# Patient Record
Sex: MALE | Race: WHITE | NOT HISPANIC OR LATINO | URBAN - METROPOLITAN AREA
[De-identification: names, ages, dates, MRNs, and addresses within clinical notes are randomized per-mention and may not be internally consistent; named-entity substitution may affect disease eponyms.]

---

## 2017-03-17 ENCOUNTER — INPATIENT (INPATIENT)
Facility: HOSPITAL | Age: 82
LOS: 4 days | Discharge: HOME | End: 2017-03-22
Attending: INTERNAL MEDICINE | Admitting: INTERNAL MEDICINE

## 2017-03-24 ENCOUNTER — APPOINTMENT (OUTPATIENT)
Dept: CARDIOLOGY | Facility: CLINIC | Age: 82
End: 2017-03-24

## 2017-03-24 VITALS
OXYGEN SATURATION: 97 % | HEART RATE: 87 BPM | BODY MASS INDEX: 28.44 KG/M2 | HEIGHT: 72 IN | WEIGHT: 210 LBS | DIASTOLIC BLOOD PRESSURE: 73 MMHG | SYSTOLIC BLOOD PRESSURE: 130 MMHG

## 2017-03-24 DIAGNOSIS — Z86.79 PERSONAL HISTORY OF OTHER DISEASES OF THE CIRCULATORY SYSTEM: ICD-10-CM

## 2017-03-24 PROBLEM — Z00.00 ENCOUNTER FOR PREVENTIVE HEALTH EXAMINATION: Status: ACTIVE | Noted: 2017-03-24

## 2017-03-24 RX ORDER — CLOPIDOGREL 75 MG/1
75 TABLET, FILM COATED ORAL
Refills: 0 | Status: DISCONTINUED | COMMUNITY
End: 2017-03-24

## 2017-04-03 ENCOUNTER — APPOINTMENT (OUTPATIENT)
Dept: CARDIOLOGY | Facility: CLINIC | Age: 82
End: 2017-04-03

## 2017-04-03 VITALS — SYSTOLIC BLOOD PRESSURE: 140 MMHG | DIASTOLIC BLOOD PRESSURE: 80 MMHG | HEART RATE: 62 BPM

## 2017-04-03 DIAGNOSIS — F15.90 OTHER STIMULANT USE, UNSPECIFIED, UNCOMPLICATED: ICD-10-CM

## 2017-04-03 DIAGNOSIS — Z87.891 PERSONAL HISTORY OF NICOTINE DEPENDENCE: ICD-10-CM

## 2017-04-03 DIAGNOSIS — Z78.9 OTHER SPECIFIED HEALTH STATUS: ICD-10-CM

## 2017-04-13 PROBLEM — Z86.79 HISTORY OF HYPERTENSION: Status: RESOLVED | Noted: 2017-04-13 | Resolved: 2017-04-13

## 2017-04-14 ENCOUNTER — RX RENEWAL (OUTPATIENT)
Age: 82
End: 2017-04-14

## 2017-06-27 DIAGNOSIS — H53.2 DIPLOPIA: ICD-10-CM

## 2017-06-27 DIAGNOSIS — I95.1 ORTHOSTATIC HYPOTENSION: ICD-10-CM

## 2017-06-27 DIAGNOSIS — E03.9 HYPOTHYROIDISM, UNSPECIFIED: ICD-10-CM

## 2017-06-27 DIAGNOSIS — R42 DIZZINESS AND GIDDINESS: ICD-10-CM

## 2017-06-27 DIAGNOSIS — I25.10 ATHEROSCLEROTIC HEART DISEASE OF NATIVE CORONARY ARTERY WITHOUT ANGINA PECTORIS: ICD-10-CM

## 2017-06-27 DIAGNOSIS — Z95.2 PRESENCE OF PROSTHETIC HEART VALVE: ICD-10-CM

## 2017-06-27 DIAGNOSIS — N40.0 BENIGN PROSTATIC HYPERPLASIA WITHOUT LOWER URINARY TRACT SYMPTOMS: ICD-10-CM

## 2017-06-27 DIAGNOSIS — I10 ESSENTIAL (PRIMARY) HYPERTENSION: ICD-10-CM

## 2017-06-27 DIAGNOSIS — I25.2 OLD MYOCARDIAL INFARCTION: ICD-10-CM

## 2017-06-27 DIAGNOSIS — E78.5 HYPERLIPIDEMIA, UNSPECIFIED: ICD-10-CM

## 2017-07-10 ENCOUNTER — APPOINTMENT (OUTPATIENT)
Dept: CARDIOLOGY | Facility: CLINIC | Age: 82
End: 2017-07-10

## 2017-07-10 VITALS
WEIGHT: 210 LBS | OXYGEN SATURATION: 97 % | BODY MASS INDEX: 28.44 KG/M2 | DIASTOLIC BLOOD PRESSURE: 98 MMHG | HEART RATE: 71 BPM | HEIGHT: 72 IN | SYSTOLIC BLOOD PRESSURE: 190 MMHG

## 2017-07-10 VITALS — DIASTOLIC BLOOD PRESSURE: 85 MMHG | SYSTOLIC BLOOD PRESSURE: 145 MMHG

## 2018-01-08 ENCOUNTER — APPOINTMENT (OUTPATIENT)
Dept: CARDIOLOGY | Facility: CLINIC | Age: 83
End: 2018-01-08

## 2018-02-05 ENCOUNTER — APPOINTMENT (OUTPATIENT)
Dept: CARDIOLOGY | Facility: CLINIC | Age: 83
End: 2018-02-05

## 2018-02-05 VITALS
SYSTOLIC BLOOD PRESSURE: 187 MMHG | DIASTOLIC BLOOD PRESSURE: 97 MMHG | HEART RATE: 69 BPM | HEIGHT: 72 IN | BODY MASS INDEX: 28.31 KG/M2 | OXYGEN SATURATION: 98 % | WEIGHT: 209 LBS

## 2018-02-05 DIAGNOSIS — E03.9 HYPOTHYROIDISM, UNSPECIFIED: ICD-10-CM

## 2018-02-19 ENCOUNTER — RX RENEWAL (OUTPATIENT)
Age: 83
End: 2018-02-19

## 2018-06-03 ENCOUNTER — INPATIENT (INPATIENT)
Facility: HOSPITAL | Age: 83
LOS: 2 days | Discharge: HOME | End: 2018-06-06
Attending: INTERNAL MEDICINE | Admitting: INTERNAL MEDICINE

## 2018-06-03 VITALS
HEART RATE: 94 BPM | DIASTOLIC BLOOD PRESSURE: 78 MMHG | OXYGEN SATURATION: 96 % | RESPIRATION RATE: 20 BRPM | TEMPERATURE: 101 F | SYSTOLIC BLOOD PRESSURE: 161 MMHG

## 2018-06-03 DIAGNOSIS — Z95.2 PRESENCE OF PROSTHETIC HEART VALVE: Chronic | ICD-10-CM

## 2018-06-03 DIAGNOSIS — Z98.890 OTHER SPECIFIED POSTPROCEDURAL STATES: Chronic | ICD-10-CM

## 2018-06-03 LAB
ALBUMIN SERPL ELPH-MCNC: 4 G/DL — SIGNIFICANT CHANGE UP (ref 3.5–5.2)
ALP SERPL-CCNC: 61 U/L — SIGNIFICANT CHANGE UP (ref 30–115)
ALT FLD-CCNC: 12 U/L — SIGNIFICANT CHANGE UP (ref 0–41)
ANION GAP SERPL CALC-SCNC: 16 MMOL/L — HIGH (ref 7–14)
APPEARANCE UR: (no result)
AST SERPL-CCNC: 13 U/L — SIGNIFICANT CHANGE UP (ref 0–41)
BACTERIA # UR AUTO: (no result) /HPF
BASE EXCESS BLDV CALC-SCNC: 2.8 MMOL/L — HIGH (ref -2–2)
BILIRUB SERPL-MCNC: 0.8 MG/DL — SIGNIFICANT CHANGE UP (ref 0.2–1.2)
BILIRUB UR-MCNC: NEGATIVE — SIGNIFICANT CHANGE UP
BUN SERPL-MCNC: 21 MG/DL — HIGH (ref 10–20)
CA-I SERPL-SCNC: 1.17 MMOL/L — SIGNIFICANT CHANGE UP (ref 1.12–1.3)
CALCIUM SERPL-MCNC: 8.8 MG/DL — SIGNIFICANT CHANGE UP (ref 8.5–10.1)
CHLORIDE SERPL-SCNC: 97 MMOL/L — LOW (ref 98–110)
CO2 SERPL-SCNC: 24 MMOL/L — SIGNIFICANT CHANGE UP (ref 17–32)
COLOR SPEC: SIGNIFICANT CHANGE UP
CREAT SERPL-MCNC: 1.5 MG/DL — SIGNIFICANT CHANGE UP (ref 0.7–1.5)
DIFF PNL FLD: (no result)
GAS PNL BLDV: 137 MMOL/L — SIGNIFICANT CHANGE UP (ref 136–145)
GAS PNL BLDV: SIGNIFICANT CHANGE UP
GLUCOSE SERPL-MCNC: 116 MG/DL — HIGH (ref 70–99)
GLUCOSE UR QL: NEGATIVE MG/DL — SIGNIFICANT CHANGE UP
HCO3 BLDV-SCNC: 28 MMOL/L — SIGNIFICANT CHANGE UP (ref 22–29)
HCT VFR BLD CALC: 40.1 % — LOW (ref 42–52)
HCT VFR BLDA CALC: 41.9 % — SIGNIFICANT CHANGE UP (ref 34–44)
HGB BLD CALC-MCNC: 13.7 G/DL — LOW (ref 14–18)
HGB BLD-MCNC: 13.4 G/DL — LOW (ref 14–18)
KETONES UR-MCNC: NEGATIVE — SIGNIFICANT CHANGE UP
LACTATE BLDV-MCNC: 1.5 MMOL/L — SIGNIFICANT CHANGE UP (ref 0.5–1.6)
LEUKOCYTE ESTERASE UR-ACNC: (no result)
MAGNESIUM SERPL-MCNC: 1.6 MG/DL — LOW (ref 1.8–2.4)
MCHC RBC-ENTMCNC: 32 PG — HIGH (ref 27–31)
MCHC RBC-ENTMCNC: 33.4 G/DL — SIGNIFICANT CHANGE UP (ref 32–37)
MCV RBC AUTO: 95.7 FL — HIGH (ref 80–94)
NITRITE UR-MCNC: POSITIVE
NRBC # BLD: 0 /100 WBCS — SIGNIFICANT CHANGE UP (ref 0–0)
PCO2 BLDV: 44 MMHG — SIGNIFICANT CHANGE UP (ref 41–51)
PH BLDV: 7.41 — SIGNIFICANT CHANGE UP (ref 7.26–7.43)
PH UR: 6 — SIGNIFICANT CHANGE UP (ref 5–8)
PLATELET # BLD AUTO: 169 K/UL — SIGNIFICANT CHANGE UP (ref 130–400)
PO2 BLDV: 21 MMHG — SIGNIFICANT CHANGE UP (ref 20–40)
POTASSIUM BLDV-SCNC: 4 MMOL/L — SIGNIFICANT CHANGE UP (ref 3.3–5.6)
POTASSIUM SERPL-MCNC: 4.1 MMOL/L — SIGNIFICANT CHANGE UP (ref 3.5–5)
POTASSIUM SERPL-SCNC: 4.1 MMOL/L — SIGNIFICANT CHANGE UP (ref 3.5–5)
PROT SERPL-MCNC: 6.5 G/DL — SIGNIFICANT CHANGE UP (ref 6–8)
PROT UR-MCNC: 100 MG/DL
RBC # BLD: 4.19 M/UL — LOW (ref 4.7–6.1)
RBC # FLD: 12.9 % — SIGNIFICANT CHANGE UP (ref 11.5–14.5)
RBC CASTS # UR COMP ASSIST: (no result) /HPF
SAO2 % BLDV: 35 % — SIGNIFICANT CHANGE UP
SODIUM SERPL-SCNC: 137 MMOL/L — SIGNIFICANT CHANGE UP (ref 135–146)
SP GR SPEC: 1.02 — SIGNIFICANT CHANGE UP (ref 1.01–1.03)
UROBILINOGEN FLD QL: 1 MG/DL (ref 0.2–0.2)
WBC # BLD: 21.54 K/UL — HIGH (ref 4.8–10.8)
WBC # FLD AUTO: 21.54 K/UL — HIGH (ref 4.8–10.8)
WBC UR QL: >50 /HPF

## 2018-06-03 RX ORDER — ACETAMINOPHEN 500 MG
650 TABLET ORAL ONCE
Qty: 0 | Refills: 0 | Status: COMPLETED | OUTPATIENT
Start: 2018-06-03 | End: 2018-06-03

## 2018-06-03 RX ORDER — PANTOPRAZOLE SODIUM 20 MG/1
40 TABLET, DELAYED RELEASE ORAL
Qty: 0 | Refills: 0 | Status: DISCONTINUED | OUTPATIENT
Start: 2018-06-03 | End: 2018-06-06

## 2018-06-03 RX ORDER — LISINOPRIL 2.5 MG/1
20 TABLET ORAL DAILY
Qty: 0 | Refills: 0 | Status: DISCONTINUED | OUTPATIENT
Start: 2018-06-03 | End: 2018-06-06

## 2018-06-03 RX ORDER — SODIUM CHLORIDE 9 MG/ML
3000 INJECTION INTRAMUSCULAR; INTRAVENOUS; SUBCUTANEOUS ONCE
Qty: 0 | Refills: 0 | Status: COMPLETED | OUTPATIENT
Start: 2018-06-03 | End: 2018-06-03

## 2018-06-03 RX ORDER — CEFTRIAXONE 500 MG/1
1 INJECTION, POWDER, FOR SOLUTION INTRAMUSCULAR; INTRAVENOUS EVERY 24 HOURS
Qty: 0 | Refills: 0 | Status: DISCONTINUED | OUTPATIENT
Start: 2018-06-04 | End: 2018-06-06

## 2018-06-03 RX ORDER — ATORVASTATIN CALCIUM 80 MG/1
80 TABLET, FILM COATED ORAL AT BEDTIME
Qty: 0 | Refills: 0 | Status: DISCONTINUED | OUTPATIENT
Start: 2018-06-03 | End: 2018-06-06

## 2018-06-03 RX ORDER — ASPIRIN/CALCIUM CARB/MAGNESIUM 324 MG
81 TABLET ORAL DAILY
Qty: 0 | Refills: 0 | Status: DISCONTINUED | OUTPATIENT
Start: 2018-06-03 | End: 2018-06-06

## 2018-06-03 RX ORDER — APIXABAN 2.5 MG/1
5 TABLET, FILM COATED ORAL EVERY 12 HOURS
Qty: 0 | Refills: 0 | Status: DISCONTINUED | OUTPATIENT
Start: 2018-06-03 | End: 2018-06-06

## 2018-06-03 RX ORDER — LEVOTHYROXINE SODIUM 125 MCG
75 TABLET ORAL
Qty: 0 | Refills: 0 | Status: DISCONTINUED | OUTPATIENT
Start: 2018-06-03 | End: 2018-06-06

## 2018-06-03 RX ORDER — MAGNESIUM SULFATE 500 MG/ML
2 VIAL (ML) INJECTION ONCE
Qty: 0 | Refills: 0 | Status: COMPLETED | OUTPATIENT
Start: 2018-06-03 | End: 2018-06-03

## 2018-06-03 RX ORDER — FOLIC ACID 0.8 MG
1 TABLET ORAL ONCE
Qty: 0 | Refills: 0 | Status: COMPLETED | OUTPATIENT
Start: 2018-06-03 | End: 2018-06-03

## 2018-06-03 RX ORDER — TAMSULOSIN HYDROCHLORIDE 0.4 MG/1
0.4 CAPSULE ORAL AT BEDTIME
Qty: 0 | Refills: 0 | Status: DISCONTINUED | OUTPATIENT
Start: 2018-06-03 | End: 2018-06-06

## 2018-06-03 RX ORDER — CEFTRIAXONE 500 MG/1
1 INJECTION, POWDER, FOR SOLUTION INTRAMUSCULAR; INTRAVENOUS ONCE
Qty: 0 | Refills: 0 | Status: COMPLETED | OUTPATIENT
Start: 2018-06-03 | End: 2018-06-03

## 2018-06-03 RX ADMIN — Medication 650 MILLIGRAM(S): at 21:22

## 2018-06-03 RX ADMIN — CEFTRIAXONE 100 GRAM(S): 500 INJECTION, POWDER, FOR SOLUTION INTRAMUSCULAR; INTRAVENOUS at 21:22

## 2018-06-03 RX ADMIN — Medication 50 GRAM(S): at 22:38

## 2018-06-03 RX ADMIN — SODIUM CHLORIDE 2000 MILLILITER(S): 9 INJECTION INTRAMUSCULAR; INTRAVENOUS; SUBCUTANEOUS at 21:22

## 2018-06-03 NOTE — ED PROVIDER NOTE - CARE PLAN
Principal Discharge DX:	Acute cystitis without hematuria  Secondary Diagnosis:	Fever  Secondary Diagnosis:	Leukocytosis Principal Discharge DX:	Acute cystitis without hematuria  Assessment and plan of treatment:	86m w UTI symptoms and fever concerning for systemic infection. nontoxic appearing, n/v itnact. --CBC, CMP, lactate, blood/urine cx, CXR, UA. --IV fluids, antipyretics, abx, admit  Secondary Diagnosis:	Fever  Secondary Diagnosis:	Leukocytosis

## 2018-06-03 NOTE — H&P ADULT - ASSESSMENT
# Fever w/leukocytosis and urinary symptoms - complicated UTI vs Pyelonephritis  - F/u Urine & Blood cx  - IV Abx    # AF     # CAD c/p MI    # Hypothyroidism 86/ hx of HTN, HLD, CAD s/p MI (2010), AF (on eliquis), Porcine AV (2013), loop recorder (2017 - for eval of TIA - not removed), hypothyrodism, presents with fever and difficulty urinating & dysuria x1 day. At home had temperature 101.8F.    # Fever w/leukocytosis and urinary symptoms - complicated UTI vs Pyelonephritis  - No hypotnesion, recieved 3L IVF in Ed  - Family requested no ricardo catheter (daughter is an RN), will monitor urine outpt - if <0.5cc/kg/hr place ricardo; or if pt is retaining urine based on imaging studies - place ricardo  - Cr stable (Baseline 1.4)  - F/u Urine & Blood cx  - IV Abx - ceftriaxone  - Will check bladder - kidney sono;  - C/w flomax    # AF - C/w eliquis; pt not on rate control medication; no tachycardia now  - pt still has loop recorder (from 2017)    # CAD c/p MI (2010) - C/w Aspirin & statin  # Hypothyroidism - c/w levothyroxine      OOBTC  No dvt ppx - on eliquis  GI ppx  Full Code 86/ hx of HTN, HLD, CAD s/p MI (2010), AF (on eliquis), Porcine AV (2013), loop recorder (2017 - for eval of TIA - not removed), hypothyrodism, presents with fever and difficulty urinating & dysuria x1 day. At home had temperature 101.8F.    # Fever w/leukocytosis and urinary symptoms - complicated UTI vs Pyelonephritis  - No hypotnesion, recieved 3L IVF in Ed  - Family requested no ricardo catheter (daughter is an RN), will monitor urine outpt - if <0.5cc/kg/hr place ricardo; or if pt is retaining urine based on imaging studies - place ricardo  - Cr stable (Baseline 1.4)  - F/u Urine & Blood cx  - IV Abx - ceftriaxone  - Will check bladder - kidney sono;  - C/w flomax    # AF - C/w eliquis; pt not on rate control medication; no tachycardia now  - pt still has loop recorder (from 2017)    #HTN - c/w lisinopril  # CAD c/p MI (2010) - C/w Aspirin & statin  # Hypothyroidism - c/w levothyroxine      OOBTC  No dvt ppx - on eliquis  GI ppx  Full Code 86/ hx of HTN, HLD, CAD s/p MI (2010), AF (on eliquis), Porcine AV (2013), loop recorder (2017 - for eval of TIA - not removed), hypothyrodism, presents with fever and difficulty urinating & dysuria x1 day. At home had temperature 101.8F.    # Fever w/leukocytosis and urinary symptoms - complicated UTI vs Pyelonephritis  - No hypotnesion, recieved 3L IVF in Ed  - Family requested no ricardo catheter (daughter is an RN), will monitor urine outpt - if <0.5cc/kg/hr place ricardo; or if pt is retaining urine based on imaging studies - place ricardo  - Cr stable (Baseline 1.4)  - F/u Urine & Blood cx  - IV Abx - ceftriaxone  - Will check bladder - kidney sono;  - C/w flomax    # AF - C/w eliquis; pt not on rate control medication; no tachycardia now  - pt still has loop recorder (from 2017)    #HTN - c/w lisinopril  # CAD c/p MI (2010) - C/w Aspirin & statin  # Hypothyroidism - c/w levothyroxine    # ETOH use daily - reports no hx of w/d. Drinks 3-4 "small" glasses wine a day. Goes days without wine as well. No s/s of w/d now.      OOBTC  No dvt ppx - on eliquis  GI ppx  Full Code 86/ hx of HTN, HLD, CAD s/p MI (2010), AF (on eliquis), Porcine AV (2013), loop recorder (2017 - for eval of TIA - not removed), hypothyrodism, presents with fever and difficulty urinating & dysuria x1 day. At home had temperature 101.8F.    # Fever w/leukocytosis and urinary symptoms - complicated UTI vs Pyelonephritis  - No hypotnesion, recieved 3L IVF in Ed  - Family requested no ricardo catheter (daughter is an RN), will monitor urine outpt - if <0.5cc/kg/hr place ricardo; or if pt is retaining urine based on imaging studies - place ricardo  - Cr stable (Baseline 1.4)  - F/u Urine & Blood cx  - IV Abx - ceftriaxone  - Will check bladder - kidney sono;  - C/w flomax    # AF - C/w eliquis; pt not on rate control medication; no tachycardia now  - pt still has loop recorder (from 2017)    #HTN - c/w lisinopril  # CAD c/p MI (2010) - C/w Aspirin & statin (not on a b-blocker)  - monitor HR & BP, consider Metoprolol if VS stable    # Hypothyroidism - c/w levothyroxine    # ETOH use daily - reports no hx of w/d. Drinks 3-4 "small" glasses wine a day. Goes days without wine as well. No s/s of w/d now.      Medication list confirmed with wife.    OOBTC  No dvt ppx - on eliquis  GI ppx  Full Code 86/ hx of HTN, HLD, CAD s/p MI (2010), AF (on eliquis), Porcine AV (2013), loop recorder (2017 - for eval of TIA - not removed), hypothyrodism, presents with fever and difficulty urinating & dysuria x1 day. At home had temperature 101.8F.    # Fever w/leukocytosis and urinary symptoms - complicated UTI vs Pyelonephritis  - No hypotnesion, recieved 3L IVF in Ed  - Family requested no ricardo catheter (daughter is an RN), will monitor urine outpt - if <0.5cc/kg/hr place ricardo; or if pt is retaining urine based on imaging studies - place ricardo  - Cr stable (Baseline 1.4)  - F/u Urine & Blood cx  - IV Abx - ceftriaxone  - Will check bladder - kidney sono;  - C/w flomax    # AF - C/w eliquis; pt not on rate control medication; no tachycardia now  - pt still has loop recorder (from 2017)    #HTN - c/w lisinopril  # CAD c/p MI (2010) - C/w Aspirin & statin (not on a b-blocker)  - monitor HR & BP, consider Metoprolol if VS stable    # Hypothyroidism - c/w levothyroxine    # ETOH use daily - reports no hx of w/d. Drinks 3-4 "small" glasses wine a day. Goes days without wine as well. No s/s of w/d now.      # Mg - repleted in ED    Medication list confirmed with wife.    OOBTC  No dvt ppx - on eliquis  GI ppx  Full Code 86/ hx of HTN, HLD, CAD s/p MI (2010), AF (on eliquis), Porcine AV (2013), loop recorder (2017 - for eval of TIA - not removed), hypothyrodism, presents with fever and difficulty urinating & dysuria x1 day. At home had temperature 101.8F.    # Fever w/leukocytosis and urinary symptoms - complicated UTI vs Pyelonephritis  - No hypotnesion, recieved 3L IVF in Ed  - Family requested no ricardo catheter (daughter is an RN), will monitor urine outpt - if <0.5cc/kg/hr place ricardo; or if pt is retaining urine based on imaging studies - place ricardo  - Cr stable (Baseline 1.4)  - F/u Urine & Blood cx  - IV Abx - ceftriaxone  - Will check bladder - kidney sono;  - C/w flomax    # AF - C/w eliquis; pt not on rate control medication; no tachycardia now  - pt still has loop recorder (from 2017)    #HTN - c/w lisinopril  # CAD c/p MI (2010) - C/w Aspirin & statin (not on a b-blocker)  - monitor HR & BP, consider Metoprolol if VS stable    # Hypothyroidism - c/w levothyroxine    # ETOH use daily - reports no hx of w/d. Drinks 3-4 "small" glasses wine a day. Goes days without wine as well. No s/s of w/d now.      # Mg - repleted in ED    Medication list confirmed with wife. family concerned about issue on medication non-compliance.    OOBTC  No dvt ppx - on eliquis  GI ppx  Full Code 86/ hx of HTN, HLD, CAD s/p MI (2010), AF (on eliquis), Porcine AV (2013), loop recorder (2017 - for eval of TIA - not removed), hypothyrodism, presents with fever and difficulty urinating & dysuria x1 day. At home had temperature 101.8F.    # Fever w/leukocytosis and urinary symptoms - complicated UTI vs Pyelonephritis  - No hypotnesion, recieved 3L IVF in Ed  - Family requested no ricardo catheter (daughter is an RN), will monitor urine outpt - if <0.5cc/kg/hr place ricardo; or if pt is retaining urine based on imaging studies - place ricardo  - Cr stable (Baseline 1.4)  - F/u Urine & Blood cx  - IV Abx - ceftriaxone  - Will check bladder - kidney sono;  - C/w flomax    # AF - C/w eliquis; pt not on rate control medication; no tachycardia now  - pt still has loop recorder (from 2017)  - Monitor BMP daily for renal function - may need dose adjustment of eliquis if reduction in renal function.    #HTN - c/w lisinopril  # CAD c/p MI (2010) - C/w Aspirin & statin (not on a b-blocker)  - monitor HR & BP, consider Metoprolol if VS stable    # Hypothyroidism - c/w levothyroxine    # ETOH use daily - reports no hx of w/d. Drinks 3-4 "small" glasses wine a day. Goes days without wine as well. No s/s of w/d now.      # Mg - repleted in ED    Medication list confirmed with wife. family concerned about issue on medication non-compliance.    OOBTC  No dvt ppx - on eliquis  GI ppx  Full Code

## 2018-06-03 NOTE — H&P ADULT - NSHPLABSRESULTS_GEN_ALL_CORE
13.4   21.54 )-----------( 169      ( 2018 20:59 )             40.1       06-03    137  |  97<L>  |  21<H>  ----------------------------<  116<H>  4.1   |  24  |  1.5    Ca    8.8      2018 20:59  Mg     1.6     -    TPro  6.5  /  Alb  4.0  /  TBili  0.8  /  DBili  x   /  AST  13  /  ALT  12  /  AlkPhos  61  -        Urinalysis Basic - ( 2018 20:59 )    Color: Dark Yellow / Appearance: Cloudy / S.025 / pH: x  Gluc: x / Ketone: Negative  / Bili: Negative / Urobili: 1.0 mg/dL   Blood: x / Protein: 100 mg/dL / Nitrite: Positive   Leuk Esterase: Moderate / RBC: 5-10 /HPF / WBC >50 /HPF   Sq Epi: x / Non Sq Epi: x / Bacteria: Many /HPF            Lactate Trend            CAPILLARY BLOOD GLUCOSE

## 2018-06-03 NOTE — ED PROVIDER NOTE - NS ED ROS FT
Review of Systems  Constitutional:  +fever  Eyes:  Negative.   ENMT:  No nasal congestion, discharge, or throat pain.   Cardiac:  No chest pain, syncope, or edema.  Respiratory:  No dyspnea, wheezing, or cough. No hemoptysis.  GI:  No vomiting, diarrhea, or abdominal pain. No melena or hematochezia.  :  +dysuria, +frequency. No hematuria.  Musculoskeletal:  No joint swelling, joint pain, or back pain.  Skin:  No skin rash, jaundice, or lesions.  Neuro:  No headache, loss of sensation, or focal weakness.  No change in mental status.

## 2018-06-03 NOTE — H&P ADULT - PSH
H/O aortic valve replacement  porcine H/O aortic valve replacement  porcine  History of loop recorder  2017

## 2018-06-03 NOTE — H&P ADULT - HISTORY OF PRESENT ILLNESS
86/ hx of HTN, HLD, CAD s/p MI, AF (on eliquis), Porcine AV, loop recorder, hypothyrodism, presents with fever and difficulty urinating.    While in ED WBC 23K, with positive, and fever 101.2 (hoem 101.8). 86/ hx of HTN, HLD, CAD s/p MI (2010), AF (on eliquis), Porcine AV (2013), loop recorder (2017 - for eval of TIA - not removed), hypothyrodism, presents with fever and difficulty urinating & dysuria x1 day. At home had temperature 101.8F. Came to ED for eval. While in ED WBC 23K, with positive UA, and fever 101.2.    No chest pain, SOB, N/V/diarrhea. No bloody / black stools. no hx similar events

## 2018-06-03 NOTE — ED PROVIDER NOTE - PHYSICAL EXAMINATION
Physical Exam  General: Awake, alert, NAD, WDWN, non-toxic appearing, NCAT  Eyes: PERRL, EOMI, no icterus, lids and conjunctivae are normal  ENT: External inspection normal, pink/dry membranes, no pharyngeal erythema/exudate  CV: S1S2, regular rate and rhythm, no murmur/gallops/rubs, no JVD, 2+ pulses b/l, no edema/cords/homans, warm/well-perfused  Respiratory: Normal respiratory rate/effort, no respiratory distress, normal voice, speaking full sentences, lungs clear to auscultation b/l, no wheezing/rales/rhonchi, no retractions, no stridor  Abdomen: Soft abdomen, no tender/distended/guarding/rebound, no CVA tender  Musculoskeletal: FROM all 4 extremities, N/V intact  Neck: FROM neck, supple, no meningismus, trachea midline, no JVD  Integumentary: Color normal for race, warm and dry, no rash  Neuro: Oriented x3, CN 2-12 grossly intact, normal motor, normal sensory  Psych: Oriented x3, mood normal, affect normal

## 2018-06-03 NOTE — ED PROVIDER NOTE - MEDICAL DECISION MAKING DETAILS
86m w UTI symptoms and fever concerning for systemic infection. Abx & IV fluids given. Patient admitted for further care and management.

## 2018-06-03 NOTE — ED PROVIDER NOTE - PLAN OF CARE
86m w UTI symptoms and fever concerning for systemic infection. nontoxic appearing, n/v itnact. --CBC, CMP, lactate, blood/urine cx, CXR, UA. --IV fluids, antipyretics, abx, admit

## 2018-06-03 NOTE — H&P ADULT - NSHPREVIEWOFSYSTEMS_GEN_ALL_CORE
REVIEW OF SYSTEMS:    CONSTITUTIONAL: see hpi  EYES/ENT: No visual changes  NECK: No pain or stiffness  RESPIRATORY: No cough, wheezing, hemoptysis; No shortness of breath  CARDIOVASCULAR: No chest pain or palpitations, no lower extremity edema  GASTROINTESTINAL: No abdominal pain. No nausea or vomiting; No diarrhea or constipation. No bloody or black colored stool  GENITOURINARY: see hpi  NEUROLOGICAL: No numbness or weakness  SKIN: No itching, rashes

## 2018-06-03 NOTE — H&P ADULT - NSHPPHYSICALEXAM_GEN_ALL_CORE
Vital Signs Last 24 Hrs  T(C): 37.2 (03 Jun 2018 22:38), Max: 38.4 (03 Jun 2018 19:45)  T(F): 98.9 (03 Jun 2018 22:38), Max: 101.1 (03 Jun 2018 19:45)  HR: 94 (03 Jun 2018 19:45) (94 - 94)  BP: 161/78 (03 Jun 2018 19:45) (161/78 - 161/78)  BP(mean): --  RR: 20 (03 Jun 2018 19:45) (20 - 20)  SpO2: 96% (03 Jun 2018 19:45) (96% - 96%) Vital Signs Last 24 Hrs  T(C): 37.2 (03 Jun 2018 22:38), Max: 38.4 (03 Jun 2018 19:45)  T(F): 98.9 (03 Jun 2018 22:38), Max: 101.1 (03 Jun 2018 19:45)  HR: 94 (03 Jun 2018 19:45) (94 - 94)  BP: 161/78 (03 Jun 2018 19:45) (161/78 - 161/78)  BP(mean): --  RR: 20 (03 Jun 2018 19:45) (20 - 20)  SpO2: 96% (03 Jun 2018 19:45) (96% - 96%)    PHYSICAL EXAM:  GENERAL: NAD, speaks in full sentences, no signs of respiratory distress, lying flat  HEAD:  Atraumatic, Normocephalic  EYES: EOMI, conjunctiva and sclera clear  NECK: Supple  CHEST/LUNG: Clear to auscultation bilaterally; No wheeze; No crackles; No accessory muscles used  HEART: irregular rhythm, +murmur, no LE edema  ABDOMEN: Soft, Nontender, Nondistended; Bowel sounds present; No guarding  EXTREMITIES:  no LE edema  NEUROLOGY: follows all commands  SKIN: No rashes or lesions

## 2018-06-03 NOTE — ED PROVIDER NOTE - OBJECTIVE STATEMENT
86m w a hx of a-fib, CAD, HLD, HTN, & porcine aortic valve. Pt presents w dysuria and frequency for the past few days now w fever 101.8 today. Symptoms are constant, moderate, no exacerbating/alleviating. No recent travel/hosp/immobilization/abx.

## 2018-06-04 LAB
ALBUMIN SERPL ELPH-MCNC: 3 G/DL — LOW (ref 3.5–5.2)
ALP SERPL-CCNC: 52 U/L — SIGNIFICANT CHANGE UP (ref 30–115)
ALT FLD-CCNC: 8 U/L — SIGNIFICANT CHANGE UP (ref 0–41)
ANION GAP SERPL CALC-SCNC: 14 MMOL/L — SIGNIFICANT CHANGE UP (ref 7–14)
AST SERPL-CCNC: 10 U/L — SIGNIFICANT CHANGE UP (ref 0–41)
BILIRUB SERPL-MCNC: 0.5 MG/DL — SIGNIFICANT CHANGE UP (ref 0.2–1.2)
BUN SERPL-MCNC: 20 MG/DL — SIGNIFICANT CHANGE UP (ref 10–20)
CALCIUM SERPL-MCNC: 7.7 MG/DL — LOW (ref 8.5–10.1)
CHLORIDE SERPL-SCNC: 107 MMOL/L — SIGNIFICANT CHANGE UP (ref 98–110)
CO2 SERPL-SCNC: 20 MMOL/L — SIGNIFICANT CHANGE UP (ref 17–32)
CREAT SERPL-MCNC: 1.2 MG/DL — SIGNIFICANT CHANGE UP (ref 0.7–1.5)
GLUCOSE SERPL-MCNC: 103 MG/DL — HIGH (ref 70–99)
HCT VFR BLD CALC: 33.1 % — LOW (ref 42–52)
HGB BLD-MCNC: 10.8 G/DL — LOW (ref 14–18)
MAGNESIUM SERPL-MCNC: 2.1 MG/DL — SIGNIFICANT CHANGE UP (ref 1.8–2.4)
MCHC RBC-ENTMCNC: 31.7 PG — HIGH (ref 27–31)
MCHC RBC-ENTMCNC: 32.6 G/DL — SIGNIFICANT CHANGE UP (ref 32–37)
MCV RBC AUTO: 97.1 FL — HIGH (ref 80–94)
NRBC # BLD: 0 /100 WBCS — SIGNIFICANT CHANGE UP (ref 0–0)
PHOSPHATE SERPL-MCNC: 2.4 MG/DL — SIGNIFICANT CHANGE UP (ref 2.1–4.9)
PLATELET # BLD AUTO: 138 K/UL — SIGNIFICANT CHANGE UP (ref 130–400)
POTASSIUM SERPL-MCNC: 3.9 MMOL/L — SIGNIFICANT CHANGE UP (ref 3.5–5)
POTASSIUM SERPL-SCNC: 3.9 MMOL/L — SIGNIFICANT CHANGE UP (ref 3.5–5)
PROT SERPL-MCNC: 5 G/DL — LOW (ref 6–8)
RBC # BLD: 3.41 M/UL — LOW (ref 4.7–6.1)
RBC # FLD: 12.9 % — SIGNIFICANT CHANGE UP (ref 11.5–14.5)
SODIUM SERPL-SCNC: 141 MMOL/L — SIGNIFICANT CHANGE UP (ref 135–146)
WBC # BLD: 15.54 K/UL — HIGH (ref 4.8–10.8)
WBC # FLD AUTO: 15.54 K/UL — HIGH (ref 4.8–10.8)

## 2018-06-04 RX ORDER — SODIUM CHLORIDE 9 MG/ML
1000 INJECTION INTRAMUSCULAR; INTRAVENOUS; SUBCUTANEOUS
Qty: 0 | Refills: 0 | Status: DISCONTINUED | OUTPATIENT
Start: 2018-06-04 | End: 2018-06-04

## 2018-06-04 RX ORDER — LACTULOSE 10 G/15ML
10 SOLUTION ORAL ONCE
Qty: 0 | Refills: 0 | Status: DISCONTINUED | OUTPATIENT
Start: 2018-06-04 | End: 2018-06-06

## 2018-06-04 RX ORDER — SENNA PLUS 8.6 MG/1
2 TABLET ORAL AT BEDTIME
Qty: 0 | Refills: 0 | Status: DISCONTINUED | OUTPATIENT
Start: 2018-06-04 | End: 2018-06-06

## 2018-06-04 RX ORDER — DOCUSATE SODIUM 100 MG
100 CAPSULE ORAL
Qty: 0 | Refills: 0 | Status: DISCONTINUED | OUTPATIENT
Start: 2018-06-04 | End: 2018-06-06

## 2018-06-04 RX ORDER — SODIUM CHLORIDE 9 MG/ML
1000 INJECTION INTRAMUSCULAR; INTRAVENOUS; SUBCUTANEOUS
Qty: 0 | Refills: 0 | Status: DISCONTINUED | OUTPATIENT
Start: 2018-06-04 | End: 2018-06-05

## 2018-06-04 RX ADMIN — APIXABAN 5 MILLIGRAM(S): 2.5 TABLET, FILM COATED ORAL at 18:28

## 2018-06-04 RX ADMIN — Medication 81 MILLIGRAM(S): at 00:09

## 2018-06-04 RX ADMIN — SODIUM CHLORIDE 60 MILLILITER(S): 9 INJECTION INTRAMUSCULAR; INTRAVENOUS; SUBCUTANEOUS at 12:56

## 2018-06-04 RX ADMIN — Medication 100 MILLIGRAM(S): at 22:33

## 2018-06-04 RX ADMIN — Medication 75 MICROGRAM(S): at 05:57

## 2018-06-04 RX ADMIN — CEFTRIAXONE 100 GRAM(S): 500 INJECTION, POWDER, FOR SOLUTION INTRAMUSCULAR; INTRAVENOUS at 22:34

## 2018-06-04 RX ADMIN — LISINOPRIL 20 MILLIGRAM(S): 2.5 TABLET ORAL at 00:09

## 2018-06-04 RX ADMIN — ATORVASTATIN CALCIUM 80 MILLIGRAM(S): 80 TABLET, FILM COATED ORAL at 22:33

## 2018-06-04 RX ADMIN — Medication 81 MILLIGRAM(S): at 13:13

## 2018-06-04 RX ADMIN — TAMSULOSIN HYDROCHLORIDE 0.4 MILLIGRAM(S): 0.4 CAPSULE ORAL at 00:10

## 2018-06-04 RX ADMIN — TAMSULOSIN HYDROCHLORIDE 0.4 MILLIGRAM(S): 0.4 CAPSULE ORAL at 22:34

## 2018-06-04 RX ADMIN — ATORVASTATIN CALCIUM 80 MILLIGRAM(S): 80 TABLET, FILM COATED ORAL at 00:09

## 2018-06-04 RX ADMIN — Medication 1 MILLIGRAM(S): at 00:09

## 2018-06-04 RX ADMIN — SENNA PLUS 2 TABLET(S): 8.6 TABLET ORAL at 22:34

## 2018-06-04 RX ADMIN — APIXABAN 5 MILLIGRAM(S): 2.5 TABLET, FILM COATED ORAL at 05:56

## 2018-06-04 NOTE — CONSULT NOTE ADULT - SUBJECTIVE AND OBJECTIVE BOX
NEPHROLOGY CONSULTATION NOTE    86/ hx of HTN, HLD, CAD s/p MI (2010), AF (on eliquis), Porcine AV (), loop recorder, hypothyroidism, presents with fever and difficulty urinating & dysuria x1 day. At home had temperature 101.8F. Came to ED for eval. While in ED WBC 23K, with positive UA, and fever 101.2.  Cr baseline 1.3, admission 1.5, now 1.2    PAST MEDICAL & SURGICAL HISTORY:  Hypothyroidism  Hypertension  Hyperlipidemia  Atrial fibrillation  Coronary artery disease  History of loop recorder:   H/O aortic valve replacement: porcine  CKD    Allergies:  Allergy Status Unknown    Home Medications Reviewed    SOCIAL HISTORY:  Denies ETOH,Smoking,   FAMILY HISTORY:  No pertinent family history in first degree relatives        REVIEW OF SYSTEMS:  All other review of systems is negative unless indicated above.    PHYSICAL EXAM:  NAD  awake and alert  moistmm  no jvd  cta bl  irreg, murmur  soft,nt  no cvat  no cce  no rash    Hospital Medications:   MEDICATIONS  (STANDING):  apixaban 5 milliGRAM(s) Oral every 12 hours  aspirin  chewable 81 milliGRAM(s) Oral daily  atorvastatin 80 milliGRAM(s) Oral at bedtime  cefTRIAXone   IVPB 1 Gram(s) IV Intermittent every 24 hours  levothyroxine 75 MICROGram(s) Oral <User Schedule>  lisinopril 20 milliGRAM(s) Oral daily  pantoprazole    Tablet 40 milliGRAM(s) Oral before breakfast  sodium chloride 0.9%. 1000 milliLiter(s) (60 mL/Hr) IV Continuous <Continuous>  tamsulosin 0.4 milliGRAM(s) Oral at bedtime        VITALS:  T(F): 99.5 (18 @ 16:47), Max: 99.9 (18 @ 09:40)  HR: 65 (18 @ 16:47)  BP: 163/70 (18 @ 16:47)  RR: 18 (18 @ 16:47)  SpO2: 96% (18 @ 07:28)  Wt(kg): --     @ 07:01  -   @ 19:53  --------------------------------------------------------  IN: 490 mL / OUT: 300 mL / NET: 190 mL        Weight (kg): 90.718 ( @ 20:42)    LABS:      141  |  107  |  20  ----------------------------<  103<H>  3.9   |  20  |  1.2    Ca    7.7<L>      2018 07:26  Phos  2.4       Mg     2.1         TPro  5.0<L>  /  Alb  3.0<L>  /  TBili  0.5  /  DBili      /  AST  10  /  ALT  8   /  AlkPhos  52                            10.8   15.54 )-----------( 138      ( 2018 07:26 )             33.1       Urine Studies:  Urinalysis Basic - ( 2018 20:59 )    Color: Dark Yellow / Appearance: Cloudy / S.025 / pH:   Gluc:  / Ketone: Negative  / Bili: Negative / Urobili: 1.0 mg/dL   Blood:  / Protein: 100 mg/dL / Nitrite: Positive   Leuk Esterase: Moderate / RBC: 5-10 /HPF / WBC >50 /HPF   Sq Epi:  / Non Sq Epi:  / Bacteria: Many /HPF          RADIOLOGY & ADDITIONAL STUDIES:

## 2018-06-05 LAB
ANION GAP SERPL CALC-SCNC: 13 MMOL/L — SIGNIFICANT CHANGE UP (ref 7–14)
BUN SERPL-MCNC: 18 MG/DL — SIGNIFICANT CHANGE UP (ref 10–20)
CALCIUM SERPL-MCNC: 7.9 MG/DL — LOW (ref 8.5–10.1)
CHLORIDE SERPL-SCNC: 105 MMOL/L — SIGNIFICANT CHANGE UP (ref 98–110)
CO2 SERPL-SCNC: 20 MMOL/L — SIGNIFICANT CHANGE UP (ref 17–32)
CREAT SERPL-MCNC: 1.2 MG/DL — SIGNIFICANT CHANGE UP (ref 0.7–1.5)
GLUCOSE SERPL-MCNC: 91 MG/DL — SIGNIFICANT CHANGE UP (ref 70–99)
HCT VFR BLD CALC: 32.4 % — LOW (ref 42–52)
HGB BLD-MCNC: 10.8 G/DL — LOW (ref 14–18)
MAGNESIUM SERPL-MCNC: 2 MG/DL — SIGNIFICANT CHANGE UP (ref 1.8–2.4)
MCHC RBC-ENTMCNC: 32.2 PG — HIGH (ref 27–31)
MCHC RBC-ENTMCNC: 33.3 G/DL — SIGNIFICANT CHANGE UP (ref 32–37)
MCV RBC AUTO: 96.7 FL — HIGH (ref 80–94)
NRBC # BLD: 0 /100 WBCS — SIGNIFICANT CHANGE UP (ref 0–0)
PLATELET # BLD AUTO: 138 K/UL — SIGNIFICANT CHANGE UP (ref 130–400)
POTASSIUM SERPL-MCNC: 3.8 MMOL/L — SIGNIFICANT CHANGE UP (ref 3.5–5)
POTASSIUM SERPL-SCNC: 3.8 MMOL/L — SIGNIFICANT CHANGE UP (ref 3.5–5)
RBC # BLD: 3.35 M/UL — LOW (ref 4.7–6.1)
RBC # FLD: 12.6 % — SIGNIFICANT CHANGE UP (ref 11.5–14.5)
SODIUM SERPL-SCNC: 138 MMOL/L — SIGNIFICANT CHANGE UP (ref 135–146)
WBC # BLD: 14.49 K/UL — HIGH (ref 4.8–10.8)
WBC # FLD AUTO: 14.49 K/UL — HIGH (ref 4.8–10.8)

## 2018-06-05 RX ORDER — AMLODIPINE BESYLATE 2.5 MG/1
5 TABLET ORAL DAILY
Qty: 0 | Refills: 0 | Status: DISCONTINUED | OUTPATIENT
Start: 2018-06-05 | End: 2018-06-06

## 2018-06-05 RX ADMIN — Medication 100 MILLIGRAM(S): at 18:47

## 2018-06-05 RX ADMIN — LISINOPRIL 20 MILLIGRAM(S): 2.5 TABLET ORAL at 05:53

## 2018-06-05 RX ADMIN — AMLODIPINE BESYLATE 5 MILLIGRAM(S): 2.5 TABLET ORAL at 19:33

## 2018-06-05 RX ADMIN — APIXABAN 5 MILLIGRAM(S): 2.5 TABLET, FILM COATED ORAL at 05:53

## 2018-06-05 RX ADMIN — TAMSULOSIN HYDROCHLORIDE 0.4 MILLIGRAM(S): 0.4 CAPSULE ORAL at 21:48

## 2018-06-05 RX ADMIN — ATORVASTATIN CALCIUM 80 MILLIGRAM(S): 80 TABLET, FILM COATED ORAL at 21:48

## 2018-06-05 RX ADMIN — Medication 75 MICROGRAM(S): at 05:53

## 2018-06-05 RX ADMIN — SENNA PLUS 2 TABLET(S): 8.6 TABLET ORAL at 21:48

## 2018-06-05 RX ADMIN — APIXABAN 5 MILLIGRAM(S): 2.5 TABLET, FILM COATED ORAL at 18:47

## 2018-06-05 RX ADMIN — Medication 81 MILLIGRAM(S): at 12:34

## 2018-06-05 RX ADMIN — Medication 100 MILLIGRAM(S): at 05:54

## 2018-06-05 RX ADMIN — SODIUM CHLORIDE 60 MILLILITER(S): 9 INJECTION INTRAMUSCULAR; INTRAVENOUS; SUBCUTANEOUS at 00:48

## 2018-06-05 RX ADMIN — CEFTRIAXONE 100 GRAM(S): 500 INJECTION, POWDER, FOR SOLUTION INTRAMUSCULAR; INTRAVENOUS at 21:48

## 2018-06-05 RX ADMIN — PANTOPRAZOLE SODIUM 40 MILLIGRAM(S): 20 TABLET, DELAYED RELEASE ORAL at 07:16

## 2018-06-05 NOTE — PROGRESS NOTE ADULT - SUBJECTIVE AND OBJECTIVE BOX
NEPHROLOGY CONSULTATION NOTE    spoke with team, doing well, no fever, tolerating po.  UCx + ecoli.  Cr stable    PAST MEDICAL & SURGICAL HISTORY:  Hypothyroidism  Hypertension  Hyperlipidemia  Atrial fibrillation  Coronary artery disease  History of loop recorder: 2017  H/O aortic valve replacement: porcine  CKD    Allergies:  Allergy Status Unknown    Home Medications Reviewed    SOCIAL HISTORY:  Denies ETOH,Smoking,   FAMILY HISTORY:  No pertinent family history in first degree relatives        REVIEW OF SYSTEMS:  All other review of systems is negative unless indicated above.    PHYSICAL EXAM:  NAD  awake and alert  moistmm  no jvd  cta bl  irreg, murmur  soft,nt  no cvat  no cce  no rash      Hospital Medications:   MEDICATIONS  (STANDING):  apixaban 5 milliGRAM(s) Oral every 12 hours  aspirin  chewable 81 milliGRAM(s) Oral daily  atorvastatin 80 milliGRAM(s) Oral at bedtime  cefTRIAXone   IVPB 1 Gram(s) IV Intermittent every 24 hours  docusate sodium 100 milliGRAM(s) Oral two times a day  levothyroxine 75 MICROGram(s) Oral <User Schedule>  lisinopril 20 milliGRAM(s) Oral daily  pantoprazole    Tablet 40 milliGRAM(s) Oral before breakfast  senna 2 Tablet(s) Oral at bedtime  sodium chloride 0.9%. 1000 milliLiter(s) (60 mL/Hr) IV Continuous <Continuous>  tamsulosin 0.4 milliGRAM(s) Oral at bedtime        VITALS:  T(F): 97.6 (18 @ 07:56), Max: 100.3 (18 @ 23:41)  HR: 90 (18 @ 07:56)  BP: 145/61 (18 @ 07:56)  RR: 18 (18 @ 07:56)  SpO2: --  Wt(kg): --     @ 07:01  -   @ 07:00  --------------------------------------------------------  IN: 910 mL / OUT: 700 mL / NET: 210 mL     @ 07:01  -   @ 13:59  --------------------------------------------------------  IN: 0 mL / OUT: 200 mL / NET: -200 mL          LABS:      138  |  105  |  18  ----------------------------<  91  3.8   |  20  |  1.2    Ca    7.9<L>      2018 05:38  Phos  2.4       Mg     2.0         TPro  5.0<L>  /  Alb  3.0<L>  /  TBili  0.5  /  DBili      /  AST  10  /  ALT  8   /  AlkPhos  52                            10.8   14.49 )-----------( 138      ( 2018 05:38 )             32.4       Urine Studies:  Urinalysis Basic - ( 2018 20:59 )    Color: Dark Yellow / Appearance: Cloudy / S.025 / pH:   Gluc:  / Ketone: Negative  / Bili: Negative / Urobili: 1.0 mg/dL   Blood:  / Protein: 100 mg/dL / Nitrite: Positive   Leuk Esterase: Moderate / RBC: 5-10 /HPF / WBC >50 /HPF   Sq Epi:  / Non Sq Epi:  / Bacteria: Many /HPF          RADIOLOGY & ADDITIONAL STUDIES:

## 2018-06-05 NOTE — PROGRESS NOTE ADULT - SUBJECTIVE AND OBJECTIVE BOX
SUBJECTIVE:    Patient is a 86y old  Male who presents with a chief complaint of Fever and urinary symptoms (2018 22:43)    Currently admitted to medicine with the primary diagnosis of    Today is hospital day 2d. This morning he is resting comfortably in bed and reports no new issues or overnight events.     PAST MEDICAL & SURGICAL HISTORY  PAST MEDICAL & SURGICAL HISTORY:  Hypothyroidism  Hypertension  Hyperlipidemia  Atrial fibrillation  Coronary artery disease  History of loop recorder: 2017  H/O aortic valve replacement: porcine    SOCIAL HISTORY:    ALLERGIES:  Allergy Status Unknown    MEDICATIONS:  STANDING MEDICATIONS  apixaban 5 milliGRAM(s) Oral every 12 hours  aspirin  chewable 81 milliGRAM(s) Oral daily  atorvastatin 80 milliGRAM(s) Oral at bedtime  cefTRIAXone   IVPB 1 Gram(s) IV Intermittent every 24 hours  docusate sodium 100 milliGRAM(s) Oral two times a day  levothyroxine 75 MICROGram(s) Oral <User Schedule>  lisinopril 20 milliGRAM(s) Oral daily  pantoprazole    Tablet 40 milliGRAM(s) Oral before breakfast  senna 2 Tablet(s) Oral at bedtime  sodium chloride 0.9%. 1000 milliLiter(s) IV Continuous <Continuous>  tamsulosin 0.4 milliGRAM(s) Oral at bedtime    PRN MEDICATIONS  lactulose Syrup 10 Gram(s) Oral once PRN    VITALS:   T(F): 97.6  HR: 90  BP: 145/61  RR: 18  SpO2: --    LABS:                        10.8   14.49 )-----------( 138      ( 2018 05:38 )             32.4     06-05    138  |  105  |  18  ----------------------------<  91  3.8   |  20  |  1.2    Ca    7.9<L>      2018 05:38  Phos  2.4     06-04  Mg     2.0     06-05    TPro  5.0<L>  /  Alb  3.0<L>  /  TBili  0.5  /  DBili  x   /  AST  10  /  ALT  8   /  AlkPhos  52  06-04      Urinalysis Basic - ( 2018 20:59 )    Color: Dark Yellow / Appearance: Cloudy / S.025 / pH: x  Gluc: x / Ketone: Negative  / Bili: Negative / Urobili: 1.0 mg/dL   Blood: x / Protein: 100 mg/dL / Nitrite: Positive   Leuk Esterase: Moderate / RBC: 5-10 /HPF / WBC >50 /HPF   Sq Epi: x / Non Sq Epi: x / Bacteria: Many /HPF            Culture - Urine (collected 2018 20:59)  Source: .Urine Clean Catch (Midstream)  Preliminary Report (2018 10:04):    >100,000 CFU/ml Escherichia coli          RADIOLOGY:    PHYSICAL EXAM:  Head:  Normocephalic, atraumatic  ENT:  Mucosa moist, no ulcerations  Neck:  Supple, no JVD,   Skin: no breakdowns (as per RN)  Resp: CTA B/L  CV: RRR, S1S2,   GI: Soft, NT, bowel sounds  MS: No edema, + peripheral pulses, FROM all 4 extremity

## 2018-06-05 NOTE — CONSULT NOTE ADULT - SUBJECTIVE AND OBJECTIVE BOX
AMA HARDY  86y, Male  Allergy: Allergy Status Unknown      HPI:  86/ hx of HTN, HLD, CAD s/p MI (2010), AF (on eliquis), Porcine AV (), loop recorder (2017 - for eval of TIA - not removed), hypothyrodism, presents with fever and difficulty urinating & dysuria x1 day. At home had temperature 101.8F. Came to ED for eval. While in ED WBC 23K, with positive UA, and fever 101.2.    No chest pain, SOB, N/V/diarrhea. No bloody / black stools. no hx similar events (2018 22:43)    FAMILY HISTORY:  No pertinent family history in first degree relatives    PAST MEDICAL & SURGICAL HISTORY:  Hypothyroidism  Hypertension  Hyperlipidemia  Atrial fibrillation  Coronary artery disease  History of loop recorder:   H/O aortic valve replacement: porcine        VITALS:  T(F): 97.6, Max: 100.3 (18 @ 23:41)  HR: 90  BP: 145/61  RR: 18Vital Signs Last 24 Hrs  T(C): 36.4 (2018 07:56), Max: 37.9 (2018 23:41)  T(F): 97.6 (2018 07:56), Max: 100.3 (2018 23:41)  HR: 90 (2018 07:56) (63 - 90)  BP: 145/61 (2018 07:56) (130/73 - 163/70)  BP(mean): --  RR: 18 (2018 07:56) (18 - 18)  SpO2: --    TESTS & MEASUREMENTS:                        10.8   14.49 )-----------( 138      ( 2018 05:38 )             32.4     06-05    138  |  105  |  18  ----------------------------<  91  3.8   |  20  |  1.2    Ca    7.9<L>      2018 05:38  Phos  2.4     06-04  Mg     2.0     06-05    TPro  5.0<L>  /  Alb  3.0<L>  /  TBili  0.5  /  DBili  x   /  AST  10  /  ALT  8   /  AlkPhos  52  06-04    LIVER FUNCTIONS - ( 2018 07:26 )  Alb: 3.0 g/dL / Pro: 5.0 g/dL / ALK PHOS: 52 U/L / ALT: 8 U/L / AST: 10 U/L / GGT: x             Culture - Urine (collected 18 @ 20:59)  Source: .Urine Clean Catch (Midstream)  Preliminary Report (18 @ 10:04):    >100,000 CFU/ml Escherichia coli      Urinalysis Basic - ( 2018 20:59 )    Color: Dark Yellow / Appearance: Cloudy / S.025 / pH: x  Gluc: x / Ketone: Negative  / Bili: Negative / Urobili: 1.0 mg/dL   Blood: x / Protein: 100 mg/dL / Nitrite: Positive   Leuk Esterase: Moderate / RBC: 5-10 /HPF / WBC >50 /HPF   Sq Epi: x / Non Sq Epi: x / Bacteria: Many /HPF          RADIOLOGY & ADDITIONAL TESTS:    ANTIBIOTICS:  cefTRIAXone   IVPB 1 Gram(s) IV Intermittent every 24 hours

## 2018-06-05 NOTE — PROGRESS NOTE ADULT - ASSESSMENT
CLAUDIO - resolved  CKD 3 - baseline Cr 1.3  Sepsis - better, WBC downtrending, fever down  E. Coli UTI  BPH / prostatomegaly  prostatic cyst  right renal cyst  HTN  CAD / afib / bioAVR  etoh consumption    plan:  dc ivf's  do not dc iv abx  check urine culture sensitivities  cont lisinopril  maintain oral hydration  no ricardo  f/u wbc  d/w resident  oob ambulate

## 2018-06-05 NOTE — PROGRESS NOTE ADULT - SUBJECTIVE AND OBJECTIVE BOX
Patient was seen and examined. Spoke with RN. Chart reviewed.  No events overnight.  Vital Signs Last 24 Hrs  T(F): 99.3 (2018 05:49), Max: 100.3 (2018 23:41)  HR: 63 (2018 05:49) (63 - 76)  BP: 140/65 (2018 05:49) (130/73 - 166/75)  SpO2: 96% (2018 07:28) (96% - 96%)  MEDICATIONS  (STANDING):  apixaban 5 milliGRAM(s) Oral every 12 hours  aspirin  chewable 81 milliGRAM(s) Oral daily  atorvastatin 80 milliGRAM(s) Oral at bedtime  cefTRIAXone   IVPB 1 Gram(s) IV Intermittent every 24 hours  docusate sodium 100 milliGRAM(s) Oral two times a day  levothyroxine 75 MICROGram(s) Oral <User Schedule>  lisinopril 20 milliGRAM(s) Oral daily  pantoprazole    Tablet 40 milliGRAM(s) Oral before breakfast  senna 2 Tablet(s) Oral at bedtime  sodium chloride 0.9%. 1000 milliLiter(s) (60 mL/Hr) IV Continuous <Continuous>  tamsulosin 0.4 milliGRAM(s) Oral at bedtime    MEDICATIONS  (PRN):  lactulose Syrup 10 Gram(s) Oral once PRN constipation    Labs:                        10.8   14.49 )-----------( 138      ( 2018 05:38 )             32.4                         10.8   15.54 )-----------( 138      ( 2018 07:26 )             33.1     2018 05:38    138    |  105    |  18     ----------------------------<  91     3.8     |  20     |  1.2    2018 07:26    141    |  107    |  20     ----------------------------<  103    3.9     |  20     |  1.2      Ca    7.9        2018 05:38  Ca    7.7        2018 07:26  Phos  2.4       2018 07:26  Mg     2.0       2018 05:38  Mg     2.1       2018 07:26    TPro  5.0    /  Alb  3.0    /  TBili  0.5    /  DBili  x      /  AST  10     /  ALT  8      /  AlkPhos  52     2018 07:26  TPro  6.5    /  Alb  4.0    /  TBili  0.8    /  DBili  x      /  AST  13     /  ALT  12     /  AlkPhos  61     2018 20:59      Urinalysis Basic - ( 2018 20:59 )    Color: Dark Yellow / Appearance: Cloudy / S.025 / pH: x  Gluc: x / Ketone: Negative  / Bili: Negative / Urobili: 1.0 mg/dL   Blood: x / Protein: 100 mg/dL / Nitrite: Positive   Leuk Esterase: Moderate / RBC: 5-10 /HPF / WBC >50 /HPF   Sq Epi: x / Non Sq Epi: x / Bacteria: Many /HPF        General: comfortable, NAD  Neurology: nonfocal  Head:  Normocephalic, atraumatic  ENT:  Mucosa moist, no ulcerations  Neck:  Supple, no JVD,   Skin: no breakdowns (as per RN)  Resp: CTA B/L  CV: RRR, S1S2,   GI: Soft, NT, bowel sounds  MS: No edema, + peripheral pulses, FROM all 4 extremity      A/P:  CLAUDIO - resolved  CKD 3 - baseline Cr 1.3  Sepsis- wbc improving  UTI  BPH / prostatomegaly  prostatic cyst  right renal cyst  HTN  CAD / afib / bioAVR  etoh consumption    plan:  gentle ivf's (daughter requesting)  cont rocephin  check blood and urine cultures  ID Dr saenz- please call  cont lisinopril  maintain oral hydration  no ricardo  f/u wbc  DVT prophylaxis  Decubitus prevention- all measures as per RN protocol  Please call or text me with any questions or updates

## 2018-06-05 NOTE — CONSULT NOTE ADULT - ASSESSMENT
CLAUDIO - resolved  CKD 3 - baseline Cr 1.3  Sepsis  UTI  BPH / prostatomegaly  prostatic cyst  right renal cyst  HTN  CAD / afib / bioAVR  etoh consumption    plan:  gentle ivf's (daughter requesting)  cont rocephin  check blood and urine cultures  cont lisinopril  maintain oral hydration  no ricardo  f/u wbc  spoke with wife and daughter in detail
IMPRESSION:  No evidence of ongoing diarrhea. CD negative.  No pyelonephritis.    RECOMMENDATIONS:  D/C antibiotics.    Recall prn please.

## 2018-06-05 NOTE — PROGRESS NOTE ADULT - ASSESSMENT
A/P:  CLAUDIO - resolved  CKD 3 - baseline Cr 1.3  Sepsis- wbc improving  UTI  BPH / prostatomegaly  prostatic cyst  right renal cyst  HTN  CAD / afib / bioAVR  etoh consumption    plan:  cont rocephin as per Dr. Molina  ID Dr sanez- rec to d/c abx  cont lisinopril  maintain oral hydration  no ricardo  f/u wbc  DVT prophylaxis

## 2018-06-06 ENCOUNTER — TRANSCRIPTION ENCOUNTER (OUTPATIENT)
Age: 83
End: 2018-06-06

## 2018-06-06 VITALS — HEART RATE: 68 BPM | SYSTOLIC BLOOD PRESSURE: 100 MMHG | DIASTOLIC BLOOD PRESSURE: 68 MMHG | RESPIRATION RATE: 18 BRPM

## 2018-06-06 LAB
-  AMIKACIN: SIGNIFICANT CHANGE UP
-  AMOXICILLIN/CLAVULANIC ACID: SIGNIFICANT CHANGE UP
-  AMPICILLIN/SULBACTAM: SIGNIFICANT CHANGE UP
-  AMPICILLIN: SIGNIFICANT CHANGE UP
-  AZTREONAM: SIGNIFICANT CHANGE UP
-  CEFAZOLIN: SIGNIFICANT CHANGE UP
-  CEFEPIME: SIGNIFICANT CHANGE UP
-  CEFOXITIN: SIGNIFICANT CHANGE UP
-  CEFTRIAXONE: SIGNIFICANT CHANGE UP
-  CIPROFLOXACIN: SIGNIFICANT CHANGE UP
-  ERTAPENEM: SIGNIFICANT CHANGE UP
-  GENTAMICIN: SIGNIFICANT CHANGE UP
-  IMIPENEM: SIGNIFICANT CHANGE UP
-  LEVOFLOXACIN: SIGNIFICANT CHANGE UP
-  MEROPENEM: SIGNIFICANT CHANGE UP
-  NITROFURANTOIN: SIGNIFICANT CHANGE UP
-  PIPERACILLIN/TAZOBACTAM: SIGNIFICANT CHANGE UP
-  TIGECYCLINE: SIGNIFICANT CHANGE UP
-  TOBRAMYCIN: SIGNIFICANT CHANGE UP
-  TRIMETHOPRIM/SULFAMETHOXAZOLE: SIGNIFICANT CHANGE UP
CULTURE RESULTS: SIGNIFICANT CHANGE UP
HCT VFR BLD CALC: 34.1 % — LOW (ref 42–52)
HGB BLD-MCNC: 11.3 G/DL — LOW (ref 14–18)
MCHC RBC-ENTMCNC: 31.8 PG — HIGH (ref 27–31)
MCHC RBC-ENTMCNC: 33.1 G/DL — SIGNIFICANT CHANGE UP (ref 32–37)
MCV RBC AUTO: 96.1 FL — HIGH (ref 80–94)
METHOD TYPE: SIGNIFICANT CHANGE UP
NRBC # BLD: 0 /100 WBCS — SIGNIFICANT CHANGE UP (ref 0–0)
ORGANISM # SPEC MICROSCOPIC CNT: SIGNIFICANT CHANGE UP
ORGANISM # SPEC MICROSCOPIC CNT: SIGNIFICANT CHANGE UP
PLATELET # BLD AUTO: 174 K/UL — SIGNIFICANT CHANGE UP (ref 130–400)
RBC # BLD: 3.55 M/UL — LOW (ref 4.7–6.1)
RBC # FLD: 12.4 % — SIGNIFICANT CHANGE UP (ref 11.5–14.5)
SPECIMEN SOURCE: SIGNIFICANT CHANGE UP
WBC # BLD: 8.94 K/UL — SIGNIFICANT CHANGE UP (ref 4.8–10.8)
WBC # FLD AUTO: 8.94 K/UL — SIGNIFICANT CHANGE UP (ref 4.8–10.8)

## 2018-06-06 RX ORDER — AMLODIPINE BESYLATE 2.5 MG/1
1 TABLET ORAL
Qty: 30 | Refills: 0 | OUTPATIENT
Start: 2018-06-06

## 2018-06-06 RX ORDER — CEFPODOXIME PROXETIL 100 MG
1 TABLET ORAL
Qty: 20 | Refills: 0
Start: 2018-06-06 | End: 2018-06-15

## 2018-06-06 RX ADMIN — Medication 75 MICROGRAM(S): at 05:57

## 2018-06-06 RX ADMIN — AMLODIPINE BESYLATE 5 MILLIGRAM(S): 2.5 TABLET ORAL at 05:57

## 2018-06-06 RX ADMIN — APIXABAN 5 MILLIGRAM(S): 2.5 TABLET, FILM COATED ORAL at 05:57

## 2018-06-06 RX ADMIN — LISINOPRIL 20 MILLIGRAM(S): 2.5 TABLET ORAL at 05:57

## 2018-06-06 RX ADMIN — Medication 100 MILLIGRAM(S): at 05:57

## 2018-06-06 RX ADMIN — PANTOPRAZOLE SODIUM 40 MILLIGRAM(S): 20 TABLET, DELAYED RELEASE ORAL at 08:57

## 2018-06-06 RX ADMIN — Medication 81 MILLIGRAM(S): at 11:01

## 2018-06-06 NOTE — DISCHARGE NOTE ADULT - MEDICATION SUMMARY - MEDICATIONS TO TAKE
I will START or STAY ON the medications listed below when I get home from the hospital:    aspirin 81 mg oral tablet  -- 1 tab(s) by mouth once a day  -- Indication: For Heart healthy    lisinopril 20 mg oral tablet  -- 1 tab(s) by mouth once a day (at bedtime)  -- Indication: For Htn    tamsulosin 0.4 mg oral capsule  -- 1 cap(s) by mouth once a day  -- Indication: For bph    Eliquis 5 mg oral tablet  -- 1 tab(s) by mouth 2 times a day  -- Indication: For Anticoagulation    atorvastatin 80 mg oral tablet  -- 1 tab(s) by mouth once a day  -- Indication: For dld    amLODIPine 5 mg oral tablet  -- 1 tab(s) by mouth once a day  -- Indication: For Htn    cefpodoxime 100 mg oral tablet  -- 1 tab(s) by mouth 2 times a day   -- Finish all this medication unless otherwise directed by prescriber.  Take with food or milk.    -- Indication: For uti    levothyroxine 75 mcg (0.075 mg) oral tablet  -- 1 tab(s) by mouth once a day  -- Indication: For thyroid    folic acid 1 mg oral tablet  -- 1 tab(s) by mouth once a day (at bedtime)  -- Indication: For vitamin

## 2018-06-06 NOTE — DISCHARGE NOTE ADULT - PLAN OF CARE
prevent complications sent vantin to pharmacy for more days continue with eliquis and home meds continue with amlodipine and home meds

## 2018-06-06 NOTE — DISCHARGE NOTE ADULT - CARE PLAN
Principal Discharge DX:	UTI (urinary tract infection)  Goal:	prevent complications  Assessment and plan of treatment:	sent vantin to pharmacy for more days  Secondary Diagnosis:	Atrial fibrillation, unspecified type  Goal:	prevent complications  Assessment and plan of treatment:	continue with eliquis and home meds  Secondary Diagnosis:	Hypertension, unspecified type  Goal:	prevent complications  Assessment and plan of treatment:	continue with amlodipine and home meds

## 2018-06-06 NOTE — PROGRESS NOTE ADULT - SUBJECTIVE AND OBJECTIVE BOX
NEPHROLOGY CONSULTATION NOTE    spoke with resident.  No fever and wbc normalized.  Ucx reviewed.  pt without new complaints    PAST MEDICAL & SURGICAL HISTORY:  Hypothyroidism  Hypertension  Hyperlipidemia  Atrial fibrillation  Coronary artery disease  History of loop recorder: 2017  H/O aortic valve replacement: porcine  CKD    Allergies:  Allergy Status Unknown    Home Medications Reviewed    SOCIAL HISTORY:  Denies ETOH,Smoking,   FAMILY HISTORY:  No pertinent family history in first degree relatives        REVIEW OF SYSTEMS:  All other review of systems is negative unless indicated above.    PHYSICAL EXAM:  NAD  awake and alert  moistmm  no jvd  cta bl  irreg, murmur  soft,nt  no cvat  no cce  no rash      Hospital Medications:   MEDICATIONS  (STANDING):  amLODIPine   Tablet 5 milliGRAM(s) Oral daily  apixaban 5 milliGRAM(s) Oral every 12 hours  aspirin  chewable 81 milliGRAM(s) Oral daily  atorvastatin 80 milliGRAM(s) Oral at bedtime  cefTRIAXone   IVPB 1 Gram(s) IV Intermittent every 24 hours  docusate sodium 100 milliGRAM(s) Oral two times a day  levothyroxine 75 MICROGram(s) Oral <User Schedule>  lisinopril 20 milliGRAM(s) Oral daily  pantoprazole    Tablet 40 milliGRAM(s) Oral before breakfast  senna 2 Tablet(s) Oral at bedtime  tamsulosin 0.4 milliGRAM(s) Oral at bedtime        VITALS:  T(F): 99.3 (18 @ 07:33), Max: 99.3 (18 @ 07:33)  HR: 66 (18 @ 07:33)  BP: 170/79 (18 @ 07:33)  RR: 18 (18 @ 07:33)  SpO2: --  Wt(kg): --     @ 07:  -   @ 07:00  --------------------------------------------------------  IN: 910 mL / OUT: 700 mL / NET: 210 mL     @ 07:01  -   @ 07:00  --------------------------------------------------------  IN: 420 mL / OUT: 1600 mL / NET: -1180 mL          LABS:      138  |  105  |  18  ----------------------------<  91  3.8   |  20  |  1.2    Ca    7.9<L>      2018 05:38  Mg     2.0                                 11.3   8.94  )-----------( 174      ( 2018 06:16 )             34.1       Urine Studies:  Urinalysis Basic - ( 2018 20:59 )    Color: Dark Yellow / Appearance: Cloudy / S.025 / pH:   Gluc:  / Ketone: Negative  / Bili: Negative / Urobili: 1.0 mg/dL   Blood:  / Protein: 100 mg/dL / Nitrite: Positive   Leuk Esterase: Moderate / RBC: 5-10 /HPF / WBC >50 /HPF   Sq Epi:  / Non Sq Epi:  / Bacteria: Many /HPF          RADIOLOGY & ADDITIONAL STUDIES:          RADIOLOGY & ADDITIONAL STUDIES:

## 2018-06-06 NOTE — PROGRESS NOTE ADULT - SUBJECTIVE AND OBJECTIVE BOX
Patient was seen and examined. Spoke with RN. Chart reviewed.  No events overnight.  Vital Signs Last 24 Hrs  T(F): 98.6 (06 Jun 2018 00:00), Max: 98.6 (05 Jun 2018 16:18)  HR: 64 (06 Jun 2018 06:15) (64 - 90)  BP: 142/64 (06 Jun 2018 06:15) (142/64 - 178/85)  SpO2: --  MEDICATIONS  (STANDING):  amLODIPine   Tablet 5 milliGRAM(s) Oral daily  apixaban 5 milliGRAM(s) Oral every 12 hours  aspirin  chewable 81 milliGRAM(s) Oral daily  atorvastatin 80 milliGRAM(s) Oral at bedtime  cefTRIAXone   IVPB 1 Gram(s) IV Intermittent every 24 hours  docusate sodium 100 milliGRAM(s) Oral two times a day  levothyroxine 75 MICROGram(s) Oral <User Schedule>  lisinopril 20 milliGRAM(s) Oral daily  pantoprazole    Tablet 40 milliGRAM(s) Oral before breakfast  senna 2 Tablet(s) Oral at bedtime  tamsulosin 0.4 milliGRAM(s) Oral at bedtime    MEDICATIONS  (PRN):  lactulose Syrup 10 Gram(s) Oral once PRN constipation    Labs:                        10.8   14.49 )-----------( 138      ( 05 Jun 2018 05:38 )             32.4                         10.8   15.54 )-----------( 138      ( 04 Jun 2018 07:26 )             33.1     05 Jun 2018 05:38    138    |  105    |  18     ----------------------------<  91     3.8     |  20     |  1.2    04 Jun 2018 07:26    141    |  107    |  20     ----------------------------<  103    3.9     |  20     |  1.2      Ca    7.9        05 Jun 2018 05:38  Ca    7.7        04 Jun 2018 07:26  Phos  2.4       04 Jun 2018 07:26  Mg     2.0       05 Jun 2018 05:38  Mg     2.1       04 Jun 2018 07:26    TPro  5.0    /  Alb  3.0    /  TBili  0.5    /  DBili  x      /  AST  10     /  ALT  8      /  AlkPhos  52     04 Jun 2018 07:26          Culture - Blood (collected 04 Jun 2018 07:26)  Source: .Blood None  Preliminary Report (05 Jun 2018 12:02):    No growth to date.    Culture - Blood (collected 04 Jun 2018 07:26)  Source: .Blood None  Preliminary Report (05 Jun 2018 12:02):    No growth to date.    Culture - Blood (collected 03 Jun 2018 20:59)  Source: .Blood Blood  Preliminary Report (05 Jun 2018 12:02):    No growth to date.    Culture - Blood (collected 03 Jun 2018 20:59)  Source: .Blood Blood  Preliminary Report (05 Jun 2018 12:01):    No growth to date.    Culture - Urine (collected 03 Jun 2018 20:59)  Source: .Urine Clean Catch (Midstream)  Final Report (06 Jun 2018 07:17):    >100,000 CFU/ml Escherichia coli  Organism: Escherichia coli (06 Jun 2018 07:17)  Organism: Escherichia coli (06 Jun 2018 07:17)      refused physical exam- wants to rest      A/P:  CLAUDIO - resolved  CKD 3 - baseline Cr 1.3  Sepsis- wbc improving  UTI  BPH / prostatomegaly  prostatic cyst  right renal cyst  HTN  CAD / afib / bioAVR  etoh consumption    plan:  cont rocephin as per Dr. Molina  ID Dr saenz- rec to d/c abx    follow sensitivities of urine culture  outpt urology eval    DC when cleared by renal    OOB to chair  cont lisinopril  maintain oral hydration  no   DVT prophylaxis  Decubitus prevention- all measures as per RN protocol  Please call or text me with any questions or updates

## 2018-06-06 NOTE — PROGRESS NOTE ADULT - ASSESSMENT
CLAUDIO - resolved  CKD 3   Sepsis - resolved  E. Coli UTI  BPH / prostatomegaly  prostatic cyst  right renal cyst  HTN  CAD / afib / bioAVR  etoh consumption    plan:  may change to vantin 200mg po bid for 10 more days  cont flomax  cont lisinopril  may dc home  oupt edilberto gaona  spoke with resident

## 2018-06-06 NOTE — DISCHARGE NOTE ADULT - HOSPITAL COURSE
A/P:  CLAUDIO - resolved  CKD 3 - baseline Cr 1.3  Sepsis- wbc improving  UTI  BPH / prostatomegaly  prostatic cyst  right renal cyst  HTN  CAD / afib / bioAVR    plan:  c/w vantin for 10 more days  outpt urology eval    OOB to chair  cont lisinopril  maintain oral hydration

## 2018-06-06 NOTE — DISCHARGE NOTE ADULT - PATIENT PORTAL LINK FT
You can access the [email or web address] Patient Portal, offered by [facility], by registering with the following website: [email or web address]

## 2018-06-08 DIAGNOSIS — R50.9 FEVER, UNSPECIFIED: ICD-10-CM

## 2018-06-08 DIAGNOSIS — I25.10 ATHEROSCLEROTIC HEART DISEASE OF NATIVE CORONARY ARTERY WITHOUT ANGINA PECTORIS: ICD-10-CM

## 2018-06-08 DIAGNOSIS — I48.91 UNSPECIFIED ATRIAL FIBRILLATION: ICD-10-CM

## 2018-06-08 DIAGNOSIS — A41.9 SEPSIS, UNSPECIFIED ORGANISM: ICD-10-CM

## 2018-06-08 DIAGNOSIS — Z95.3 PRESENCE OF XENOGENIC HEART VALVE: ICD-10-CM

## 2018-06-08 DIAGNOSIS — N18.3 CHRONIC KIDNEY DISEASE, STAGE 3 (MODERATE): ICD-10-CM

## 2018-06-08 DIAGNOSIS — Z72.89 OTHER PROBLEMS RELATED TO LIFESTYLE: ICD-10-CM

## 2018-06-08 DIAGNOSIS — I12.9 HYPERTENSIVE CHRONIC KIDNEY DISEASE WITH STAGE 1 THROUGH STAGE 4 CHRONIC KIDNEY DISEASE, OR UNSPECIFIED CHRONIC KIDNEY DISEASE: ICD-10-CM

## 2018-06-08 DIAGNOSIS — N39.0 URINARY TRACT INFECTION, SITE NOT SPECIFIED: ICD-10-CM

## 2018-06-08 DIAGNOSIS — N17.9 ACUTE KIDNEY FAILURE, UNSPECIFIED: ICD-10-CM

## 2018-06-08 DIAGNOSIS — B96.20 UNSPECIFIED ESCHERICHIA COLI [E. COLI] AS THE CAUSE OF DISEASES CLASSIFIED ELSEWHERE: ICD-10-CM

## 2018-06-08 DIAGNOSIS — E78.5 HYPERLIPIDEMIA, UNSPECIFIED: ICD-10-CM

## 2018-06-08 DIAGNOSIS — E03.9 HYPOTHYROIDISM, UNSPECIFIED: ICD-10-CM

## 2018-06-08 DIAGNOSIS — N40.0 BENIGN PROSTATIC HYPERPLASIA WITHOUT LOWER URINARY TRACT SYMPTOMS: ICD-10-CM

## 2018-06-09 LAB
CULTURE RESULTS: SIGNIFICANT CHANGE UP
SPECIMEN SOURCE: SIGNIFICANT CHANGE UP

## 2018-08-08 ENCOUNTER — APPOINTMENT (OUTPATIENT)
Dept: CARDIOLOGY | Facility: CLINIC | Age: 83
End: 2018-08-08

## 2018-08-08 PROBLEM — E78.5 HYPERLIPIDEMIA, UNSPECIFIED: Chronic | Status: ACTIVE | Noted: 2018-06-03

## 2018-08-08 PROBLEM — I25.10 ATHEROSCLEROTIC HEART DISEASE OF NATIVE CORONARY ARTERY WITHOUT ANGINA PECTORIS: Chronic | Status: ACTIVE | Noted: 2018-06-03

## 2018-08-08 PROBLEM — E03.9 HYPOTHYROIDISM, UNSPECIFIED: Chronic | Status: ACTIVE | Noted: 2018-06-03

## 2018-08-08 PROBLEM — I10 ESSENTIAL (PRIMARY) HYPERTENSION: Chronic | Status: ACTIVE | Noted: 2018-06-03

## 2018-08-08 PROBLEM — I48.91 UNSPECIFIED ATRIAL FIBRILLATION: Chronic | Status: ACTIVE | Noted: 2018-06-03

## 2018-08-28 ENCOUNTER — APPOINTMENT (OUTPATIENT)
Dept: CARDIOLOGY | Facility: CLINIC | Age: 83
End: 2018-08-28

## 2018-08-28 VITALS
WEIGHT: 200 LBS | OXYGEN SATURATION: 96 % | DIASTOLIC BLOOD PRESSURE: 94 MMHG | HEART RATE: 67 BPM | SYSTOLIC BLOOD PRESSURE: 189 MMHG | BODY MASS INDEX: 27.13 KG/M2

## 2018-08-28 VITALS — SYSTOLIC BLOOD PRESSURE: 172 MMHG | DIASTOLIC BLOOD PRESSURE: 85 MMHG

## 2018-09-10 ENCOUNTER — EMERGENCY (EMERGENCY)
Facility: HOSPITAL | Age: 83
LOS: 0 days | Discharge: HOME | End: 2018-09-11
Attending: EMERGENCY MEDICINE | Admitting: EMERGENCY MEDICINE
Payer: MEDICARE

## 2018-09-10 VITALS
SYSTOLIC BLOOD PRESSURE: 195 MMHG | DIASTOLIC BLOOD PRESSURE: 88 MMHG | HEART RATE: 63 BPM | RESPIRATION RATE: 20 BRPM | TEMPERATURE: 96 F

## 2018-09-10 DIAGNOSIS — G45.9 TRANSIENT CEREBRAL ISCHEMIC ATTACK, UNSPECIFIED: ICD-10-CM

## 2018-09-10 DIAGNOSIS — R20.2 PARESTHESIA OF SKIN: ICD-10-CM

## 2018-09-10 DIAGNOSIS — Z98.890 OTHER SPECIFIED POSTPROCEDURAL STATES: Chronic | ICD-10-CM

## 2018-09-10 DIAGNOSIS — I25.10 ATHEROSCLEROTIC HEART DISEASE OF NATIVE CORONARY ARTERY WITHOUT ANGINA PECTORIS: ICD-10-CM

## 2018-09-10 DIAGNOSIS — Z95.2 PRESENCE OF PROSTHETIC HEART VALVE: Chronic | ICD-10-CM

## 2018-09-10 DIAGNOSIS — Z98.890 OTHER SPECIFIED POSTPROCEDURAL STATES: ICD-10-CM

## 2018-09-10 DIAGNOSIS — E03.9 HYPOTHYROIDISM, UNSPECIFIED: ICD-10-CM

## 2018-09-10 DIAGNOSIS — E78.5 HYPERLIPIDEMIA, UNSPECIFIED: ICD-10-CM

## 2018-09-10 DIAGNOSIS — I48.91 UNSPECIFIED ATRIAL FIBRILLATION: ICD-10-CM

## 2018-09-10 DIAGNOSIS — F17.210 NICOTINE DEPENDENCE, CIGARETTES, UNCOMPLICATED: ICD-10-CM

## 2018-09-10 DIAGNOSIS — I10 ESSENTIAL (PRIMARY) HYPERTENSION: ICD-10-CM

## 2018-09-10 DIAGNOSIS — Z86.73 PERSONAL HISTORY OF TRANSIENT ISCHEMIC ATTACK (TIA), AND CEREBRAL INFARCTION WITHOUT RESIDUAL DEFICITS: ICD-10-CM

## 2018-09-10 LAB
ALBUMIN SERPL ELPH-MCNC: 4.3 G/DL — SIGNIFICANT CHANGE UP (ref 3.5–5.2)
ALP SERPL-CCNC: 64 U/L — SIGNIFICANT CHANGE UP (ref 30–115)
ALT FLD-CCNC: 13 U/L — SIGNIFICANT CHANGE UP (ref 0–41)
ANION GAP SERPL CALC-SCNC: 15 MMOL/L — HIGH (ref 7–14)
APTT BLD: 34.7 SEC — SIGNIFICANT CHANGE UP (ref 27–39.2)
AST SERPL-CCNC: 15 U/L — SIGNIFICANT CHANGE UP (ref 0–41)
BASOPHILS # BLD AUTO: 0.06 K/UL — SIGNIFICANT CHANGE UP (ref 0–0.2)
BASOPHILS NFR BLD AUTO: 0.9 % — SIGNIFICANT CHANGE UP (ref 0–1)
BILIRUB SERPL-MCNC: 0.4 MG/DL — SIGNIFICANT CHANGE UP (ref 0.2–1.2)
BUN SERPL-MCNC: 20 MG/DL — SIGNIFICANT CHANGE UP (ref 10–20)
CALCIUM SERPL-MCNC: 9.5 MG/DL — SIGNIFICANT CHANGE UP (ref 8.5–10.1)
CHLORIDE SERPL-SCNC: 104 MMOL/L — SIGNIFICANT CHANGE UP (ref 98–110)
CHOLEST SERPL-MCNC: 158 MG/DL — SIGNIFICANT CHANGE UP (ref 100–200)
CK SERPL-CCNC: 86 U/L — SIGNIFICANT CHANGE UP (ref 0–225)
CO2 SERPL-SCNC: 22 MMOL/L — SIGNIFICANT CHANGE UP (ref 17–32)
CREAT SERPL-MCNC: 1.6 MG/DL — HIGH (ref 0.7–1.5)
EOSINOPHIL # BLD AUTO: 0.11 K/UL — SIGNIFICANT CHANGE UP (ref 0–0.7)
EOSINOPHIL NFR BLD AUTO: 1.6 % — SIGNIFICANT CHANGE UP (ref 0–8)
ESTIMATED AVERAGE GLUCOSE: 117 MG/DL — HIGH (ref 68–114)
GAS PNL BLDV: SIGNIFICANT CHANGE UP
GLUCOSE SERPL-MCNC: 121 MG/DL — HIGH (ref 70–99)
HBA1C BLD-MCNC: 5.7 % — HIGH (ref 4–5.6)
HCT VFR BLD CALC: 39.5 % — LOW (ref 42–52)
HDLC SERPL-MCNC: 44 MG/DL — SIGNIFICANT CHANGE UP
HGB BLD-MCNC: 13.1 G/DL — LOW (ref 14–18)
IMM GRANULOCYTES NFR BLD AUTO: 0.1 % — SIGNIFICANT CHANGE UP (ref 0.1–0.3)
INR BLD: 1.43 RATIO — HIGH (ref 0.65–1.3)
LIPID PNL WITH DIRECT LDL SERPL: 95 MG/DL — SIGNIFICANT CHANGE UP (ref 4–129)
LYMPHOCYTES # BLD AUTO: 2.19 K/UL — SIGNIFICANT CHANGE UP (ref 1.2–3.4)
LYMPHOCYTES # BLD AUTO: 31.3 % — SIGNIFICANT CHANGE UP (ref 20.5–51.1)
MCHC RBC-ENTMCNC: 32 PG — HIGH (ref 27–31)
MCHC RBC-ENTMCNC: 33.2 G/DL — SIGNIFICANT CHANGE UP (ref 32–37)
MCV RBC AUTO: 96.6 FL — HIGH (ref 80–94)
MONOCYTES # BLD AUTO: 0.87 K/UL — HIGH (ref 0.1–0.6)
MONOCYTES NFR BLD AUTO: 12.4 % — HIGH (ref 1.7–9.3)
NEUTROPHILS # BLD AUTO: 3.75 K/UL — SIGNIFICANT CHANGE UP (ref 1.4–6.5)
NEUTROPHILS NFR BLD AUTO: 53.7 % — SIGNIFICANT CHANGE UP (ref 42.2–75.2)
PLATELET # BLD AUTO: 185 K/UL — SIGNIFICANT CHANGE UP (ref 130–400)
POTASSIUM SERPL-MCNC: 4.6 MMOL/L — SIGNIFICANT CHANGE UP (ref 3.5–5)
POTASSIUM SERPL-SCNC: 4.6 MMOL/L — SIGNIFICANT CHANGE UP (ref 3.5–5)
PROT SERPL-MCNC: 6.9 G/DL — SIGNIFICANT CHANGE UP (ref 6–8)
PROTHROM AB SERPL-ACNC: 15.4 SEC — HIGH (ref 9.95–12.87)
RBC # BLD: 4.09 M/UL — LOW (ref 4.7–6.1)
RBC # FLD: 13.1 % — SIGNIFICANT CHANGE UP (ref 11.5–14.5)
SODIUM SERPL-SCNC: 141 MMOL/L — SIGNIFICANT CHANGE UP (ref 135–146)
TOTAL CHOLESTEROL/HDL RATIO MEASUREMENT: 3.6 RATIO — LOW (ref 4–5.5)
TRIGL SERPL-MCNC: 147 MG/DL — SIGNIFICANT CHANGE UP (ref 10–149)
TROPONIN T SERPL-MCNC: <0.01 NG/ML — SIGNIFICANT CHANGE UP
TYPE + AB SCN PNL BLD: SIGNIFICANT CHANGE UP
WBC # BLD: 6.99 K/UL — SIGNIFICANT CHANGE UP (ref 4.8–10.8)
WBC # FLD AUTO: 6.99 K/UL — SIGNIFICANT CHANGE UP (ref 4.8–10.8)

## 2018-09-10 RX ORDER — LABETALOL HCL 100 MG
10 TABLET ORAL ONCE
Qty: 0 | Refills: 0 | Status: COMPLETED | OUTPATIENT
Start: 2018-09-10 | End: 2018-09-10

## 2018-09-10 RX ORDER — FOLIC ACID 0.8 MG
1 TABLET ORAL DAILY
Qty: 0 | Refills: 0 | Status: DISCONTINUED | OUTPATIENT
Start: 2018-09-11 | End: 2018-09-11

## 2018-09-10 RX ORDER — ASPIRIN/CALCIUM CARB/MAGNESIUM 324 MG
81 TABLET ORAL ONCE
Qty: 0 | Refills: 0 | Status: COMPLETED | OUTPATIENT
Start: 2018-09-10 | End: 2018-09-10

## 2018-09-10 RX ORDER — ASPIRIN/CALCIUM CARB/MAGNESIUM 324 MG
81 TABLET ORAL DAILY
Qty: 0 | Refills: 0 | Status: DISCONTINUED | OUTPATIENT
Start: 2018-09-11 | End: 2018-09-11

## 2018-09-10 RX ORDER — LISINOPRIL 2.5 MG/1
1 TABLET ORAL
Qty: 0 | Refills: 0 | COMMUNITY

## 2018-09-10 RX ORDER — LISINOPRIL 2.5 MG/1
20 TABLET ORAL DAILY
Qty: 0 | Refills: 0 | Status: DISCONTINUED | OUTPATIENT
Start: 2018-09-11 | End: 2018-09-11

## 2018-09-10 RX ORDER — APIXABAN 2.5 MG/1
5 TABLET, FILM COATED ORAL EVERY 12 HOURS
Qty: 0 | Refills: 0 | Status: DISCONTINUED | OUTPATIENT
Start: 2018-09-10 | End: 2018-09-11

## 2018-09-10 RX ORDER — TAMSULOSIN HYDROCHLORIDE 0.4 MG/1
0.4 CAPSULE ORAL ONCE
Qty: 0 | Refills: 0 | Status: COMPLETED | OUTPATIENT
Start: 2018-09-10 | End: 2018-09-10

## 2018-09-10 RX ORDER — ATORVASTATIN CALCIUM 80 MG/1
80 TABLET, FILM COATED ORAL ONCE
Qty: 0 | Refills: 0 | Status: COMPLETED | OUTPATIENT
Start: 2018-09-10 | End: 2018-09-10

## 2018-09-10 RX ORDER — LISINOPRIL 2.5 MG/1
20 TABLET ORAL ONCE
Qty: 0 | Refills: 0 | Status: COMPLETED | OUTPATIENT
Start: 2018-09-10 | End: 2018-09-10

## 2018-09-10 RX ORDER — ALPRAZOLAM 0.25 MG
0.25 TABLET ORAL ONCE
Qty: 0 | Refills: 0 | Status: DISCONTINUED | OUTPATIENT
Start: 2018-09-10 | End: 2018-09-10

## 2018-09-10 RX ORDER — FOLIC ACID 0.8 MG
1 TABLET ORAL ONCE
Qty: 0 | Refills: 0 | Status: COMPLETED | OUTPATIENT
Start: 2018-09-10 | End: 2018-09-10

## 2018-09-10 RX ORDER — TAMSULOSIN HYDROCHLORIDE 0.4 MG/1
0.4 CAPSULE ORAL DAILY
Qty: 0 | Refills: 0 | Status: DISCONTINUED | OUTPATIENT
Start: 2018-09-11 | End: 2018-09-11

## 2018-09-10 RX ORDER — ATORVASTATIN CALCIUM 80 MG/1
80 TABLET, FILM COATED ORAL AT BEDTIME
Qty: 0 | Refills: 0 | Status: DISCONTINUED | OUTPATIENT
Start: 2018-09-11 | End: 2018-09-11

## 2018-09-10 RX ORDER — SODIUM CHLORIDE 9 MG/ML
1000 INJECTION INTRAMUSCULAR; INTRAVENOUS; SUBCUTANEOUS
Qty: 0 | Refills: 0 | Status: DISCONTINUED | OUTPATIENT
Start: 2018-09-10 | End: 2018-09-11

## 2018-09-10 RX ADMIN — TAMSULOSIN HYDROCHLORIDE 0.4 MILLIGRAM(S): 0.4 CAPSULE ORAL at 14:45

## 2018-09-10 RX ADMIN — Medication 0.25 MILLIGRAM(S): at 12:16

## 2018-09-10 RX ADMIN — LISINOPRIL 20 MILLIGRAM(S): 2.5 TABLET ORAL at 14:45

## 2018-09-10 RX ADMIN — Medication 1 MILLIGRAM(S): at 14:45

## 2018-09-10 RX ADMIN — Medication 81 MILLIGRAM(S): at 14:45

## 2018-09-10 RX ADMIN — Medication 10 MILLIGRAM(S): at 11:21

## 2018-09-10 RX ADMIN — SODIUM CHLORIDE 75 MILLILITER(S): 9 INJECTION INTRAMUSCULAR; INTRAVENOUS; SUBCUTANEOUS at 16:44

## 2018-09-10 RX ADMIN — APIXABAN 5 MILLIGRAM(S): 2.5 TABLET, FILM COATED ORAL at 14:45

## 2018-09-10 RX ADMIN — ATORVASTATIN CALCIUM 80 MILLIGRAM(S): 80 TABLET, FILM COATED ORAL at 23:27

## 2018-09-10 NOTE — ED PROVIDER NOTE - PHYSICAL EXAMINATION
CONSTITUTIONAL: Well-developed; well-nourished; in no acute distress.   SKIN: warm, dry  HEAD: Normocephalic; atraumatic.  EYES: PERRL, EOMI, no conjunctival erythema  ENT: No nasal discharge; airway clear.  NECK: Supple; non tender.  CARD: S1, S2 normal; no murmurs, gallops, or rubs. Regular rate and rhythm.   RESP: No wheezes, rales or rhonchi.  ABD: soft ntnd  EXT: Normal ROM.  No clubbing, cyanosis or edema.   LYMPH: No acute cervical adenopathy.  NEURO: Alert, oriented, grossly unremarkable, Muscle strength 5/5, no sensory deficit.  PSYCH: Cooperative, appropriate. CONSTITUTIONAL: Well-developed; well-nourished; in no acute distress.   SKIN: warm, dry  HEAD: Normocephalic; atraumatic.  EYES: PERRL, EOMI, no conjunctival erythema  ENT: No nasal discharge; airway clear.  NECK: Supple; non tender.  CARD: S1, S2 normal; no murmurs, gallops, or rubs. Regular rate and rhythm.   RESP: No wheezes, rales or rhonchi.  ABD: soft ntnd  EXT: Normal ROM.  No clubbing, cyanosis or edema.   LYMPH: No acute cervical adenopathy.  NEURO: CN II-XII grossly intact, no facial asymmetry, no slurred speech. Strength 5/5 in upper and lower extremities. Sensation intact in all extremities. FNF testing normal. No pronator drift.  PSYCH: Cooperative, appropriate.

## 2018-09-10 NOTE — ED ADULT NURSE NOTE - OBJECTIVE STATEMENT
patient complaint of numbness and tingling to b/l upper and lower extremities upon awakening this morning,.

## 2018-09-10 NOTE — ED PROVIDER NOTE - ATTENDING CONTRIBUTION TO CARE
86 y.o. M with PMH A fib s/p ILR on eliquis, HTN, DLD, hypothyroidism, TIA, presents to ED w/ B/L lower and upper extremity tingling for 3 hours. Pt also states legs and arms feel weak. No slurred speech, headache, facial droop, chest pain, SOB. Pt denies any trauma.  Stroke Code called in triage.    Current NIH Stroke score is 0.  Patient last took Eliquis within the past 24 hours and has a contraindication to t-PA. Patient also has no lateralizing signs.  Appropriate labs sent and patient for head CT.

## 2018-09-10 NOTE — ED ADULT TRIAGE NOTE - CHIEF COMPLAINT QUOTE
Patient BIBA from home for numbness to all extremities since waking up. Patient reports numbness and heavy feeling in bilateral arms and legs.

## 2018-09-10 NOTE — ED PROVIDER NOTE - PROGRESS NOTE DETAILS
Pt back from CT. Called neuro team who will see pt. OBs attending and PA spoken to, Dr. Yancey says MRI head, MRA head/neck, echo, systolic BP under 160. No need for carotid ultrasound

## 2018-09-10 NOTE — ED CDU PROVIDER INITIAL DAY NOTE - ATTENDING CONTRIBUTION TO CARE
Seen with PA agree with above, lungs- clear, abdomen- soft no tenderness to any region, neuro- non focal, s1s2 no gallops or murmurs, TIA protocol initiated will continue to re-evaluate

## 2018-09-10 NOTE — ED PROVIDER NOTE - MEDICAL DECISION MAKING DETAILS
86 y.o. M with PMH A fib s/p ILR on eliquis, HTN, DLD, hypothyroidism, TIA, presents to ED w/ B/L lower and upper extremity tingling for 3 hours. Pt also states legs and arms feel weak. No slurred speech, headache, facial droop, chest pain, SOB. Pt denies any trauma.  Stroke Code called in triage.    Current NIH Stroke score is 0.  Patient last took Eliquis within the past 24 hours and has a contraindication to t-PA. Patient also has no lateralizing signs.  Appropriate labs sent and patient for head CT. ED work up reviewed and will send patient to OBS for further work up and management as per Neuro recommendations

## 2018-09-10 NOTE — ED CDU PROVIDER INITIAL DAY NOTE - OBJECTIVE STATEMENT
87 y/o male with PMHX of A.Fib on Eliquis, TIA (2016), HTN, HLD, Hypothyroidism, AVR, Internal Loop Recorder in place, Former Smoker presenting under TIA Protocol. As per pt, this morning woke up with B/L upper and lower extremity numbness/tingling. . Daughter states she also noticed weakness in patient's legs when ambulating.  Pt continues to have mild numbness in the hands currently, but the numbness/tingling in legs has resolved. Pt was brought in to ED as stroke code, but then cancelled.  Daughter states patient had TIA in 2016 - symptoms during that time  were slurred speech, facial drooping and memory impairment. None of these symptoms were present during today's episode. Denies having a neurologist. Cardiologist - Dr Mcmullen, EP - Dr Perdomo

## 2018-09-10 NOTE — ED PROVIDER NOTE - NS ED ROS FT
Constitutional: No fever, chills.  Eyes: No visual changes.  ENT: No hearing changes. No sore throat.  Neck: No neck pain or stiffness.  Cardiovascular: No chest pain, palpitations, edema.  Pulmonary: No SOB, cough. No hemoptysis.  Abdominal: No abdominal pain, nausea, vomiting, diarrhea.  : No dysuria, frequency.  Neuro: No headache, syncope, dizziness. + b/l upper and lower tingling.  MS: No back pain. No calf pain/swelling.  Psych: No suicidal ideations.

## 2018-09-10 NOTE — ED PROVIDER NOTE - OBJECTIVE STATEMENT
86 y.o. M with PMH of afib s/p IRL on eloquis w/ B/L numbness and tingling for 3 hours. 86 y.o. M with PMH of afib s/p ILR on eliquis, HTN, DLD, hypothyroidism, TIA, presents to ED w/ B/L lower and upper extremity tingling for 3 hours. Pt also states legs and arms feel weak. No slurred speech, headache, facial droop, chest pain, SOB. Pt denies any trauma
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
## 2018-09-10 NOTE — ED ADULT NURSE NOTE - NSIMPLEMENTINTERV_GEN_ALL_ED
Implemented All Fall with Harm Risk Interventions:  [place] to call system. Call bell, personal items and telephone within reach. Instruct patient to call for assistance. Room bathroom lighting operational. Non-slip footwear when patient is off stretcher. Physically safe environment: no spills, clutter or unnecessary equipment. Stretcher in lowest position, wheels locked, appropriate side rails in place. Provide visual cue, wrist band, yellow gown, etc. Monitor gait and stability. Monitor for mental status changes and reorient to person, place, and time. Review medications for side effects contributing to fall risk. Reinforce activity limits and safety measures with patient and family. Provide visual clues: red socks.

## 2018-09-11 VITALS
DIASTOLIC BLOOD PRESSURE: 72 MMHG | HEART RATE: 58 BPM | SYSTOLIC BLOOD PRESSURE: 157 MMHG | RESPIRATION RATE: 18 BRPM | OXYGEN SATURATION: 96 % | TEMPERATURE: 97 F

## 2018-09-11 PROCEDURE — 93880 EXTRACRANIAL BILAT STUDY: CPT | Mod: 26

## 2018-09-11 RX ORDER — AMLODIPINE BESYLATE 2.5 MG/1
10 TABLET ORAL ONCE
Qty: 0 | Refills: 0 | Status: COMPLETED | OUTPATIENT
Start: 2018-09-11 | End: 2018-09-11

## 2018-09-11 RX ADMIN — SODIUM CHLORIDE 75 MILLILITER(S): 9 INJECTION INTRAMUSCULAR; INTRAVENOUS; SUBCUTANEOUS at 06:23

## 2018-09-11 RX ADMIN — AMLODIPINE BESYLATE 10 MILLIGRAM(S): 2.5 TABLET ORAL at 02:44

## 2018-09-11 RX ADMIN — LISINOPRIL 20 MILLIGRAM(S): 2.5 TABLET ORAL at 06:21

## 2018-09-11 RX ADMIN — APIXABAN 5 MILLIGRAM(S): 2.5 TABLET, FILM COATED ORAL at 06:21

## 2018-09-11 RX ADMIN — TAMSULOSIN HYDROCHLORIDE 0.4 MILLIGRAM(S): 0.4 CAPSULE ORAL at 11:53

## 2018-09-11 RX ADMIN — Medication 81 MILLIGRAM(S): at 11:53

## 2018-09-11 RX ADMIN — Medication 1 MILLIGRAM(S): at 11:53

## 2018-09-11 NOTE — ED ADULT NURSE REASSESSMENT NOTE - NS ED NURSE REASSESS COMMENT FT1
pt. refusing ativan to take MRI, states he does not want to go for test without anesthesia, PA made aware

## 2018-09-11 NOTE — ED ADULT NURSE REASSESSMENT NOTE - NS ED NURSE REASSESS COMMENT FT1
patient assessed, neuro status intact no deficits noted, awaiting results of repeat CT scan, vital signs stable, will continue to monitor. patient assessed, neuro status intact no deficits noted, awaiting results of repeat CT scan, vital signs stable, pt scheduled for carotid duplex, will continue to monitor.

## 2018-09-11 NOTE — PROGRESS NOTE ADULT - ASSESSMENT
86 year old gentleman with a past medical history of HTN and A-Fib on Eliquis, presents to the ED after waking up with bilateral upper and lower extremity tingling. Upon waking he noticed difficulty walking with the bilateral lower extremity parasthesias. The lower extremity symptoms resolved but the tingling in the distal bilateral upper extremities persisted. He was outside the therapeutic window for IV thrombolytics and acute stroke intervention with NIHSS 0 and unclear vascular localization.  Etiology of symptoms unknown possibly metabolic vs positional but given extensive vascular risk factors recommend CVA/TIA w/u.      Suggestion:  f/u official rpt HCT results  ok to continue Eliquis  check Carotid duplex- if negative, recommend f/u as outpt for Open MRI w/n next 2 - 4 wks  continue statin
Impression:  86 year old gentleman with a past medical history of HTN and A-Fib on Eliquis, presents to the ED after waking up with bilateral upper and lower extremity tingling. Upon waking he noticed difficulty walking with the bilateral lower extremity parasthesias. The lower extremity symptoms resolved but the tingling in the distal bilateral upper extremities persisted. He was outside the therapeutic window for IV thrombolytics and acute stroke intervention with NIHSS 0 and unclear vascular localization.  Etiology of symptoms unknown possibly metabolic vs positional but given extensive vascular risk factors recommend CVA/TIA w/u.      Suggestion:  Routine stroke workup including:  MRI brain without ninfa.  MRA head and neck.  TTE with contrast to rule out right to left shunt, vegetation, akinesia, intracardiac thrombus.  Telemetry monitoring.  Hemoglobin A1C, LDL.   PT OT Rehab     Normal saline 75 ml per hour  May continue Eliquis from a stroke perspective.  Antiplatelets as per medical team.  Continue statin.    Disposition:  ED observational unit.

## 2018-09-11 NOTE — ED CDU PROVIDER SUBSEQUENT DAY NOTE - MEDICAL DECISION MAKING DETAILS
Pt in CDU for workup of b/l arm and leg paresthesias. Plan had been for MRI, however pt refused without anesthesia (anxiolysis was offered, but pt insisted). As risk outweighs benefit, spoke with neuro Dr Yancey who rec just repeat CT, if neg can have outpt open MRI. Will continue to observe.

## 2018-09-11 NOTE — ED CDU PROVIDER SUBSEQUENT DAY NOTE - PROGRESS NOTE DETAILS
Spoke to Radiology thompson Limon who had patient down at MRI - pt refused MRI - did not want ativan pt states that he wants to be under anesthesias for MRI. Patient now resting comfortably with no complaints. Patient resting comfortably with no complaints. Will continue to monitor. Pt sleeping comfortably. Overnight, he refused MRI, stating that he requires anesthesia (not merely anxiolytic). Spoke with neuro this am; risk of anesthesia significantly outweighs need for MRI for this symptom constellation. Dr Yancey rec repeat head CT and if unchanged, pt can be d/c home to f/u outpt for open MRI.

## 2018-09-11 NOTE — ED ADULT NURSE REASSESSMENT NOTE - NS ED NURSE REASSESS COMMENT FT1
Covering primary nurse for break, pt is awake in bed on cardiac monitor denies pain or discomfort at this time. pt aware of the plan pf carre and has no questions or concerns at this time.   VS recorded. Family member at bedside. All questions were answered regarding given meds. Safety maintained. Will continue to monitor

## 2018-09-11 NOTE — ED ADULT NURSE REASSESSMENT NOTE - NS ED NURSE REASSESS COMMENT FT1
patient assessed, no complaints of pain at this time, neuro status intact, no deficits noted, IV fluids maintained, will continue to monitor.

## 2018-09-24 ENCOUNTER — APPOINTMENT (OUTPATIENT)
Dept: CARDIOLOGY | Facility: CLINIC | Age: 83
End: 2018-09-24

## 2019-01-02 NOTE — STROKE CODE NOTE - NIH STROKE SCALE: 10. DYSARTHRIA, QM
Render In Strict Bullet Format?: No
Continue Regimen: Fluorouracil 5% bid
Detail Level: Zone
(0) Normal articulation
(0) Normal articulation

## 2019-02-25 ENCOUNTER — APPOINTMENT (OUTPATIENT)
Dept: CARDIOLOGY | Facility: CLINIC | Age: 84
End: 2019-02-25

## 2019-02-25 VITALS
BODY MASS INDEX: 27.09 KG/M2 | DIASTOLIC BLOOD PRESSURE: 89 MMHG | OXYGEN SATURATION: 95 % | HEIGHT: 72 IN | WEIGHT: 200 LBS | SYSTOLIC BLOOD PRESSURE: 182 MMHG | HEART RATE: 72 BPM

## 2019-02-25 VITALS — SYSTOLIC BLOOD PRESSURE: 154 MMHG | DIASTOLIC BLOOD PRESSURE: 84 MMHG

## 2019-02-25 NOTE — PHYSICAL EXAM
[General Appearance - Well Developed] : well developed [Well Groomed] : well groomed [General Appearance - Well Nourished] : well nourished [No Deformities] : no deformities [General Appearance - In No Acute Distress] : no acute distress [Heart Rate And Rhythm] : heart rate and rhythm were normal [Heart Sounds] : normal S1 and S2 [Murmurs] : no murmurs present [Respiration, Rhythm And Depth] : normal respiratory rhythm and effort [Exaggerated Use Of Accessory Muscles For Inspiration] : no accessory muscle use [Auscultation Breath Sounds / Voice Sounds] : lungs were clear to auscultation bilaterally [Clean] : clean [Dry] : dry [Well-Healed] : well-healed [Abdomen Soft] : soft [Abdomen Tenderness] : non-tender [Abdomen Mass (___ Cm)] : no abdominal mass palpated [Nail Clubbing] : no clubbing of the fingernails [Cyanosis, Localized] : no localized cyanosis [Petechial Hemorrhages (___cm)] : no petechial hemorrhages [] : no ischemic changes [Bleeding] : no active bleeding [Erythema] : not erythematous [Warm] : not warm [FreeTextEntry1] : left parasternal

## 2019-02-25 NOTE — REVIEW OF SYSTEMS
[see HPI] : see HPI [Negative] : Endocrine [Nocturia] : nocturia [Fever] : no fever [Blurry Vision] : no blurred vision [Loss Of Hearing] : no hearing loss [Shortness Of Breath] : no shortness of breath [Dyspnea on exertion] : not dyspnea during exertion [Chest Pain] : no chest pain [Cough] : no cough [Abdominal Pain] : no abdominal pain [Urinary Frequency] : no change in urinary frequency [Joint Pain] : no joint pain [Skin: A Rash] : no rash: [Dizziness] : no dizziness [Confusion] : no confusion was observed [Easy Bleeding] : no tendency for easy bleeding [Easy Bruising] : no tendency for easy bruising

## 2019-02-25 NOTE — DISCUSSION/SUMMARY
[Patient] : the patient [Family] : the patient's family [FreeTextEntry1] : Mr. Roly Nielson is an 86  year-old man with hypertension, hyperlipidemia, hypothyroidism, coronary artery disease, history of myocardial infarction, transient ischemic attack (TIA) in March 2017, and paroxysmal atrial fibrillation. After TIA in March 2017 he underwent the placement of an implantable loop recorder on 3/22/2017 for detection of atrial fibrillation. Few days later, he had ILR transmission with atrial fibrillation. His CHADSVASC score is 6. He was placed on Eliquis 5 mg q12h. He presents for a routine follow-up.\par Reports c/w including Eliquis 5 mg q12h. No s/s bleeding reported. \par Labs reviewed ( 10/2018) - Cr -1.38. Hgb 13.3 \par ILR interrogation today : SR , no reema or tachy episodes . Episodes of PAF , rate controlled. Detection reprogramed to the longest duration for storage . \par Patient was seen and examined with Dr. Perdomo today \par \par Impression/ plan \par 1. Asymptomatic PAF \par -Continue Eliquis 5 mg BID , refilled today \par -Continue remote monitoring\par -F/U with Dr. Mcmullen as scheduled \par -return for in-office device interrogation in 6 months  \par  \par \par  Unable to assess

## 2019-02-25 NOTE — HISTORY OF PRESENT ILLNESS
[Palpitations] : no palpitations [SOB] : no dyspnea [Syncope] : no syncope [Dizziness] : no dizziness [Chest Pain] : no chest pain or discomfort [Pain at Site] : no pain at device site [Erythema at Site] : no erythema at device site [de-identified] : Referring Physician: Dr. Herminio Mcmullen\par \par Patient with an ILR presents for a routine follow up/device interrogation.  \par He denies CP/ SOB or palpitations . NO dizziness , near syncope or syncope . Stable WALLACE . No changes in daily activity level since his last office visit. \par \par CARDIAC TESTING:\par ECG (2/25/2019) sinus rhythm at 67 bpm  ms\par ECG (8/28/2018) Sinus rhythm at 64 bpm, QTc 420 ms \par ECG (2/5/2018): sinus rhythm at 66 bpm, , QRS 82, no significant ST/T abnormalities.\par ECG (7/10/2017): sinus rhythm at 71 bpm, , QRS 76, QTc 415, no significant ST/T abnormalities.\par 2D Echo (1/19/2018): EF 50-55%, trace MR, mild bioprosthetic aortic valve sclerosis

## 2019-02-25 NOTE — PROCEDURE
[No] : not [Atrial Fibrillation] : atrial fibrillation [See Scanned Paceart Report] : See scanned paceart report [See Device Printout] : See device printout [de-identified] : Medtronic [de-identified] : Device Check- Episodes of PAF/ rate controlled. HR during  Afib ranges 80's-90's  . Some episodes recorded as Afib, close analysis of EGM shows - SR with APCs . NO reema or tachy episodes \par Pacemaker/ICD : Xunlei. \par Model: Reveal. Serial Number: [id number]. Date of Implant: 3/22/2017. \par \par Battery Status: GOOD. \par Program Changes: none. \par

## 2019-02-25 NOTE — END OF VISIT
[FreeTextEntry3] : I was present with NP Elizabeth Roche during the history and exam of the patient. I discussed patient's management with her in details.I reviewed the NP’s note and agree with the documented findings and plan of care. I would like to take this opportunity to thank you for involving me in this patient care. Please do not hesitate to contact me if you have any further questions at [phone].\par

## 2019-02-25 NOTE — REASON FOR VISIT
[Follow-up Device Check] : follow-up device check visit [___ Device Check] : [unfilled] device check [Spouse] : spouse

## 2019-04-15 ENCOUNTER — RX RENEWAL (OUTPATIENT)
Age: 84
End: 2019-04-15

## 2019-04-28 ENCOUNTER — APPOINTMENT (OUTPATIENT)
Dept: CARDIOLOGY | Facility: CLINIC | Age: 84
End: 2019-04-28
Payer: MEDICARE

## 2019-04-28 PROCEDURE — 93299: CPT

## 2019-04-28 PROCEDURE — 93298 REM INTERROG DEV EVAL SCRMS: CPT

## 2019-05-02 ENCOUNTER — RX RENEWAL (OUTPATIENT)
Age: 84
End: 2019-05-02

## 2019-05-30 ENCOUNTER — APPOINTMENT (OUTPATIENT)
Dept: CARDIOLOGY | Facility: CLINIC | Age: 84
End: 2019-05-30
Payer: MEDICARE

## 2019-05-30 PROCEDURE — 93298 REM INTERROG DEV EVAL SCRMS: CPT

## 2019-05-30 PROCEDURE — 93299: CPT

## 2019-07-01 ENCOUNTER — APPOINTMENT (OUTPATIENT)
Dept: CARDIOLOGY | Facility: CLINIC | Age: 84
End: 2019-07-01
Payer: MEDICARE

## 2019-07-01 PROCEDURE — 93298 REM INTERROG DEV EVAL SCRMS: CPT

## 2019-07-01 PROCEDURE — 93299: CPT

## 2019-08-02 ENCOUNTER — APPOINTMENT (OUTPATIENT)
Dept: CARDIOLOGY | Facility: CLINIC | Age: 84
End: 2019-08-02
Payer: MEDICARE

## 2019-08-02 PROCEDURE — 93299: CPT

## 2019-08-02 PROCEDURE — 93298 REM INTERROG DEV EVAL SCRMS: CPT

## 2019-08-26 ENCOUNTER — APPOINTMENT (OUTPATIENT)
Dept: CARDIOLOGY | Facility: CLINIC | Age: 84
End: 2019-08-26
Payer: MEDICARE

## 2019-08-26 VITALS
DIASTOLIC BLOOD PRESSURE: 84 MMHG | OXYGEN SATURATION: 96 % | HEART RATE: 63 BPM | BODY MASS INDEX: 27.09 KG/M2 | SYSTOLIC BLOOD PRESSURE: 161 MMHG | HEIGHT: 72 IN | WEIGHT: 200 LBS

## 2019-08-26 DIAGNOSIS — E78.5 HYPERLIPIDEMIA, UNSPECIFIED: ICD-10-CM

## 2019-08-26 PROCEDURE — 93000 ELECTROCARDIOGRAM COMPLETE: CPT

## 2019-08-26 PROCEDURE — 99214 OFFICE O/P EST MOD 30 MIN: CPT

## 2019-08-26 NOTE — HISTORY OF PRESENT ILLNESS
[Palpitations] : no palpitations [SOB] : no dyspnea [Syncope] : no syncope [Dizziness] : no dizziness [Chest Pain] : no chest pain or discomfort [Pain at Site] : no pain at device site [Erythema at Site] : no erythema at device site [de-identified] : \par Referring Physician: Dr. Herminio Mcmullen\par \par Patient with an ILR presents for a routine follow up/device interrogation.  \par He denies CP/ SOB or palpitations . NO dizziness , near syncope or syncope . Stable WALLACE . No changes in daily activity level since his last office visit. \par \par CARDIAC TESTING:\par ECG (8/26/2019) sinus rhythm at 92 bpm  ms\par ECG (2/25/2019) sinus rhythm at 67 bpm  ms\par ECG (8/28/2018) Sinus rhythm at 64 bpm, QTc 420 ms \par ECG (2/5/2018): sinus rhythm at 66 bpm, , QRS 82, no significant ST/T abnormalities.\par ECG (7/10/2017): sinus rhythm at 71 bpm, , QRS 76, QTc 415, no significant ST/T abnormalities.\par 2D Echo (1/19/2018): EF 50-55%, trace MR, mild bioprosthetic aortic valve sclerosis

## 2019-08-26 NOTE — PHYSICAL EXAM
[General Appearance - Well Developed] : well developed [Well Groomed] : well groomed [General Appearance - Well Nourished] : well nourished [No Deformities] : no deformities [General Appearance - In No Acute Distress] : no acute distress [Heart Rate And Rhythm] : heart rate and rhythm were normal [Murmurs] : no murmurs present [Heart Sounds] : normal S1 and S2 [Respiration, Rhythm And Depth] : normal respiratory rhythm and effort [Exaggerated Use Of Accessory Muscles For Inspiration] : no accessory muscle use [Auscultation Breath Sounds / Voice Sounds] : lungs were clear to auscultation bilaterally [Dry] : dry [Clean] : clean [Well-Healed] : well-healed [Erythema] : not erythematous [Bleeding] : no active bleeding [FreeTextEntry1] : left parasternal [Warm] : not warm [Abdomen Soft] : soft [Abdomen Mass (___ Cm)] : no abdominal mass palpated [Abdomen Tenderness] : non-tender [Nail Clubbing] : no clubbing of the fingernails [Petechial Hemorrhages (___cm)] : no petechial hemorrhages [Cyanosis, Localized] : no localized cyanosis [] : no ischemic changes

## 2019-08-26 NOTE — DISCUSSION/SUMMARY
[Patient] : the patient [Family] : the patient's family [FreeTextEntry1] : Mr. Roly Nielson is an 87  year-old man with hypertension, hyperlipidemia, hypothyroidism, coronary artery disease, history of myocardial infarction, transient ischemic attack (TIA) in March 2017, and paroxysmal atrial fibrillation. After TIA in March 2017 he underwent the placement of an implantable loop recorder on 3/22/2017 for detection of atrial fibrillation. Few days later, he had ILR transmission with atrial fibrillation. His CHADSVASC score is 6. He was placed on Eliquis 5 mg q12h. He presents for a routine follow-up.\par Eliquis was reduced to 2.5 mg q12h in May 2019 due to Cr 1.54 . No s/s bleeding reported. \par \par 1. Asymptomatic PAF \par -Continue Eliquis 2.5 mg BID , refilled today \par -Continue remote monitoring\par \par I interrogated and reprogrammed his device as described in procedure. His wound is healed properly, with no signs of inflammation, infection or bleeding. I discussed with patient plan of care in great details. I discussed remote monitoring with him, and need to call office if he does manual transmission. I answered all his questions to his satisfaction. Patient was pleased with the visit.  \par \par Patient will follow with me in 9 months’ time. Please do not hesitate to contact me at [phone] if you have any further questions regarding this patient care.

## 2019-08-26 NOTE — REVIEW OF SYSTEMS
[Fever] : no fever [Blurry Vision] : no blurred vision [Loss Of Hearing] : no hearing loss [Shortness Of Breath] : no shortness of breath [see HPI] : see HPI [Dyspnea on exertion] : not dyspnea during exertion [Chest Pain] : no chest pain [Abdominal Pain] : no abdominal pain [Cough] : no cough [Urinary Frequency] : no change in urinary frequency [Nocturia] : nocturia [Joint Pain] : no joint pain [Dizziness] : no dizziness [Skin: A Rash] : no rash: [Confusion] : no confusion was observed [Easy Bleeding] : no tendency for easy bleeding [Easy Bruising] : no tendency for easy bruising [Negative] : Endocrine

## 2019-08-26 NOTE — PROCEDURE
[No] : not [NSR] : normal sinus rhythm [Sensing Amplitude ___mv] : sensing amplitude was [unfilled] mv [de-identified] : Reveal LINQ [de-identified] : Medtronic [de-identified] : [id number] [de-identified] : 3/2/17 [de-identified] : GOOD [de-identified] : 514 AF episodes, longest lasting 6 hours and 16 minutes on 8/15/19. AT/AF burden 7.8%

## 2019-08-26 NOTE — END OF VISIT
[>50% of Time Spent on Counseling and Coordination of Care for  ___] : Greater than 50% of the encounter time was spent on counseling and coordination of care for [unfilled] [FreeTextEntry3] : I was present with NP Elizabeth Roche during the history and exam of the patient. I discussed patient's management with her in details.I reviewed the NP’s note and agree with the documented findings and plan of care. I would like to take this opportunity to thank you for involving me in this patient care. Please do not hesitate to contact me if you have any further questions at [phone].\par  [Time Spent: ___ minutes] : I have spent [unfilled] minutes of face to face time with the patient

## 2019-10-07 ENCOUNTER — APPOINTMENT (OUTPATIENT)
Dept: CARDIOLOGY | Facility: CLINIC | Age: 84
End: 2019-10-07
Payer: MEDICARE

## 2019-10-07 PROCEDURE — 93299: CPT

## 2019-10-07 PROCEDURE — 93298 REM INTERROG DEV EVAL SCRMS: CPT

## 2019-11-08 ENCOUNTER — APPOINTMENT (OUTPATIENT)
Dept: CARDIOLOGY | Facility: CLINIC | Age: 84
End: 2019-11-08
Payer: MEDICARE

## 2019-11-08 PROCEDURE — 93299: CPT

## 2019-11-08 PROCEDURE — 93298 REM INTERROG DEV EVAL SCRMS: CPT

## 2019-12-09 ENCOUNTER — APPOINTMENT (OUTPATIENT)
Dept: CARDIOLOGY | Facility: CLINIC | Age: 84
End: 2019-12-09
Payer: MEDICARE

## 2019-12-09 PROCEDURE — 93298 REM INTERROG DEV EVAL SCRMS: CPT

## 2019-12-09 PROCEDURE — 93299: CPT

## 2019-12-13 ENCOUNTER — APPOINTMENT (OUTPATIENT)
Dept: CARDIOLOGY | Facility: CLINIC | Age: 84
End: 2019-12-13

## 2019-12-30 NOTE — ED CDU PROVIDER DISPOSITION NOTE - NS ED MD DISPO DISCHARGE CCDA
Patient/Caregiver provided printed discharge information. Date Of Previous Biopsy (Optional): 12/4/19

## 2020-01-09 ENCOUNTER — APPOINTMENT (OUTPATIENT)
Dept: CARDIOLOGY | Facility: CLINIC | Age: 85
End: 2020-01-09
Payer: MEDICARE

## 2020-01-09 PROCEDURE — 93298 REM INTERROG DEV EVAL SCRMS: CPT

## 2020-01-09 PROCEDURE — G2066: CPT

## 2020-02-10 ENCOUNTER — APPOINTMENT (OUTPATIENT)
Dept: CARDIOLOGY | Facility: CLINIC | Age: 85
End: 2020-02-10
Payer: MEDICARE

## 2020-02-10 PROCEDURE — 93298 REM INTERROG DEV EVAL SCRMS: CPT

## 2020-02-10 PROCEDURE — G2066: CPT

## 2020-03-12 ENCOUNTER — APPOINTMENT (OUTPATIENT)
Dept: CARDIOLOGY | Facility: CLINIC | Age: 85
End: 2020-03-12
Payer: MEDICARE

## 2020-03-12 PROCEDURE — 93298 REM INTERROG DEV EVAL SCRMS: CPT

## 2020-03-12 PROCEDURE — G2066: CPT

## 2020-04-13 ENCOUNTER — APPOINTMENT (OUTPATIENT)
Dept: CARDIOLOGY | Facility: CLINIC | Age: 85
End: 2020-04-13
Payer: MEDICARE

## 2020-04-13 PROCEDURE — G2066: CPT

## 2020-04-13 PROCEDURE — 93298 REM INTERROG DEV EVAL SCRMS: CPT

## 2020-05-14 ENCOUNTER — APPOINTMENT (OUTPATIENT)
Dept: CARDIOLOGY | Facility: CLINIC | Age: 85
End: 2020-05-14
Payer: MEDICARE

## 2020-05-14 PROCEDURE — G2066: CPT

## 2020-05-14 PROCEDURE — 93298 REM INTERROG DEV EVAL SCRMS: CPT

## 2020-06-06 ENCOUNTER — INPATIENT (INPATIENT)
Facility: HOSPITAL | Age: 85
LOS: 9 days | Discharge: HOME HEALTH | End: 2020-06-16
Attending: INTERNAL MEDICINE | Admitting: INTERNAL MEDICINE
Payer: MEDICARE

## 2020-06-06 VITALS
HEART RATE: 78 BPM | RESPIRATION RATE: 18 BRPM | DIASTOLIC BLOOD PRESSURE: 98 MMHG | SYSTOLIC BLOOD PRESSURE: 207 MMHG | OXYGEN SATURATION: 98 % | TEMPERATURE: 96 F

## 2020-06-06 DIAGNOSIS — Z95.2 PRESENCE OF PROSTHETIC HEART VALVE: Chronic | ICD-10-CM

## 2020-06-06 DIAGNOSIS — Z98.890 OTHER SPECIFIED POSTPROCEDURAL STATES: Chronic | ICD-10-CM

## 2020-06-06 LAB
ALBUMIN SERPL ELPH-MCNC: 4.1 G/DL — SIGNIFICANT CHANGE UP (ref 3.5–5.2)
ALP SERPL-CCNC: 54 U/L — SIGNIFICANT CHANGE UP (ref 30–115)
ALT FLD-CCNC: 11 U/L — SIGNIFICANT CHANGE UP (ref 0–41)
ANION GAP SERPL CALC-SCNC: 14 MMOL/L — SIGNIFICANT CHANGE UP (ref 7–14)
APTT BLD: 30.9 SEC — SIGNIFICANT CHANGE UP (ref 27–39.2)
AST SERPL-CCNC: 17 U/L — SIGNIFICANT CHANGE UP (ref 0–41)
BASE EXCESS BLDV CALC-SCNC: -1.7 MMOL/L — SIGNIFICANT CHANGE UP (ref -2–2)
BILIRUB SERPL-MCNC: 0.6 MG/DL — SIGNIFICANT CHANGE UP (ref 0.2–1.2)
BUN SERPL-MCNC: 18 MG/DL — SIGNIFICANT CHANGE UP (ref 10–20)
CA-I SERPL-SCNC: 1.06 MMOL/L — LOW (ref 1.12–1.3)
CALCIUM SERPL-MCNC: 8.6 MG/DL — SIGNIFICANT CHANGE UP (ref 8.5–10.1)
CHLORIDE SERPL-SCNC: 86 MMOL/L — LOW (ref 98–110)
CO2 SERPL-SCNC: 21 MMOL/L — SIGNIFICANT CHANGE UP (ref 17–32)
CREAT SERPL-MCNC: 1.3 MG/DL — SIGNIFICANT CHANGE UP (ref 0.7–1.5)
GAS PNL BLDV: 119 MMOL/L — LOW (ref 136–145)
GAS PNL BLDV: SIGNIFICANT CHANGE UP
GLUCOSE SERPL-MCNC: 107 MG/DL — HIGH (ref 70–99)
HCO3 BLDV-SCNC: 20 MMOL/L — LOW (ref 22–29)
HCT VFR BLD CALC: 33.5 % — LOW (ref 42–52)
HCT VFR BLD CALC: 33.8 % — LOW (ref 42–52)
HCT VFR BLDA CALC: 37.8 % — SIGNIFICANT CHANGE UP (ref 34–44)
HGB BLD CALC-MCNC: 12.3 G/DL — LOW (ref 14–18)
HGB BLD-MCNC: 12.1 G/DL — LOW (ref 14–18)
HGB BLD-MCNC: 12.1 G/DL — LOW (ref 14–18)
INR BLD: 1.33 RATIO — HIGH (ref 0.65–1.3)
LACTATE BLDV-MCNC: 1.9 MMOL/L — HIGH (ref 0.5–1.6)
LIDOCAIN IGE QN: 41 U/L — SIGNIFICANT CHANGE UP (ref 7–60)
MCHC RBC-ENTMCNC: 33.2 PG — HIGH (ref 27–31)
MCHC RBC-ENTMCNC: 33.5 PG — HIGH (ref 27–31)
MCHC RBC-ENTMCNC: 35.8 G/DL — SIGNIFICANT CHANGE UP (ref 32–37)
MCHC RBC-ENTMCNC: 36.1 G/DL — SIGNIFICANT CHANGE UP (ref 32–37)
MCV RBC AUTO: 92.6 FL — SIGNIFICANT CHANGE UP (ref 80–94)
MCV RBC AUTO: 92.8 FL — SIGNIFICANT CHANGE UP (ref 80–94)
NRBC # BLD: 0 /100 WBCS — SIGNIFICANT CHANGE UP (ref 0–0)
NRBC # BLD: 0 /100 WBCS — SIGNIFICANT CHANGE UP (ref 0–0)
PCO2 BLDV: 26 MMHG — LOW (ref 41–51)
PH BLDV: 7.5 — HIGH (ref 7.26–7.43)
PLATELET # BLD AUTO: 212 K/UL — SIGNIFICANT CHANGE UP (ref 130–400)
PLATELET # BLD AUTO: 235 K/UL — SIGNIFICANT CHANGE UP (ref 130–400)
PO2 BLDV: 45 MMHG — HIGH (ref 20–40)
POTASSIUM BLDV-SCNC: 3.6 MMOL/L — SIGNIFICANT CHANGE UP (ref 3.3–5.6)
POTASSIUM SERPL-MCNC: 4.5 MMOL/L — SIGNIFICANT CHANGE UP (ref 3.5–5)
POTASSIUM SERPL-SCNC: 4.5 MMOL/L — SIGNIFICANT CHANGE UP (ref 3.5–5)
PROT SERPL-MCNC: 6.3 G/DL — SIGNIFICANT CHANGE UP (ref 6–8)
PROTHROM AB SERPL-ACNC: 15.3 SEC — HIGH (ref 9.95–12.87)
RBC # BLD: 3.61 M/UL — LOW (ref 4.7–6.1)
RBC # BLD: 3.65 M/UL — LOW (ref 4.7–6.1)
RBC # FLD: 11.9 % — SIGNIFICANT CHANGE UP (ref 11.5–14.5)
RBC # FLD: 11.9 % — SIGNIFICANT CHANGE UP (ref 11.5–14.5)
SAO2 % BLDV: 84 % — SIGNIFICANT CHANGE UP
SODIUM SERPL-SCNC: 121 MMOL/L — LOW (ref 135–146)
TROPONIN T SERPL-MCNC: <0.01 NG/ML — SIGNIFICANT CHANGE UP
WBC # BLD: 10.14 K/UL — SIGNIFICANT CHANGE UP (ref 4.8–10.8)
WBC # BLD: 8.73 K/UL — SIGNIFICANT CHANGE UP (ref 4.8–10.8)
WBC # FLD AUTO: 10.14 K/UL — SIGNIFICANT CHANGE UP (ref 4.8–10.8)
WBC # FLD AUTO: 8.73 K/UL — SIGNIFICANT CHANGE UP (ref 4.8–10.8)

## 2020-06-06 PROCEDURE — 99285 EMERGENCY DEPT VISIT HI MDM: CPT | Mod: CS,GC

## 2020-06-06 PROCEDURE — 70450 CT HEAD/BRAIN W/O DYE: CPT | Mod: 26

## 2020-06-06 PROCEDURE — 93010 ELECTROCARDIOGRAM REPORT: CPT

## 2020-06-06 PROCEDURE — 71045 X-RAY EXAM CHEST 1 VIEW: CPT | Mod: 26

## 2020-06-06 PROCEDURE — 70496 CT ANGIOGRAPHY HEAD: CPT | Mod: 26

## 2020-06-06 PROCEDURE — 70498 CT ANGIOGRAPHY NECK: CPT | Mod: 26

## 2020-06-06 PROCEDURE — 71260 CT THORAX DX C+: CPT | Mod: 26

## 2020-06-06 PROCEDURE — 99285 EMERGENCY DEPT VISIT HI MDM: CPT

## 2020-06-06 PROCEDURE — 74177 CT ABD & PELVIS W/CONTRAST: CPT | Mod: 26

## 2020-06-06 RX ORDER — FAMOTIDINE 10 MG/ML
20 INJECTION INTRAVENOUS ONCE
Refills: 0 | Status: COMPLETED | OUTPATIENT
Start: 2020-06-06 | End: 2020-06-06

## 2020-06-06 RX ORDER — LABETALOL HCL 100 MG
10 TABLET ORAL ONCE
Refills: 0 | Status: COMPLETED | OUTPATIENT
Start: 2020-06-06 | End: 2020-06-06

## 2020-06-06 RX ORDER — SODIUM CHLORIDE 9 MG/ML
1000 INJECTION, SOLUTION INTRAVENOUS ONCE
Refills: 0 | Status: COMPLETED | OUTPATIENT
Start: 2020-06-06 | End: 2020-06-06

## 2020-06-06 RX ORDER — ONDANSETRON 8 MG/1
4 TABLET, FILM COATED ORAL ONCE
Refills: 0 | Status: COMPLETED | OUTPATIENT
Start: 2020-06-06 | End: 2020-06-06

## 2020-06-06 RX ORDER — ASPIRIN/CALCIUM CARB/MAGNESIUM 324 MG
162 TABLET ORAL ONCE
Refills: 0 | Status: COMPLETED | OUTPATIENT
Start: 2020-06-06 | End: 2020-06-06

## 2020-06-06 RX ADMIN — SODIUM CHLORIDE 1000 MILLILITER(S): 9 INJECTION, SOLUTION INTRAVENOUS at 20:50

## 2020-06-06 RX ADMIN — ONDANSETRON 4 MILLIGRAM(S): 8 TABLET, FILM COATED ORAL at 20:50

## 2020-06-06 RX ADMIN — Medication 10 MILLIGRAM(S): at 22:00

## 2020-06-06 RX ADMIN — FAMOTIDINE 20 MILLIGRAM(S): 10 INJECTION INTRAVENOUS at 20:24

## 2020-06-06 NOTE — ED ADULT NURSE NOTE - NSIMPLEMENTINTERV_GEN_ALL_ED
Implemented All Universal Safety Interventions:  [place] to call system. Call bell, personal items and telephone within reach. Instruct patient to call for assistance. Room bathroom lighting operational. Non-slip footwear when patient is off stretcher. Physically safe environment: no spills, clutter or unnecessary equipment. Stretcher in lowest position, wheels locked, appropriate side rails in place.

## 2020-06-06 NOTE — ED ADULT NURSE NOTE - CHPI ED NUR SYMPTOMS NEG
no vomiting/no chills/no abdominal distension/no dysuria/no blood in stool/no burning urination/no hematuria/no fever/no diarrhea

## 2020-06-06 NOTE — ED PROVIDER NOTE - PROGRESS NOTE DETAILS
called to bedside by brittany weiss, with change in clinical status. pt repeating I don't feel well, no longer able to answer other questions. stroke code called, pt upgraded to juany, d/w ct tech to change scans to non con ct head, cta head/neck, ct iv contrast chest, abd/pevlis. np genesis guajardo shortly after calling code. pt currently in ct. zw ATTENDING NOTE: 87 y/o M with pmhx Afib, on Eliquis, and TIA presents with chest pain associated with nausea and not feeling well x2 days. Pt brought in by daughter who gave additional history of pt recently started Lexapro for depression. On exam: AVSS; exam as noted. CTAB. RRR. Abdomen soft NTND, (+) bowel sounds. Neuro nonfocal. AH: during ED stay, pt became acutely confused called to bedside, stroke code activated. NIHSS=0. Stroke team at bedside. Plan for CT angio and dissection study.

## 2020-06-06 NOTE — CONSULT NOTE ADULT - ASSESSMENT
Impression:  87yo RHM with pmhx of  afib on Eliquis HTN, DLD, hypothyroidism, TIA 2 years ago and has a loop recorder presents with  chief complaint of nausea and abdominal pain. As per daughter patient had few  transient episodes of confusion in last  2 weeks and has been having high bp readings. While in ED a stroke code was activated for acute onset confusion and expressive aphasia. /98 was improved to 184/110 with IVP labetalol .Nihss 2. CTH was negative for acute findings. Patient is not a candidate for TPA as he is on Eliquis. No acute neuro intervention since CTA H/N negative for LVO. Case was discussed with neuro attending on call Dr Blayne Sen          Suggestion:  -Admit to stroke Unit  -Q 4 Neuro checks  -N/C MRI BRAIN (EPS eval to program loop prior to MRI)  -Echo with bubble  -Avoid hypotension, dehydration or extreme  bp fluctuations  -Continue Lipitor 80 mg q 24  -Continue asa 81 mg and Eliquis 5 mg bid  -speech and swallow eval  -PT/REHAB  -Call for acute change in neuro status Impression:  89yo RHM with pmhx of  afib on Eliquis HTN, DLD, hypothyroidism, TIA 2 years ago and has a loop recorder presents with  chief complaint of nausea and abdominal pain. As per daughter patient had few  transient episodes of confusion in last  2 weeks and has been having high bp readings. While in ED a stroke code was activated for acute onset confusion and expressive aphasia. /98 was improved to 184/110 with IVP labetalol .Nihss 2. CTH was negative for acute findings. Patient is not a candidate for TPA as he is on Eliquis. No acute neuro intervention since CTA H/N negative for LVO. Case was discussed with neuro attending on call Dr Blayne Sen          Suggestion:  -Admit to stroke Unit  -Q 4 Neuro checks  -N/C MRI BRAIN (EPS eval to program loop prior to MRI)  -Echo with bubble  -Avoid hypotension, dehydration or extreme  bp fluctuations  -Continue Lipitor 80 mg q 24  -Continue asa 81 mg and Eliquis 5 mg bid  -speech and swallow eval  -PT/REHAB

## 2020-06-06 NOTE — ED PROVIDER NOTE - NS ED ROS FT
Constitutional: (-) fever (-) vomiting  Eyes/ENT: (-) vision changes, (-) hearing changes  Cardiovascular: (-) chest pain, (-) sob  Respiratory: (-) cough, (-) shortness of breath  Gastrointestinal: (-) vomiting, (-) diarrhea, (-) abdominal pain  : (-) dysuria   Musculoskeletal: (-) back pain  Integumentary: (-) rash, (-) edema  Neurological: (-)loc  Allergic/Immunologic: (-) pruritus  Endocrine: +history of thyroid disease

## 2020-06-06 NOTE — ED PROVIDER NOTE - PHYSICAL EXAMINATION
CONSTITUTIONAL: Well-developed; well-nourished; in no acute distress, speaking in full sentences  SKIN: warm, dry  HEAD: Normocephalic; atraumatic  EYES: PERRL, EOMI, no conjunctival erythema  ENT: No nasal discharge; airway clear, mucous membranes moist  NECK: Supple; non tender, FROM  CARD: radial 2+ and equal b/l, +irreg irreg   RESP: No wheezes, rales or rhonchi. CTABL  ABD: soft ntnd, no rebound, no guarding, no rigidity, neg murphys  EXT: moves all extremities, ambulates wo assistance No clubbing, cyanosis or edema.   NEURO: Alert, oriented, grossly unremarkable, no focal deficits, cn ii-xii grossly intact  PSYCH: Cooperative, appropriate

## 2020-06-06 NOTE — ED PROVIDER NOTE - CARE PLAN
Principal Discharge DX:	TIA (transient ischemic attack)  Secondary Diagnosis:	Hyponatremia  Secondary Diagnosis:	Nausea

## 2020-06-06 NOTE — ED ADULT TRIAGE NOTE - CHIEF COMPLAINT QUOTE
as per family pt bp has been elevated, appetite is decreased, pt is stressed at home.  pt states "  I just don't feel good, I feel nauseas "

## 2020-06-06 NOTE — CONSULT NOTE ADULT - SUBJECTIVE AND OBJECTIVE BOX
Chief Complaint: Nausea and abdominal pain.    HPI:  87yo RHM with pmhx of  afib on Eliquis HTN, DLD, hypothyroidism, TIA 2 years ago and has a loop recorder presents with  chief complaint of nausea and abdominal pain. As per daughter patient had few  transient episodes of confusion in last  2 weeks and has been having high bp readings for which he saw cardiologist few days ago and his amlodipine was increased. While in ED a stroke code was activated for acute onset confusion and difficulty speaking. /98          Home Medications:  Aspirin 81 mg oral tablet: 1 tab(s) orally once a day   Atorvastatin 80 mg oral tablet: 1 tab(s) orally once a day   Eliquis 5 mg oral tablet: 1 tab(s) orally 2 times a day   Folic acid 1 mg oral tablet: 1 tab(s) orally once a day (at bedtime)  Levothyroxine 75 mcg (0.075 mg) oral tablet: 1 tab(s) orally once a day  Lisinopril 20 mg oral tablet: 1 tab(s) orally once a day (at bedtime)   Tamsulosin 0.4 mg oral capsule: 1 cap(s) orally once a day       Relevant PMH:  [X] Prior ischemic stroke/TIA  [X] Afib  []CAD  [x]HTN  []DLD  []DM []PVD []Obesity [] Sedintary lifestyle []CHF  []BANDAR  []Cancer Hx     Social History: [] Smoking []  Drug Use: []   Alcohol Use:   [] Other:  None        Possible Location of Stroke:  Unknown at this time, will have a better understanding post stroke workup.      Possible Cause of Stroke:  Likely embolic      Relevant Cerebral Imaging:  < from: CT Brain Stroke Protocol (06.06.20 @ 21:35) >  Findings:    The ventricles and cortical sulci are prominent, consistent with diffuse parenchymal volume loss. There is no evidence for acute intracranial hemorrhage, midline shift, mass effect, or loss of gray-white differentiation to suggest acute territorial infarction. No extra-axial fluid collection is seen.    Confluent areas of periventricular and subcortical white matter hypodensity, without definite evidence of surrounding mass effect. Findings are most consistent with severe chronic microvascular disease.    Stable chronic lacunar infarct in the right hemisphere.    The paranasal sinuses and mastoids are well-aerated. The visualized orbital structures are unremarkable. Dense vascular calcifications are noted at the skull base.    IMPRESSION:     No evidence for acute intracranial hemorrhage, midline shift, or mass effect.      < end of copied text >        Relevant Cervicocerebral Imaging:  CT Angio Neck w/ IV Cont:   ******PRELIMINARY REPORT******    ******PRELIMINARY REPORT******          EXAM:  CT ANGIO NECK (W)AW IC        EXAM:  CT ANGIO BRAIN (W)AW IC            PROCEDURE DATE:  06/06/2020          ******PRELIMINARY REPORT******    ******PRELIMINARY REPORT******          INTERPRETATION:  Clinical history: Stroke code. Acute onset aphasia.    Technique: CT angiogram of the head and neck. CTA of the head and neck was performed following the intravenous administration of 100 cc Optiray 320 with coronal, sagittal and multiple 3-D MIP and volume rendered reformats.    Comparison/correlation: Ultrasound neck 9/11/2018. MRA brain 3/18/2017.    Findings:     NECK:    The visualized aortic arch is patent. The great vessel origins contain calcifications without significant stenosis.    The right common carotid artery is patent without significant stenosis. The right carotid bulb contains calcifications without significant stenosis. The right proximal ICA contains calcification with mild/moderate (approximately 50%) stenosis (series 2/180).    The left common carotid artery is patent without significant stenosis. The left proximal ICA contains calcification with mild (less than 50%) stenosis.    The bilateral vertebral arteries (left dominant) are patent. Calcification of the left distal vertebral artery, V4 segment, with mild stenosis (less than 50%).    HEAD:    The distal internal carotid arteries contain calcifications with moderate stenosis (approximately 50%) per minute the supraclinoid regions. The bilateral anterior and middle cerebral arteries are unremarkable.    The basilar artery, bilateral posterior inferior cerebellar arteries and posterior cerebral arteries are patent.      IMPRESSION:     No evidence of large vessel stenosis oraneurysm.    Scattered calcifications with mild/moderate stenosis in the proximal right ICA and bilateral supraclinoid ICAs.      ******PRELIMINARY REPORT******    ******PRELIMINARY REPORT******          PABLITO GILMORE M.D., RESIDENT RADIOLOGIST          CT Angio Head w/ IV Cont:   ******PRELIMINARY REPORT******    ******PRELIMINARY REPORT******          EXAM:  CT ANGIO NECK (W)AW IC        EXAM:  CT ANGIO BRAIN (W)AW IC            PROCEDURE DATE:  06/06/2020          ******PRELIMINARY REPORT******    ******PRELIMINARY REPORT******          INTERPRETATION:  Clinical history: Stroke code. Acute onset aphasia.    Technique: CT angiogram of the head and neck. CTA of the head and neck was performed following the intravenous administration of 100 cc Optiray 320 with coronal, sagittal and multiple 3-D MIP and volume rendered reformats.    Comparison/correlation: Ultrasound neck 9/11/2018. MRA brain 3/18/2017.    Findings:     NECK:    The visualized aortic arch is patent. The great vessel origins contain calcifications without significant stenosis.    The right common carotid artery is patent without significant stenosis. The right carotid bulb contains calcifications without significant stenosis. The right proximal ICA contains calcification with mild/moderate (approximately 50%) stenosis (series 2/180).    The left common carotid artery is patent without significant stenosis. The left proximal ICA contains calcification with mild (less than 50%) stenosis.    The bilateral vertebral arteries (left dominant) are patent. Calcification of the left distal vertebral artery, V4 segment, with mild stenosis (less than 50%).    HEAD:    The distal internal carotid arteries contain calcifications with moderate stenosis (approximately 50%) per minute the supraclinoid regions. The bilateral anterior and middle cerebral arteries are unremarkable.    The basilar artery, bilateral posterior inferior cerebellar arteries and posterior cerebral arteries are patent.      IMPRESSION:     No evidence of large vessel stenosis oraneurysm.    Scattered calcifications with mild/moderate stenosis in the proximal right ICA and bilateral supraclinoid ICAs.      PABLITO GILMORE M.D., RESIDENT RADIOLOGIST        Relevant blood tests:      Relevant cardiac rhythm monitoring:        Relevant Cardiac Structure:(TTE/STEPHY +/-):[]No intracardiac thrombus/[] no vegetation/[]no [place]/EF:  pending        Home Medications:  aspirin 81 mg oral tablet: 1 tab(s) orally once a day (10 Sep 2018 07:17)  atorvastatin 80 mg oral tablet: 1 tab(s) orally once a day (10 Sep 2018 07:17)  Eliquis 5 mg oral tablet: 1 tab(s) orally 2 times a day (10 Sep 2018 07:17)  folic acid 1 mg oral tablet: 1 tab(s) orally once a day (at bedtime) (10 Sep 2018 07:17)  levothyroxine 75 mcg (0.075 mg) oral tablet: 1 tab(s) orally once a day (10 Sep 2018 07:17)  lisinopril 20 mg oral tablet: 1 tab(s) orally once a day (at bedtime) (10 Sep 2018 07:17)  tamsulosin 0.4 mg oral capsule: 1 cap(s) orally once a day (10 Sep 2018 07:17)      MEDICATIONS  (STANDING):      PT/OT/Speech/Rehab/S&Swr:Pending        Exam:  Patient awake alert , able to say his name and age , able to say the month year and presidents name when given cues, speech is fluent  CN: Intact except for expressive aphasia  Power: RUE 5/5 RLE 4+/5             LUE 5/5  LLE 4+/5  Sensory: Intact  F-N : No dysmetria  H-S : no limb ataxia  No neglect        NIHSS 2      LOC: 0      1a:     1b(Questions):  1         1c(Instructions):   0          Best Gaze:0  Visual:0  Motor:                 RUE:  0   RLE: 0    LUE:  0   LLE:  0   FACE:  0   Limb Ataxia:0  Sensory:    0   Language:  1     Dysarthria:  0        Extinction and Inattention: 0    NIHSS on admission:  2                          m-RS:  0 No symptoms at all  1 No significant disability despite symptoms; able to carry out all usual duties and activities without assistance  2 Slight disability; unable to carry out all previous activities, but able to look after own affairs  3 Moderate disability; requiring some help, but able to walk without assistance  4 Moderately severe disability; unable to walk without assistance and unable to attend to own bodily needs without assistance  5 Severe disability; bedridden, incontinent and requiring constant nursing care and attention  6 Dead          Vital Signs Last 24 Hrs  T(C): 35.8 (06 Jun 2020 19:26), Max: 35.8 (06 Jun 2020 19:26)  T(F): 96.4 (06 Jun 2020 19:26), Max: 96.4 (06 Jun 2020 19:26)  HR: 77 (06 Jun 2020 22:48) (70 - 78)  BP: 157/77 (06 Jun 2020 22:48) (157/77 - 207/98)  BP(mean): --  RR: 20 (06 Jun 2020 22:48) (18 - 22)  SpO2: 98% (06 Jun 2020 22:48) (98% - 100%) Chief Complaint: Nausea and abdominal pain.    HPI:  89yo RHM with pmhx of  afib on Eliquis HTN, DLD, hypothyroidism, TIA 2 years ago and has a loop recorder presents with  chief complaint of nausea and abdominal pain. As per daughter patient had few  transient episodes of confusion in last  2 weeks and has been having high bp readings for which he saw cardiologist few days ago and his amlodipine was increased. While in ED a stroke code was activated for acute onset confusion and difficulty speaking. /98            Relevant PMH:  [X] Prior ischemic stroke/TIA  [X] Afib  []CAD  [x]HTN  []DLD  []DM []PVD []Obesity [] Sedintary lifestyle []CHF  []BANDAR  []Cancer Hx     Social History: [] Smoking []  Drug Use: []   Alcohol Use:   [] Other:  None        Possible Location of Stroke:  Unknown at this time, will have a better understanding post stroke workup.      Possible Cause of Stroke:  Likely embolic      Relevant Cerebral Imaging:  < from: CT Brain Stroke Protocol (06.06.20 @ 21:35) >  Findings:    The ventricles and cortical sulci are prominent, consistent with diffuse parenchymal volume loss. There is no evidence for acute intracranial hemorrhage, midline shift, mass effect, or loss of gray-white differentiation to suggest acute territorial infarction. No extra-axial fluid collection is seen.    Confluent areas of periventricular and subcortical white matter hypodensity, without definite evidence of surrounding mass effect. Findings are most consistent with severe chronic microvascular disease.    Stable chronic lacunar infarct in the right hemisphere.    The paranasal sinuses and mastoids are well-aerated. The visualized orbital structures are unremarkable. Dense vascular calcifications are noted at the skull base.    IMPRESSION:     No evidence for acute intracranial hemorrhage, midline shift, or mass effect.      < end of copied text >        Relevant Cervicocerebral Imaging:  CT Angio Neck w/ IV Cont:   ******PRELIMINARY REPORT******    ******PRELIMINARY REPORT******          EXAM:  CT ANGIO NECK (W)AW IC        EXAM:  CT ANGIO BRAIN (W)AW IC            PROCEDURE DATE:  06/06/2020          ******PRELIMINARY REPORT******    ******PRELIMINARY REPORT******          INTERPRETATION:  Clinical history: Stroke code. Acute onset aphasia.    Technique: CT angiogram of the head and neck. CTA of the head and neck was performed following the intravenous administration of 100 cc Optiray 320 with coronal, sagittal and multiple 3-D MIP and volume rendered reformats.    Comparison/correlation: Ultrasound neck 9/11/2018. MRA brain 3/18/2017.    Findings:     NECK:    The visualized aortic arch is patent. The great vessel origins contain calcifications without significant stenosis.    The right common carotid artery is patent without significant stenosis. The right carotid bulb contains calcifications without significant stenosis. The right proximal ICA contains calcification with mild/moderate (approximately 50%) stenosis (series 2/180).    The left common carotid artery is patent without significant stenosis. The left proximal ICA contains calcification with mild (less than 50%) stenosis.    The bilateral vertebral arteries (left dominant) are patent. Calcification of the left distal vertebral artery, V4 segment, with mild stenosis (less than 50%).    HEAD:    The distal internal carotid arteries contain calcifications with moderate stenosis (approximately 50%) per minute the supraclinoid regions. The bilateral anterior and middle cerebral arteries are unremarkable.    The basilar artery, bilateral posterior inferior cerebellar arteries and posterior cerebral arteries are patent.      IMPRESSION:     No evidence of large vessel stenosis oraneurysm.    Scattered calcifications with mild/moderate stenosis in the proximal right ICA and bilateral supraclinoid ICAs.      ******PRELIMINARY REPORT******    ******PRELIMINARY REPORT******          PABLITO GILMORE M.D., RESIDENT RADIOLOGIST          CT Angio Head w/ IV Cont:   ******PRELIMINARY REPORT******    ******PRELIMINARY REPORT******          EXAM:  CT ANGIO NECK (W)AW IC        EXAM:  CT ANGIO BRAIN (W)AW IC            PROCEDURE DATE:  06/06/2020          ******PRELIMINARY REPORT******    ******PRELIMINARY REPORT******          INTERPRETATION:  Clinical history: Stroke code. Acute onset aphasia.    Technique: CT angiogram of the head and neck. CTA of the head and neck was performed following the intravenous administration of 100 cc Optiray 320 with coronal, sagittal and multiple 3-D MIP and volume rendered reformats.    Comparison/correlation: Ultrasound neck 9/11/2018. MRA brain 3/18/2017.    Findings:     NECK:    The visualized aortic arch is patent. The great vessel origins contain calcifications without significant stenosis.    The right common carotid artery is patent without significant stenosis. The right carotid bulb contains calcifications without significant stenosis. The right proximal ICA contains calcification with mild/moderate (approximately 50%) stenosis (series 2/180).    The left common carotid artery is patent without significant stenosis. The left proximal ICA contains calcification with mild (less than 50%) stenosis.    The bilateral vertebral arteries (left dominant) are patent. Calcification of the left distal vertebral artery, V4 segment, with mild stenosis (less than 50%).    HEAD:    The distal internal carotid arteries contain calcifications with moderate stenosis (approximately 50%) per minute the supraclinoid regions. The bilateral anterior and middle cerebral arteries are unremarkable.    The basilar artery, bilateral posterior inferior cerebellar arteries and posterior cerebral arteries are patent.      IMPRESSION:     No evidence of large vessel stenosis oraneurysm.    Scattered calcifications with mild/moderate stenosis in the proximal right ICA and bilateral supraclinoid ICAs.      PABLITO GILMORE M.D., RESIDENT RADIOLOGIST        Relevant blood tests:      Relevant cardiac rhythm monitoring:        Relevant Cardiac Structure:(TTE/STEPHY +/-):[]No intracardiac thrombus/[] no vegetation/[]no [place]/EF:  pending        Home Medications:  aspirin 81 mg oral tablet: 1 tab(s) orally once a day (10 Sep 2018 07:17)  atorvastatin 80 mg oral tablet: 1 tab(s) orally once a day (10 Sep 2018 07:17)  Eliquis 5 mg oral tablet: 1 tab(s) orally 2 times a day (10 Sep 2018 07:17)  folic acid 1 mg oral tablet: 1 tab(s) orally once a day (at bedtime) (10 Sep 2018 07:17)  levothyroxine 75 mcg (0.075 mg) oral tablet: 1 tab(s) orally once a day (10 Sep 2018 07:17)  lisinopril 20 mg oral tablet: 1 tab(s) orally once a day (at bedtime) (10 Sep 2018 07:17)  tamsulosin 0.4 mg oral capsule: 1 cap(s) orally once a day (10 Sep 2018 07:17)      MEDICATIONS  (STANDING):      PT/OT/Speech/Rehab/S&Swr:Pending        Exam:  Patient awake alert , able to say his name and age , able to say the month year and presidents name when given cues, speech is fluent  CN: Intact except for expressive aphasia  Power: RUE 5/5 RLE 4+/5             LUE 5/5  LLE 4+/5  Sensory: Intact  F-N : No dysmetria  H-S : no limb ataxia  No neglect        NIHSS 2      LOC: 0      1a:     1b(Questions):  1         1c(Instructions):   0          Best Gaze:0  Visual:0  Motor:                 RUE:  0   RLE: 0    LUE:  0   LLE:  0   FACE:  0   Limb Ataxia:0  Sensory:    0   Language:  1     Dysarthria:  0        Extinction and Inattention: 0    NIHSS on admission:  2                          m-RS:  0 No symptoms at all  1 No significant disability despite symptoms; able to carry out all usual duties and activities without assistance  2 Slight disability; unable to carry out all previous activities, but able to look after own affairs  3 Moderate disability; requiring some help, but able to walk without assistance  4 Moderately severe disability; unable to walk without assistance and unable to attend to own bodily needs without assistance  5 Severe disability; bedridden, incontinent and requiring constant nursing care and attention  6 Dead          Vital Signs Last 24 Hrs  T(C): 35.8 (06 Jun 2020 19:26), Max: 35.8 (06 Jun 2020 19:26)  T(F): 96.4 (06 Jun 2020 19:26), Max: 96.4 (06 Jun 2020 19:26)  HR: 77 (06 Jun 2020 22:48) (70 - 78)  BP: 157/77 (06 Jun 2020 22:48) (157/77 - 207/98)  BP(mean): --  RR: 20 (06 Jun 2020 22:48) (18 - 22)  SpO2: 98% (06 Jun 2020 22:48) (98% - 100%)

## 2020-06-06 NOTE — ED PROVIDER NOTE - OBJECTIVE STATEMENT
89yo m pmhx afib on eliquis, HTN, DLD, hypothyroidism, TIA presents CC several days of nausea. pt saw pmd vicky yesterday, had home medications adjusted but unsure which ones. pt comes to ed today not bc anything is different but because nausea has not resolved. pt denies chest pain, vomiting, fevers, sob, cough, abdominal pain, diarrhea, dysuria. reports normal bm 2 hours pta, no black stools no bloody stools.. no le edema.

## 2020-06-06 NOTE — ED ADULT NURSE NOTE - OBJECTIVE STATEMENT
patient complaints of abdominal pain and nausea. Patient noted to have constant burping. Patient states he has been burping x 2 days. Patient reports, "I just don't feel good."  Patient denies chest pain, cough, fever, and vomiting. Patient has had decrease in appetite due to nausea.

## 2020-06-06 NOTE — ED ADULT NURSE REASSESSMENT NOTE - NS ED NURSE REASSESS COMMENT FT1
patient became AMS, confused and aphasic.  MD called to bedside.  Ordered a Stroke code.  Stroke code called and 9:15pm and patient moved to [facility] bed 2.  Patient last known well time 9:10pm.  Report given to HERO Miranda.  Patient remains on cardiac monitor.

## 2020-06-06 NOTE — ED PROVIDER NOTE - CLINICAL SUMMARY MEDICAL DECISION MAKING FREE TEXT BOX
pt hypertensive. Will give Labetalol. CT brain and labs non-contributory except for hyponatremia. Will admit for in-patient work up.

## 2020-06-07 LAB
ANION GAP SERPL CALC-SCNC: 10 MMOL/L — SIGNIFICANT CHANGE UP (ref 7–14)
ANION GAP SERPL CALC-SCNC: 11 MMOL/L — SIGNIFICANT CHANGE UP (ref 7–14)
ANION GAP SERPL CALC-SCNC: 12 MMOL/L — SIGNIFICANT CHANGE UP (ref 7–14)
APPEARANCE UR: CLEAR — SIGNIFICANT CHANGE UP
BACTERIA # UR AUTO: ABNORMAL
BILIRUB UR-MCNC: NEGATIVE — SIGNIFICANT CHANGE UP
BUN SERPL-MCNC: 12 MG/DL — SIGNIFICANT CHANGE UP (ref 10–20)
BUN SERPL-MCNC: 12 MG/DL — SIGNIFICANT CHANGE UP (ref 10–20)
BUN SERPL-MCNC: 14 MG/DL — SIGNIFICANT CHANGE UP (ref 10–20)
CALCIUM SERPL-MCNC: 8.2 MG/DL — LOW (ref 8.5–10.1)
CALCIUM SERPL-MCNC: 8.3 MG/DL — LOW (ref 8.5–10.1)
CALCIUM SERPL-MCNC: 8.3 MG/DL — LOW (ref 8.5–10.1)
CHLORIDE SERPL-SCNC: 82 MMOL/L — LOW (ref 98–110)
CHLORIDE SERPL-SCNC: 82 MMOL/L — LOW (ref 98–110)
CHLORIDE SERPL-SCNC: 88 MMOL/L — LOW (ref 98–110)
CHOLEST SERPL-MCNC: 120 MG/DL — SIGNIFICANT CHANGE UP (ref 100–200)
CO2 SERPL-SCNC: 23 MMOL/L — SIGNIFICANT CHANGE UP (ref 17–32)
CO2 SERPL-SCNC: 24 MMOL/L — SIGNIFICANT CHANGE UP (ref 17–32)
CO2 SERPL-SCNC: 26 MMOL/L — SIGNIFICANT CHANGE UP (ref 17–32)
COLOR SPEC: SIGNIFICANT CHANGE UP
CREAT ?TM UR-MCNC: 32 MG/DL — SIGNIFICANT CHANGE UP
CREAT SERPL-MCNC: 1.1 MG/DL — SIGNIFICANT CHANGE UP (ref 0.7–1.5)
CREAT SERPL-MCNC: 1.1 MG/DL — SIGNIFICANT CHANGE UP (ref 0.7–1.5)
CREAT SERPL-MCNC: 1.2 MG/DL — SIGNIFICANT CHANGE UP (ref 0.7–1.5)
DIFF PNL FLD: NEGATIVE — SIGNIFICANT CHANGE UP
EPI CELLS # UR: 0 /HPF — SIGNIFICANT CHANGE UP (ref 0–5)
GLUCOSE SERPL-MCNC: 101 MG/DL — HIGH (ref 70–99)
GLUCOSE SERPL-MCNC: 116 MG/DL — HIGH (ref 70–99)
GLUCOSE SERPL-MCNC: 121 MG/DL — HIGH (ref 70–99)
GLUCOSE UR QL: NEGATIVE — SIGNIFICANT CHANGE UP
HDLC SERPL-MCNC: 54 MG/DL — SIGNIFICANT CHANGE UP
HYALINE CASTS # UR AUTO: 0 /LPF — SIGNIFICANT CHANGE UP (ref 0–7)
KETONES UR-MCNC: NEGATIVE — SIGNIFICANT CHANGE UP
LEUKOCYTE ESTERASE UR-ACNC: ABNORMAL
LIPID PNL WITH DIRECT LDL SERPL: 55 MG/DL — SIGNIFICANT CHANGE UP (ref 4–129)
NITRITE UR-MCNC: POSITIVE
OSMOLALITY SERPL: 260 MOSMOL/KG — LOW (ref 280–301)
OSMOLALITY UR: 325 MOS/KG — SIGNIFICANT CHANGE UP (ref 50–1400)
PH UR: 7 — SIGNIFICANT CHANGE UP (ref 5–8)
POTASSIUM SERPL-MCNC: 4.1 MMOL/L — SIGNIFICANT CHANGE UP (ref 3.5–5)
POTASSIUM SERPL-MCNC: 4.1 MMOL/L — SIGNIFICANT CHANGE UP (ref 3.5–5)
POTASSIUM SERPL-MCNC: 4.2 MMOL/L — SIGNIFICANT CHANGE UP (ref 3.5–5)
POTASSIUM SERPL-SCNC: 4.1 MMOL/L — SIGNIFICANT CHANGE UP (ref 3.5–5)
POTASSIUM SERPL-SCNC: 4.1 MMOL/L — SIGNIFICANT CHANGE UP (ref 3.5–5)
POTASSIUM SERPL-SCNC: 4.2 MMOL/L — SIGNIFICANT CHANGE UP (ref 3.5–5)
PROT UR-MCNC: SIGNIFICANT CHANGE UP
RBC CASTS # UR COMP ASSIST: 1 /HPF — SIGNIFICANT CHANGE UP (ref 0–4)
SARS-COV-2 RNA SPEC QL NAA+PROBE: SIGNIFICANT CHANGE UP
SODIUM SERPL-SCNC: 117 MMOL/L — CRITICAL LOW (ref 135–146)
SODIUM SERPL-SCNC: 118 MMOL/L — CRITICAL LOW (ref 135–146)
SODIUM SERPL-SCNC: 123 MMOL/L — LOW (ref 135–146)
SODIUM UR-SCNC: 80 MMOL/L — SIGNIFICANT CHANGE UP
SP GR SPEC: 1.02 — SIGNIFICANT CHANGE UP (ref 1.01–1.02)
TOTAL CHOLESTEROL/HDL RATIO MEASUREMENT: 2.2 RATIO — LOW (ref 4–5.5)
TRIGL SERPL-MCNC: 73 MG/DL — SIGNIFICANT CHANGE UP (ref 10–149)
TROPONIN T SERPL-MCNC: <0.01 NG/ML — SIGNIFICANT CHANGE UP
UROBILINOGEN FLD QL: SIGNIFICANT CHANGE UP
UUN UR-MCNC: 235 MG/DL — SIGNIFICANT CHANGE UP
WBC UR QL: 94 /HPF — HIGH (ref 0–5)

## 2020-06-07 PROCEDURE — 99223 1ST HOSP IP/OBS HIGH 75: CPT

## 2020-06-07 PROCEDURE — 93010 ELECTROCARDIOGRAM REPORT: CPT

## 2020-06-07 PROCEDURE — 70551 MRI BRAIN STEM W/O DYE: CPT | Mod: 26

## 2020-06-07 RX ORDER — APIXABAN 2.5 MG/1
5 TABLET, FILM COATED ORAL
Refills: 0 | Status: DISCONTINUED | OUTPATIENT
Start: 2020-06-07 | End: 2020-06-16

## 2020-06-07 RX ORDER — SODIUM CHLORIDE 5 G/100ML
100 INJECTION, SOLUTION INTRAVENOUS
Refills: 0 | Status: COMPLETED | OUTPATIENT
Start: 2020-06-07 | End: 2020-06-07

## 2020-06-07 RX ORDER — ASPIRIN/CALCIUM CARB/MAGNESIUM 324 MG
81 TABLET ORAL DAILY
Refills: 0 | Status: DISCONTINUED | OUTPATIENT
Start: 2020-06-07 | End: 2020-06-16

## 2020-06-07 RX ORDER — LEVOTHYROXINE SODIUM 125 MCG
75 TABLET ORAL DAILY
Refills: 0 | Status: DISCONTINUED | OUTPATIENT
Start: 2020-06-07 | End: 2020-06-16

## 2020-06-07 RX ORDER — LISINOPRIL 2.5 MG/1
20 TABLET ORAL DAILY
Refills: 0 | Status: DISCONTINUED | OUTPATIENT
Start: 2020-06-07 | End: 2020-06-16

## 2020-06-07 RX ORDER — CHLORHEXIDINE GLUCONATE 213 G/1000ML
1 SOLUTION TOPICAL
Refills: 0 | Status: DISCONTINUED | OUTPATIENT
Start: 2020-06-07 | End: 2020-06-16

## 2020-06-07 RX ORDER — SODIUM CHLORIDE 9 MG/ML
1000 INJECTION INTRAMUSCULAR; INTRAVENOUS; SUBCUTANEOUS
Refills: 0 | Status: DISCONTINUED | OUTPATIENT
Start: 2020-06-07 | End: 2020-06-07

## 2020-06-07 RX ORDER — ACETAMINOPHEN 500 MG
650 TABLET ORAL EVERY 6 HOURS
Refills: 0 | Status: DISCONTINUED | OUTPATIENT
Start: 2020-06-07 | End: 2020-06-16

## 2020-06-07 RX ORDER — ATORVASTATIN CALCIUM 80 MG/1
80 TABLET, FILM COATED ORAL AT BEDTIME
Refills: 0 | Status: DISCONTINUED | OUTPATIENT
Start: 2020-06-07 | End: 2020-06-16

## 2020-06-07 RX ORDER — SODIUM CHLORIDE 9 MG/ML
1 INJECTION INTRAMUSCULAR; INTRAVENOUS; SUBCUTANEOUS ONCE
Refills: 0 | Status: COMPLETED | OUTPATIENT
Start: 2020-06-07 | End: 2020-06-07

## 2020-06-07 RX ORDER — FOLIC ACID 0.8 MG
1 TABLET ORAL AT BEDTIME
Refills: 0 | Status: DISCONTINUED | OUTPATIENT
Start: 2020-06-07 | End: 2020-06-16

## 2020-06-07 RX ORDER — AMLODIPINE BESYLATE 2.5 MG/1
5 TABLET ORAL DAILY
Refills: 0 | Status: DISCONTINUED | OUTPATIENT
Start: 2020-06-07 | End: 2020-06-12

## 2020-06-07 RX ORDER — CEFTRIAXONE 500 MG/1
1000 INJECTION, POWDER, FOR SOLUTION INTRAMUSCULAR; INTRAVENOUS EVERY 24 HOURS
Refills: 0 | Status: DISCONTINUED | OUTPATIENT
Start: 2020-06-07 | End: 2020-06-12

## 2020-06-07 RX ADMIN — SODIUM CHLORIDE 1 GRAM(S): 9 INJECTION INTRAMUSCULAR; INTRAVENOUS; SUBCUTANEOUS at 18:37

## 2020-06-07 RX ADMIN — SODIUM CHLORIDE 1000 MILLILITER(S): 9 INJECTION, SOLUTION INTRAVENOUS at 05:02

## 2020-06-07 RX ADMIN — Medication 650 MILLIGRAM(S): at 02:44

## 2020-06-07 RX ADMIN — ATORVASTATIN CALCIUM 80 MILLIGRAM(S): 80 TABLET, FILM COATED ORAL at 21:56

## 2020-06-07 RX ADMIN — APIXABAN 5 MILLIGRAM(S): 2.5 TABLET, FILM COATED ORAL at 17:29

## 2020-06-07 RX ADMIN — AMLODIPINE BESYLATE 5 MILLIGRAM(S): 2.5 TABLET ORAL at 06:02

## 2020-06-07 RX ADMIN — SODIUM CHLORIDE 60 MILLILITER(S): 9 INJECTION INTRAMUSCULAR; INTRAVENOUS; SUBCUTANEOUS at 01:52

## 2020-06-07 RX ADMIN — Medication 75 MICROGRAM(S): at 06:01

## 2020-06-07 RX ADMIN — CEFTRIAXONE 100 MILLIGRAM(S): 500 INJECTION, POWDER, FOR SOLUTION INTRAMUSCULAR; INTRAVENOUS at 11:40

## 2020-06-07 RX ADMIN — Medication 1 MILLIGRAM(S): at 21:56

## 2020-06-07 RX ADMIN — Medication 81 MILLIGRAM(S): at 11:03

## 2020-06-07 RX ADMIN — APIXABAN 5 MILLIGRAM(S): 2.5 TABLET, FILM COATED ORAL at 06:01

## 2020-06-07 RX ADMIN — SODIUM CHLORIDE 30 MILLILITER(S): 5 INJECTION, SOLUTION INTRAVENOUS at 22:53

## 2020-06-07 RX ADMIN — LISINOPRIL 20 MILLIGRAM(S): 2.5 TABLET ORAL at 06:01

## 2020-06-07 NOTE — H&P ADULT - NSHPPHYSICALEXAM_GEN_ALL_CORE
GENERAL: NAD, alert oriented X2  HEENT: Atraumatic Normocephalic  CHEST: Clear to auscultate bilaterally   HEART: Normal S1 and S2  ABDOMEN: Soft nontender  EXTREMITIES: No edema

## 2020-06-07 NOTE — H&P ADULT - NSHPLABSRESULTS_GEN_ALL_CORE
12.1   8.73  )-----------( 212      ( 06 Jun 2020 22:11 )             33.5   06-06    121<L>  |  86<L>  |  18  ----------------------------<  107<H>  4.5   |  21  |  1.3    Ca    8.6      06 Jun 2020 20:24    TPro  6.3  /  Alb  4.1  /  TBili  0.6  /  DBili  x   /  AST  17  /  ALT  11  /  AlkPhos  54  06-06  < from: CT Abdomen and Pelvis w/ IV Cont (06.06.20 @ 22:02) >      IMPRESSION:    Motion degraded examination.    No CT evidence for acute intrathoracic, abdominal or pelvic pathology.    2 pulmonary nodules measuring 3 and 4 mm. As per Fleischner Society guidelines, in a low-risk patient, no routine follow-up is necessary and in a high-risk patient optional twelve-month follow-up chest CT can be obtained.    Dilated main pulmonary artery which can be seen in pulmonary arterial hypertension.    Suggestion of cholelithiasis.      < end of copied text >    < from: CT Angio Neck w/ IV Cont (06.06.20 @ 22:01) >      IMPRESSION:     No evidence of large vessel stenosis oraneurysm.    Scattered calcifications with mild/moderate stenosis in the proximal right ICA and bilateral supraclinoid ICAs.    < end of copied text >    < from: CT Brain Stroke Protocol (06.06.20 @ 21:35) >    IMPRESSION:     No evidence for acute intracranial hemorrhage, midline shift, or mass effect.    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    < end of copied text >

## 2020-06-07 NOTE — PHYSICAL THERAPY INITIAL EVALUATION ADULT - LIVES WITH, PROFILE
children/Pt reports he lives on first floor of pvt home with wife,; daughter lives upstairs. Pt insists he does not use stairs at home. Pt appears confused at times, unclear if fully accurate.

## 2020-06-07 NOTE — H&P ADULT - ATTENDING COMMENTS
I saw and evaluated the patient. I have reviewed and agree with the findings and plan of care as documented above in the resident’s note (unless indicated differently below). Any necessary changes were made in the body of the text.    89 yo M pt p/w "not feeling well (x 2 days)," nausea/abdominal discomfort and confusion / mood changes (intermittently over 2 weeks). Quality of discomfort: cramping (not present at the time of this evaluation). Location of discomfort: generalized. Course of all symptoms: intermittent but with progressive worsening. Intensity of symptoms: moderate. For depressed mood (was started on lexapro by outside MD). Pt denies fever, vomiting or diarrhea. No blood per rectum. Had headache before (mild, frontal, achy, non-radiating) but says that this has resolved. No complaints at the time of this evaluation. Lives at home with family. Ambulates w/ cane. Of note, has had elevated BP readings over the past week (dose of amlodipine was increased by his Cardiologist).    ROS (negative at the time of this evaluation as below):  Constitutional: no fevers; no chills  Eyes: no conjunctivitis; no itching  ENT: no dysphagia; no odynophagia  CVS: no PND; no orthopnea; no chest pain  Resp: no SOB; no coughing  GI: no nausea (at the time of this evaluation); no vomiting; no diarrhea; no abd pain  : no dysuria; no hematuria  MSK: no myalgias; no arthralgias  Skin: no rashes; no ulcers  Neuro: no focal weakness; no headache  All other systems reviewed and are negative    PMHx, Home medications, SurHx, FHx and Social history as listed above in the corresponding sections of the note - reviewed and edited where appropriate    Exam:  Vitals: BP = 153/73; P = 77; T = 98.4; RR = 18; SpO2 = 97 on room air  General: appears stated age; cooperative; pleasant  Eyes: anicteric sclera; moist conjunctiva; PERRLA  HENT: NC/AT; clear oropharynx w/ moist mucous membranes; nL hard/soft palate  Neck: supple w/ FROM; trachea midline; no thyromegaly; no carotid bruits  Lungs: cta b/l with no tachypnea, accessory muscle usage, wheezing, rhonci or rales  CVS: RRR; S1 and S2 w/o MRGs  Abd: BS+; soft; non-tender to palpation x 4; no masses or HSM  Ext: no peripheral edema; pulses 2+ b/l  Skin: normal temp, turgor and texture; no rashes, ulcers or nodules  Neuro: CN II-XII intact; str 5/5 throughout; sensation grossly intact – light touch/temp; word finding difficulty / dysarthria  Psych: appropriate affect; alert and oriented to person, place and partially to situation    Labs: H&H 12.1/33.5, Na 121 > 123, Cl 88, BUN/Cr 14/1.1, gluc 116, serum osmolality 260  vBG 7.5/26  CT chest/abd/pelvis: dilated main pulmonary artery; pulm nodules; cholelithiasis  CT angio neck/head: no large vessel stenosis or aneurysm; scattered calcifications w/ mild/mod stenosis in prox right ICA and b/l supraclinoid ICA's  CT head: no acute findings; chronic lacunar infarct; microvascular disease - severe  EKG: MAT    Assessment:  (1) Confusion and word finding difficulty - r/o CVA  (2) Hyponatremia (moderately severe and likely chronic meaning > 48 hrs)  ---- Without alarm findings such as seizure, obtundation or resp distress  (3) Hypertensive urgency on presentation  (4) Hypothyroidism - stable and clinically euthyroid  (5) A-fib ([id number] of at least 5 on anti-coagulation) - stable  (6) Hx of TIA - chronic microvascular ischemic disease on head CT    Plan:  (1) Continuous rhythm monitoring; neurochecks  (2) Permissive BP control  (3) MRI  (4) ECHO w/ bubble study  (5) Lipid profile and A1C  (6) ASA, statin  (7) Continue anti-coagulation  (8) PT/Rehab eval  (9) Fluid restriction; check Gerardo, urine osm  (10) Rest of meds as dosed  (11) Supportive care; fall precautions; PRN analgesics    Code status: full code

## 2020-06-07 NOTE — PHYSICAL THERAPY INITIAL EVALUATION ADULT - GENERAL OBSERVATIONS, REHAB EVAL
Pt rec supine in bed with +bed alarm, +tele, in NAD. Pt appears mildly confused at times but cooperative and follows commands appropriately.

## 2020-06-07 NOTE — CONSULT NOTE ADULT - SUBJECTIVE AND OBJECTIVE BOX
NEPHROLOGY CONSULTATION NOTE    88 year old male with PMH of atrial fibrillation on Eliquis HTN, DLD, hypothyroidism, suspected TIA 2 years ago and has a loop recorder presents with  chief complaint of nausea and abdominal pain. The main history was taken from the daughter. 2 weeks ago the family noted mood changes, he was getting upset at unusual things, such a mowing the lawn and feeling depressed. So the family discussed his situation with his cardiologist who started him on lexapro. Initially he improved but today he was back to depressed mood, did not feel good at all, initially grabbed his chest and showed signs of discomfort, delirium and hence was brought to the hospital by his daughter. While in ED a stroke code was activated for acute onset confusion and difficulty speaking. He was found to have elevated BP and was having difficulty in expressing his concerns and confusion initially.  Currently he is alert oriented X2-3 and no signs of seizures. (2020)    PAST MEDICAL & SURGICAL HISTORY:  Hypothyroidism  Hypertension  Hyperlipidemia  Atrial fibrillation  Coronary artery disease  History of loop recorder:   H/O aortic valve replacement: porcine    Allergies:  No Known Allergies    Home Medications:  amLODIPine 5 mg oral tablet: 1 tab(s) orally once a day (2020 01:59)  aspirin 81 mg oral tablet: 1 tab(s) orally once a day (10 Sep 2018 07:17)  atorvastatin 80 mg oral tablet: 1 tab(s) orally once a day (10 Sep 2018 07:17)  Eliquis 5 mg oral tablet: 1 tab(s) orally 2 times a day (10 Sep 2018 07:17)  folic acid 1 mg oral tablet: 1 tab(s) orally once a day (at bedtime) (10 Sep 2018 07:17)  levothyroxine 75 mcg (0.075 mg) oral tablet: 1 tab(s) orally once a day (10 Sep 2018 07:17)  lisinopril 20 mg oral tablet: 1 tab(s) orally once a day (at bedtime) (10 Sep 2018 07:17)  tamsulosin 0.4 mg oral capsule: 1 cap(s) orally once a day (10 Sep 2018 07:17)    Hospital Medications:   MEDICATIONS  (STANDING):  amLODIPine   Tablet 5 milliGRAM(s) Oral daily  apixaban 5 milliGRAM(s) Oral two times a day  aspirin enteric coated 81 milliGRAM(s) Oral daily  atorvastatin 80 milliGRAM(s) Oral at bedtime  chlorhexidine 4% Liquid 1 Application(s) Topical <User Schedule>  folic acid 1 milliGRAM(s) Oral at bedtime  levothyroxine 75 MICROGram(s) Oral daily  lisinopril 20 milliGRAM(s) Oral daily      SOCIAL HISTORY:  Denies ETOH,Smoking,   FAMILY HISTORY:  No pertinent family history in first degree relatives: CVA (pt denies)        REVIEW OF SYSTEMS:    All other review of systems is negative unless indicated above.    VITALS:  T(F): 98.4 (20 @ 07:36), Max: 98.5 (20 @ 05:56)  HR: 67 (20 @ 11:05)  BP: 165/79 (20 @ 11:05)  RR: 16 (20 @ 11:05)  SpO2: 96% (20 @ 11:05)        I&O's Detail        PHYSICAL EXAM:  Constitutional: NAD  Respiratory: CTAB, no wheezes, rales or rhonchi  Cardiovascular: S1, S2, RRR  Gastrointestinal: BS+, soft, NT/ND  Extremities: No peripheral edema  Neurological: A/O x 3  : No ricardo.     Vascular Access:    LABS:      123<L>  |  88<L>  |  14  ----------------------------<  116<H>  4.1   |  23  |  1.1    Ca    8.3<L>      2020 06:04    TPro  6.3  /  Alb  4.1  /  TBili  0.6  /  DBili      /  AST  17  /  ALT  11  /  AlkPhos  54  06-    Creatinine Trend: 1.1 <--, 1.3 <--                        12.1   8.73  )-----------( 212      ( 2020 22:11 )             33.5     Osmolality, Serum: 260 mosmol/kg (20 @ 06:04)    Blood Gas Profile - Venous (20 @ 22:18)    pH, Venous: 7.50    pCO2, Venous: 26 mmHg    pO2, Venous: 45 mmHg    HCO3, Venous: 20 mmoL/L    Base Excess, Venous: -1.7 mmoL/L    Oxygen Saturation, Venous: 84 %    Urine Studies:  Urinalysis Basic - ( 2020 09: )    Color: Light Yellow / Appearance: Clear / S.022 / pH:   Gluc:  / Ketone: Negative  / Bili: Negative / Urobili: <2 mg/dL   Blood:  / Protein: Trace / Nitrite: Positive   Leuk Esterase: Large / RBC: 1 /HPF / WBC 94 /HPF   Sq Epi:  / Non Sq Epi: 0 /HPF / Bacteria: Moderate      Sodium, Random Urine: 80.0 mmoL/L ( @ :)  Creatinine, Random Urine: 32 mg/dL ( @ )  Osmolality, Random Urine: 325 mos/kg ( @ )    RADIOLOGY & ADDITIONAL STUDIES:  < from: Xray Chest 1 View AP/PA (20 @ 23:53) >  Impression:      No radiographic evidence of acute cardiopulmonary disease.    < end of copied text >    < from: CT Abdomen and Pelvis w/ IV Cont (20 @ 22:02) >  IMPRESSION:    Motion degraded examination.    No CT evidence for acute intrathoracic, abdominal or pelvic pathology.    2 pulmonary nodules measuring 3 and 4 mm. As per Fleischner Society guidelines, in a low-risk patient, no routine follow-up is necessary and in a high-risk patient optional twelve-month follow-up chest CT can be obtained.    Dilated main pulmonary artery which can be seen in pulmonary arterial hypertension.    Suggestion of cholelithiasis.    < end of copied text >    < from: CT Angio Neck w/ IV Cont (20 @ 22:01) >  IMPRESSION:     No significantvascular stenosis or large vessel occlusion.    < end of copied text >    < from: CT Brain Stroke Protocol (20 @ 21:35) >  IMPRESSION:     No evidence for acute intracranial hemorrhage, midline shift, or mass effect.    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    < end of copied text >

## 2020-06-07 NOTE — CONSULT NOTE ADULT - ASSESSMENT
IMPRESSION: Rehab of gait ab RO CVA    PRECAUTIONS: [  x  ] Cardiac  [    ] Respiratory  [    ] Seizures [    ] Contact Isolation  [    ] Droplet Isolation  [    ] Other    Weight Bearing Status:     RECOMMENDATION:    Out of Bed to Chair     DVT/Decubiti Prophylaxis    REHAB PLAN:     [  x  ] Bedside P/T 3-5 times a week   [ x    ] Bedside O/T  2-3 times a week   [     ] No Rehab Therapy Indicated   [     ]  Speech Therapy   Conditioning/ROM                                 ADL  Bed Mobility                                            Conditioning/ROM  Transfers                                                  Bed Mobility  Sitting /Standing Balance                      Transfers                                        Gait Training                                            Sitting/Standing Balance  Stair Training [   ]Applicable                 Home equipment Eval                                                                     Splinting  [   ] Only      GOALS:   ADL   [ x   ]   Independent         Transfers  [ x   ] Independent            Ambulation  [ x    ] Independent     [   x  ] With device                            [    ]  CG                                               [    ]  CG                                                    [     ] CG             CANE                            [    ] Min A                                          [    ] Min A                                                [     ] Min  A          DISCHARGE PLAN:   [     ]  Good candidate for Intensive Rehabilitation/Hospital based                                             Will tolerate 3hrs Intensive Rehab Daily                                       [      ]  Short Term Rehab in Skilled Nursing Facility                                       [   x   ]  Home with Outpatient or  services                                         [      ]  Possible Candidate for Intensive Hospital based Rehab

## 2020-06-07 NOTE — PHYSICAL THERAPY INITIAL EVALUATION ADULT - GAIT DEVIATIONS NOTED, PT EVAL
Mildly unsteady when turning/turning around, decreased speed, decreased step height/length/decreased stride length/decreased step length/decreased mukund

## 2020-06-07 NOTE — PHYSICAL THERAPY INITIAL EVALUATION ADULT - PERTINENT HX OF CURRENT PROBLEM, REHAB EVAL
89 yo M pt p/w "not feeling well (x 2 days)," nausea/abdominal discomfort and confusion / mood changes (intermittently over 2 weeks).

## 2020-06-07 NOTE — OCCUPATIONAL THERAPY INITIAL EVALUATION ADULT - NS ASR FOLLOW COMMAND OT EVAL
able to follow single-step instructions/75% of the time/Pt able to follow 100% of commands with verbal cue.

## 2020-06-07 NOTE — CHART NOTE - NSCHARTNOTEFT_GEN_A_CORE
Repeat Na+ 118 down from 123 on previous BMP.  DIscussed with Renal fellow, given severe hypernatremia, will administer 1g sodium chloride PO. Repeat BMP q4hr (8 PM, 11.30PM).  IF severe hyponatremia persists (Na+<120) --> 100cc bolus of 3% saline.

## 2020-06-07 NOTE — OCCUPATIONAL THERAPY INITIAL EVALUATION ADULT - LEVEL OF INDEPENDENCE: DRESS UPPER BODY, OT EVAL
minimum assist (75% patients effort)/pt required assist with orientation of gown placement. Pt received wearing gown on LUE only

## 2020-06-07 NOTE — OCCUPATIONAL THERAPY INITIAL EVALUATION ADULT - PLANNED THERAPY INTERVENTIONS, OT EVAL
ADL retraining/fine motor coordination training/transfer training/balance training/cognitive, visual perceptual

## 2020-06-07 NOTE — CHART NOTE - NSCHARTNOTEFT_GEN_A_CORE
Patient presented with AMS; Etiology remains unclear at this time.  Differential includes: Hyponatremia vs. TIA vs. Complicated UTI  - CT Head unremarkable  - Hyponatremia likely induced by escitalopram  - UA was positive (LE/Nitrites with +bacteria/WBC and no epithelial cells)    PLAN:  - Follow up MR Head results and Neurology recommendations  - Follow up Urine cultures; Start Rocephin IV  - Follow up Kidney/Bladder US given male with UTI is complicated; PSA as well  - Follow up Na+ at 4PM; Fluid restriction Patient presented with AMS; Etiology remains unclear at this time.  Differential includes: Hyponatremia vs. TIA vs. Complicated UTI  - CT Head unremarkable  - Hyponatremia likely induced by escitalopram  - UA was positive (LE/Nitrites with +bacteria/WBC and no epithelial cells)    PLAN:  - Follow up MR Head results and Neurology recommendations  - Follow up Urine cultures; Start Rocephin IV  - CT A/P unremarkable so no large obstruction or cause for complicated UTI but will check PSA   - Follow up Na+ at 4PM; Fluid restriction Patient presented with AMS; Etiology remains unclear at this time.  Differential includes: Hyponatremia vs. TIA vs. Complicated UTI  - CT Head unremarkable  - Hyponatremia likely induced by escitalopram  - UA was positive (LE/Nitrites with +bacteria/WBC and no epithelial cells)    PLAN:  - Follow up MR Head results and Neurology recommendations  - Follow up Urine cultures; Start Rocephin IV  - CT A/P unremarkable so obstruction noted; Get Bladder Scan if suspecting retention; Will check PSA as well   - Follow up Na+ at 4PM; Fluid restriction Patient presented with AMS; Etiology remains unclear at this time.  Differential includes: Hyponatremia vs. TIA vs. Complicated UTI  - CT Head unremarkable  - Hyponatremia likely induced by escitalopram  - UA was positive (LE/Nitrites with +bacteria/WBC and no epithelial cells)  - TWI on cardiac monitor around 11am during exam    PLAN:  - Follow up MR Head results and Neurology recommendations  - Follow up Urine cultures; Start Rocephin IV  - CT A/P unremarkable so obstruction noted; Get Bladder Scan if suspecting retention; Will check PSA as well   - Follow up Na+ at 4PM; Fluid restriction  - Follow up STAT EKG

## 2020-06-07 NOTE — PHYSICAL THERAPY INITIAL EVALUATION ADULT - LEVEL OF INDEPENDENCE: SIT/SUPINE, REHAB EVAL
Writer received call back from orthotics and was able to schedule Jennifer for Wednesday, May 27 at 1:00 pm. Writer advised Jennifer of appointment and provided address with [facility].   supervision

## 2020-06-07 NOTE — CONSULT NOTE ADULT - ASSESSMENT
87 y/o M with hyponatremia in hospital for nausea, abdominal pain, mood changes. started on lexapro 2 weeks by his cardiologist. in ED code stroke for acute confusion and difficulty in speaking.  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   - hold lexapro for now.   - serum osm, urine osm, urine Na noted in favour of SIADH   - restrict free water intake to 1L daily   - trend Na level q 6-8 hr. avoid > 8 meq rise in Na level over 24 hours.   - BP noted, resume home meds monitor BP   - check serum TSH.    # ? stroke   - CT head unremarkable.   - s/p contrast imaging.   - neuro following. plan for MRI head    will follow

## 2020-06-07 NOTE — H&P ADULT - NSICDXPASTMEDICALHX_GEN_ALL_CORE_FT
PAST MEDICAL HISTORY:  Atrial fibrillation     Coronary artery disease     Hyperlipidemia     Hypertension     Hypothyroidism

## 2020-06-07 NOTE — CONSULT NOTE ADULT - ATTENDING COMMENTS
seen with fellow. hyponatremia. likely SIADH.  noted. restrict free water. trend Na. avoid over rapid correction. stop lexapro.
I have personally seen and examined this patient on 6/7.  I have fully participated in the care of this patient.  I have reviewed all pertinent clinical information, including history, physical exam, plan and note.  No focal deficit on exam except mild slurred speech. Agree with stroke work up and Brain MRI after interrogation of loop recorder.  I have reviewed all pertinent clinical information and reviewed all relevant imaging and diagnostic studies personally.  Recommendations as above.  Agree with above assessment except as noted.

## 2020-06-07 NOTE — OCCUPATIONAL THERAPY INITIAL EVALUATION ADULT - ORIENTATION, REHAB EVAL
person/place/pt stated date was january 14th when first asked. Pt provided with correct date and was able to recall when asked again later. Pt unable to specifically state reason for admission.

## 2020-06-07 NOTE — H&P ADULT - ASSESSMENT
# Change in mental Status- Suspected TIA  -S/p Stroke code. Not a candidate for TPA as per Neuro.  -S/P CT brain protocol, no evidence of acute infarct.   -Admit to stroke unit.   -Q 4 Neuro checks  -N/C MRI BRAIN holding (EPS evaluation to program loop prior to MRI)/ Patient severely Claustrophobic( daughter says may not be able to tolerate it)   -Echo with bubble  -Avoid hypotension, dehydration or extreme  bp fluctuations  -Continue Lipitor 80 mg q 24  -Continue asa 81 mg and Eliquis 5 mg bid  -speech and swallow eval  -PT/REHAB    # Hyponatremia- Likely Chronic and SIADH  -Patient was recently started on lexapro  -Will recheck after fluid restriction.   -Check Urine sodium and Osmolality    # HTN  -Continue with Home medications.     # Hypothyroidism  -On synthroid     # Afib  -Continue with Eliquis    # DVT prophylaxis  -On Eliquis

## 2020-06-07 NOTE — OCCUPATIONAL THERAPY INITIAL EVALUATION ADULT - PERTINENT HX OF CURRENT PROBLEM, REHAB EVAL
Pt is an 87 y/o right handed man admitted to ED with c/o Abdominal pain and nausea since 2 days. Also noted that pt was with high BP and family reported multiple episodes of confusion over past 2 weeks.

## 2020-06-07 NOTE — OCCUPATIONAL THERAPY INITIAL EVALUATION ADULT - LEVEL OF INDEPENDENCE: SUPINE/SIT, REHAB EVAL
assist needed to remove blankets to clear room for legs to move off to side of bed/minimum assist (75% patients effort)

## 2020-06-07 NOTE — CONSULT NOTE ADULT - SUBJECTIVE AND OBJECTIVE BOX
Patient is a 88y old  Male who presents with a chief complaint of Abdominal pain and nausea since 2 days (2020 23:01)    HPI:  88 year old male with PMH of atrial fibrillation on Eliquis HTN, DLD, hypothyroidism, suspected TIA 2 years ago and has a loop recorder presents with  chief complaint of nausea and abdominal pain. The main history was taken from the daughter. 2 weeks ago the family noted mood changes, he was getting upset at unusual things, such a mowing the lawn and feeling depressed. So the family discussed his situation with his cardiologist who started him on lexapro. Initially he improved but today he was back to depressed mood, did not feel good at all, initially grabbed his chest and showed signs of discomfort, delirium and hence was brought to the hospital by his daughter. While in ED a stroke code was activated for acute onset confusion and difficulty speaking. He was found to have elevated BP and was having difficulty in expressing his concerns and confusion initially.  Currently he is alert oriented X2-3 and no signs of seizures. (2020 02:00)      PAST MEDICAL & SURGICAL HISTORY:  Hypothyroidism  Hypertension  Hyperlipidemia  Atrial fibrillation  Coronary artery disease  History of loop recorder: 2017  H/O aortic valve replacement: porcine      Hospital Course: 1) Confusion and word finding difficulty - r/o CVA  (2) Hyponatremia (moderately severe and likely chronic meaning > 48 hrs)  ---- Without alarm findings such as seizure, obtundation or resp distress  (3) Hypertensive urgency on presentation  (4) Hypothyroidism - stable and clinically euthyroid  (5) A-fib ([id number] of at least 5 on anti-coagulation) - stable  (6) Hx of TIA - chronic microvascular ischemic disease on head CT    Plan:  (1) Continuous rhythm monitoring; neurochecks  (2) Permissive BP control  (3) MRI  (4) ECHO w/ bubble study  (5) Lipid profile and A1C  (6) ASA, statin  (7) Continue anti-coagulation  (9) Fluid restriction; check Gerardo, urine osm  (10) Rest of meds as dosed  (11) Supportive care; fall precautions; PRN analgesics    CT chest/abd/pelvis: dilated main pulmonary artery; pulm nodules; cholelithiasis  CT angio neck/head: no large vessel stenosis or aneurysm; scattered calcifications w/ mild/mod stenosis in prox right ICA and b/l supraclinoid ICA's  CT head: no acute findings; chronic lacunar infarct; microvascular disease - severe    TODAY'S SUBJECTIVE & REVIEW OF SYMPTOMS:     Constitutional WNL   Cardio WNL   Resp WNL   GI WNL  Heme WNL  Endo WNL  Skin WNL  MSK WNL  Neuro WNL  Cognitive WNL  Psych WNL      MEDICATIONS  (STANDING):  amLODIPine   Tablet 5 milliGRAM(s) Oral daily  apixaban 5 milliGRAM(s) Oral two times a day  aspirin enteric coated 81 milliGRAM(s) Oral daily  atorvastatin 80 milliGRAM(s) Oral at bedtime  cefTRIAXone   IVPB 1000 milliGRAM(s) IV Intermittent every 24 hours  chlorhexidine 4% Liquid 1 Application(s) Topical <User Schedule>  folic acid 1 milliGRAM(s) Oral at bedtime  levothyroxine 75 MICROGram(s) Oral daily  lisinopril 20 milliGRAM(s) Oral daily    MEDICATIONS  (PRN):  acetaminophen   Tablet .. 650 milliGRAM(s) Oral every 6 hours PRN Moderate Pain (4 - 6)      FAMILY HISTORY:  No pertinent family history in first degree relatives: CVA (pt denies)      Allergies    No Known Allergies    Intolerances        SOCIAL HISTORY:    [    ] Etoh  [    ] Smoking  [    ] Substance abuse     Home Environment:  [    ] Home Alone  [  x  ] Lives with FamilyWIFE  [    ] Home Health Aid    Dwelling:  [    ] Apartment  [  x  ] Private House  [    ] Adult Home  [    ] Skilled Nursing Facility      [    ] Short Term  [    ] Long Term  [    ] Stairs                           [    ] Elevator     FUNCTIONAL STATUS PTA: (Check all that apply)  Ambulation: [   x  ]Independent    [    ] Dependent     [    ] Non-Ambulatory  Assistive Device: [  x  ] SA Cane  [    ]  Q Cane  [    ] Walker  [    ]  Wheelchair  ADL : [   x ] Independent  [    ]  Dependent   DRIVES CAR- DAUGHTER LIVES IN APT UPSTAIRS    Vital Signs Last 24 Hrs  T(C): 36.9 (2020 07:36), Max: 36.9 (2020 05:05)  T(F): 98.4 (2020 07:36), Max: 98.5 (2020 05:56)  HR: 67 (2020 11:05) (62 - 78)  BP: 165/79 (2020 11:05) (146/68 - 207/98)  BP(mean): 113 (2020 11:05) (97 - 113)  RR: 16 (2020 11:05) (16 - 22)  SpO2: 96% (2020 11:05) (96% - 100%)      PHYSICAL EXAM: Alert & Oriented X3 ?[place] [address] Confusion  GENERAL: NAD, well-groomed, well-developed  HEAD:  Atraumatic, Normocephalic  EYES: EOMI, PERRLA, conjunctiva and sclera clear  NECK: Supple  CHEST/LUNG: Clear bilaterally  HEART: Regular rate and rhythm  ABDOMEN: Soft, Nontender, Nondistended; Bowel sounds present  EXTREMITIES:  no calf tenderness,no edema BLES    NERVOUS SYSTEM:  Cranial Nerves 2-12 intact [  x  ] Abnormal  [    ]  ROM: WFL all extremities [   x ]  Abnormal [     ]  Motor Strength: WFL all extremities  [  x  ]  Abnormal [    ]  Sensation: intact to light touch [  x  ] Abnormal [    ]      FUNCTIONAL STATUS:  Bed Mobility: [   ]  Independent [    ]  Supervision [  x  ]  Needs Assistance [  ]  N/A  Transfers: [    ]  Independent [    ]  Supervision [   x ]  Needs Assistance [    ]  N/A    Ambulation:  [    ]  Independent [    ]  Supervision [ x   ]  Needs Assistance [    ]  N/A   ADL:  [    ]   Independent [   x ] Requires Assistance [    ] N/A   CG /Min A transfers-  One Armed Assist To bathroom with SA Cane At bedside    LABS:                        12.1   8.73  )-----------( 212      ( 2020 22:11 )             33.5     06-07    123<L>  |  88<L>  |  14  ----------------------------<  116<H>  4.1   |  23  |  1.1    Ca    8.3<L>      2020 06:04    TPro  6.3  /  Alb  4.1  /  TBili  0.6  /  DBili  x   /  AST  17  /  ALT  11  /  AlkPhos  54  06-06    PT/INR - ( 2020 20:24 )   PT: 15.30 sec;   INR: 1.33 ratio         PTT - ( 2020 20:24 )  PTT:30.9 sec  Urinalysis Basic - ( 2020 09:00 )    Color: Light Yellow / Appearance: Clear / S.022 / pH: x  Gluc: x / Ketone: Negative  / Bili: Negative / Urobili: <2 mg/dL   Blood: x / Protein: Trace / Nitrite: Positive   Leuk Esterase: Large / RBC: 1 /HPF / WBC 94 /HPF   Sq Epi: x / Non Sq Epi: 0 /HPF / Bacteria: Moderate        RADIOLOGY & ADDITIONAL STUDIES:

## 2020-06-07 NOTE — H&P ADULT - HISTORY OF PRESENT ILLNESS
88 year old male with PMH of atrial fibrillation on Eliquis HTN, DLD, hypothyroidism, suspected TIA 2 years ago and has a loop recorder presents with  chief complaint of nausea and abdominal pain. The main history was taken from the daughter. 2 weeks ago the family noted mood changes, he was getting upset at unusual things, such a mowing the lawn and feeling depressed. So the family discussed his situation with his cardiologist who started him on lexapro. Initially he improved but today he was back to depressed mood, did not feel good at all, initially grabbed his chest and showed signs of discomfort, delirium and hence was brought to the hospital by his daughter. While in ED a stroke code was activated for acute onset confusion and difficulty speaking. He was found to have elevated BP and was having difficulty in expressing his concerns and confusion initially.  Currently he is alert oriented X2-3 and no signs of seizures.

## 2020-06-07 NOTE — OCCUPATIONAL THERAPY INITIAL EVALUATION ADULT - ADDITIONAL COMMENTS
As per pt, pt resides in  with children living above. Pt reports he was indep with all Activities of daily living PTA though it should be noted that presents with impaired cognition at times. Pt also reports he still drives. +bathtub

## 2020-06-07 NOTE — OCCUPATIONAL THERAPY INITIAL EVALUATION ADULT - GENERAL OBSERVATIONS, REHAB EVAL
OT evaluation 9:05-9:50 Pt received semi borges in bed in NAD +tele +BP cuff to SHARONA. Pt agreeable to OT eval.

## 2020-06-08 LAB
A1C WITH ESTIMATED AVERAGE GLUCOSE RESULT: 5.4 % — SIGNIFICANT CHANGE UP (ref 4–5.6)
ANION GAP SERPL CALC-SCNC: 11 MMOL/L — SIGNIFICANT CHANGE UP (ref 7–14)
ANION GAP SERPL CALC-SCNC: 11 MMOL/L — SIGNIFICANT CHANGE UP (ref 7–14)
ANION GAP SERPL CALC-SCNC: 12 MMOL/L — SIGNIFICANT CHANGE UP (ref 7–14)
ANION GAP SERPL CALC-SCNC: 12 MMOL/L — SIGNIFICANT CHANGE UP (ref 7–14)
ANION GAP SERPL CALC-SCNC: 14 MMOL/L — SIGNIFICANT CHANGE UP (ref 7–14)
APTT BLD: 35.9 SEC — SIGNIFICANT CHANGE UP (ref 27–39.2)
BASOPHILS # BLD AUTO: 0.05 K/UL — SIGNIFICANT CHANGE UP (ref 0–0.2)
BASOPHILS NFR BLD AUTO: 0.5 % — SIGNIFICANT CHANGE UP (ref 0–1)
BLD GP AB SCN SERPL QL: SIGNIFICANT CHANGE UP
BUN SERPL-MCNC: 11 MG/DL — SIGNIFICANT CHANGE UP (ref 10–20)
BUN SERPL-MCNC: 13 MG/DL — SIGNIFICANT CHANGE UP (ref 10–20)
CALCIUM SERPL-MCNC: 8.4 MG/DL — LOW (ref 8.5–10.1)
CALCIUM SERPL-MCNC: 8.4 MG/DL — LOW (ref 8.5–10.1)
CALCIUM SERPL-MCNC: 8.5 MG/DL — SIGNIFICANT CHANGE UP (ref 8.5–10.1)
CALCIUM SERPL-MCNC: 8.5 MG/DL — SIGNIFICANT CHANGE UP (ref 8.5–10.1)
CALCIUM SERPL-MCNC: 8.7 MG/DL — SIGNIFICANT CHANGE UP (ref 8.5–10.1)
CHLORIDE SERPL-SCNC: 83 MMOL/L — LOW (ref 98–110)
CHLORIDE SERPL-SCNC: 83 MMOL/L — LOW (ref 98–110)
CHLORIDE SERPL-SCNC: 84 MMOL/L — LOW (ref 98–110)
CHLORIDE SERPL-SCNC: 85 MMOL/L — LOW (ref 98–110)
CHLORIDE SERPL-SCNC: 87 MMOL/L — LOW (ref 98–110)
CK MB CFR SERPL CALC: 10.9 NG/ML — HIGH (ref 0.6–6.3)
CK SERPL-CCNC: 292 U/L — HIGH (ref 0–225)
CO2 SERPL-SCNC: 21 MMOL/L — SIGNIFICANT CHANGE UP (ref 17–32)
CO2 SERPL-SCNC: 22 MMOL/L — SIGNIFICANT CHANGE UP (ref 17–32)
CO2 SERPL-SCNC: 22 MMOL/L — SIGNIFICANT CHANGE UP (ref 17–32)
CO2 SERPL-SCNC: 23 MMOL/L — SIGNIFICANT CHANGE UP (ref 17–32)
CO2 SERPL-SCNC: 24 MMOL/L — SIGNIFICANT CHANGE UP (ref 17–32)
CORTIS AM PEAK SERPL-MCNC: 11.7 UG/DL — SIGNIFICANT CHANGE UP (ref 6–18.4)
CREAT SERPL-MCNC: 1 MG/DL — SIGNIFICANT CHANGE UP (ref 0.7–1.5)
EOSINOPHIL # BLD AUTO: 0.03 K/UL — SIGNIFICANT CHANGE UP (ref 0–0.7)
EOSINOPHIL NFR BLD AUTO: 0.3 % — SIGNIFICANT CHANGE UP (ref 0–8)
ESTIMATED AVERAGE GLUCOSE: 108 MG/DL — SIGNIFICANT CHANGE UP (ref 68–114)
GLUCOSE SERPL-MCNC: 100 MG/DL — HIGH (ref 70–99)
GLUCOSE SERPL-MCNC: 102 MG/DL — HIGH (ref 70–99)
GLUCOSE SERPL-MCNC: 103 MG/DL — HIGH (ref 70–99)
GLUCOSE SERPL-MCNC: 110 MG/DL — HIGH (ref 70–99)
GLUCOSE SERPL-MCNC: 122 MG/DL — HIGH (ref 70–99)
HCT VFR BLD CALC: 32.7 % — LOW (ref 42–52)
HGB BLD-MCNC: 11.7 G/DL — LOW (ref 14–18)
IMM GRANULOCYTES NFR BLD AUTO: 0.4 % — HIGH (ref 0.1–0.3)
INR BLD: 1.46 RATIO — HIGH (ref 0.65–1.3)
LYMPHOCYTES # BLD AUTO: 1.53 K/UL — SIGNIFICANT CHANGE UP (ref 1.2–3.4)
LYMPHOCYTES # BLD AUTO: 14.1 % — LOW (ref 20.5–51.1)
MAGNESIUM SERPL-MCNC: 1 MG/DL — LOW (ref 1.8–2.4)
MCHC RBC-ENTMCNC: 32.5 PG — HIGH (ref 27–31)
MCHC RBC-ENTMCNC: 35.8 G/DL — SIGNIFICANT CHANGE UP (ref 32–37)
MCV RBC AUTO: 90.8 FL — SIGNIFICANT CHANGE UP (ref 80–94)
MONOCYTES # BLD AUTO: 1.12 K/UL — HIGH (ref 0.1–0.6)
MONOCYTES NFR BLD AUTO: 10.3 % — HIGH (ref 1.7–9.3)
NEUTROPHILS # BLD AUTO: 8.06 K/UL — HIGH (ref 1.4–6.5)
NEUTROPHILS NFR BLD AUTO: 74.4 % — SIGNIFICANT CHANGE UP (ref 42.2–75.2)
NRBC # BLD: 0 /100 WBCS — SIGNIFICANT CHANGE UP (ref 0–0)
OSMOLALITY SERPL: 248 MOSMOL/KG — LOW (ref 280–301)
PH UR: 7 — SIGNIFICANT CHANGE UP (ref 5–8)
PLATELET # BLD AUTO: 217 K/UL — SIGNIFICANT CHANGE UP (ref 130–400)
POTASSIUM SERPL-MCNC: 3.8 MMOL/L — SIGNIFICANT CHANGE UP (ref 3.5–5)
POTASSIUM SERPL-MCNC: 3.9 MMOL/L — SIGNIFICANT CHANGE UP (ref 3.5–5)
POTASSIUM SERPL-MCNC: 4 MMOL/L — SIGNIFICANT CHANGE UP (ref 3.5–5)
POTASSIUM SERPL-MCNC: 4.1 MMOL/L — SIGNIFICANT CHANGE UP (ref 3.5–5)
POTASSIUM SERPL-MCNC: 4.1 MMOL/L — SIGNIFICANT CHANGE UP (ref 3.5–5)
POTASSIUM SERPL-SCNC: 3.8 MMOL/L — SIGNIFICANT CHANGE UP (ref 3.5–5)
POTASSIUM SERPL-SCNC: 3.9 MMOL/L — SIGNIFICANT CHANGE UP (ref 3.5–5)
POTASSIUM SERPL-SCNC: 4 MMOL/L — SIGNIFICANT CHANGE UP (ref 3.5–5)
POTASSIUM SERPL-SCNC: 4.1 MMOL/L — SIGNIFICANT CHANGE UP (ref 3.5–5)
POTASSIUM SERPL-SCNC: 4.1 MMOL/L — SIGNIFICANT CHANGE UP (ref 3.5–5)
PROTHROM AB SERPL-ACNC: 16.8 SEC — HIGH (ref 9.95–12.87)
PSA FLD-MCNC: 0.39 NG/ML — SIGNIFICANT CHANGE UP (ref 0–4)
RBC # BLD: 3.6 M/UL — LOW (ref 4.7–6.1)
RBC # FLD: 11.7 % — SIGNIFICANT CHANGE UP (ref 11.5–14.5)
SODIUM SERPL-SCNC: 116 MMOL/L — CRITICAL LOW (ref 135–146)
SODIUM SERPL-SCNC: 119 MMOL/L — CRITICAL LOW (ref 135–146)
SODIUM SERPL-SCNC: 119 MMOL/L — CRITICAL LOW (ref 135–146)
SODIUM SERPL-SCNC: 120 MMOL/L — LOW (ref 135–146)
SODIUM SERPL-SCNC: 120 MMOL/L — LOW (ref 135–146)
TROPONIN T SERPL-MCNC: <0.01 NG/ML — SIGNIFICANT CHANGE UP
TSH SERPL-MCNC: 2.04 UIU/ML — SIGNIFICANT CHANGE UP (ref 0.27–4.2)
TSH SERPL-MCNC: 2.05 UIU/ML — SIGNIFICANT CHANGE UP (ref 0.27–4.2)
WBC # BLD: 10.83 K/UL — HIGH (ref 4.8–10.8)
WBC # FLD AUTO: 10.83 K/UL — HIGH (ref 4.8–10.8)

## 2020-06-08 PROCEDURE — 99233 SBSQ HOSP IP/OBS HIGH 50: CPT

## 2020-06-08 PROCEDURE — 93010 ELECTROCARDIOGRAM REPORT: CPT

## 2020-06-08 PROCEDURE — 93306 TTE W/DOPPLER COMPLETE: CPT | Mod: 26

## 2020-06-08 RX ORDER — SODIUM CHLORIDE 9 MG/ML
1 INJECTION INTRAMUSCULAR; INTRAVENOUS; SUBCUTANEOUS ONCE
Refills: 0 | Status: COMPLETED | OUTPATIENT
Start: 2020-06-08 | End: 2020-06-08

## 2020-06-08 RX ORDER — MAGNESIUM SULFATE 500 MG/ML
4 VIAL (ML) INJECTION ONCE
Refills: 0 | Status: DISCONTINUED | OUTPATIENT
Start: 2020-06-08 | End: 2020-06-08

## 2020-06-08 RX ORDER — MAGNESIUM SULFATE 500 MG/ML
2 VIAL (ML) INJECTION
Refills: 0 | Status: COMPLETED | OUTPATIENT
Start: 2020-06-08 | End: 2020-06-08

## 2020-06-08 RX ORDER — SODIUM CHLORIDE 9 MG/ML
1 INJECTION INTRAMUSCULAR; INTRAVENOUS; SUBCUTANEOUS THREE TIMES A DAY
Refills: 0 | Status: DISCONTINUED | OUTPATIENT
Start: 2020-06-08 | End: 2020-06-11

## 2020-06-08 RX ADMIN — APIXABAN 5 MILLIGRAM(S): 2.5 TABLET, FILM COATED ORAL at 05:23

## 2020-06-08 RX ADMIN — Medication 12.5 GRAM(S): at 07:59

## 2020-06-08 RX ADMIN — APIXABAN 5 MILLIGRAM(S): 2.5 TABLET, FILM COATED ORAL at 17:09

## 2020-06-08 RX ADMIN — Medication 1 MILLIGRAM(S): at 21:13

## 2020-06-08 RX ADMIN — SODIUM CHLORIDE 1 GRAM(S): 9 INJECTION INTRAMUSCULAR; INTRAVENOUS; SUBCUTANEOUS at 04:31

## 2020-06-08 RX ADMIN — ATORVASTATIN CALCIUM 80 MILLIGRAM(S): 80 TABLET, FILM COATED ORAL at 21:13

## 2020-06-08 RX ADMIN — SODIUM CHLORIDE 1 GRAM(S): 9 INJECTION INTRAMUSCULAR; INTRAVENOUS; SUBCUTANEOUS at 13:21

## 2020-06-08 RX ADMIN — Medication 75 MICROGRAM(S): at 05:23

## 2020-06-08 RX ADMIN — Medication 12.5 GRAM(S): at 14:44

## 2020-06-08 RX ADMIN — SODIUM CHLORIDE 1 GRAM(S): 9 INJECTION INTRAMUSCULAR; INTRAVENOUS; SUBCUTANEOUS at 21:13

## 2020-06-08 RX ADMIN — CEFTRIAXONE 100 MILLIGRAM(S): 500 INJECTION, POWDER, FOR SOLUTION INTRAMUSCULAR; INTRAVENOUS at 12:39

## 2020-06-08 RX ADMIN — LISINOPRIL 20 MILLIGRAM(S): 2.5 TABLET ORAL at 05:23

## 2020-06-08 RX ADMIN — Medication 81 MILLIGRAM(S): at 12:39

## 2020-06-08 RX ADMIN — AMLODIPINE BESYLATE 5 MILLIGRAM(S): 2.5 TABLET ORAL at 05:23

## 2020-06-08 NOTE — PROGRESS NOTE ADULT - ASSESSMENT
13. Impression:  88 year old male with PMH of atrial fibrillation on Eliquis HTN, DLD, hypothyroidism, suspected TIA 2 years ago and has a loop recorder presents with  chief complaint of nausea and abdominal pain. While in ED a stroke code was activated for acute onset confusion and difficulty speaking. He was found to have elevated BP and was having difficulty in expressing his concerns and confusion initially. No vascular occlusion on CTA, no reported acute ischemic change on MRI brain.     14. Probable cause/s of Stroke:  Unknown etiology of symptoms, should have psychiatry evaluation for depression.     15. Suggestions:   Routine stroke workup including:  Continue medical management.   Psych consult for depression.     16. Disposition:  As per rehab and medical team.     Charli Del Rio NP  [phone] 13. Impression:  88 year old male with PMH of atrial fibrillation on Eliquis HTN, DLD, hypothyroidism, suspected TIA 2 years ago and has a loop recorder presents with  chief complaint of nausea and abdominal pain. While in ED a stroke code was activated for acute onset confusion and difficulty speaking. He was found to have elevated BP and was having difficulty in expressing his concerns and confusion initially. No vascular occlusion on CTA, no reported acute ischemic change on MRI brain.     14. Probable cause/s of Stroke:  Unknown etiology of symptoms, should have psychiatry evaluation for depression.     15. Suggestions:   Routine stroke workup including:  Continue medical management.   Psych consult for depression.   EEG  16. Disposition:  would sign off  consult general neurology for confusion    Charli Del Rio NP  [phone]

## 2020-06-08 NOTE — PROGRESS NOTE ADULT - SUBJECTIVE AND OBJECTIVE BOX
Stroke Progress Note:    AMA HARDY    1. Chief Complaint: Confusion    HPI:  88 year old male with PMH of atrial fibrillation on Eliquis HTN, DLD, hypothyroidism, suspected TIA 2 years ago and has a loop recorder presents with  chief complaint of nausea and abdominal pain. While in ED a stroke code was activated for acute onset confusion and difficulty speaking. He was found to have elevated BP and was having difficulty in expressing his concerns and confusion initially.        2. Relevant PMH:   Prior ischemic stroke/TIA[ ], Afib [x ], CAD [ ], HTN [ x], DLD [x ], DM [ ], PVD [ ], Obesity [ ],   Sedentary lifestyle [ ], CHF [ ], BANDAR [ ], Cancer Hx [ ].    3. Social History: Smoking [ ], Drug Use [ ], Alcohol Use [ ], Other [ ]    4. Possible Location of Stroke:    NA  5. Relevant Brain Tissue Imaging:  < from: MR Head No Cont (06.07.20 @ 10:26) >    IMPRESSION:    Incomplete limited examination demonstrates no MRI evidence to suggest acute ischemic change.    6. Relevant Cerebrovascular Imaging:   CT Angio Neck w/ IV Cont:   EXAM:  CT ANGIO NECK (W)AW IC        EXAM:  CT ANGIO BRAIN (W)AW IC          IMPRESSION:     No significantvascular stenosis or large vessel occlusion.        7. Relevant blood tests: Direct LDL: 55 mg/dL [4 - 129] (06-07-20 @ 06:04)  A1C with Estimated Average Glucose Result: 5.4:     8. Relevant cardiac rhythm monitoring:  No reported events	  9. Relevant Cardiac Structure: (TTE/STEPHY +/-):[ ]No intracardiac thrombus/[ ] no vegetation/[ ]no [place]/EF:  < from: Transthoracic Echocardiogram (06.08.20 @ 11:34) >    Summary:   1. Suboptimal study.   2. Left ventricular ejection fraction, by visual estimation, is 60 to 65%.   3. Normal global left ventricular systolic function.   4.Severe mitral annular calcification.   5. No evidence of patent foramen ovale.    Home Medications:  amLODIPine 5 mg oral tablet: 1 tab(s) orally once a day (07 Jun 2020 01:59)  aspirin 81 mg oral tablet: 1 tab(s) orally once a day (10 Sep 2018 07:17)  atorvastatin 80 mg oral tablet: 1 tab(s) orally once a day (10 Sep 2018 07:17)  Eliquis 5 mg oral tablet: 1 tab(s) orally 2 times a day (10 Sep 2018 07:17)  folic acid 1 mg oral tablet: 1 tab(s) orally once a day (at bedtime) (10 Sep 2018 07:17)  levothyroxine 75 mcg (0.075 mg) oral tablet: 1 tab(s) orally once a day (10 Sep 2018 07:17)  lisinopril 20 mg oral tablet: 1 tab(s) orally once a day (at bedtime) (10 Sep 2018 07:17)  tamsulosin 0.4 mg oral capsule: 1 cap(s) orally once a day (10 Sep 2018 07:17)      MEDICATIONS  (STANDING):  amLODIPine   Tablet 5 milliGRAM(s) Oral daily  apixaban 5 milliGRAM(s) Oral two times a day  aspirin enteric coated 81 milliGRAM(s) Oral daily  atorvastatin 80 milliGRAM(s) Oral at bedtime  cefTRIAXone   IVPB 1000 milliGRAM(s) IV Intermittent every 24 hours  chlorhexidine 4% Liquid 1 Application(s) Topical <User Schedule>  folic acid 1 milliGRAM(s) Oral at bedtime  levothyroxine 75 MICROGram(s) Oral daily  lisinopril 20 milliGRAM(s) Oral daily  magnesium sulfate  IVPB 2 Gram(s) IV Intermittent <User Schedule>  sodium chloride 1 Gram(s) Oral three times a day      10. PT/OT/Speech/Rehab/S&Sw/ Cognitive eval results and recommendations: pending    11. Exam:    Vital Signs Last 24 Hrs  T(C): 37.1 (08 Jun 2020 08:24), Max: 37.1 (08 Jun 2020 08:24)  T(F): 98.7 (08 Jun 2020 08:24), Max: 98.7 (08 Jun 2020 08:24)  HR: 76 (08 Jun 2020 08:24) (64 - 76)  BP: 151/90 (08 Jun 2020 08:24) (149/73 - 184/79)  BP(mean): --  RR: 20 (08 Jun 2020 08:24) (18 - 20)  SpO2: 97% (08 Jun 2020 08:24) (94% - 99%)    12.   NIH STROKE SCALE  Item	                                                        Score  1 a.	Level of Consciousness	               	0  1 b. LOC Questions	                                0  1 c.	LOC Commands	                               	0  2.	Best Gaze	                                        0  3.	Visual	                                                0  4.	Facial Palsy	                                        0  5 a.	Motor Arm - Left	                                0  5 b.	Motor Arm - Right	                        0  6 a.	Motor Leg - Left	                                0  6 b.	Motor Leg - Right	                                0  7.	Limb Ataxia	                                        0  8.	Sensory	                                                0  9.	Language	                                        0  10.	Dysarthria	                                        0  11.	Extinction and Inattention  	        0  ______________________________________  TOTAL	                                                        0    Total NIHSS on admission:  0        mRS:0  0 No symptoms at all  1 No significant disability despite symptoms; able to carry out all usual duties and activities without assistance  2 Slight disability; unable to carry out all previous activities, but able to look after own affairs  3 Moderate disability; requiring some help, but able to walk without assistance  4 Moderately severe disability; unable to walk without assistance and unable to attend to own bodily needs without assistance  5 Severe disability; bedridden, incontinent and requiring constant nursing care and attention  6 Dead

## 2020-06-08 NOTE — PROGRESS NOTE ADULT - SUBJECTIVE AND OBJECTIVE BOX
Pt seen and examined at bedside. Reports feeling unwell, unable to clarify exactly why. He is alert to self only.     VITAL SIGNS (Last 24 hrs):  T(C): 37.1 (20 @ 08:24), Max: 37.1 (20 @ 08:24)  HR: 76 (20 @ 08:24) (64 - 76)  BP: 151/90 (20 @ 08:24) (149/73 - 184/79)  RR: 20 (20 @ 08:24) (18 - 20)  SpO2: 97% (20 @ 08:24) (94% - 99%)  Wt(kg): --  Daily     Daily Weight in k.3 (2020 08:26)    I&O's Summary      PHYSICAL EXAM:  GENERAL: NAD, confused   HEAD:  Atraumatic, Normocephalic  EYES: EOMI, PERRLA, conjunctiva and sclera clear  NECK: Supple, No JVD  CHEST/LUNG: Clear to auscultation bilaterally; No wheeze  HEART: Regular rate and rhythm; No murmurs, rubs, or gallops  ABDOMEN: Soft, Nontender, Nondistended; Bowel sounds present  EXTREMITIES:  2+ Peripheral Pulses, No clubbing, cyanosis, or edema  PSYCH: AAOx1  NEUROLOGY: alert, confused   SKIN: No rashes or lesions         CBC Full  -  ( 2020 05:46 )  WBC Count : 10.83 K/uL  Hemoglobin : 11.7 g/dL  Hematocrit : 32.7 %  Platelet Count - Automated : 217 K/uL  Mean Cell Volume : 90.8 fL  Mean Cell Hemoglobin : 32.5 pg  Mean Cell Hemoglobin Concentration : 35.8 g/dL  Auto Neutrophil # : 8.06 K/uL  Auto Lymphocyte # : 1.53 K/uL  Auto Monocyte # : 1.12 K/uL  Auto Eosinophil # : 0.03 K/uL  Auto Basophil # : 0.05 K/uL  Auto Neutrophil % : 74.4 %  Auto Lymphocyte % : 14.1 %  Auto Monocyte % : 10.3 %  Auto Eosinophil % : 0.3 %  Auto Basophil % : 0.5 %    BMP:     @ 05:46    Blood Urea Nitrogen - 11  Calcium - 8.4  Carbond Dioxide - 22  Chloride - 87  Creatinine - 1.0  Glucose - 103  Potassium - 4.1  Sodium - 120      Hemoglobin A1c -   PT/INR - ( 2020 08:26 )   PT: 16.80 sec;   INR: 1.46 ratio         PTT - ( 2020 08:26 )  PTT:35.9 sec  Urine Culture:   @ 11:50 Urine culture: --    Culture Results:   >100,000 CFU/ml Gram Negative Rods  Method Type: --  Organism: --  Organism Identification: --  Specimen Source: .Urine Clean Catch (Midstream)       MEDICATIONS  (STANDING):  amLODIPine   Tablet 5 milliGRAM(s) Oral daily  apixaban 5 milliGRAM(s) Oral two times a day  aspirin enteric coated 81 milliGRAM(s) Oral daily  atorvastatin 80 milliGRAM(s) Oral at bedtime  cefTRIAXone   IVPB 1000 milliGRAM(s) IV Intermittent every 24 hours  chlorhexidine 4% Liquid 1 Application(s) Topical <User Schedule>  folic acid 1 milliGRAM(s) Oral at bedtime  levothyroxine 75 MICROGram(s) Oral daily  lisinopril 20 milliGRAM(s) Oral daily  sodium chloride 1 Gram(s) Oral three times a day    MEDICATIONS  (PRN):  acetaminophen   Tablet .. 650 milliGRAM(s) Oral every 6 hours PRN Moderate Pain (4 - 6)

## 2020-06-08 NOTE — PROGRESS NOTE ADULT - ASSESSMENT
87 y/o M with hyponatremia in hospital for nausea, abdominal pain, mood changes. started on lexapro 2 weeks by his cardiologist. in ED code stroke for acute confusion and difficulty in speaking.  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   - hold lexapro for now.   - serum osm, urine osm, urine Na noted in favour of SIADH   - restrict free water intake to 1L daily   - trend Na level q 6-8 hr. avoid > 8 meq rise in Na level over 24 hours.   - BP noted, resume home meds monitor BP   - check serum TSH. uric acid and LDH     # ? stroke   - CT head unremarkable.   - s/p contrast imaging.   - neuro following. MRI no evidence of CVA    will follow

## 2020-06-08 NOTE — SWALLOW BEDSIDE ASSESSMENT ADULT - COMMENTS
pt received lethargic, on RA. pt refused all PO trials, despite max encouragement by SLP. RN reports pt with min-no intake during mealtime.

## 2020-06-08 NOTE — PROGRESS NOTE ADULT - ASSESSMENT
Metabolic encephalopathy (Infectious vs. electrolyte disturbance) vs. Progression of dementia  - Patient does not have hyponatremia at baseline, per PMD; Initially believed to be caused by Escitalopram; however, the wife reports the patient has only been taking the medication for 2 days prior to presentation; Serum/Urine Osm and Urine Lytes suggest SIADH, but the cause still remains unclear; His sodium levels have not significantly improved with free water restriction; Continue salt tabs with meals  - Additionally, the patient was found to have UTI with +UA; Cultures are now growing >1000,000 GNR; Prior cultures show sensitivity to Rocephin and will continue with pending sensitivities; Patient remains asymptomatic and still without development of sepsis  - Spoke to the PMD as well; He reports the patient has had worsening dementia over the last few year, which contradicts the wife's provided history  - Workup thusfar has been negative for stroke with normal TSH and AM Cortisol  - PLAN  ·	Follow up serial BMP; Start Tolvaptan if still no improvement with fluid restriction  ·	Follow up culture sensitivities; Continue Rocephin 1g IV daily  ·	Follow up Nephrology recommendations; Appreciated  ·	Downgrade to Med/Surg as Stroke has been rules out    Magnesium deficiency  - Replete today and follow up AM level    Hx of Normocytic Anemia: Follow up vitamin serum level  Hx of Atrial Fibrillation: Rate controlled and on Elquis  Hx of HTN: Continue home medications; Holding home HCTZ that patient rarely takes; Continue ASA  Hx of ?Dementia: Possible progression; Can start Donepezil here, but will need OP follow up  Hx of Hypothyroidism: Continue home dose of Synthroid; TSH WNL  Hx of DLD: Continue statin    GI ppx:   Not indicated; Dysphagia diet; S+S appreciated  DVT ppx:   Eliquis  Activity:   As Tolerated   DISPO:  Patient to be discharged when condition(s) optimized.    CODE STATUS:   FULL Metabolic encephalopathy (Infectious vs. electrolyte disturbance) vs. Progression of dementia  - Patient does not have hyponatremia at baseline, per PMD; Initially believed to be caused by Escitalopram; however, the wife reports the patient has only been taking the medication for 2 days prior to presentation; Serum/Urine Osm and Urine Lytes suggest SIADH, but the cause still remains unclear; His sodium levels have not significantly improved with free water restriction; Continue salt tabs with meals  - Additionally, the patient was found to have UTI with +UA; Cultures are now growing >100,000GNR; Prior cultures show sensitivity to Rocephin and will continue with pending sensitivities; Patient remains asymptomatic and still without development of sepsis  - Spoke to the PMD as well; He reports the patient has had worsening dementia over the last few year, which contradicts the wife's provided history  - Workup thusfar has been negative for stroke with normal TSH and AM Cortisol  - PLAN  ·	Follow up serial BMP; Start Tolvaptan if still no improvement with fluid restriction  ·	Follow up culture sensitivities; Continue Rocephin 1g IV daily  ·	Follow up Nephrology recommendations; Appreciated  ·	Downgrade to Med/Surg as Stroke has been rules out    Magnesium deficiency  - Replete today and follow up AM level    Hx of Normocytic Anemia: Follow up vitamin serum level  Hx of Atrial Fibrillation: Rate controlled and on Elquis  Hx of HTN: Continue home medications; Holding home HCTZ that patient rarely takes; Continue ASA  Hx of ?Dementia: Possible progression; Can start Donepezil here, but will need OP follow up  Hx of Hypothyroidism: Continue home dose of Synthroid; TSH WNL  Hx of DLD: Continue statin    GI ppx:   Not indicated; Dysphagia diet; S+S appreciated  DVT ppx:   Eliquis  Activity:   As Tolerated   DISPO:  Patient to be discharged when condition(s) optimized.    CODE STATUS:   FULL

## 2020-06-08 NOTE — PROGRESS NOTE ADULT - ASSESSMENT
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     # Hypothyroidism  -On synthroid     # Severe hypomagnesemia   - QTc okay   - replete and recheck tomorrow     # Afib  -Continue with Eliquis    # DVT prophylaxis  -On Eliquis

## 2020-06-08 NOTE — PROGRESS NOTE ADULT - SUBJECTIVE AND OBJECTIVE BOX
DAILY PROGRESS NOTE  ===========================================================    Patient Information:  AMA HARDY  /  88y  /  Male  /  MRN#: [id number]    Hospital Day: 1d     |:::::::::::::::::::::::::::| SUBJECTIVE |:::::::::::::::::::::::::::|    OVERNIGHT EVENTS: None  TODAY: Patient was seen today at bedside. The patient was slightly more lethargic today but was easily aroused, and improved during the day time. He is oriented to person and occassionally place. He is not oriented to time. I spoke to the wife this afternoon who was able to provide an accurate and detailed history. She reports at baseline the patient is fully functional, and rarely has issue with memory. However, according to his PMD the patient has been having worsening dementia over the last few years. The patient had no active complaints today. Review of systems is otherwise negative.    |:::::::::::::::::::::::::::| OBJECTIVE |:::::::::::::::::::::::::::|    VITAL SIGNS: Last 24 Hours  T(C): 37.1 (2020 08:24), Max: 37.1 (2020 08:24)  T(F): 98.7 (2020 08:24), Max: 98.7 (2020 08:24)  HR: 76 (2020 08:24) (64 - 76)  BP: 151/90 (2020 08:24) (149/73 - 184/79)  RR: 20 (2020 08:24) (18 - 20)  SpO2: 97% (2020 08:24) (94% - 99%)    PHYSICAL EXAM:  GENERAL:   Awake, alert; NAD this afternoon.   HEENT:  Head NC/AT; Conjunctivae pink, Sclera anicteric; Oral mucosa moist.  CARDIO:   Regular rate; Regular rhythm; S1 & S2.  RESP:   No rales, wheezing, or rhonchi appreciated.  GI:   Soft; NT/ND; BS; No guarding; No rebound tenderness.  EXT:   Strength in tact; No edema.   SKIN:   Intact.    LAB RESULTS:                        11.7   10.83 )-----------( 217      ( 2020 05:46 )             32.7     120<L>  |  87<L>  |  11  ----------------------------<  103<H>  4.1   |  22  |  1.0    Ca    8.4<L>      2020 05:46  Mg     1.0     06-    TPro  6.3  /  Alb  4.1  /  TBili  0.6  /  DBili  x   /  AST  17  /  ALT  11  /  AlkPhos  54     PT/INR - ( 2020 08:26 )   PT: 16.80 sec;   INR: 1.46 ratio    PTT - ( 2020 08:26 )  PTT:35.9 sec    Urinalysis Basic - ( 2020 09:00 )  Color: Light Yellow / Appearance: Clear / S.022 / pH: x  Gluc: x / Ketone: Negative  / Bili: Negative / Urobili: <2 mg/dL   Blood: x / Protein: Trace / Nitrite: Positive   Leuk Esterase: Large / RBC: 1 /HPF / WBC 94 /HPF   Sq Epi: x / Non Sq Epi: 0 /HPF / Bacteria: Moderate    Troponin T, Serum: <0.01 ng/mL (20 @ 08:26)  Creatine Kinase, Serum: 292 U/L <H> (20 @ 08:26)  Troponin T, Serum: <0.01 ng/mL (20 @ 16:47)    CARDIAC MARKERS ( 2020 08:26 )  x     / <0.01 ng/mL / 292 U/L / x     / 10.9 ng/mL  CARDIAC MARKERS ( 2020 16:47 )  x     / <0.01 ng/mL / x     / x     / x      CARDIAC MARKERS ( 2020 20:24 )  x     / <0.01 ng/mL / x     / x     / x        MICROBIOLOGY:  Culture - Urine (collected 2020 11:50)  Source: .Urine Clean Catch (Midstream)  Preliminary Report (2020 14:53):    >100,000 CFU/ml Gram Negative Rods    RADIOLOGY:  Reviewed    ALLERGIES:  No Known Allergies    ===========================================================

## 2020-06-09 ENCOUNTER — RX RENEWAL (OUTPATIENT)
Age: 85
End: 2020-06-09

## 2020-06-09 LAB
-  AMIKACIN: SIGNIFICANT CHANGE UP
-  AMPICILLIN/SULBACTAM: SIGNIFICANT CHANGE UP
-  AMPICILLIN: SIGNIFICANT CHANGE UP
-  AZTREONAM: SIGNIFICANT CHANGE UP
-  CEFAZOLIN: SIGNIFICANT CHANGE UP
-  CEFEPIME: SIGNIFICANT CHANGE UP
-  CEFOXITIN: SIGNIFICANT CHANGE UP
-  CEFTRIAXONE: SIGNIFICANT CHANGE UP
-  CIPROFLOXACIN: SIGNIFICANT CHANGE UP
-  ERTAPENEM: SIGNIFICANT CHANGE UP
-  GENTAMICIN: SIGNIFICANT CHANGE UP
-  IMIPENEM: SIGNIFICANT CHANGE UP
-  LEVOFLOXACIN: SIGNIFICANT CHANGE UP
-  MEROPENEM: SIGNIFICANT CHANGE UP
-  NITROFURANTOIN: SIGNIFICANT CHANGE UP
-  PIPERACILLIN/TAZOBACTAM: SIGNIFICANT CHANGE UP
-  TIGECYCLINE: SIGNIFICANT CHANGE UP
-  TOBRAMYCIN: SIGNIFICANT CHANGE UP
-  TRIMETHOPRIM/SULFAMETHOXAZOLE: SIGNIFICANT CHANGE UP
ANION GAP SERPL CALC-SCNC: 11 MMOL/L — SIGNIFICANT CHANGE UP (ref 7–14)
ANION GAP SERPL CALC-SCNC: 12 MMOL/L — SIGNIFICANT CHANGE UP (ref 7–14)
BASOPHILS # BLD AUTO: 0.03 K/UL — SIGNIFICANT CHANGE UP (ref 0–0.2)
BASOPHILS NFR BLD AUTO: 0.3 % — SIGNIFICANT CHANGE UP (ref 0–1)
BUN SERPL-MCNC: 12 MG/DL — SIGNIFICANT CHANGE UP (ref 10–20)
BUN SERPL-MCNC: 13 MG/DL — SIGNIFICANT CHANGE UP (ref 10–20)
BUN SERPL-MCNC: 14 MG/DL — SIGNIFICANT CHANGE UP (ref 10–20)
BUN SERPL-MCNC: 14 MG/DL — SIGNIFICANT CHANGE UP (ref 10–20)
CALCIUM SERPL-MCNC: 8.5 MG/DL — SIGNIFICANT CHANGE UP (ref 8.5–10.1)
CALCIUM SERPL-MCNC: 8.5 MG/DL — SIGNIFICANT CHANGE UP (ref 8.5–10.1)
CALCIUM SERPL-MCNC: 8.6 MG/DL — SIGNIFICANT CHANGE UP (ref 8.5–10.1)
CALCIUM SERPL-MCNC: 8.6 MG/DL — SIGNIFICANT CHANGE UP (ref 8.5–10.1)
CHLORIDE SERPL-SCNC: 83 MMOL/L — LOW (ref 98–110)
CHLORIDE SERPL-SCNC: 83 MMOL/L — LOW (ref 98–110)
CHLORIDE SERPL-SCNC: 84 MMOL/L — LOW (ref 98–110)
CHLORIDE SERPL-SCNC: 84 MMOL/L — LOW (ref 98–110)
CO2 SERPL-SCNC: 19 MMOL/L — SIGNIFICANT CHANGE UP (ref 17–32)
CO2 SERPL-SCNC: 21 MMOL/L — SIGNIFICANT CHANGE UP (ref 17–32)
CO2 SERPL-SCNC: 22 MMOL/L — SIGNIFICANT CHANGE UP (ref 17–32)
CO2 SERPL-SCNC: 23 MMOL/L — SIGNIFICANT CHANGE UP (ref 17–32)
CREAT ?TM UR-MCNC: 90 MG/DL — SIGNIFICANT CHANGE UP
CREAT SERPL-MCNC: 1 MG/DL — SIGNIFICANT CHANGE UP (ref 0.7–1.5)
CULTURE RESULTS: SIGNIFICANT CHANGE UP
EOSINOPHIL # BLD AUTO: 0.06 K/UL — SIGNIFICANT CHANGE UP (ref 0–0.7)
EOSINOPHIL NFR BLD AUTO: 0.5 % — SIGNIFICANT CHANGE UP (ref 0–8)
FOLATE SERPL-MCNC: >20 NG/ML — SIGNIFICANT CHANGE UP
GLUCOSE SERPL-MCNC: 100 MG/DL — HIGH (ref 70–99)
GLUCOSE SERPL-MCNC: 120 MG/DL — HIGH (ref 70–99)
GLUCOSE SERPL-MCNC: 120 MG/DL — HIGH (ref 70–99)
GLUCOSE SERPL-MCNC: 124 MG/DL — HIGH (ref 70–99)
HCT VFR BLD CALC: 33.6 % — LOW (ref 42–52)
HGB BLD-MCNC: 11.9 G/DL — LOW (ref 14–18)
IMM GRANULOCYTES NFR BLD AUTO: 0.4 % — HIGH (ref 0.1–0.3)
LDH SERPL L TO P-CCNC: 266 U/L — HIGH (ref 50–242)
LYMPHOCYTES # BLD AUTO: 1.44 K/UL — SIGNIFICANT CHANGE UP (ref 1.2–3.4)
LYMPHOCYTES # BLD AUTO: 12.8 % — LOW (ref 20.5–51.1)
MAGNESIUM SERPL-MCNC: 1.5 MG/DL — LOW (ref 1.8–2.4)
MCHC RBC-ENTMCNC: 32.1 PG — HIGH (ref 27–31)
MCHC RBC-ENTMCNC: 35.4 G/DL — SIGNIFICANT CHANGE UP (ref 32–37)
MCV RBC AUTO: 90.6 FL — SIGNIFICANT CHANGE UP (ref 80–94)
METHOD TYPE: SIGNIFICANT CHANGE UP
MONOCYTES # BLD AUTO: 1.19 K/UL — HIGH (ref 0.1–0.6)
MONOCYTES NFR BLD AUTO: 10.6 % — HIGH (ref 1.7–9.3)
NEUTROPHILS # BLD AUTO: 8.47 K/UL — HIGH (ref 1.4–6.5)
NEUTROPHILS NFR BLD AUTO: 75.4 % — HIGH (ref 42.2–75.2)
NRBC # BLD: 0 /100 WBCS — SIGNIFICANT CHANGE UP (ref 0–0)
ORGANISM # SPEC MICROSCOPIC CNT: SIGNIFICANT CHANGE UP
ORGANISM # SPEC MICROSCOPIC CNT: SIGNIFICANT CHANGE UP
OSMOLALITY UR: 457 MOS/KG — SIGNIFICANT CHANGE UP (ref 50–1400)
PHOSPHATE SERPL-MCNC: 2.8 MG/DL — SIGNIFICANT CHANGE UP (ref 2.1–4.9)
PLATELET # BLD AUTO: 221 K/UL — SIGNIFICANT CHANGE UP (ref 130–400)
POTASSIUM SERPL-MCNC: 3.9 MMOL/L — SIGNIFICANT CHANGE UP (ref 3.5–5)
POTASSIUM SERPL-MCNC: 4.1 MMOL/L — SIGNIFICANT CHANGE UP (ref 3.5–5)
POTASSIUM SERPL-MCNC: 4.2 MMOL/L — SIGNIFICANT CHANGE UP (ref 3.5–5)
POTASSIUM SERPL-MCNC: 4.3 MMOL/L — SIGNIFICANT CHANGE UP (ref 3.5–5)
POTASSIUM SERPL-SCNC: 3.9 MMOL/L — SIGNIFICANT CHANGE UP (ref 3.5–5)
POTASSIUM SERPL-SCNC: 4.1 MMOL/L — SIGNIFICANT CHANGE UP (ref 3.5–5)
POTASSIUM SERPL-SCNC: 4.2 MMOL/L — SIGNIFICANT CHANGE UP (ref 3.5–5)
POTASSIUM SERPL-SCNC: 4.3 MMOL/L — SIGNIFICANT CHANGE UP (ref 3.5–5)
RBC # BLD: 3.71 M/UL — LOW (ref 4.7–6.1)
RBC # FLD: 11.7 % — SIGNIFICANT CHANGE UP (ref 11.5–14.5)
SODIUM SERPL-SCNC: 115 MMOL/L — CRITICAL LOW (ref 135–146)
SODIUM SERPL-SCNC: 117 MMOL/L — CRITICAL LOW (ref 135–146)
SODIUM UR-SCNC: 60 MMOL/L — SIGNIFICANT CHANGE UP
SPECIMEN SOURCE: SIGNIFICANT CHANGE UP
URATE SERPL-MCNC: 2.7 MG/DL — LOW (ref 3.4–8.8)
URATE UR-MCNC: 35 MG/DL — SIGNIFICANT CHANGE UP
UUN UR-MCNC: 585 MG/DL — SIGNIFICANT CHANGE UP
VIT B12 SERPL-MCNC: 463 PG/ML — SIGNIFICANT CHANGE UP (ref 232–1245)
WBC # BLD: 11.23 K/UL — HIGH (ref 4.8–10.8)
WBC # FLD AUTO: 11.23 K/UL — HIGH (ref 4.8–10.8)

## 2020-06-09 PROCEDURE — 99232 SBSQ HOSP IP/OBS MODERATE 35: CPT

## 2020-06-09 PROCEDURE — 99233 SBSQ HOSP IP/OBS HIGH 50: CPT

## 2020-06-09 RX ORDER — MAGNESIUM OXIDE 400 MG ORAL TABLET 241.3 MG
400 TABLET ORAL
Refills: 0 | Status: DISCONTINUED | OUTPATIENT
Start: 2020-06-09 | End: 2020-06-16

## 2020-06-09 RX ORDER — SODIUM CHLORIDE 9 MG/ML
1000 INJECTION INTRAMUSCULAR; INTRAVENOUS; SUBCUTANEOUS
Refills: 0 | Status: DISCONTINUED | OUTPATIENT
Start: 2020-06-09 | End: 2020-06-10

## 2020-06-09 RX ORDER — AMLODIPINE BESYLATE 2.5 MG/1
5 TABLET ORAL ONCE
Refills: 0 | Status: COMPLETED | OUTPATIENT
Start: 2020-06-09 | End: 2020-06-09

## 2020-06-09 RX ORDER — MAGNESIUM SULFATE 500 MG/ML
2 VIAL (ML) INJECTION ONCE
Refills: 0 | Status: COMPLETED | OUTPATIENT
Start: 2020-06-09 | End: 2020-06-09

## 2020-06-09 RX ADMIN — Medication 75 MICROGRAM(S): at 05:45

## 2020-06-09 RX ADMIN — MAGNESIUM OXIDE 400 MG ORAL TABLET 400 MILLIGRAM(S): 241.3 TABLET ORAL at 15:12

## 2020-06-09 RX ADMIN — APIXABAN 5 MILLIGRAM(S): 2.5 TABLET, FILM COATED ORAL at 17:18

## 2020-06-09 RX ADMIN — AMLODIPINE BESYLATE 5 MILLIGRAM(S): 2.5 TABLET ORAL at 05:45

## 2020-06-09 RX ADMIN — SODIUM CHLORIDE 1 GRAM(S): 9 INJECTION INTRAMUSCULAR; INTRAVENOUS; SUBCUTANEOUS at 21:12

## 2020-06-09 RX ADMIN — SODIUM CHLORIDE 1 GRAM(S): 9 INJECTION INTRAMUSCULAR; INTRAVENOUS; SUBCUTANEOUS at 15:12

## 2020-06-09 RX ADMIN — SODIUM CHLORIDE 1 GRAM(S): 9 INJECTION INTRAMUSCULAR; INTRAVENOUS; SUBCUTANEOUS at 02:37

## 2020-06-09 RX ADMIN — MAGNESIUM OXIDE 400 MG ORAL TABLET 400 MILLIGRAM(S): 241.3 TABLET ORAL at 17:18

## 2020-06-09 RX ADMIN — ATORVASTATIN CALCIUM 80 MILLIGRAM(S): 80 TABLET, FILM COATED ORAL at 21:12

## 2020-06-09 RX ADMIN — SODIUM CHLORIDE 75 MILLILITER(S): 9 INJECTION INTRAMUSCULAR; INTRAVENOUS; SUBCUTANEOUS at 21:16

## 2020-06-09 RX ADMIN — CHLORHEXIDINE GLUCONATE 1 APPLICATION(S): 213 SOLUTION TOPICAL at 05:45

## 2020-06-09 RX ADMIN — Medication 25 GRAM(S): at 10:36

## 2020-06-09 RX ADMIN — AMLODIPINE BESYLATE 5 MILLIGRAM(S): 2.5 TABLET ORAL at 21:11

## 2020-06-09 RX ADMIN — APIXABAN 5 MILLIGRAM(S): 2.5 TABLET, FILM COATED ORAL at 05:45

## 2020-06-09 RX ADMIN — SODIUM CHLORIDE 75 MILLILITER(S): 9 INJECTION INTRAMUSCULAR; INTRAVENOUS; SUBCUTANEOUS at 15:12

## 2020-06-09 RX ADMIN — Medication 1 MILLIGRAM(S): at 21:12

## 2020-06-09 RX ADMIN — LISINOPRIL 20 MILLIGRAM(S): 2.5 TABLET ORAL at 05:45

## 2020-06-09 RX ADMIN — SODIUM CHLORIDE 1 GRAM(S): 9 INJECTION INTRAMUSCULAR; INTRAVENOUS; SUBCUTANEOUS at 05:45

## 2020-06-09 RX ADMIN — Medication 81 MILLIGRAM(S): at 11:39

## 2020-06-09 RX ADMIN — CEFTRIAXONE 100 MILLIGRAM(S): 500 INJECTION, POWDER, FOR SOLUTION INTRAMUSCULAR; INTRAVENOUS at 11:39

## 2020-06-09 NOTE — PROGRESS NOTE ADULT - SUBJECTIVE AND OBJECTIVE BOX
Nephrology progress note    THIS IS AN INCOMPLETE NOTE . FULL NOTE TO FOLLOW SHORTLY    Patient is seen and examined, events over the last 24 h noted .    Allergies:  No Known Allergies    Hospital Medications:   MEDICATIONS  (STANDING):  amLODIPine   Tablet 5 milliGRAM(s) Oral daily  apixaban 5 milliGRAM(s) Oral two times a day  aspirin enteric coated 81 milliGRAM(s) Oral daily  atorvastatin 80 milliGRAM(s) Oral at bedtime  cefTRIAXone   IVPB 1000 milliGRAM(s) IV Intermittent every 24 hours  chlorhexidine 4% Liquid 1 Application(s) Topical <User Schedule>  folic acid 1 milliGRAM(s) Oral at bedtime  levothyroxine 75 MICROGram(s) Oral daily  lisinopril 20 milliGRAM(s) Oral daily  magnesium sulfate  IVPB 2 Gram(s) IV Intermittent once  sodium chloride 1 Gram(s) Oral three times a day        VITALS:  T(F): 97.6 (20 @ 06:36), Max: 97.6 (20 @ 06:36)  HR: 94 (20 @ 06:36)  BP: 172/77 (20 @ 06:36)  RR: 20 (20 @ 06:36)  SpO2: 97% (20 @ 19:45)  Wt(kg): --     @ 07:01  -   @ 07:00  --------------------------------------------------------  IN: 0 mL / OUT: 550 mL / NET: -550 mL      Height (cm): 182.9 ( @ 22:34)  Weight (kg): 92.3 ( @ 22:34)  BMI (kg/m2): 27.6 ( @ 22:34)  BSA (m2): 2.15 ( @ 22:34)    PHYSICAL EXAM:  Constitutional: NAD  HEENT: anicteric sclera, oropharynx clear, MMM  Neck: No JVD  Respiratory: CTAB, no wheezes, rales or rhonchi  Cardiovascular: S1, S2, RRR  Gastrointestinal: BS+, soft, NT/ND  Extremities: No cyanosis or clubbing. No peripheral edema  :  No ricardo.   Skin: No rashes    LABS:      117<LL>  |  83<L>  |  12  ----------------------------<  120<H>  3.9   |  22  |  1.0  SODIUM TREND:  Sodium 117 [ @ 05:29]  Sodium 117 [ @ 00:54]  Sodium 116 [ @ 22:53]  Sodium 119 [ @ 16:35]  Sodium 120 [ @ 05:46]  Sodium 120 [ @ 02:10]  Sodium 119 [ @ 01:21]  Sodium 117 [ @ 20:24]  Sodium 118 [ @ 16:47]  Sodium 123 [ @ 06:04]    Ca    8.5      2020 05:29  Phos  2.8       Mg     1.5                                 11.9   11.23 )-----------( 221      ( 2020 05:29 )             33.6       Urine Studies:  Urinalysis Basic - ( 2020 09:00 )    Color: Light Yellow / Appearance: Clear / S.022 / pH:   Gluc:  / Ketone: Negative  / Bili: Negative / Urobili: <2 mg/dL   Blood:  / Protein: Trace / Nitrite: Positive   Leuk Esterase: Large / RBC: 1 /HPF / WBC 94 /HPF   Sq Epi:  / Non Sq Epi: 0 /HPF / Bacteria: Moderate      Sodium, Random Urine: 80.0 mmoL/L ( @ 09:00)  Creatinine, Random Urine: 32 mg/dL ( @ 09:00)  Osmolality, Random Urine: 325 mos/kg ( @ 09:00)    RADIOLOGY & ADDITIONAL STUDIES: Nephrology progress note  Patient is seen and examined, events over the last 24 h noted .  ambulating  denied any complaints     Allergies:  No Known Allergies    Hospital Medications:   MEDICATIONS  (STANDING):  amLODIPine   Tablet 5 milliGRAM(s) Oral daily  apixaban 5 milliGRAM(s) Oral two times a day  aspirin enteric coated 81 milliGRAM(s) Oral daily  atorvastatin 80 milliGRAM(s) Oral at bedtime  cefTRIAXone   IVPB 1000 milliGRAM(s) IV Intermittent every 24 hours  folic acid 1 milliGRAM(s) Oral at bedtime  levothyroxine 75 MICROGram(s) Oral daily  lisinopril 20 milliGRAM(s) Oral daily  magnesium sulfate  IVPB 2 Gram(s) IV Intermittent once  sodium chloride 1 Gram(s) Oral three times a day        VITALS:  T(F): 97.6 (20 @ 06:36), Max: 97.6 (20 @ 06:36)  HR: 94 (20 @ 06:36)  BP: 172/77 (20 @ 06:36)  RR: 20 (20 @ 06:36)  SpO2: 97% (20 @ 19:45)       @ 07:01  -   @ 07:00  --------------------------------------------------------  IN: 0 mL / OUT: 550 mL / NET: -550 mL      Height (cm): 182.9 ( @ 22:34)  Weight (kg): 92.3 ( @ 22:34)  BMI (kg/m2): 27.6 ( @ 22:34)  BSA (m2): 2.15 ( @ 22:34)    PHYSICAL EXAM:  Constitutional: NAD  HEENT: anicteric sclera, oropharynx clear, MMM  Neck: No JVD  Respiratory: CTAB, no wheezes, rales or rhonchi  Cardiovascular: S1, S2, RRR  Gastrointestinal: BS+, soft, NT/ND  Extremities: No cyanosis or clubbing. No peripheral edema  :  No ricardo.   Skin: No rashes    LABS:      117<LL>  |  83<L>  |  12  ----------------------------<  120<H>  3.9   |  22  |  1.0  SODIUM TREND:  Sodium 117 [ @ 05:29]  Sodium 117 [ 00:54]  Sodium 116 [ 22:53]  Sodium 119 [ 16:35]  Sodium 120 [ 05:46]  Sodium 120 [ @ 02:10]  Sodium 119 [ @ 01:21]  Sodium 117 [ @ 20:24]  Sodium 118 [ @ 16:47]  Sodium 123 [ @ 06:04]    Ca    8.5      2020 05:29  Phos  2.8       Mg     1.5         Uric Acid, Serum (20 @ 05:29)    Uric Acid, Serum: 2.7 mg/dL                            11.9   11.23 )-----------( 221      ( 2020 05:29 )             33.6     Thyroid Stimulating Hormone, Serum: 2.04 uIU/mL (20 @ 06:04)        Urine Studies:  Urinalysis Basic - ( 2020 09:00 )    Color: Light Yellow / Appearance: Clear / S.022 / pH:   Gluc:  / Ketone: Negative  / Bili: Negative / Urobili: <2 mg/dL   Blood:  / Protein: Trace / Nitrite: Positive   Leuk Esterase: Large / RBC: 1 /HPF / WBC 94 /HPF   Sq Epi:  / Non Sq Epi: 0 /HPF / Bacteria: Moderate      Sodium, Random Urine: 80.0 mmoL/L ( @ 09:00)  Creatinine, Random Urine: 32 mg/dL (:00)  Osmolality, Random Urine: 325 mos/kg (:00)    RADIOLOGY & ADDITIONAL STUDIES:

## 2020-06-09 NOTE — PROGRESS NOTE ADULT - SUBJECTIVE AND OBJECTIVE BOX
Patient is a 88y old  Male who presents with a chief complaint of AMS; Hyponatremia; UTI (08 Jun 2020 15:16)    INTERVAL HPI/OVERNIGHT EVENTS:  feels weak, wants to go home  ROS: Denies CP, SOB, AP  All other systems reviewed and are within normal limits.  InitialHPI:  88 year old male with PMH of atrial fibrillation on Eliquis HTN, DLD, hypothyroidism, suspected TIA 2 years ago and has a loop recorder presents with  chief complaint of nausea and abdominal pain. The main history was taken from the daughter. 2 weeks ago the family noted mood changes, he was getting upset at unusual things, such a mowing the lawn and feeling depressed. So the family discussed his situation with his cardiologist who started him on lexapro. Initially he improved but today he was back to depressed mood, did not feel good at all, initially grabbed his chest and showed signs of discomfort, delirium and hence was brought to the hospital by his daughter. While in ED a stroke code was activated for acute onset confusion and difficulty speaking. He was found to have elevated BP and was having difficulty in expressing his concerns and confusion initially.  Currently he is alert oriented X2-3 and no signs of seizures. (07 Jun 2020 02:00)    TIA (TRANSIENT ISCHEMIC ATTACK);HYPONATREMIA  ^POSS HEART ATTACK  No pertinent family history in first degree relatives  Handoff  MEWS Score  Hypothyroidism  Hypertension  Hyperlipidemia  Atrial fibrillation  Coronary artery disease  TIA (transient ischemic attack)  History of loop recorder  H/O aortic valve replacement  POSS HEART ATTACK  90+  Nausea  Hyponatremia    PAST MEDICAL & SURGICAL HISTORY:  Hypothyroidism  Hypertension  Hyperlipidemia  Atrial fibrillation  Coronary artery disease  History of loop recorder: 2017  H/O aortic valve replacement: porcine      General: NAD, AAO3 but +cognitive deficits, [place]  CV: S1 S2  Resp: decreased breath sounds at bases  GI: NT/ND/S +BS  MS: no clubbing/cyanosis/edema, 2+ pulses b/l  Neuro: nonfocal, 2+reflexes thruout    MEDICATIONS  (STANDING):  amLODIPine   Tablet 5 milliGRAM(s) Oral daily  apixaban 5 milliGRAM(s) Oral two times a day  aspirin enteric coated 81 milliGRAM(s) Oral daily  atorvastatin 80 milliGRAM(s) Oral at bedtime  cefTRIAXone   IVPB 1000 milliGRAM(s) IV Intermittent every 24 hours  chlorhexidine 4% Liquid 1 Application(s) Topical <User Schedule>  folic acid 1 milliGRAM(s) Oral at bedtime  levothyroxine 75 MICROGram(s) Oral daily  lisinopril 20 milliGRAM(s) Oral daily  magnesium oxide 400 milliGRAM(s) Oral three times a day with meals  sodium chloride 1 Gram(s) Oral three times a day  sodium chloride 0.9%. 1000 milliLiter(s) (75 mL/Hr) IV Continuous <Continuous>    MEDICATIONS  (PRN):  acetaminophen   Tablet .. 650 milliGRAM(s) Oral every 6 hours PRN Moderate Pain (4 - 6)    Home Medications:  amLODIPine 5 mg oral tablet: 1 tab(s) orally once a day (07 Jun 2020 01:59)  aspirin 81 mg oral tablet: 1 tab(s) orally once a day (10 Sep 2018 07:17)  atorvastatin 80 mg oral tablet: 1 tab(s) orally once a day (10 Sep 2018 07:17)  Eliquis 5 mg oral tablet: 1 tab(s) orally 2 times a day (10 Sep 2018 07:17)  folic acid 1 mg oral tablet: 1 tab(s) orally once a day (at bedtime) (10 Sep 2018 07:17)  levothyroxine 75 mcg (0.075 mg) oral tablet: 1 tab(s) orally once a day (10 Sep 2018 07:17)  lisinopril 20 mg oral tablet: 1 tab(s) orally once a day (at bedtime) (10 Sep 2018 07:17)  tamsulosin 0.4 mg oral capsule: 1 cap(s) orally once a day (10 Sep 2018 07:17)    Vital Signs Last 24 Hrs  T(C): 36.4 (09 Jun 2020 13:50), Max: 36.4 (09 Jun 2020 06:36)  T(F): 97.6 (09 Jun 2020 13:50), Max: 97.6 (09 Jun 2020 06:36)  HR: 70 (09 Jun 2020 13:50) (70 - 94)  BP: 166/86 (09 Jun 2020 13:50) (159/95 - 172/77)  BP(mean): --  RR: 18 (09 Jun 2020 13:50) (18 - 20)  SpO2: 97% (08 Jun 2020 19:45) (97% - 97%)  CAPILLARY BLOOD GLUCOSE        LABS:                        11.9   11.23 )-----------( 221      ( 09 Jun 2020 05:29 )             33.6     06-09    115<LL>  |  84<L>  |  14  ----------------------------<  120<H>  4.3   |  19  |  1.0    Ca    8.6      09 Jun 2020 11:29  Phos  2.8     06-09  Mg     1.5     06-09        CARDIAC MARKERS ( 08 Jun 2020 08:26 )  x     / <0.01 ng/mL / 292 U/L / x     / 10.9 ng/mL  CARDIAC MARKERS ( 07 Jun 2020 16:47 )  x     / <0.01 ng/mL / x     / x     / x          PT/INR - ( 08 Jun 2020 08:26 )   PT: 16.80 sec;   INR: 1.46 ratio         PTT - ( 08 Jun 2020 08:26 )  PTT:35.9 sec            Culture - Urine (collected 07 Jun 2020 11:50)  Source: .Urine Clean Catch (Midstream)  Preliminary Report (08 Jun 2020 14:53):    >100,000 CFU/ml Gram Negative Rods      Chart, Consultant(s) Notes Reviewed:  [x ] YES  [ ] NO  Care Discussed with Consultants/Other Providers/ Housestaff [ x] YES  [ ] NO  Radiology, labs, old available records personally reviewed.

## 2020-06-09 NOTE — CHART NOTE - NSCHARTNOTEFT_GEN_A_CORE
Patient Na+ 116. Will give a stat dose oral salt tab. Recommend day team begin vaptan therapy (requires nephrology approval, therefore not initiated overnight).

## 2020-06-09 NOTE — CDI QUERY NOTE - NSCDIOTHERTXTBX_GEN_ALL_CORE_HH
_____________________________________________________________________________________________________________________________________________________________________________________________________________________________________  88 M,  who presents with nausea, abdominal pain, confusion, Dx:  Hyponatremia, UTI, Hypertensive Urgency  Clinical Indicators: Na= 123, 120, 119, 116, 117, Hypomagnesemia, UTI, Hypertensive Urgency, PN: 6/8/2020: wife claims taking Escitalopram, 2days prior to admission  Meds;  IVF hydration w/ Nacl, LRS, MgSO4, amlodipine, labetalol     Based on your medical judgment and consideration of these clinical indicators, I have treated, evaluated, and / or monitored this patient for the following condition during this episode of care:    [ ] Drug induced encephalopathy  [ ] Hypertensive encephalopathy  [ ] Metabolic encephalopathy  [ ] Other (please specify)  [ ] Unable to clinically determined

## 2020-06-09 NOTE — PROGRESS NOTE ADULT - ASSESSMENT
# Encephalopathy likely due to 2/2 Hyponatremia +/_UTI  # Hypotonic euvolemic hyponatremia 2/2 SIADH vs HCTZ/Lexapro  -MRI brain negative for CVA   -Echo unremarkable   -d/w renal, will repeat urine lytes and start on NS  -trend BMP q6h (avoid rapid rise in Na+)  -Tolvaptan if sodium does not improve   -nephro following     # Possible UTI  - c/w ceftriaxone and follow urine culture     # HTN  -Continue with Home medications.     # Hypothyroidism  -On synthroid     # Severe hypomagnesemia   - QTc okay   - replete and recheck tomorrow     # Afib  -Continue with Eliquis    # DVT prophylaxis  -On Eliquis    #Progress Note Handoff  Pending (specify):  Consults____Clinical improvement and stability__x___Tests___BMPs_____PT________  Pt/Family discussion: Pt informed and agrees with the current plan  Disposition: Home____x__/SNF_______/To be determined________

## 2020-06-09 NOTE — SWALLOW BEDSIDE ASSESSMENT ADULT - DIET PRIOR TO ADMI
CC: seizure  Presents with dad and spoke to mom on the phone    HPI: 3yo ambidextrous boy who presents for concern over seizure.  He was in backseat. He was asking for a gumball. He slurred his speech.   He collets gumballs -- he doesn't eat them. He started drooling. He was out of it. He was out of the zone.  He was able to follow command. He couldn't remember say his dad's name. He was having trouble with S sounds. Sounded like his tongue was injected with lidocaine.  Mom thinks the event lasted a couple of minutes.  Brought to ED. Bloodwork -- normal.   Baseline within 30 minutes.    He's a very articulate kid. He's barely has had a cold his whole life.   He remembers a lot of the event.     He's been feeling under the weather for the last 3 weeks. Tired. - just not feeling up to himself. He's been more andrade -- teary than normal.    Past med: Meconium aspiration at birth. C/section - for prolonged delivery and decels  Full term. He had intussusception at 3 months - self resolving with fluorscopy  No other medical problems  No concerns with development.  He's not great with pencil.     In last few weeks - mostly URI symptoms -- cough and nasal discharge, a little bit gassy    Otherwise, no concerns regarding growth, weight loss, changes in appetitie, sore throat, changes in hearing or vision, heat palpitations or chest pains, or respiratory problems. No episodes of fainting or passing out, abdominal pain, constipation, diarrhea, rashes, urinary difficulties, immunological problems or musculoskeletal problems.    Meds: probiotic, multivitamins  All: none (maybe mold)    Social Hx:  Dad is a pharmacist at [employer].  Mom is a nurse in oncology.  Only child  [employer] - a / type school    Family Hx:  Dad may have been diagnosed with ADHD  Paternal Uncle with ADD  Maternal grandaunt with seizures as a child and grew out of it  No seizures on dad's side of family  Mom has two autistic  "cousins - severely autistic    Temp 98.2  F (36.8  C)  Ht 3' 7.75\" (111.1 cm)  Wt 42 lb 3.2 oz (19.1 kg)  BMI 15.5 kg/m2     General appearance: well nourished, pleasant  Head: Normocephalic, atraumatic.  Eyes: Conjunctiva clear, non icteric. PERRLA.  Ears: External ears normal BL.  Nose: Septum midline, nasal mucosa pink and moist. No discharge.  Mouth / Throat: Normal dentition.  No oral lesions. Pharynx non erythematous, tonsils without hypertrophy.  Neck: Supple, no enlarged LN, trachea midline.  LUNGS: no increased WOB  Abdomen: was soft, nontender without mass or organomegaly  Skin: was without lesion    Neurologic (Child):  Mental Status: Awake, alert, attentive, oriented to time, place, and situation. Able to follow two step commands. Speech is fluent.  CN: II-XII intact. EOMI with no nystagmus or diplopia. Visual field is intact to confrontation. Face is symmetric. Palate and uvula rise, are symmetric. Tongue protrudes to midline. No pronator drift.  Motor: Normal bulk and tone. Strength 5/5 throughout in bilateral shoulder abduction, elbow flexion and extension, , hip flexion, knee extension and flexion, and ankle dorsiflexion.  Sensation: Intact for LT and vibration in all limbs; Romberg negative.  Coordination: No dysmetria on FTN, or heel-shin testing, Cristi intact.  Reflexes: 2+ symmetrically present in biceps, brachioradialis, patellar, achilles, and  toes downgoing.  Gait: Normal. Normal tandem. Able to walk on toes and heels. Able to hop on one foot.    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 EEG - prelim read -- bilateral parietal/central spikes. Left more frequent than right. All results given to parents. Discussed the diagnosis of seizures, prognosis, and treatment/management. Discussed seizure precautions of water and height safety. Discussed with parents the need for MRI to evaluate for underlying structural concerns. They had baseline CBC BMP at [facility]. Mom to get those records " regular and thin to me. Explained there risks of having another seizure given no maintenance med treatment started today. I gave them option to consider keppra or oxcarbazepine as options to consider daily medication.  For the time being, parents would like to wait to start daily meds and closely monitor. Because we have labwork, should he have another seizure, I could easily send prescription to them.     I have prescribed Diastat 7.5mg pr for prolonged seizure lasting longer than 5 minutes.  Because this was a lot of information to take in, I would like parents (especially mom since she was on the phone) to come back in the next few weeks to discuss in person, the diagnosis and management.  We may even have the MRI results back by then.    I also discussed the possibility of children outgrowing their seizures and the protocol to treat for two years and if seizure free, check EEG and if normal, we could at that point in time consider wean.    I also discussed that because maternal grandaunt had childhood seizures and out grew them, that we could consider genetic testing for Eric.    Recommended --   1) Diastat 7.5mg pr for prolonged seizure   > 5 min  2) MRI brain  3) RTC in a few weeks so that I can sit down and discuss with mom results in person.    At next visit, I will need to provide them info on epilepsy foundation and see if there is more support they would like.    Thank you for the opportunity to participate in  Eric's care. Approximately 60 minutes of time was spent explaining diagnosis, treatment, management, and prognosis.  Over half of the time was spent in education and counseling.    Jeniffer Pena MD  Pediatric Neurology

## 2020-06-09 NOTE — SWALLOW BEDSIDE ASSESSMENT ADULT - SLP PERTINENT HISTORY OF CURRENT PROBLEM
88 y.o M p/w c/o nausea and abdominal pain. 2 weeks ago family noted mood changes, getting upset about unusual things, and feeling depressed. Pt noted to have grabbed his chest, showing signs of discomfort and delirium. Pt currently being treated for AMS 2'2 deleirium 2'2 hyponatremia and possible UTI, Afib, Severe hypomagnesmia, and DVT. MRI negative.   PMhx: Afib, HTN, DLD, hypothyroidism, suspected TIA 2 years ago.
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

## 2020-06-09 NOTE — SWALLOW BEDSIDE ASSESSMENT ADULT - ORAL PHASE
Within functional limits
trace-mild lingual stasis for regular, which pt independently cleared w/ liquid wash

## 2020-06-09 NOTE — PROGRESS NOTE ADULT - SUBJECTIVE AND OBJECTIVE BOX
Neurology Progress Note    Interval History:      Pt is 88 yom presenting as code stroke for AMS found to have no acute stroke on MRI but rather hyponatremia.  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  Nephrology recommended a gradual correction of sodium.    Vital Signs Last 24 Hrs  T(C): 36.4 (09 Jun 2020 13:50), Max: 36.4 (09 Jun 2020 06:36)  T(F): 97.6 (09 Jun 2020 13:50), Max: 97.6 (09 Jun 2020 06:36)  HR: 70 (09 Jun 2020 13:50) (70 - 94)  BP: 166/86 (09 Jun 2020 13:50) (159/95 - 172/77)  BP(mean): --  RR: 18 (09 Jun 2020 13:50) (18 - 20)  SpO2: 97% (08 Jun 2020 19:45) (97% - 97%)    Neurological Exam:   Mental status: Awake, alert and oriented x 2. Short term recall impaired.  Naming, repetition and comprehension intact.  Attention/concentration intact.  No dysarthria, no aphasia.  Fund of knowledge appropriate.    Cranial nerves: Pupils equally round and reactive to light, visual fields full, no nystagmus, extraocular muscles intact, V1 through V3 intact bilaterally and symmetric, face symmetric, hearing intact to finger rub, palate elevation symmetric, tongue was midline.  Motor:  MRC grading 5/5 b/l UE/LE.   strength 5/5.  Normal tone and bulk.  No abnormal movements.    Sensation: Intact to light touch, proprioception, and pinprick in upper extremities but diminished in lower extremities worse in toes.  Coordination: No dysmetria on finger-to-nose and heel-to-shin.  No dysdiadokinesia.  Reflexes: symmetric in UE's reduced in LE's.  Toes downgoing on right equivocal on left.      MEDICATIONS  (STANDING):  amLODIPine   Tablet 5 milliGRAM(s) Oral daily  apixaban 5 milliGRAM(s) Oral two times a day  aspirin enteric coated 81 milliGRAM(s) Oral daily  atorvastatin 80 milliGRAM(s) Oral at bedtime  cefTRIAXone   IVPB 1000 milliGRAM(s) IV Intermittent every 24 hours  chlorhexidine 4% Liquid 1 Application(s) Topical <User Schedule>  folic acid 1 milliGRAM(s) Oral at bedtime  levothyroxine 75 MICROGram(s) Oral daily  lisinopril 20 milliGRAM(s) Oral daily  magnesium oxide 400 milliGRAM(s) Oral three times a day with meals  sodium chloride 1 Gram(s) Oral three times a day  sodium chloride 0.9%. 1000 milliLiter(s) (75 mL/Hr) IV Continuous <Continuous>      Labs:  CBC Full  -  ( 09 Jun 2020 05:29 )  WBC Count : 11.23 K/uL  RBC Count : 3.71 M/uL  Hemoglobin : 11.9 g/dL  Hematocrit : 33.6 %  Platelet Count - Automated : 221 K/uL  Mean Cell Volume : 90.6 fL  Mean Cell Hemoglobin : 32.1 pg  Mean Cell Hemoglobin Concentration : 35.4 g/dL  Auto Neutrophil # : 8.47 K/uL  Auto Lymphocyte # : 1.44 K/uL  Auto Monocyte # : 1.19 K/uL  Auto Eosinophil # : 0.06 K/uL  Auto Basophil # : 0.03 K/uL  Auto Neutrophil % : 75.4 %  Auto Lymphocyte % : 12.8 %  Auto Monocyte % : 10.6 %  Auto Eosinophil % : 0.5 %  Auto Basophil % : 0.3 %    06-09    115<LL>  |  84<L>  |  14  ----------------------------<  120<H>  4.3   |  19  |  1.0    Ca    8.6      09 Jun 2020 11:29  Phos  2.8     06-09  Mg     1.5     06-09        PT/INR - ( 08 Jun 2020 08:26 )   PT: 16.80 sec;   INR: 1.46 ratio         PTT - ( 08 Jun 2020 08:26 )  PTT:35.9 sec      Assessment:  This is a 88y Male w/ h/o recent decline in STM found to be negative for acute stroke but found to have 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  Lexapro was discontinued and strategy to gradually raise sodium levels was instituted.     Recommend:  1.  Follow nephrology recommendations to gradually raise sodium (no more than 8 meq per 24 hours).	  2.  Please check B12/folate levels and replace if low (b12 < 400).  3.  Advised patient to reduce alcohol intake since can be toxic to nerves.  4.  Advised patient to follow up with his neurologist (Dr. Whitaker for ongoing concerns of poor memory) if symptoms persist.    Will sign off.  Call back for questions. Neurology Progress Note    Interval History:      Pt is 88 yom presenting as code stroke for AMS found to have no acute stroke on MRI but rather hyponatremia.  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  Nephrology recommended a gradual correction of sodium.    Vital Signs Last 24 Hrs  T(C): 36.4 (09 Jun 2020 13:50), Max: 36.4 (09 Jun 2020 06:36)  T(F): 97.6 (09 Jun 2020 13:50), Max: 97.6 (09 Jun 2020 06:36)  HR: 70 (09 Jun 2020 13:50) (70 - 94)  BP: 166/86 (09 Jun 2020 13:50) (159/95 - 172/77)  BP(mean): --  RR: 18 (09 Jun 2020 13:50) (18 - 20)  SpO2: 97% (08 Jun 2020 19:45) (97% - 97%)    Neurological Exam:   Mental status: Awake, alert and oriented x 3.Naming, repetition and comprehension intact.  Attention/concentration intact.  No dysarthria, no aphasia.  Fund of knowledge appropriate.    Cranial nerves: Pupils equally round and reactive to light, visual fields full, no nystagmus, extraocular muscles intact, V1 through V3 intact bilaterally and symmetric, face symmetric, hearing intact to finger rub, palate elevation symmetric, tongue was midline.  Motor:  MRC grading 5/5 b/l UE/LE.   strength 5/5.  Normal tone and bulk.  No abnormal movements.    Sensation: Intact to light touch, proprioception, and pinprick in upper extremities but diminished in lower extremities worse in toes.  Coordination: No dysmetria on finger-to-nose and heel-to-shin.  No dysdiadokinesia.  Reflexes: symmetric in UE's reduced in LE's.        MEDICATIONS  (STANDING):  amLODIPine   Tablet 5 milliGRAM(s) Oral daily  apixaban 5 milliGRAM(s) Oral two times a day  aspirin enteric coated 81 milliGRAM(s) Oral daily  atorvastatin 80 milliGRAM(s) Oral at bedtime  cefTRIAXone   IVPB 1000 milliGRAM(s) IV Intermittent every 24 hours  chlorhexidine 4% Liquid 1 Application(s) Topical <User Schedule>  folic acid 1 milliGRAM(s) Oral at bedtime  levothyroxine 75 MICROGram(s) Oral daily  lisinopril 20 milliGRAM(s) Oral daily  magnesium oxide 400 milliGRAM(s) Oral three times a day with meals  sodium chloride 1 Gram(s) Oral three times a day  sodium chloride 0.9%. 1000 milliLiter(s) (75 mL/Hr) IV Continuous <Continuous>      Labs:  CBC Full  -  ( 09 Jun 2020 05:29 )  WBC Count : 11.23 K/uL  RBC Count : 3.71 M/uL  Hemoglobin : 11.9 g/dL  Hematocrit : 33.6 %  Platelet Count - Automated : 221 K/uL  Mean Cell Volume : 90.6 fL  Mean Cell Hemoglobin : 32.1 pg  Mean Cell Hemoglobin Concentration : 35.4 g/dL  Auto Neutrophil # : 8.47 K/uL  Auto Lymphocyte # : 1.44 K/uL  Auto Monocyte # : 1.19 K/uL  Auto Eosinophil # : 0.06 K/uL  Auto Basophil # : 0.03 K/uL  Auto Neutrophil % : 75.4 %  Auto Lymphocyte % : 12.8 %  Auto Monocyte % : 10.6 %  Auto Eosinophil % : 0.5 %  Auto Basophil % : 0.3 %    06-09    115<LL>  |  84<L>  |  14  ----------------------------<  120<H>  4.3   |  19  |  1.0    Ca    8.6      09 Jun 2020 11:29  Phos  2.8     06-09  Mg     1.5     06-09        PT/INR - ( 08 Jun 2020 08:26 )   PT: 16.80 sec;   INR: 1.46 ratio         PTT - ( 08 Jun 2020 08:26 )  PTT:35.9 sec      Assessment:  This is a 88y Male w/ h/o recent AMS found to be negative for acute stroke but found to have 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  Lexapro was discontinued and strategy to gradually raise sodium levels was instituted.     Recommend:  1.  Follow nephrology recommendations to gradually raise sodium (no more than 8 meq per 24 hours).	  2.  Please check B12/folate levels and replace if low (b12 < 400).  3.  Advised patient to reduce alcohol intake since can be toxic to nerves.      Will sign off.  Call back for questions.

## 2020-06-09 NOTE — PROGRESS NOTE ADULT - SUBJECTIVE AND OBJECTIVE BOX
Hospital Day:  2d    Subjective: Patient is a 88y old  Male who presents with a chief complaint of AMS, code stroke (2020 16:38)    PT seen and evaluated at bedside. No over the night acute events reported.   Pt has not complaints. Wants to go home. Explained Risks of hyponatremia.     Past Medical Hx:   Hypothyroidism  Hypertension  Hyperlipidemia  Atrial fibrillation  Coronary artery disease    Past Sx:  History of loop recorder  H/O aortic valve replacement    Allergies:  No Known Allergies    Current Meds:   Standng Meds:  amLODIPine   Tablet 5 milliGRAM(s) Oral daily  apixaban 5 milliGRAM(s) Oral two times a day  aspirin enteric coated 81 milliGRAM(s) Oral daily  atorvastatin 80 milliGRAM(s) Oral at bedtime  cefTRIAXone   IVPB 1000 milliGRAM(s) IV Intermittent every 24 hours  chlorhexidine 4% Liquid 1 Application(s) Topical <User Schedule>  folic acid 1 milliGRAM(s) Oral at bedtime  levothyroxine 75 MICROGram(s) Oral daily  lisinopril 20 milliGRAM(s) Oral daily  magnesium oxide 400 milliGRAM(s) Oral three times a day with meals  sodium chloride 1 Gram(s) Oral three times a day  sodium chloride 0.9%. 1000 milliLiter(s) (75 mL/Hr) IV Continuous <Continuous>    PRN Meds:  acetaminophen   Tablet .. 650 milliGRAM(s) Oral every 6 hours PRN Moderate Pain (4 - 6)    HOME MEDICATIONS:  amLODIPine 5 mg oral tablet: 1 tab(s) orally once a day  aspirin 81 mg oral tablet: 1 tab(s) orally once a day  atorvastatin 80 mg oral tablet: 1 tab(s) orally once a day  Eliquis 5 mg oral tablet: 1 tab(s) orally 2 times a day  folic acid 1 mg oral tablet: 1 tab(s) orally once a day (at bedtime)  levothyroxine 75 mcg (0.075 mg) oral tablet: 1 tab(s) orally once a day  lisinopril 20 mg oral tablet: 1 tab(s) orally once a day (at bedtime)  tamsulosin 0.4 mg oral capsule: 1 cap(s) orally once a day      Vital Signs:   T(F): 97.6 (20 @ 13:50), Max: 97.6 (20 @ 06:36)  HR: 70 (20 @ 13:50) (70 - 94)  BP: 166/86 (20 @ 13:50) (159/95 - 172/77)  RR: 18 (20 @ 13:50) (18 - 20)  SpO2: 97% (20 @ 19:45) (97% - 97%)      20 @ 07:01  -  20 @ 07:00  --------------------------------------------------------  IN: 0 mL / OUT: 550 mL / NET: -550 mL        Physical Exam:   GENERAL: NAD, Sitting in chair  HEENT: NCAT  CHEST/LUNG: CTAB  HEART: Regular rate and rhythm; s1 s2 appreciated, No murmurs, rubs, or gallops  ABDOMEN: Soft, Nontender, Nondistended; Bowel sounds present  EXTREMITIES: No LE edema b/l  SKIN: no rashes, no new lesions  NERVOUS SYSTEM:  Alert & Oriented X3      Labs:                         11.9   11.23 )-----------( 221      ( 2020 05:29 )             33.6     Neutophil% 75.4, Lymphocyte% 12.8, Monocyte% 10.6, Bands% 0.4 20 @ 05:29    2020 11:29    115    |  84     |  14     ----------------------------<  120    4.3     |  19     |  1.0      Ca    8.6        2020 11:29  Phos  2.8       2020 05:29  Mg     1.5       2020 05:29    Amylase --, Lipase 41, 20 @ 20:24    Troponin <0.01, CKMB 10.9,  20 @ 08:26  Troponin <0.01, CKMB --, CK -- 20 @ 16:47  Troponin <0.01, CKMB --, CK -- 20 @ 20:24    Urinalysis Basic - ( 2020 09:00 )    Color: Light Yellow / Appearance: Clear / S.022 / pH: x  Gluc: x / Ketone: Negative  / Bili: Negative / Urobili: <2 mg/dL   Blood: x / Protein: Trace / Nitrite: Positive   Leuk Esterase: Large / RBC: 1 /HPF / WBC 94 /HPF   Sq Epi: x / Non Sq Epi: 0 /HPF / Bacteria: Moderate    Culture - Urine (20 @ 11:50)    Specimen Source: .Urine Clean Catch (Midstream)    Culture Results:   >100,000 CFU/ml Gram Negative Rods    Radiology:       Assessment and Plan:   89 y/o M w/ pMHx of Afib on Eliquis, CAD, HTN, HLD, and Hypothyroidism presented for AMS    # Encephalopathy likely due to 2/2 Hyponatremia +/_ UTI  # Hypotonic euvolemic hyponatremia 2/2 SIADH vs HCTZ/Lexapro  - MRI brain negative for CVA   - Echo unremarkable   - d/w renal, will repeat urine lytes and start on NS  - trend BMP q6h (avoid rapid rise in Na+)  - Tolvaptan if sodium does not improve   - nephro following     # Possible UTI  - Urine Cx - GNR  - c/w ceftriaxone and follow urine culture     # HTN  -Continue with Home medications.     # Hypothyroidism  -On synthroid     # Severe hypomagnesemia   - QTc okay   - replete and recheck tomorrow     # Afib  -Continue with Eliquis    # DVT prophylaxis -On Eliquis  # GI ppx - not indicated  # Diet - Dysphagia 1 pureed  # Activity - IAT  Full Code

## 2020-06-09 NOTE — PROVIDER CONTACT NOTE (OTHER) - SITUATION
[facility]  [address]   [place] [address] [phone]     Deng Joyce   [address] [place] [id number]    May 13, 2019       To Whom It May Concern:    Deng Joyce was seen at our office on 5/13/2019 for an exam with January Kramer PA-C.  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      Sincerely,         ____________________________________________  January Kramer PA-C   
patient hypertensive at 2038 194/93 HR 92

## 2020-06-09 NOTE — SWALLOW BEDSIDE ASSESSMENT ADULT - SLP GENERAL OBSERVATIONS
Pt awake in bedside chair, o2 via RA. Pt states he wants to go home
awake, alert, cooperative
awake, alert, cooperative

## 2020-06-09 NOTE — PROGRESS NOTE ADULT - ASSESSMENT
89 y/o M with hyponatremia in hospital for nausea, abdominal pain, mood changes. started on lexapro 2 weeks by his cardiologist. in ED code stroke for acute confusion and difficulty in speaking.  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   - keep lexapro on hold    - serum osm, urine osm, urine Na serum uric acid noted in favour of SIADH   - restrict free water intake to 1L daily/ start salt tablets 1g q12h    - trend Na level q 8 hr. avoid > 8 meq rise in Na level over 24 hours.   - BP noted, resume home meds monitor BP   - check serum TSH. uric acid and LDH   - TSH within normal     # ? stroke   - CT head unremarkable.   - s/p contrast imaging.   - neuro following. MRI no evidence of CVA    will follow

## 2020-06-10 LAB
ANION GAP SERPL CALC-SCNC: 11 MMOL/L — SIGNIFICANT CHANGE UP (ref 7–14)
ANION GAP SERPL CALC-SCNC: 11 MMOL/L — SIGNIFICANT CHANGE UP (ref 7–14)
ANION GAP SERPL CALC-SCNC: 12 MMOL/L — SIGNIFICANT CHANGE UP (ref 7–14)
ANION GAP SERPL CALC-SCNC: 12 MMOL/L — SIGNIFICANT CHANGE UP (ref 7–14)
ANION GAP SERPL CALC-SCNC: 7 MMOL/L — SIGNIFICANT CHANGE UP (ref 7–14)
BASOPHILS # BLD AUTO: 0.03 K/UL — SIGNIFICANT CHANGE UP (ref 0–0.2)
BASOPHILS NFR BLD AUTO: 0.3 % — SIGNIFICANT CHANGE UP (ref 0–1)
BUN SERPL-MCNC: 11 MG/DL — SIGNIFICANT CHANGE UP (ref 10–20)
BUN SERPL-MCNC: 11 MG/DL — SIGNIFICANT CHANGE UP (ref 10–20)
BUN SERPL-MCNC: 12 MG/DL — SIGNIFICANT CHANGE UP (ref 10–20)
BUN SERPL-MCNC: 12 MG/DL — SIGNIFICANT CHANGE UP (ref 10–20)
BUN SERPL-MCNC: 13 MG/DL — SIGNIFICANT CHANGE UP (ref 10–20)
CALCIUM SERPL-MCNC: 8.1 MG/DL — LOW (ref 8.5–10.1)
CALCIUM SERPL-MCNC: 8.1 MG/DL — LOW (ref 8.5–10.1)
CALCIUM SERPL-MCNC: 8.3 MG/DL — LOW (ref 8.5–10.1)
CALCIUM SERPL-MCNC: 8.4 MG/DL — LOW (ref 8.5–10.1)
CALCIUM SERPL-MCNC: 8.6 MG/DL — SIGNIFICANT CHANGE UP (ref 8.5–10.1)
CHLORIDE SERPL-SCNC: 84 MMOL/L — LOW (ref 98–110)
CHLORIDE SERPL-SCNC: 88 MMOL/L — LOW (ref 98–110)
CHLORIDE SERPL-SCNC: 89 MMOL/L — LOW (ref 98–110)
CO2 SERPL-SCNC: 19 MMOL/L — SIGNIFICANT CHANGE UP (ref 17–32)
CO2 SERPL-SCNC: 20 MMOL/L — SIGNIFICANT CHANGE UP (ref 17–32)
CO2 SERPL-SCNC: 23 MMOL/L — SIGNIFICANT CHANGE UP (ref 17–32)
CREAT SERPL-MCNC: 0.9 MG/DL — SIGNIFICANT CHANGE UP (ref 0.7–1.5)
CREAT SERPL-MCNC: 0.9 MG/DL — SIGNIFICANT CHANGE UP (ref 0.7–1.5)
CREAT SERPL-MCNC: 1 MG/DL — SIGNIFICANT CHANGE UP (ref 0.7–1.5)
CREAT SERPL-MCNC: 1 MG/DL — SIGNIFICANT CHANGE UP (ref 0.7–1.5)
CREAT SERPL-MCNC: 1.1 MG/DL — SIGNIFICANT CHANGE UP (ref 0.7–1.5)
EOSINOPHIL # BLD AUTO: 0.05 K/UL — SIGNIFICANT CHANGE UP (ref 0–0.7)
EOSINOPHIL NFR BLD AUTO: 0.5 % — SIGNIFICANT CHANGE UP (ref 0–8)
GLUCOSE SERPL-MCNC: 108 MG/DL — HIGH (ref 70–99)
GLUCOSE SERPL-MCNC: 113 MG/DL — HIGH (ref 70–99)
GLUCOSE SERPL-MCNC: 114 MG/DL — HIGH (ref 70–99)
GLUCOSE SERPL-MCNC: 126 MG/DL — HIGH (ref 70–99)
GLUCOSE SERPL-MCNC: 90 MG/DL — SIGNIFICANT CHANGE UP (ref 70–99)
HCT VFR BLD CALC: 32.4 % — LOW (ref 42–52)
HGB BLD-MCNC: 11.5 G/DL — LOW (ref 14–18)
IMM GRANULOCYTES NFR BLD AUTO: 0.5 % — HIGH (ref 0.1–0.3)
LYMPHOCYTES # BLD AUTO: 1.42 K/UL — SIGNIFICANT CHANGE UP (ref 1.2–3.4)
LYMPHOCYTES # BLD AUTO: 14.2 % — LOW (ref 20.5–51.1)
MAGNESIUM SERPL-MCNC: 1.7 MG/DL — LOW (ref 1.8–2.4)
MAGNESIUM SERPL-MCNC: 2.3 MG/DL — SIGNIFICANT CHANGE UP (ref 1.8–2.4)
MCHC RBC-ENTMCNC: 31.9 PG — HIGH (ref 27–31)
MCHC RBC-ENTMCNC: 35.5 G/DL — SIGNIFICANT CHANGE UP (ref 32–37)
MCV RBC AUTO: 90 FL — SIGNIFICANT CHANGE UP (ref 80–94)
MONOCYTES # BLD AUTO: 1.02 K/UL — HIGH (ref 0.1–0.6)
MONOCYTES NFR BLD AUTO: 10.2 % — HIGH (ref 1.7–9.3)
NEUTROPHILS # BLD AUTO: 7.46 K/UL — HIGH (ref 1.4–6.5)
NEUTROPHILS NFR BLD AUTO: 74.3 % — SIGNIFICANT CHANGE UP (ref 42.2–75.2)
NRBC # BLD: 0 /100 WBCS — SIGNIFICANT CHANGE UP (ref 0–0)
PLATELET # BLD AUTO: 213 K/UL — SIGNIFICANT CHANGE UP (ref 130–400)
POTASSIUM SERPL-MCNC: 4.5 MMOL/L — SIGNIFICANT CHANGE UP (ref 3.5–5)
POTASSIUM SERPL-MCNC: 4.7 MMOL/L — SIGNIFICANT CHANGE UP (ref 3.5–5)
POTASSIUM SERPL-MCNC: 4.8 MMOL/L — SIGNIFICANT CHANGE UP (ref 3.5–5)
POTASSIUM SERPL-SCNC: 4.5 MMOL/L — SIGNIFICANT CHANGE UP (ref 3.5–5)
POTASSIUM SERPL-SCNC: 4.7 MMOL/L — SIGNIFICANT CHANGE UP (ref 3.5–5)
POTASSIUM SERPL-SCNC: 4.8 MMOL/L — SIGNIFICANT CHANGE UP (ref 3.5–5)
RBC # BLD: 3.6 M/UL — LOW (ref 4.7–6.1)
RBC # FLD: 11.6 % — SIGNIFICANT CHANGE UP (ref 11.5–14.5)
SODIUM SERPL-SCNC: 114 MMOL/L — CRITICAL LOW (ref 135–146)
SODIUM SERPL-SCNC: 115 MMOL/L — CRITICAL LOW (ref 135–146)
SODIUM SERPL-SCNC: 115 MMOL/L — CRITICAL LOW (ref 135–146)
SODIUM SERPL-SCNC: 122 MMOL/L — LOW (ref 135–146)
SODIUM SERPL-SCNC: 124 MMOL/L — LOW (ref 135–146)
WBC # BLD: 10.03 K/UL — SIGNIFICANT CHANGE UP (ref 4.8–10.8)
WBC # FLD AUTO: 10.03 K/UL — SIGNIFICANT CHANGE UP (ref 4.8–10.8)

## 2020-06-10 PROCEDURE — 99233 SBSQ HOSP IP/OBS HIGH 50: CPT

## 2020-06-10 RX ORDER — MAGNESIUM SULFATE 500 MG/ML
2 VIAL (ML) INJECTION ONCE
Refills: 0 | Status: COMPLETED | OUTPATIENT
Start: 2020-06-10 | End: 2020-06-10

## 2020-06-10 RX ORDER — TOLVAPTAN 15 MG/1
15 TABLET ORAL ONCE
Refills: 0 | Status: COMPLETED | OUTPATIENT
Start: 2020-06-10 | End: 2020-06-10

## 2020-06-10 RX ORDER — SODIUM CHLORIDE 9 MG/ML
1000 INJECTION, SOLUTION INTRAVENOUS
Refills: 0 | Status: DISCONTINUED | OUTPATIENT
Start: 2020-06-10 | End: 2020-06-11

## 2020-06-10 RX ORDER — ONDANSETRON 8 MG/1
4 TABLET, FILM COATED ORAL ONCE
Refills: 0 | Status: COMPLETED | OUTPATIENT
Start: 2020-06-10 | End: 2020-06-10

## 2020-06-10 RX ORDER — SODIUM CHLORIDE 5 G/100ML
100 INJECTION, SOLUTION INTRAVENOUS
Refills: 0 | Status: DISCONTINUED | OUTPATIENT
Start: 2020-06-10 | End: 2020-06-11

## 2020-06-10 RX ADMIN — SODIUM CHLORIDE 1 GRAM(S): 9 INJECTION INTRAMUSCULAR; INTRAVENOUS; SUBCUTANEOUS at 05:37

## 2020-06-10 RX ADMIN — ONDANSETRON 4 MILLIGRAM(S): 8 TABLET, FILM COATED ORAL at 11:27

## 2020-06-10 RX ADMIN — CHLORHEXIDINE GLUCONATE 1 APPLICATION(S): 213 SOLUTION TOPICAL at 05:37

## 2020-06-10 RX ADMIN — SODIUM CHLORIDE 50 MILLILITER(S): 5 INJECTION, SOLUTION INTRAVENOUS at 12:29

## 2020-06-10 RX ADMIN — Medication 50 GRAM(S): at 08:18

## 2020-06-10 RX ADMIN — Medication 75 MICROGRAM(S): at 05:38

## 2020-06-10 RX ADMIN — MAGNESIUM OXIDE 400 MG ORAL TABLET 400 MILLIGRAM(S): 241.3 TABLET ORAL at 11:02

## 2020-06-10 RX ADMIN — SODIUM CHLORIDE 1 GRAM(S): 9 INJECTION INTRAMUSCULAR; INTRAVENOUS; SUBCUTANEOUS at 22:08

## 2020-06-10 RX ADMIN — MAGNESIUM OXIDE 400 MG ORAL TABLET 400 MILLIGRAM(S): 241.3 TABLET ORAL at 17:06

## 2020-06-10 RX ADMIN — APIXABAN 5 MILLIGRAM(S): 2.5 TABLET, FILM COATED ORAL at 17:06

## 2020-06-10 RX ADMIN — Medication 81 MILLIGRAM(S): at 11:02

## 2020-06-10 RX ADMIN — LISINOPRIL 20 MILLIGRAM(S): 2.5 TABLET ORAL at 05:37

## 2020-06-10 RX ADMIN — ATORVASTATIN CALCIUM 80 MILLIGRAM(S): 80 TABLET, FILM COATED ORAL at 22:08

## 2020-06-10 RX ADMIN — Medication 1 MILLIGRAM(S): at 22:08

## 2020-06-10 RX ADMIN — MAGNESIUM OXIDE 400 MG ORAL TABLET 400 MILLIGRAM(S): 241.3 TABLET ORAL at 08:04

## 2020-06-10 RX ADMIN — SODIUM CHLORIDE 75 MILLILITER(S): 9 INJECTION INTRAMUSCULAR; INTRAVENOUS; SUBCUTANEOUS at 05:38

## 2020-06-10 RX ADMIN — AMLODIPINE BESYLATE 5 MILLIGRAM(S): 2.5 TABLET ORAL at 05:37

## 2020-06-10 RX ADMIN — APIXABAN 5 MILLIGRAM(S): 2.5 TABLET, FILM COATED ORAL at 05:38

## 2020-06-10 RX ADMIN — TOLVAPTAN 15 MILLIGRAM(S): 15 TABLET ORAL at 12:55

## 2020-06-10 RX ADMIN — SODIUM CHLORIDE 1 GRAM(S): 9 INJECTION INTRAMUSCULAR; INTRAVENOUS; SUBCUTANEOUS at 13:58

## 2020-06-10 RX ADMIN — SODIUM CHLORIDE 75 MILLILITER(S): 9 INJECTION, SOLUTION INTRAVENOUS at 22:11

## 2020-06-10 RX ADMIN — CEFTRIAXONE 100 MILLIGRAM(S): 500 INJECTION, POWDER, FOR SOLUTION INTRAMUSCULAR; INTRAVENOUS at 11:02

## 2020-06-10 NOTE — CONSULT NOTE ADULT - ASSESSMENT
IMPRESSION:    Hyponatremia due to SIADH  HO AFib  HO Hypothyroidism    PLAN:    CNS: Monitor neuro status per unit routine, no CNS depressants    HEENT: Oral care    PULMONARY:  HOB @ 45 degrees    CARDIOVASCULAR: IVF    GI: GI prophylaxis.  Feeding     RENAL:  Follow up lytes.  Correct as needed. Repeat BMP Avoid rapid correction of Na, f/u with nephrology    INFECTIOUS DISEASE: Follow up cultures.    HEMATOLOGICAL:  Eliquis for Afib    ENDOCRINE:  Follow up FS.  Insulin protocol if needed.    MUSCULOSKELETAL: OOB to chair    At this time patient does not require continuous monitoring in the MICU recall if status changes

## 2020-06-10 NOTE — PROGRESS NOTE ADULT - ASSESSMENT
87 y/o M with hyponatremia in hospital for nausea, abdominal pain, mood changes. started on lexapro 2 weeks by his cardiologist. in ED code stroke for acute confusion and difficulty in speaking.  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   - serum osm, urine osm, urine Na serum uric acid noted in favour of SIADH   - serum sodium worse with IVF   - will start tolvaptan today / no free water restriction please    - will need malignancy work up ( CT chest NC to rule out lung ca)    - trend Na level q 8 hr. avoid > 8 meq rise in Na level over 24 hours.   - BP noted, resume home meds monitor BP  - TSH within normal     # ? stroke   - CT head unremarkable.   - s/p contrast imaging.   - neuro following. MRI no evidence of CVA    will follow

## 2020-06-10 NOTE — CONSULT NOTE ADULT - SUBJECTIVE AND OBJECTIVE BOX
Patient is a 88y old  Male who presents with a chief complaint of AMS (09 Jun 2020 17:04)      HPI:  88 year old male with PMH of atrial fibrillation on Eliquis HTN, DLD, hypothyroidism, suspected TIA 2 years ago and has a loop recorder presents with  chief complaint of nausea and abdominal pain. The main history was taken from the daughter. 2 weeks ago the family noted mood changes, he was getting upset at unusual things, such a mowing the lawn and feeling depressed. So the family discussed his situation with his cardiologist who started him on lexapro. Initially he improved but today he was back to depressed mood, did not feel good at all, initially grabbed his chest and showed signs of discomfort, delirium and hence was brought to the hospital by his daughter. While in ED a stroke code was activated for acute onset confusion and difficulty speaking. He was found to have elevated BP and was having difficulty in expressing his concerns and confusion initially.  Currently he is alert oriented X2-3 and no signs of seizures. (07 Jun 2020 02:00)      PAST MEDICAL & SURGICAL HISTORY:  Hypothyroidism  Hypertension  Hyperlipidemia  Atrial fibrillation  Coronary artery disease  History of loop recorder: 2017  H/O aortic valve replacement: porcine      SOCIAL HX:   Smoking    former smoker                     ETOH                            Other    FAMILY HISTORY:  No pertinent family history in first degree relatives: CVA (pt denies)  :  No known cardiovacular family hisotry     ROS:  See HPI     Allergies    No Known Allergies    Intolerances          PHYSICAL EXAM    ICU Vital Signs Last 24 Hrs  T(C): 36.6 (10 Robi 2020 13:49), Max: 36.9 (09 Jun 2020 20:36)  T(F): 97.8 (10 Robi 2020 13:49), Max: 98.5 (09 Jun 2020 20:36)  HR: 75 (10 Robi 2020 13:49) (70 - 92)  BP: 94/60 (10 Robi 2020 13:49) (94/60 - 194/93)  RR: 16 (10 Robi 2020 13:49) (16 - 20)  SpO2: 98% (10 Robi 2020 05:35) (98% - 98%)      General: In NAD   HEENT:  AME              Lymphatic system: No cervical LN   Lungs: Bilateral BS  Cardiovascular: Regular  Gastrointestinal: Soft, Positive BS  Musculoskeletal: No clubbing.  Moves all extremities.  Full range of motion   Skin: Warm.  Intact  Neurological: No motor or sensory deficit AAOx3      06-09-20 @ 07:01  -  06-10-20 @ 07:00  --------------------------------------------------------  IN:    sodium chloride 0.9%: 825 mL  Total IN: 825 mL    OUT:    Voided: 1025 mL  Total OUT: 1025 mL    Total NET: -200 mL          LABS:                          11.5   10.03 )-----------( 213      ( 10 Robi 2020 06:09 )             32.4                                               06-10    115<LL>  |  84<L>  |  11  ----------------------------<  126<H>  4.7   |  19  |  0.9    Ca    8.3<L>      10 Robi 2020 11:30  Phos  2.8     06-09  Mg     2.3     06-10                                                                                                                                                                                                                           X-Rays: No acute cardiopulmonary disease                                                                                  ECHO    MEDICATIONS  (STANDING):  amLODIPine   Tablet 5 milliGRAM(s) Oral daily  apixaban 5 milliGRAM(s) Oral two times a day  aspirin enteric coated 81 milliGRAM(s) Oral daily  atorvastatin 80 milliGRAM(s) Oral at bedtime  cefTRIAXone   IVPB 1000 milliGRAM(s) IV Intermittent every 24 hours  chlorhexidine 4% Liquid 1 Application(s) Topical <User Schedule>  folic acid 1 milliGRAM(s) Oral at bedtime  levothyroxine 75 MICROGram(s) Oral daily  lisinopril 20 milliGRAM(s) Oral daily  magnesium oxide 400 milliGRAM(s) Oral three times a day with meals  sodium chloride 1 Gram(s) Oral three times a day  sodium chloride 3%. 100 milliLiter(s) (50 mL/Hr) IV Continuous <Continuous>    MEDICATIONS  (PRN):  acetaminophen   Tablet .. 650 milliGRAM(s) Oral every 6 hours PRN Moderate Pain (4 - 6)

## 2020-06-10 NOTE — PROGRESS NOTE ADULT - ASSESSMENT
# Encephalopathy likely due to 2/2 Hyponatremia   # Hypotonic euvolemic hyponatremia 2/2 SIADH   -MRI brain negative for CVA   -Echo unremarkable  -did not improve w/ NS but sodium worsened c/w SIADH   -d/w renal, gave pt 100 cc of 3%NaCL and 1 dose of Tolvaptan  -trend BMP q6h (avoid rapid rise in Na+)  -evaluated by ICU today but for now declined upgrade. If Sx worsen will need to recall ICU for 3% NaCl gtt  -nephro following     # Possible UTI  - c/w ceftriaxone and follow urine culture     # HTN  -Continue with Home medications.     # Hypothyroidism  -On synthroid     # Severe hypomagnesemia   - QTc okay   - replete and recheck tomorrow     # Afib  -Continue with Eliquis    # DVT prophylaxis  -On Eliquis    High risk pt. Prognosis is guarded    #Progress Note Handoff  Pending (specify):  Consults____Clinical improvement and stability__x___Tests___BMPs_____PT________  Pt/Family discussion: Pt informed and agrees with the current plan  Disposition: Home____x__/SNF_______/To be determined________

## 2020-06-10 NOTE — PROGRESS NOTE ADULT - SUBJECTIVE AND OBJECTIVE BOX
Nephrology progress note    THIS IS AN INCOMPLETE NOTE . FULL NOTE TO FOLLOW SHORTLY    Patient is seen and examined, events over the last 24 h noted .    Allergies:  No Known Allergies    Hospital Medications:   MEDICATIONS  (STANDING):  amLODIPine   Tablet 5 milliGRAM(s) Oral daily  apixaban 5 milliGRAM(s) Oral two times a day  aspirin enteric coated 81 milliGRAM(s) Oral daily  atorvastatin 80 milliGRAM(s) Oral at bedtime  cefTRIAXone   IVPB 1000 milliGRAM(s) IV Intermittent every 24 hours  chlorhexidine 4% Liquid 1 Application(s) Topical <User Schedule>  folic acid 1 milliGRAM(s) Oral at bedtime  levothyroxine 75 MICROGram(s) Oral daily  lisinopril 20 milliGRAM(s) Oral daily  magnesium oxide 400 milliGRAM(s) Oral three times a day with meals  sodium chloride 1 Gram(s) Oral three times a day        VITALS:  T(F): 98.1 (06-10-20 @ 05:35), Max: 98.5 (20 @ 20:36)  HR: 70 (06-10-20 @ 05:35)  BP: 144/74 (06-10-20 @ 05:35)  RR: 20 (06-10-20 @ 05:35)  SpO2: 98% (06-10-20 @ 05:35)  Wt(kg): --     @ 07:01  -   @ 07:00  --------------------------------------------------------  IN: 0 mL / OUT: 550 mL / NET: -550 mL     07:01  -  06-10 @ 07:00  --------------------------------------------------------  IN: 825 mL / OUT: 1025 mL / NET: -200 mL      Height (cm): 182.88 ( 16:32)  Weight (kg): 92.3 ( 16:32)  BMI (kg/m2): 27.6 ( @ 16:32)  BSA (m2): 2.15 ( @ 16:32)    PHYSICAL EXAM:  Constitutional: NAD  HEENT: anicteric sclera, oropharynx clear, MMM  Neck: No JVD  Respiratory: CTAB, no wheezes, rales or rhonchi  Cardiovascular: S1, S2, RRR  Gastrointestinal: BS+, soft, NT/ND  Extremities: No cyanosis or clubbing. No peripheral edema  :  No ricardo.   Skin: No rashes    LABS:  06-10    114<LL>  |  84<L>  |  11  ----------------------------<  114<H>  4.7   |  23  |  1.0  SODIUM TREND:  Sodium 114 [06-10 @ 06:09]  Sodium 115 [06-10 @ 00:43]  Sodium 117 [ @ 21:37]  Sodium 115 [ @ 11:29]  Sodium 117 [ @ 05:29]  Sodium 117 [ @ 00:54]  Sodium 116 [ @ 22:53]  Sodium 119 [ @ 16:35]  Sodium 120 [ @ 05:46]  Sodium 120 [ @ 02:10]    Ca    8.1<L>      10 Robi 2020 06:09  Phos  2.8       Mg     1.7     06-10                            11.5   10.03 )-----------( 213      ( 10 Robi 2020 06:09 )             32.4       Urine Studies:  Urinalysis Basic - ( 2020 09:00 )    Color: Light Yellow / Appearance: Clear / S.022 / pH:   Gluc:  / Ketone: Negative  / Bili: Negative / Urobili: <2 mg/dL   Blood:  / Protein: Trace / Nitrite: Positive   Leuk Esterase: Large / RBC: 1 /HPF / WBC 94 /HPF   Sq Epi:  / Non Sq Epi: 0 /HPF / Bacteria: Moderate      Creatinine, Random Urine: 90 mg/dL ( @ 15:04)  Sodium, Random Urine: 60.0 mmoL/L ( @ 15:04)  Osmolality, Random Urine: 457 mos/kg ( @ 15:04)  Sodium, Random Urine: 80.0 mmoL/L ( @ 09:00)  Creatinine, Random Urine: 32 mg/dL ( @ 09:00)  Osmolality, Random Urine: 325 mos/kg ( @ 09:00)    RADIOLOGY & ADDITIONAL STUDIES: Nephrology progress note  Patient is seen and examined, events over the last 24 h noted .  lying in bed comfortable  sp NS overnight with worsening sodium     Allergies:  No Known Allergies    Hospital Medications:   MEDICATIONS  (STANDING):  amLODIPine   Tablet 5 milliGRAM(s) Oral daily  apixaban 5 milliGRAM(s) Oral two times a day  aspirin enteric coated 81 milliGRAM(s) Oral daily  atorvastatin 80 milliGRAM(s) Oral at bedtime  cefTRIAXone   IVPB 1000 milliGRAM(s) IV Intermittent every 24 hours  folic acid 1 milliGRAM(s) Oral at bedtime  levothyroxine 75 MICROGram(s) Oral daily  lisinopril 20 milliGRAM(s) Oral daily  magnesium oxide 400 milliGRAM(s) Oral three times a day with meals  sodium chloride 1 Gram(s) Oral three times a day        VITALS:  T(F): 98.1 (06-10-20 @ 05:35), Max: 98.5 (20 @ 20:36)  HR: 70 (06-10-20 @ 05:35)  BP: 144/74 (06-10-20 @ 05:35)  RR: 20 (06-10-20 @ 05:35)  SpO2: 98% (06-10-20 @ 05:35)       @ 07:01  -   @ 07:00  --------------------------------------------------------  IN: 0 mL / OUT: 550 mL / NET: -550 mL     07:01  -  06-10 @ 07:00  --------------------------------------------------------  IN: 825 mL / OUT: 1025 mL / NET: -200 mL      Height (cm): 182.88 ( 16:32)  Weight (kg): 92.3 ( 16:32)  BMI (kg/m2): 27.6 ( 16:32)  BSA (m2): 2.15 (06-09 @ 16:32)    PHYSICAL EXAM:  Constitutional: NAD  HEENT: anicteric sclera, oropharynx clear, MMM  Neck: No JVD  Respiratory: CTAB, no wheezes, rales or rhonchi  Cardiovascular: S1, S2, RRR  Gastrointestinal: BS+, soft, NT/ND  Extremities: No cyanosis or clubbing. No peripheral edema  :  No ricardo.   Skin: No rashes    LABS:        06-10    114<LL>  |  84<L>  |  11  ----------------------------<  114<H>  4.7   |  23  |  1.0    SODIUM TREND:  Sodium 114 [06-10 @ 06:09]  Sodium 115 [06-10 @ 00:43]  Sodium 117 [ @ 21:37]  Sodium 115 [ @ 11:29]  Sodium 117 [ @ 05:29]  Sodium 117 [ @ 00:54]  Sodium 116 [ @ 22:53]  Sodium 119 [ @ 16:35]  Sodium 120 [ @ 05:46]  Sodium 120 [ @ 02:10]    Ca    8.1<L>      10 Robi 2020 06:09  Phos  2.8       Mg     1.7     06-10                            11.5   10.03 )-----------( 213      ( 10 Robi 2020 06:09 )             32.4       Urine Studies:  Urinalysis Basic - ( 2020 09:00 )    Color: Light Yellow / Appearance: Clear / S.022 / pH:   Gluc:  / Ketone: Negative  / Bili: Negative / Urobili: <2 mg/dL   Blood:  / Protein: Trace / Nitrite: Positive   Leuk Esterase: Large / RBC: 1 /HPF / WBC 94 /HPF   Sq Epi:  / Non Sq Epi: 0 /HPF / Bacteria: Moderate      Creatinine, Random Urine: 90 mg/dL ( @ 15:04)  Sodium, Random Urine: 60.0 mmoL/L ( @ 15:04)  Osmolality, Random Urine: 457 mos/kg ( @ 15:04)  Sodium, Random Urine: 80.0 mmoL/L ( 09:00)  Creatinine, Random Urine: 32 mg/dL ( @ 09:00)  Osmolality, Random Urine: 325 mos/kg ( @ 09:00)    RADIOLOGY & ADDITIONAL STUDIES:

## 2020-06-10 NOTE — PROGRESS NOTE ADULT - SUBJECTIVE AND OBJECTIVE BOX
Hospital Day:  3d    Subjective: Patient is a 88y old  Male who presents with a chief complaint of AMS (2020 17:04)    Pt seen and evaluated at bedside. no over the night acute events reported.   Pt reported "not feeling good," unable to explain what doesn't feel good.     Past Medical Hx:   Hypothyroidism  Hypertension  Hyperlipidemia  Atrial fibrillation  Coronary artery disease    Past Sx:  History of loop recorder  H/O aortic valve replacement    Allergies:  No Known Allergies    Current Meds:   Standng Meds:  amLODIPine   Tablet 5 milliGRAM(s) Oral daily  apixaban 5 milliGRAM(s) Oral two times a day  aspirin enteric coated 81 milliGRAM(s) Oral daily  atorvastatin 80 milliGRAM(s) Oral at bedtime  cefTRIAXone   IVPB 1000 milliGRAM(s) IV Intermittent every 24 hours  chlorhexidine 4% Liquid 1 Application(s) Topical <User Schedule>  folic acid 1 milliGRAM(s) Oral at bedtime  levothyroxine 75 MICROGram(s) Oral daily  lisinopril 20 milliGRAM(s) Oral daily  magnesium oxide 400 milliGRAM(s) Oral three times a day with meals  sodium chloride 1 Gram(s) Oral three times a day  sodium chloride 3%. 100 milliLiter(s) (50 mL/Hr) IV Continuous <Continuous>    PRN Meds:  acetaminophen   Tablet .. 650 milliGRAM(s) Oral every 6 hours PRN Moderate Pain (4 - 6)    HOME MEDICATIONS:  amLODIPine 5 mg oral tablet: 1 tab(s) orally once a day  aspirin 81 mg oral tablet: 1 tab(s) orally once a day  atorvastatin 80 mg oral tablet: 1 tab(s) orally once a day  Eliquis 5 mg oral tablet: 1 tab(s) orally 2 times a day  folic acid 1 mg oral tablet: 1 tab(s) orally once a day (at bedtime)  levothyroxine 75 mcg (0.075 mg) oral tablet: 1 tab(s) orally once a day  lisinopril 20 mg oral tablet: 1 tab(s) orally once a day (at bedtime)  tamsulosin 0.4 mg oral capsule: 1 cap(s) orally once a day      Vital Signs:   T(F): 97.8 (06-10-20 @ 13:49), Max: 98.5 (20 @ 20:36)  HR: 75 (06-10-20 @ 13:49) (70 - 92)  BP: 94/60 (06-10-20 @ 13:49) (94/60 - 194/93)  RR: 16 (06-10-20 @ 13:49) (16 - 20)  SpO2: 98% (06-10-20 @ 05:35) (98% - 98%)      20 @ 07:01  -  06-10-20 @ 07:00  --------------------------------------------------------  IN: 825 mL / OUT: 1025 mL / NET: -200 mL    06-10-20 @ 07:01  -  06-10-20 @ 14:55  --------------------------------------------------------  IN: 100 mL / OUT: 500 mL / NET: -400 mL        Physical Exam:   GENERAL: NAD, Resting in bed, anxious  HEENT: NCAT  CHEST/LUNG: CTAB  HEART: Regular rate and rhythm; s1 s2 appreciated, No murmurs, rubs, or gallops  ABDOMEN: Soft, Nontender, Nondistended; Bowel sounds present  EXTREMITIES: No LE edema b/l  SKIN: no rashes, no new lesions  NERVOUS SYSTEM:  Alert & Oriented X3      Labs:                        11.5   10.03 )-----------( 213      ( 10 Robi 2020 06:09 )             32.4     Neutophil% 74.3, Lymphocyte% 14.2, Monocyte% 10.2, Bands% 0.5 06-10-20 @ 06:09    10 Robi 2020 11:30    115    |  84     |  11     ----------------------------<  126    4.7     |  19     |  0.9      Ca    8.3        10 Robi 2020 11:30  Phos  2.8       2020 05:29  Mg     2.3       10 Robi 2020 11:30    Amylase --, Lipase 41, 20 @ 20:24    Troponin <0.01, CKMB 10.9,  20 @ 08:26  Troponin <0.01, CKMB --, CK -- 20 @ 16:47  Troponin <0.01, CKMB --, CK -- 20 @ 20:24    Urinalysis Basic - ( 2020 09:00 )    Color: Light Yellow / Appearance: Clear / S.022 / pH: x  Gluc: x / Ketone: Negative  / Bili: Negative / Urobili: <2 mg/dL   Blood: x / Protein: Trace / Nitrite: Positive   Leuk Esterase: Large / RBC: 1 /HPF / WBC 94 /HPF   Sq Epi: x / Non Sq Epi: 0 /HPF / Bacteria: Moderate    Culture - Urine (20 @ 11:50)    -  Gentamicin: S <=4    -  Imipenem: S <=1    -  Levofloxacin: S <=2    -  Meropenem: S <=1    -  Nitrofurantoin: S <=32 Should not be used to treat pyelonephritis    -  Piperacillin/Tazobactam: S <=16    -  Tigecycline: S <=2    -  Tobramycin: S <=4    -  Trimethoprim/Sulfamethoxazole: R >2/38    -  Amikacin: S <=16    -  Ampicillin: R >16 These ampicillin results predict results for amoxicillin    -  Ampicillin/Sulbactam: R >16/8 Enterobacter, Citrobacter, and Serratia may develop resistance during prolonged therapy (3-4 days)    -  Aztreonam: S <=4    -  Cefazolin: R >16    -  Cefepime: S <=4    -  Cefoxitin: R >16    -  Ceftriaxone: S <=1 Enterobacter, Citrobacter, and Serratia may develop resistance during prolonged therapy    -  Ciprofloxacin: S <=1    -  Ertapenem: S <=1    Specimen Source: .Urine Clean Catch (Midstream)    Culture Results:   >100,000 CFU/ml Magdiela uniquedarren    Organism Identification: Magdiela joslyn    Organism: Magdiela uniquedarren    Method Type: RONAL    Radiology:       Assessment and Plan:   87 y/o M w/ pMHx of Afib on Eliquis, CAD, HTN, HLD, and Hypothyroidism presented for AMS    # Encephalopathy likely due to 2/2 Hyponatremia +/_ UTI  # 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     # Hypothyroidism  -On synthroid     # Severe hypomagnesemia   - repleted  - stable    # Afib  -Continue with Eliquis    # DVT prophylaxis -On Eliquis  # GI ppx - not indicated  # Diet - Dysphagia 1 pureed  # Activity - IAT  Full Code

## 2020-06-10 NOTE — PROGRESS NOTE ADULT - SUBJECTIVE AND OBJECTIVE BOX
Patient is a 88y old  Male who presents with a chief complaint of AMS; Hyponatremia; UTI (08 Jun 2020 15:16)    INTERVAL HPI/OVERNIGHT EVENTS:  feels weak, nauseated  ROS: Denies CP, SOB, AP  All other systems reviewed and are within normal limits.  InitialHPI:  88 year old male with PMH of atrial fibrillation on Eliquis HTN, DLD, hypothyroidism, suspected TIA 2 years ago and has a loop recorder presents with  chief complaint of nausea and abdominal pain. The main history was taken from the daughter. 2 weeks ago the family noted mood changes, he was getting upset at unusual things, such a mowing the lawn and feeling depressed. So the family discussed his situation with his cardiologist who started him on lexapro. Initially he improved but today he was back to depressed mood, did not feel good at all, initially grabbed his chest and showed signs of discomfort, delirium and hence was brought to the hospital by his daughter. While in ED a stroke code was activated for acute onset confusion and difficulty speaking. He was found to have elevated BP and was having difficulty in expressing his concerns and confusion initially.  Currently he is alert oriented X2-3 and no signs of seizures. (07 Jun 2020 02:00)    TIA (TRANSIENT ISCHEMIC ATTACK);HYPONATREMIA  ^POSS HEART ATTACK  No pertinent family history in first degree relatives  Handoff  MEWS Score  Hypothyroidism  Hypertension  Hyperlipidemia  Atrial fibrillation  Coronary artery disease  TIA (transient ischemic attack)  History of loop recorder  H/O aortic valve replacement  POSS HEART ATTACK  90+  Nausea  Hyponatremia    PAST MEDICAL & SURGICAL HISTORY:  Hypothyroidism  Hypertension  Hyperlipidemia  Atrial fibrillation  Coronary artery disease  History of loop recorder: 2017  H/O aortic valve replacement: porcine      General: weak appearing, AAO2+ but +cognitive deficits, [place]  CV: S1 S2  Resp: decreased breath sounds at bases  GI: NT/ND/S +BS  MS: no clubbing/cyanosis/edema, 2+ pulses b/l  Neuro: LOCO, 2+reflexes thruout            MEDICATIONS  (STANDING):  amLODIPine   Tablet 5 milliGRAM(s) Oral daily  apixaban 5 milliGRAM(s) Oral two times a day  aspirin enteric coated 81 milliGRAM(s) Oral daily  atorvastatin 80 milliGRAM(s) Oral at bedtime  cefTRIAXone   IVPB 1000 milliGRAM(s) IV Intermittent every 24 hours  chlorhexidine 4% Liquid 1 Application(s) Topical <User Schedule>  folic acid 1 milliGRAM(s) Oral at bedtime  levothyroxine 75 MICROGram(s) Oral daily  lisinopril 20 milliGRAM(s) Oral daily  magnesium oxide 400 milliGRAM(s) Oral three times a day with meals  sodium chloride 1 Gram(s) Oral three times a day  sodium chloride 3%. 100 milliLiter(s) (50 mL/Hr) IV Continuous <Continuous>    MEDICATIONS  (PRN):  acetaminophen   Tablet .. 650 milliGRAM(s) Oral every 6 hours PRN Moderate Pain (4 - 6)    Home Medications:  amLODIPine 5 mg oral tablet: 1 tab(s) orally once a day (07 Jun 2020 01:59)  aspirin 81 mg oral tablet: 1 tab(s) orally once a day (10 Sep 2018 07:17)  atorvastatin 80 mg oral tablet: 1 tab(s) orally once a day (10 Sep 2018 07:17)  Eliquis 5 mg oral tablet: 1 tab(s) orally 2 times a day (10 Sep 2018 07:17)  folic acid 1 mg oral tablet: 1 tab(s) orally once a day (at bedtime) (10 Sep 2018 07:17)  levothyroxine 75 mcg (0.075 mg) oral tablet: 1 tab(s) orally once a day (10 Sep 2018 07:17)  lisinopril 20 mg oral tablet: 1 tab(s) orally once a day (at bedtime) (10 Sep 2018 07:17)  tamsulosin 0.4 mg oral capsule: 1 cap(s) orally once a day (10 Sep 2018 07:17)    Vital Signs Last 24 Hrs  T(C): 36.6 (10 Robi 2020 13:49), Max: 36.9 (09 Jun 2020 20:36)  T(F): 97.8 (10 Robi 2020 13:49), Max: 98.5 (09 Jun 2020 20:36)  HR: 75 (10 Robi 2020 17:38) (70 - 92)  BP: 134/73 (10 Robi 2020 17:38) (94/60 - 194/93)  BP(mean): --  RR: 18 (10 Robi 2020 17:38) (16 - 20)  SpO2: 97% (10 Robi 2020 17:38) (97% - 98%)  CAPILLARY BLOOD GLUCOSE        LABS:                        11.5   10.03 )-----------( 213      ( 10 Robi 2020 06:09 )             32.4     06-10    115<LL>  |  84<L>  |  11  ----------------------------<  126<H>  4.7   |  19  |  0.9    Ca    8.3<L>      10 Robi 2020 11:30  Phos  2.8     06-09  Mg     2.3     06-10                        Consultant Notes Reviewed:  [x ] YES  [ ] NO  Care Discussed with Consultants/Other Providers/ Housestaff [ x] YES  [ ] NO  Radiology, labs, new studies personally reviewed.

## 2020-06-11 LAB
ALBUMIN SERPL ELPH-MCNC: 3.6 G/DL — SIGNIFICANT CHANGE UP (ref 3.5–5.2)
ALP SERPL-CCNC: 56 U/L — SIGNIFICANT CHANGE UP (ref 30–115)
ALT FLD-CCNC: 15 U/L — SIGNIFICANT CHANGE UP (ref 0–41)
ANION GAP SERPL CALC-SCNC: 10 MMOL/L — SIGNIFICANT CHANGE UP (ref 7–14)
ANION GAP SERPL CALC-SCNC: 10 MMOL/L — SIGNIFICANT CHANGE UP (ref 7–14)
ANION GAP SERPL CALC-SCNC: 11 MMOL/L — SIGNIFICANT CHANGE UP (ref 7–14)
ANION GAP SERPL CALC-SCNC: 12 MMOL/L — SIGNIFICANT CHANGE UP (ref 7–14)
ANION GAP SERPL CALC-SCNC: 9 MMOL/L — SIGNIFICANT CHANGE UP (ref 7–14)
AST SERPL-CCNC: 21 U/L — SIGNIFICANT CHANGE UP (ref 0–41)
BASOPHILS # BLD AUTO: 0.06 K/UL — SIGNIFICANT CHANGE UP (ref 0–0.2)
BASOPHILS NFR BLD AUTO: 0.6 % — SIGNIFICANT CHANGE UP (ref 0–1)
BILIRUB SERPL-MCNC: 0.4 MG/DL — SIGNIFICANT CHANGE UP (ref 0.2–1.2)
BUN SERPL-MCNC: 13 MG/DL — SIGNIFICANT CHANGE UP (ref 10–20)
BUN SERPL-MCNC: 13 MG/DL — SIGNIFICANT CHANGE UP (ref 10–20)
BUN SERPL-MCNC: 15 MG/DL — SIGNIFICANT CHANGE UP (ref 10–20)
BUN SERPL-MCNC: 18 MG/DL — SIGNIFICANT CHANGE UP (ref 10–20)
BUN SERPL-MCNC: 18 MG/DL — SIGNIFICANT CHANGE UP (ref 10–20)
CALCIUM SERPL-MCNC: 8.6 MG/DL — SIGNIFICANT CHANGE UP (ref 8.5–10.1)
CALCIUM SERPL-MCNC: 8.6 MG/DL — SIGNIFICANT CHANGE UP (ref 8.5–10.1)
CALCIUM SERPL-MCNC: 8.7 MG/DL — SIGNIFICANT CHANGE UP (ref 8.5–10.1)
CALCIUM SERPL-MCNC: 8.9 MG/DL — SIGNIFICANT CHANGE UP (ref 8.5–10.1)
CALCIUM SERPL-MCNC: 9 MG/DL — SIGNIFICANT CHANGE UP (ref 8.5–10.1)
CHLORIDE SERPL-SCNC: 91 MMOL/L — LOW (ref 98–110)
CHLORIDE SERPL-SCNC: 91 MMOL/L — LOW (ref 98–110)
CHLORIDE SERPL-SCNC: 93 MMOL/L — LOW (ref 98–110)
CHLORIDE SERPL-SCNC: 94 MMOL/L — LOW (ref 98–110)
CHLORIDE SERPL-SCNC: 95 MMOL/L — LOW (ref 98–110)
CO2 SERPL-SCNC: 22 MMOL/L — SIGNIFICANT CHANGE UP (ref 17–32)
CO2 SERPL-SCNC: 24 MMOL/L — SIGNIFICANT CHANGE UP (ref 17–32)
CO2 SERPL-SCNC: 26 MMOL/L — SIGNIFICANT CHANGE UP (ref 17–32)
CREAT SERPL-MCNC: 1.1 MG/DL — SIGNIFICANT CHANGE UP (ref 0.7–1.5)
CREAT SERPL-MCNC: 1.1 MG/DL — SIGNIFICANT CHANGE UP (ref 0.7–1.5)
CREAT SERPL-MCNC: 1.2 MG/DL — SIGNIFICANT CHANGE UP (ref 0.7–1.5)
EOSINOPHIL # BLD AUTO: 0.04 K/UL — SIGNIFICANT CHANGE UP (ref 0–0.7)
EOSINOPHIL NFR BLD AUTO: 0.4 % — SIGNIFICANT CHANGE UP (ref 0–8)
GLUCOSE SERPL-MCNC: 100 MG/DL — HIGH (ref 70–99)
GLUCOSE SERPL-MCNC: 111 MG/DL — HIGH (ref 70–99)
GLUCOSE SERPL-MCNC: 114 MG/DL — HIGH (ref 70–99)
GLUCOSE SERPL-MCNC: 118 MG/DL — HIGH (ref 70–99)
GLUCOSE SERPL-MCNC: 94 MG/DL — SIGNIFICANT CHANGE UP (ref 70–99)
HCT VFR BLD CALC: 35.1 % — LOW (ref 42–52)
HGB BLD-MCNC: 12 G/DL — LOW (ref 14–18)
IMM GRANULOCYTES NFR BLD AUTO: 0.3 % — SIGNIFICANT CHANGE UP (ref 0.1–0.3)
LYMPHOCYTES # BLD AUTO: 1.33 K/UL — SIGNIFICANT CHANGE UP (ref 1.2–3.4)
LYMPHOCYTES # BLD AUTO: 14 % — LOW (ref 20.5–51.1)
MAGNESIUM SERPL-MCNC: 2.4 MG/DL — SIGNIFICANT CHANGE UP (ref 1.8–2.4)
MCHC RBC-ENTMCNC: 31.9 PG — HIGH (ref 27–31)
MCHC RBC-ENTMCNC: 34.2 G/DL — SIGNIFICANT CHANGE UP (ref 32–37)
MCV RBC AUTO: 93.4 FL — SIGNIFICANT CHANGE UP (ref 80–94)
MONOCYTES # BLD AUTO: 1.06 K/UL — HIGH (ref 0.1–0.6)
MONOCYTES NFR BLD AUTO: 11.2 % — HIGH (ref 1.7–9.3)
NEUTROPHILS # BLD AUTO: 6.95 K/UL — HIGH (ref 1.4–6.5)
NEUTROPHILS NFR BLD AUTO: 73.5 % — SIGNIFICANT CHANGE UP (ref 42.2–75.2)
NRBC # BLD: 0 /100 WBCS — SIGNIFICANT CHANGE UP (ref 0–0)
PLATELET # BLD AUTO: 249 K/UL — SIGNIFICANT CHANGE UP (ref 130–400)
POTASSIUM SERPL-MCNC: 4.5 MMOL/L — SIGNIFICANT CHANGE UP (ref 3.5–5)
POTASSIUM SERPL-MCNC: 4.5 MMOL/L — SIGNIFICANT CHANGE UP (ref 3.5–5)
POTASSIUM SERPL-MCNC: 4.6 MMOL/L — SIGNIFICANT CHANGE UP (ref 3.5–5)
POTASSIUM SERPL-MCNC: 4.7 MMOL/L — SIGNIFICANT CHANGE UP (ref 3.5–5)
POTASSIUM SERPL-MCNC: 4.8 MMOL/L — SIGNIFICANT CHANGE UP (ref 3.5–5)
POTASSIUM SERPL-SCNC: 4.5 MMOL/L — SIGNIFICANT CHANGE UP (ref 3.5–5)
POTASSIUM SERPL-SCNC: 4.5 MMOL/L — SIGNIFICANT CHANGE UP (ref 3.5–5)
POTASSIUM SERPL-SCNC: 4.6 MMOL/L — SIGNIFICANT CHANGE UP (ref 3.5–5)
POTASSIUM SERPL-SCNC: 4.7 MMOL/L — SIGNIFICANT CHANGE UP (ref 3.5–5)
POTASSIUM SERPL-SCNC: 4.8 MMOL/L — SIGNIFICANT CHANGE UP (ref 3.5–5)
PROT SERPL-MCNC: 5.6 G/DL — LOW (ref 6–8)
RBC # BLD: 3.76 M/UL — LOW (ref 4.7–6.1)
RBC # FLD: 12.3 % — SIGNIFICANT CHANGE UP (ref 11.5–14.5)
SODIUM SERPL-SCNC: 125 MMOL/L — LOW (ref 135–146)
SODIUM SERPL-SCNC: 125 MMOL/L — LOW (ref 135–146)
SODIUM SERPL-SCNC: 128 MMOL/L — LOW (ref 135–146)
SODIUM SERPL-SCNC: 129 MMOL/L — LOW (ref 135–146)
SODIUM SERPL-SCNC: 129 MMOL/L — LOW (ref 135–146)
WBC # BLD: 9.47 K/UL — SIGNIFICANT CHANGE UP (ref 4.8–10.8)
WBC # FLD AUTO: 9.47 K/UL — SIGNIFICANT CHANGE UP (ref 4.8–10.8)

## 2020-06-11 PROCEDURE — 99233 SBSQ HOSP IP/OBS HIGH 50: CPT

## 2020-06-11 RX ORDER — SODIUM CHLORIDE 9 MG/ML
1000 INJECTION, SOLUTION INTRAVENOUS
Refills: 0 | Status: DISCONTINUED | OUTPATIENT
Start: 2020-06-11 | End: 2020-06-11

## 2020-06-11 RX ORDER — CALCIUM CARBONATE 500(1250)
1 TABLET ORAL ONCE
Refills: 0 | Status: COMPLETED | OUTPATIENT
Start: 2020-06-11 | End: 2020-06-11

## 2020-06-11 RX ADMIN — Medication 75 MICROGRAM(S): at 06:03

## 2020-06-11 RX ADMIN — Medication 1 MILLIGRAM(S): at 21:25

## 2020-06-11 RX ADMIN — Medication 81 MILLIGRAM(S): at 11:42

## 2020-06-11 RX ADMIN — MAGNESIUM OXIDE 400 MG ORAL TABLET 400 MILLIGRAM(S): 241.3 TABLET ORAL at 17:47

## 2020-06-11 RX ADMIN — SODIUM CHLORIDE 1 GRAM(S): 9 INJECTION INTRAMUSCULAR; INTRAVENOUS; SUBCUTANEOUS at 06:03

## 2020-06-11 RX ADMIN — APIXABAN 5 MILLIGRAM(S): 2.5 TABLET, FILM COATED ORAL at 06:03

## 2020-06-11 RX ADMIN — LISINOPRIL 20 MILLIGRAM(S): 2.5 TABLET ORAL at 06:03

## 2020-06-11 RX ADMIN — SODIUM CHLORIDE 125 MILLILITER(S): 9 INJECTION, SOLUTION INTRAVENOUS at 11:44

## 2020-06-11 RX ADMIN — SODIUM CHLORIDE 125 MILLILITER(S): 9 INJECTION, SOLUTION INTRAVENOUS at 08:48

## 2020-06-11 RX ADMIN — MAGNESIUM OXIDE 400 MG ORAL TABLET 400 MILLIGRAM(S): 241.3 TABLET ORAL at 08:48

## 2020-06-11 RX ADMIN — CEFTRIAXONE 100 MILLIGRAM(S): 500 INJECTION, POWDER, FOR SOLUTION INTRAMUSCULAR; INTRAVENOUS at 11:44

## 2020-06-11 RX ADMIN — APIXABAN 5 MILLIGRAM(S): 2.5 TABLET, FILM COATED ORAL at 17:47

## 2020-06-11 RX ADMIN — ATORVASTATIN CALCIUM 80 MILLIGRAM(S): 80 TABLET, FILM COATED ORAL at 21:25

## 2020-06-11 RX ADMIN — AMLODIPINE BESYLATE 5 MILLIGRAM(S): 2.5 TABLET ORAL at 06:03

## 2020-06-11 RX ADMIN — Medication 1 TABLET(S): at 22:49

## 2020-06-11 RX ADMIN — CHLORHEXIDINE GLUCONATE 1 APPLICATION(S): 213 SOLUTION TOPICAL at 06:03

## 2020-06-11 RX ADMIN — MAGNESIUM OXIDE 400 MG ORAL TABLET 400 MILLIGRAM(S): 241.3 TABLET ORAL at 11:42

## 2020-06-11 NOTE — PROGRESS NOTE ADULT - SUBJECTIVE AND OBJECTIVE BOX
seen and examined  no distress  lying comfortable         PAST HISTORY  --------------------------------------------------------------------------------  No significant changes to PMH, PSH, FHx, SHx, unless otherwise noted    ALLERGIES & MEDICATIONS  --------------------------------------------------------------------------------  Allergies    No Known Allergies    Intolerances      Standing Inpatient Medications  amLODIPine   Tablet 5 milliGRAM(s) Oral daily  apixaban 5 milliGRAM(s) Oral two times a day  aspirin enteric coated 81 milliGRAM(s) Oral daily  atorvastatin 80 milliGRAM(s) Oral at bedtime  cefTRIAXone   IVPB 1000 milliGRAM(s) IV Intermittent every 24 hours  chlorhexidine 4% Liquid 1 Application(s) Topical <User Schedule>  dextrose 5%. 1000 milliLiter(s) IV Continuous <Continuous>  folic acid 1 milliGRAM(s) Oral at bedtime  levothyroxine 75 MICROGram(s) Oral daily  lisinopril 20 milliGRAM(s) Oral daily  magnesium oxide 400 milliGRAM(s) Oral three times a day with meals  sodium chloride 1 Gram(s) Oral three times a day    PRN Inpatient Medications  acetaminophen   Tablet .. 650 milliGRAM(s) Oral every 6 hours PRN        VITALS/PHYSICAL EXAM  --------------------------------------------------------------------------------  T(C): 36.6 (06-11-20 @ 05:25), Max: 36.6 (06-10-20 @ 13:49)  HR: 67 (06-11-20 @ 05:25) (64 - 75)  BP: 144/67 (06-11-20 @ 05:25) (94/60 - 176/76)  RR: 18 (06-11-20 @ 05:25) (16 - 18)  SpO2: 97% (06-10-20 @ 17:38) (97% - 97%)  Wt(kg): --  Height (cm): 182.88 (06-10-20 @ 17:53)  Weight (kg): 92.3 (06-10-20 @ 17:53)  BMI (kg/m2): 27.6 (06-10-20 @ 17:53)  BSA (m2): 2.15 (06-10-20 @ 17:53)      06-10-20 @ 07:01  -  06-11-20 @ 07:00  --------------------------------------------------------  IN: 1120 mL / OUT: 2220 mL / NET: -1100 mL      Physical Exam:  	Gen: NAD  	Pulm: decrease BS B/L  	CV: S1S2; no rub  	Abd: +distended  	    LABS/STUDIES  --------------------------------------------------------------------------------              12.0   9.47  >-----------<  249      [06-11-20 @ 05:10]              35.1     128  |  95  |  13  ----------------------------<  100      [06-11-20 @ 05:10]  4.8   |  24  |  1.1        Ca     8.6     [06-11-20 @ 05:10]      Mg     2.4     [06-11-20 @ 05:10]    TPro  5.6  /  Alb  3.6  /  TBili  0.4  /  DBili  x   /  AST  21  /  ALT  15  /  AlkPhos  56  [06-11-20 @ 00:32]      Creatinine Trend:  SCr 1.1 [06-11 @ 05:10]  SCr 1.1 [06-11 @ 00:32]  SCr 1.0 [06-10 @ 20:20]  SCr 1.1 [06-10 @ 18:26]  SCr 0.9 [06-10 @ 11:30]    Urinalysis - [06-07-20 @ 09:00]      Color Light Yellow / Appearance Clear / SG 1.022 / pH 7.0      Gluc Negative / Ketone Negative  / Bili Negative / Urobili <2 mg/dL       Blood Negative / Protein Trace / Leuk Est Large / Nitrite Positive      RBC 1 / WBC 94 / Hyaline 0 / Gran  / Sq Epi  / Non Sq Epi 0 / Bacteria Moderate    Urine Creatinine 90      [06-09-20 @ 15:04]  Urine Sodium 60.0      [06-09-20 @ 15:04]  Urine Urea Nitrogen 585      [06-09-20 @ 15:04]  Urine Osmolality 457      [06-09-20 @ 15:04]    HbA1c 5.7      [09-10-18 @ 16:28]  TSH 2.05      [06-07-20 @ 06:04]  Lipid: chol 120, TG 73, HDL 54, LDL 55      [06-07-20 @ 06:04] seen and examined  no distress  lying comfortable         PAST HISTORY  --------------------------------------------------------------------------------  No significant changes to PMH, PSH, FHx, SHx, unless otherwise noted    ALLERGIES & MEDICATIONS  --------------------------------------------------------------------------------  Allergies    No Known Allergies    Intolerances      Standing Inpatient Medications  amLODIPine   Tablet 5 milliGRAM(s) Oral daily  apixaban 5 milliGRAM(s) Oral two times a day  aspirin enteric coated 81 milliGRAM(s) Oral daily  atorvastatin 80 milliGRAM(s) Oral at bedtime  cefTRIAXone   IVPB 1000 milliGRAM(s) IV Intermittent every 24 hours  chlorhexidine 4% Liquid 1 Application(s) Topical <User Schedule>  dextrose 5%. 1000 milliLiter(s) IV Continuous <Continuous>  folic acid 1 milliGRAM(s) Oral at bedtime  levothyroxine 75 MICROGram(s) Oral daily  lisinopril 20 milliGRAM(s) Oral daily  magnesium oxide 400 milliGRAM(s) Oral three times a day with meals  sodium chloride 1 Gram(s) Oral three times a day    PRN Inpatient Medications  acetaminophen   Tablet .. 650 milliGRAM(s) Oral every 6 hours PRN        VITALS/PHYSICAL EXAM  --------------------------------------------------------------------------------  T(C): 36.6 (06-11-20 @ 05:25), Max: 36.6 (06-10-20 @ 13:49)  HR: 67 (06-11-20 @ 05:25) (64 - 75)  BP: 144/67 (06-11-20 @ 05:25) (94/60 - 176/76)  RR: 18 (06-11-20 @ 05:25) (16 - 18)  SpO2: 97% (06-10-20 @ 17:38) (97% - 97%)  Wt(kg): --  Height (cm): 182.88 (06-10-20 @ 17:53)  Weight (kg): 92.3 (06-10-20 @ 17:53)  BMI (kg/m2): 27.6 (06-10-20 @ 17:53)  BSA (m2): 2.15 (06-10-20 @ 17:53)      06-10-20 @ 07:01  -  06-11-20 @ 07:00  --------------------------------------------------------  IN: 1120 mL / OUT: 2220 mL / NET: -1100 mL      Physical Exam:  	Gen: NAD  	Pulm: decrease BS B/L  	CV: S1S2; no rub  	Abd: +distended  	    LABS/STUDIES  --------------------------------------------------------------------------------              12.0   9.47  >-----------<  249      [06-11-20 @ 05:10]              35.1     128  |  95  |  13  ----------------------------<  100      [06-11-20 @ 05:10]  4.8   |  24  |  1.1        Ca     8.6     [06-11-20 @ 05:10]      Mg     2.4     [06-11-20 @ 05:10]    TPro  5.6  /  Alb  3.6  /  TBili  0.4  /  DBili  x   /  AST  21  /  ALT  15  /  AlkPhos  56  [06-11-20 @ 00:32]      Creatinine Trend:  SCr 1.1 [06-11 @ 05:10]  SCr 1.1 [06-11 @ 00:32]  SCr 1.0 [06-10 @ 20:20]  SCr 1.1 [06-10 @ 18:26]  SCr 0.9 [06-10 @ 11:30]    Urinalysis - [06-07-20 @ 09:00]      Color Light Yellow / Appearance Clear / SG 1.022 / pH 7.0      Gluc Negative / Ketone Negative  / Bili Negative / Urobili <2 mg/dL       Blood Negative / Protein Trace / Leuk Est Large / Nitrite Positive      RBC 1 / WBC 94 / Hyaline 0 / Gran  / Sq Epi  / Non Sq Epi 0 / Bacteria Moderate    Urine Creatinine 90      [06-09-20 @ 15:04]  Urine Sodium 60.0      [06-09-20 @ 15:04]  Urine Urea Nitrogen 585      [06-09-20 @ 15:04]  Urine Osmolality 457      [06-09-20 @ 15:04]    HbA1c 5.7      [09-10-18 @ 16:28]  TSH 2.05      [06-07-20 @ 06:04]  Lipid: chol 120, TG 73, HDL 54, LDL 55      [06-07-20 @ 06:04]  < from: CT Abdomen and Pelvis w/ IV Cont (06.06.20 @ 22:02) >    Motion degraded examination.    No CT evidence for acute intrathoracic, abdominal or pelvic pathology.    2 pulmonary nodules measuring 3 and 4 mm. As per Fleischner Society guidelines, in a low-risk patient, no routine follow-up is necessary and in a high-risk patient optional twelve-month follow-up chest CT can be obtained.    Dilated main pulmonary artery which can be seen in pulmonary arterial hypertension.    Suggestion of cholelithiasis.    < end of copied text >

## 2020-06-11 NOTE — PROGRESS NOTE ADULT - ASSESSMENT
89 y/o M with hyponatremia in hospital for nausea, abdominal pain, mood changes. started on lexapro 2 weeks by his cardiologist. in ED code stroke for acute confusion and difficulty in speaking.  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   - CT chest noted    - sodium noted, d/c salt tablet , if repeated sodium stable, d/c iv fluid    - BP noted, resume home meds monitor BP  - TSH within normal     # ? stroke   - CT head unremarkable.   - s/p contrast imaging.   - neuro following. MRI no evidence of CVA  - BP at goal     will follow

## 2020-06-11 NOTE — PROGRESS NOTE ADULT - ASSESSMENT
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 Once Na+ is stable or decreases, may d/c D5W. BMP q6hrs.  -nephro following   -CT lungs negative for lung mass    # Possible UTI  - c/w ceftriaxone for now    # HTN  -Continue with Home medications.     # Hypothyroidism  -On synthroid     # Severe hypomagnesemia   - QTc okay   - replete and recheck tomorrow     # Afib  -Continue with Eliquis    # DVT prophylaxis  -On Eliquis    High risk pt. D/w daughter in details    #Progress Note Handoff  Pending (specify):  Consults____Clinical improvement and stability__x___Tests___BMPs_____PT________  Pt/Family discussion: Pt/daughter informed and agree with the current plan  Disposition: Home____x__/SNF_______/To be determined________

## 2020-06-11 NOTE — CHART NOTE - NSCHARTNOTEFT_GEN_A_CORE
Concern patient Na+ was being overcorrected. Patient was started on D5 at 75cc/hr. Na+ at 23:30 blood draw remained stable (increase from 124-125). Will continue D5 at same rate; follow up AM labs

## 2020-06-11 NOTE — PROGRESS NOTE ADULT - SUBJECTIVE AND OBJECTIVE BOX
Patient is a 88y old  Male who presents with a chief complaint of AMS; Hyponatremia; UTI (08 Jun 2020 15:16)    INTERVAL HPI/OVERNIGHT EVENTS:  feels better today, eating, more energy  ROS: Denies CP, SOB, AP  All other systems reviewed and are within normal limits.  InitialHPI:  88 year old male with PMH of atrial fibrillation on Eliquis HTN, DLD, hypothyroidism, suspected TIA 2 years ago and has a loop recorder presents with  chief complaint of nausea and abdominal pain. The main history was taken from the daughter. 2 weeks ago the family noted mood changes, he was getting upset at unusual things, such a mowing the lawn and feeling depressed. So the family discussed his situation with his cardiologist who started him on lexapro. Initially he improved but today he was back to depressed mood, did not feel good at all, initially grabbed his chest and showed signs of discomfort, delirium and hence was brought to the hospital by his daughter. While in ED a stroke code was activated for acute onset confusion and difficulty speaking. He was found to have elevated BP and was having difficulty in expressing his concerns and confusion initially.  Currently he is alert oriented X2-3 and no signs of seizures. (07 Jun 2020 02:00)    TIA (TRANSIENT ISCHEMIC ATTACK);HYPONATREMIA  ^POSS HEART ATTACK  No pertinent family history in first degree relatives  Handoff  MEWS Score  Hypothyroidism  Hypertension  Hyperlipidemia  Atrial fibrillation  Coronary artery disease  TIA (transient ischemic attack)  History of loop recorder  H/O aortic valve replacement  POSS HEART ATTACK  90+  Nausea  Hyponatremia    PAST MEDICAL & SURGICAL HISTORY:  Hypothyroidism  Hypertension  Hyperlipidemia  Atrial fibrillation  Coronary artery disease  History of loop recorder: 2017  H/O aortic valve replacement: porcine      General: weak appearing, AAO3 but [place]  CV: S1 S2  Resp: decreased breath sounds at bases  GI: NT/ND/S +BS  MS: no clubbing/cyanosis/edema, 2+ pulses b/l  Neuro: LOCO, 2+reflexes thruout            MEDICATIONS  (STANDING):  amLODIPine   Tablet 5 milliGRAM(s) Oral daily  apixaban 5 milliGRAM(s) Oral two times a day  aspirin enteric coated 81 milliGRAM(s) Oral daily  atorvastatin 80 milliGRAM(s) Oral at bedtime  cefTRIAXone   IVPB 1000 milliGRAM(s) IV Intermittent every 24 hours  chlorhexidine 4% Liquid 1 Application(s) Topical <User Schedule>  dextrose 5%. 1000 milliLiter(s) (125 mL/Hr) IV Continuous <Continuous>  folic acid 1 milliGRAM(s) Oral at bedtime  levothyroxine 75 MICROGram(s) Oral daily  lisinopril 20 milliGRAM(s) Oral daily  magnesium oxide 400 milliGRAM(s) Oral three times a day with meals    MEDICATIONS  (PRN):  acetaminophen   Tablet .. 650 milliGRAM(s) Oral every 6 hours PRN Moderate Pain (4 - 6)    Home Medications:  amLODIPine 5 mg oral tablet: 1 tab(s) orally once a day (07 Jun 2020 01:59)  aspirin 81 mg oral tablet: 1 tab(s) orally once a day (10 Sep 2018 07:17)  atorvastatin 80 mg oral tablet: 1 tab(s) orally once a day (10 Sep 2018 07:17)  Eliquis 5 mg oral tablet: 1 tab(s) orally 2 times a day (10 Sep 2018 07:17)  folic acid 1 mg oral tablet: 1 tab(s) orally once a day (at bedtime) (10 Sep 2018 07:17)  levothyroxine 75 mcg (0.075 mg) oral tablet: 1 tab(s) orally once a day (10 Sep 2018 07:17)  lisinopril 20 mg oral tablet: 1 tab(s) orally once a day (at bedtime) (10 Sep 2018 07:17)  tamsulosin 0.4 mg oral capsule: 1 cap(s) orally once a day (10 Sep 2018 07:17)    Vital Signs Last 24 Hrs  T(C): 35.6 (11 Jun 2020 14:00), Max: 36.6 (10 Robi 2020 21:32)  T(F): 96 (11 Jun 2020 14:00), Max: 97.8 (10 Robi 2020 21:32)  HR: 69 (11 Jun 2020 14:00) (64 - 69)  BP: 140/79 (11 Jun 2020 14:00) (140/79 - 176/76)  BP(mean): --  RR: 18 (11 Jun 2020 14:00) (18 - 18)  SpO2: --  CAPILLARY BLOOD GLUCOSE        LABS:                        12.0   9.47  )-----------( 249      ( 11 Jun 2020 05:10 )             35.1     06-11    129<L>  |  94<L>  |  15  ----------------------------<  94  4.6   |  24  |  1.2    Ca    8.7      11 Jun 2020 11:04  Mg     2.4     06-11    TPro  5.6<L>  /  Alb  3.6  /  TBili  0.4  /  DBili  x   /  AST  21  /  ALT  15  /  AlkPhos  56  06-11    LIVER FUNCTIONS - ( 11 Jun 2020 00:32 )  Alb: 3.6 g/dL / Pro: 5.6 g/dL / ALK PHOS: 56 U/L / ALT: 15 U/L / AST: 21 U/L / GGT: x                           Consultant Notes Reviewed:  [x ] YES  [ ] NO  Care Discussed with Consultants/Other Providers/ Housestaff [ x] YES  [ ] NO  Radiology, labs, new studies personally reviewed.

## 2020-06-11 NOTE — PROGRESS NOTE ADULT - SUBJECTIVE AND OBJECTIVE BOX
Hospital Day:  4d    Subjective: Patient is a 88y old  Male who presents with a chief complaint of AMS (10 Robi 2020 14:54)    Pt seen and evaluated at bedside. no over the night acute events reported.   Pt still reports "not feeling okay," however looks clinically improved.     Past Medical Hx:   Hypothyroidism  Hypertension  Hyperlipidemia  Atrial fibrillation  Coronary artery disease    Past Sx:  History of loop recorder  H/O aortic valve replacement    Allergies:  No Known Allergies    Current Meds:   Standng Meds:  amLODIPine   Tablet 5 milliGRAM(s) Oral daily  apixaban 5 milliGRAM(s) Oral two times a day  aspirin enteric coated 81 milliGRAM(s) Oral daily  atorvastatin 80 milliGRAM(s) Oral at bedtime  cefTRIAXone   IVPB 1000 milliGRAM(s) IV Intermittent every 24 hours  chlorhexidine 4% Liquid 1 Application(s) Topical <User Schedule>  dextrose 5%. 1000 milliLiter(s) (125 mL/Hr) IV Continuous <Continuous>  folic acid 1 milliGRAM(s) Oral at bedtime  levothyroxine 75 MICROGram(s) Oral daily  lisinopril 20 milliGRAM(s) Oral daily  magnesium oxide 400 milliGRAM(s) Oral three times a day with meals    PRN Meds:  acetaminophen   Tablet .. 650 milliGRAM(s) Oral every 6 hours PRN Moderate Pain (4 - 6)    HOME MEDICATIONS:  amLODIPine 5 mg oral tablet: 1 tab(s) orally once a day  aspirin 81 mg oral tablet: 1 tab(s) orally once a day  atorvastatin 80 mg oral tablet: 1 tab(s) orally once a day  Eliquis 5 mg oral tablet: 1 tab(s) orally 2 times a day  folic acid 1 mg oral tablet: 1 tab(s) orally once a day (at bedtime)  levothyroxine 75 mcg (0.075 mg) oral tablet: 1 tab(s) orally once a day  lisinopril 20 mg oral tablet: 1 tab(s) orally once a day (at bedtime)  tamsulosin 0.4 mg oral capsule: 1 cap(s) orally once a day      Vital Signs:   T(F): 96 (06-11-20 @ 14:00), Max: 97.8 (06-10-20 @ 21:32)  HR: 69 (06-11-20 @ 14:00) (64 - 75)  BP: 140/79 (06-11-20 @ 14:00) (134/73 - 176/76)  RR: 18 (06-11-20 @ 14:00) (18 - 18)  SpO2: 97% (06-10-20 @ 17:38) (97% - 97%)      06-10-20 @ 07:01  -  06-11-20 @ 07:00  --------------------------------------------------------  IN: 1120 mL / OUT: 2220 mL / NET: -1100 mL    06-11-20 @ 07:01  -  06-11-20 @ 15:13  --------------------------------------------------------  IN: 0 mL / OUT: 180 mL / NET: -180 mL      Physical Exam:   GENERAL: NAD, Resting in bed, anxious  HEENT: NCAT  CHEST/LUNG: CTAB  HEART: Regular rate and rhythm; s1 s2 appreciated, No murmurs, rubs, or gallops  ABDOMEN: Soft, Nontender, Nondistended; Bowel sounds present  EXTREMITIES: No LE edema b/l  SKIN: no rashes, no new lesions  NERVOUS SYSTEM:  Alert & Oriented X3    Labs:                         12.0   9.47  )-----------( 249      ( 11 Jun 2020 05:10 )             35.1     Neutophil% 73.5, Lymphocyte% 14.0, Monocyte% 11.2, Bands% 0.3 06-11-20 @ 05:10    11 Jun 2020 11:04    129    |  94     |  15     ----------------------------<  94     4.6     |  24     |  1.2      Ca    8.7        11 Jun 2020 11:04  Mg     2.4       11 Jun 2020 05:10    TPro  5.6    /  Alb  3.6    /  TBili  0.4    /  DBili  x      /  AST  21     /  ALT  15     /  AlkPhos  56     11 Jun 2020 00:32    Amylase --, Lipase 41, 06-06-20 @ 20:24    Troponin <0.01, CKMB 10.9,  06-08-20 @ 08:26  Troponin <0.01, CKMB --, CK -- 06-07-20 @ 16:47    Radiology:       Assessment and Plan:   89 y/o M w/ pMHx of Afib on Eliquis, CAD, HTN, HLD, and Hypothyroidism presented for AMS    # Encephalopathy likely due to 2/2 Hyponatremia +/_ UTI  # 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     # Hypothyroidism  -On synthroid     # Severe hypomagnesemia   - repleted  - stable    # Afib  -Continue with Eliquis    # DVT prophylaxis -On Eliquis  # GI ppx - not indicated  # Diet - DASH  # Activity - IAT  Full Code Hospital Day:  4d    Subjective: Patient is a 88y old  Male who presents with a chief complaint of AMS (10 Robi 2020 14:54)    Pt seen and evaluated at bedside. no over the night acute events reported.   Pt still reports "not feeling okay," however looks clinically improved.     Past Medical Hx:   Hypothyroidism  Hypertension  Hyperlipidemia  Atrial fibrillation  Coronary artery disease    Past Sx:  History of loop recorder  H/O aortic valve replacement    Allergies:  No Known Allergies    Current Meds:   Standng Meds:  amLODIPine   Tablet 5 milliGRAM(s) Oral daily  apixaban 5 milliGRAM(s) Oral two times a day  aspirin enteric coated 81 milliGRAM(s) Oral daily  atorvastatin 80 milliGRAM(s) Oral at bedtime  cefTRIAXone   IVPB 1000 milliGRAM(s) IV Intermittent every 24 hours  chlorhexidine 4% Liquid 1 Application(s) Topical <User Schedule>  dextrose 5%. 1000 milliLiter(s) (125 mL/Hr) IV Continuous <Continuous>  folic acid 1 milliGRAM(s) Oral at bedtime  levothyroxine 75 MICROGram(s) Oral daily  lisinopril 20 milliGRAM(s) Oral daily  magnesium oxide 400 milliGRAM(s) Oral three times a day with meals    PRN Meds:  acetaminophen   Tablet .. 650 milliGRAM(s) Oral every 6 hours PRN Moderate Pain (4 - 6)    HOME MEDICATIONS:  amLODIPine 5 mg oral tablet: 1 tab(s) orally once a day  aspirin 81 mg oral tablet: 1 tab(s) orally once a day  atorvastatin 80 mg oral tablet: 1 tab(s) orally once a day  Eliquis 5 mg oral tablet: 1 tab(s) orally 2 times a day  folic acid 1 mg oral tablet: 1 tab(s) orally once a day (at bedtime)  levothyroxine 75 mcg (0.075 mg) oral tablet: 1 tab(s) orally once a day  lisinopril 20 mg oral tablet: 1 tab(s) orally once a day (at bedtime)  tamsulosin 0.4 mg oral capsule: 1 cap(s) orally once a day      Vital Signs:   T(F): 96 (06-11-20 @ 14:00), Max: 97.8 (06-10-20 @ 21:32)  HR: 69 (06-11-20 @ 14:00) (64 - 75)  BP: 140/79 (06-11-20 @ 14:00) (134/73 - 176/76)  RR: 18 (06-11-20 @ 14:00) (18 - 18)  SpO2: 97% (06-10-20 @ 17:38) (97% - 97%)      06-10-20 @ 07:01  -  06-11-20 @ 07:00  --------------------------------------------------------  IN: 1120 mL / OUT: 2220 mL / NET: -1100 mL    06-11-20 @ 07:01  -  06-11-20 @ 15:13  --------------------------------------------------------  IN: 0 mL / OUT: 180 mL / NET: -180 mL      Physical Exam:   GENERAL: NAD, Resting in bed, anxious  HEENT: NCAT  CHEST/LUNG: CTAB  HEART: Regular rate and rhythm; s1 s2 appreciated, No murmurs, rubs, or gallops  ABDOMEN: Soft, Nontender, Nondistended; Bowel sounds present  EXTREMITIES: No LE edema b/l  SKIN: no rashes, no new lesions  NERVOUS SYSTEM:  Alert & Oriented X3    Labs:                         12.0   9.47  )-----------( 249      ( 11 Jun 2020 05:10 )             35.1     Neutophil% 73.5, Lymphocyte% 14.0, Monocyte% 11.2, Bands% 0.3 06-11-20 @ 05:10    11 Jun 2020 11:04    129    |  94     |  15     ----------------------------<  94     4.6     |  24     |  1.2      Ca    8.7        11 Jun 2020 11:04  Mg     2.4       11 Jun 2020 05:10    TPro  5.6    /  Alb  3.6    /  TBili  0.4    /  DBili  x      /  AST  21     /  ALT  15     /  AlkPhos  56     11 Jun 2020 00:32    Amylase --, Lipase 41, 06-06-20 @ 20:24    Troponin <0.01, CKMB 10.9,  06-08-20 @ 08:26  Troponin <0.01, CKMB --, CK -- 06-07-20 @ 16:47    Radiology:       Assessment and Plan:   89 y/o M w/ pMHx of Afib on Eliquis, CAD, HTN, HLD, and Hypothyroidism presented for AMS    # Metabolic Encephalopathy likely due to 2/2 Hyponatremia +/_ UTI  # 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     # Hypothyroidism  -On synthroid     # Severe hypomagnesemia   - repleted  - stable    # Afib  -Continue with Eliquis    # DVT prophylaxis -On Eliquis  # GI ppx - not indicated  # Diet - DASH  # Activity - IAT  Full Code

## 2020-06-12 ENCOUNTER — TRANSCRIPTION ENCOUNTER (OUTPATIENT)
Age: 85
End: 2020-06-12

## 2020-06-12 LAB
ANION GAP SERPL CALC-SCNC: 10 MMOL/L — SIGNIFICANT CHANGE UP (ref 7–14)
ANION GAP SERPL CALC-SCNC: 10 MMOL/L — SIGNIFICANT CHANGE UP (ref 7–14)
ANION GAP SERPL CALC-SCNC: 9 MMOL/L — SIGNIFICANT CHANGE UP (ref 7–14)
ANION GAP SERPL CALC-SCNC: 9 MMOL/L — SIGNIFICANT CHANGE UP (ref 7–14)
BASOPHILS # BLD AUTO: 0.07 K/UL — SIGNIFICANT CHANGE UP (ref 0–0.2)
BASOPHILS NFR BLD AUTO: 0.5 % — SIGNIFICANT CHANGE UP (ref 0–1)
BUN SERPL-MCNC: 18 MG/DL — SIGNIFICANT CHANGE UP (ref 10–20)
BUN SERPL-MCNC: 18 MG/DL — SIGNIFICANT CHANGE UP (ref 10–20)
BUN SERPL-MCNC: 19 MG/DL — SIGNIFICANT CHANGE UP (ref 10–20)
BUN SERPL-MCNC: 21 MG/DL — HIGH (ref 10–20)
CALCIUM SERPL-MCNC: 8.5 MG/DL — SIGNIFICANT CHANGE UP (ref 8.5–10.1)
CALCIUM SERPL-MCNC: 8.9 MG/DL — SIGNIFICANT CHANGE UP (ref 8.5–10.1)
CALCIUM SERPL-MCNC: 9.1 MG/DL — SIGNIFICANT CHANGE UP (ref 8.5–10.1)
CALCIUM SERPL-MCNC: 9.2 MG/DL — SIGNIFICANT CHANGE UP (ref 8.5–10.1)
CHLORIDE SERPL-SCNC: 90 MMOL/L — LOW (ref 98–110)
CHLORIDE SERPL-SCNC: 91 MMOL/L — LOW (ref 98–110)
CHLORIDE SERPL-SCNC: 93 MMOL/L — LOW (ref 98–110)
CHLORIDE SERPL-SCNC: 93 MMOL/L — LOW (ref 98–110)
CO2 SERPL-SCNC: 24 MMOL/L — SIGNIFICANT CHANGE UP (ref 17–32)
CO2 SERPL-SCNC: 24 MMOL/L — SIGNIFICANT CHANGE UP (ref 17–32)
CO2 SERPL-SCNC: 25 MMOL/L — SIGNIFICANT CHANGE UP (ref 17–32)
CO2 SERPL-SCNC: 26 MMOL/L — SIGNIFICANT CHANGE UP (ref 17–32)
CREAT SERPL-MCNC: 1.2 MG/DL — SIGNIFICANT CHANGE UP (ref 0.7–1.5)
CREAT SERPL-MCNC: 1.2 MG/DL — SIGNIFICANT CHANGE UP (ref 0.7–1.5)
CREAT SERPL-MCNC: 1.3 MG/DL — SIGNIFICANT CHANGE UP (ref 0.7–1.5)
CREAT SERPL-MCNC: 1.3 MG/DL — SIGNIFICANT CHANGE UP (ref 0.7–1.5)
EOSINOPHIL # BLD AUTO: 0.09 K/UL — SIGNIFICANT CHANGE UP (ref 0–0.7)
EOSINOPHIL NFR BLD AUTO: 0.6 % — SIGNIFICANT CHANGE UP (ref 0–8)
GLUCOSE SERPL-MCNC: 106 MG/DL — HIGH (ref 70–99)
GLUCOSE SERPL-MCNC: 109 MG/DL — HIGH (ref 70–99)
GLUCOSE SERPL-MCNC: 111 MG/DL — HIGH (ref 70–99)
GLUCOSE SERPL-MCNC: 132 MG/DL — HIGH (ref 70–99)
HCT VFR BLD CALC: 33.9 % — LOW (ref 42–52)
HGB BLD-MCNC: 11.9 G/DL — LOW (ref 14–18)
IMM GRANULOCYTES NFR BLD AUTO: 0.3 % — SIGNIFICANT CHANGE UP (ref 0.1–0.3)
LYMPHOCYTES # BLD AUTO: 1.47 K/UL — SIGNIFICANT CHANGE UP (ref 1.2–3.4)
LYMPHOCYTES # BLD AUTO: 10 % — LOW (ref 20.5–51.1)
MAGNESIUM SERPL-MCNC: 2.2 MG/DL — SIGNIFICANT CHANGE UP (ref 1.8–2.4)
MCHC RBC-ENTMCNC: 33.4 PG — HIGH (ref 27–31)
MCHC RBC-ENTMCNC: 35.1 G/DL — SIGNIFICANT CHANGE UP (ref 32–37)
MCV RBC AUTO: 95.2 FL — HIGH (ref 80–94)
MONOCYTES # BLD AUTO: 1.21 K/UL — HIGH (ref 0.1–0.6)
MONOCYTES NFR BLD AUTO: 8.2 % — SIGNIFICANT CHANGE UP (ref 1.7–9.3)
NEUTROPHILS # BLD AUTO: 11.87 K/UL — HIGH (ref 1.4–6.5)
NEUTROPHILS NFR BLD AUTO: 80.4 % — HIGH (ref 42.2–75.2)
NRBC # BLD: 0 /100 WBCS — SIGNIFICANT CHANGE UP (ref 0–0)
PLATELET # BLD AUTO: 242 K/UL — SIGNIFICANT CHANGE UP (ref 130–400)
POTASSIUM SERPL-MCNC: 4.8 MMOL/L — SIGNIFICANT CHANGE UP (ref 3.5–5)
POTASSIUM SERPL-MCNC: 4.8 MMOL/L — SIGNIFICANT CHANGE UP (ref 3.5–5)
POTASSIUM SERPL-MCNC: 4.9 MMOL/L — SIGNIFICANT CHANGE UP (ref 3.5–5)
POTASSIUM SERPL-MCNC: 5.2 MMOL/L — HIGH (ref 3.5–5)
POTASSIUM SERPL-SCNC: 4.8 MMOL/L — SIGNIFICANT CHANGE UP (ref 3.5–5)
POTASSIUM SERPL-SCNC: 4.8 MMOL/L — SIGNIFICANT CHANGE UP (ref 3.5–5)
POTASSIUM SERPL-SCNC: 4.9 MMOL/L — SIGNIFICANT CHANGE UP (ref 3.5–5)
POTASSIUM SERPL-SCNC: 5.2 MMOL/L — HIGH (ref 3.5–5)
RBC # BLD: 3.56 M/UL — LOW (ref 4.7–6.1)
RBC # FLD: 12.4 % — SIGNIFICANT CHANGE UP (ref 11.5–14.5)
SODIUM SERPL-SCNC: 125 MMOL/L — LOW (ref 135–146)
SODIUM SERPL-SCNC: 125 MMOL/L — LOW (ref 135–146)
SODIUM SERPL-SCNC: 127 MMOL/L — LOW (ref 135–146)
SODIUM SERPL-SCNC: 127 MMOL/L — LOW (ref 135–146)
WBC # BLD: 14.76 K/UL — HIGH (ref 4.8–10.8)
WBC # FLD AUTO: 14.76 K/UL — HIGH (ref 4.8–10.8)

## 2020-06-12 PROCEDURE — 99233 SBSQ HOSP IP/OBS HIGH 50: CPT

## 2020-06-12 PROCEDURE — 70553 MRI BRAIN STEM W/O & W/DYE: CPT | Mod: 26

## 2020-06-12 RX ORDER — SODIUM CHLORIDE 9 MG/ML
1 INJECTION INTRAMUSCULAR; INTRAVENOUS; SUBCUTANEOUS EVERY 12 HOURS
Refills: 0 | Status: DISCONTINUED | OUTPATIENT
Start: 2020-06-12 | End: 2020-06-13

## 2020-06-12 RX ORDER — MAGNESIUM OXIDE 400 MG ORAL TABLET 241.3 MG
1 TABLET ORAL
Qty: 90 | Refills: 0
Start: 2020-06-12 | End: 2020-07-11

## 2020-06-12 RX ORDER — THIAMINE MONONITRATE (VIT B1) 100 MG
500 TABLET ORAL EVERY 8 HOURS
Refills: 0 | Status: COMPLETED | OUTPATIENT
Start: 2020-06-12 | End: 2020-06-15

## 2020-06-12 RX ORDER — AMLODIPINE BESYLATE 2.5 MG/1
1 TABLET ORAL
Qty: 30 | Refills: 0
Start: 2020-06-12 | End: 2020-07-11

## 2020-06-12 RX ORDER — THIAMINE MONONITRATE (VIT B1) 100 MG
100 TABLET ORAL DAILY
Refills: 0 | Status: DISCONTINUED | OUTPATIENT
Start: 2020-06-12 | End: 2020-06-12

## 2020-06-12 RX ORDER — AMLODIPINE BESYLATE 2.5 MG/1
10 TABLET ORAL DAILY
Refills: 0 | Status: DISCONTINUED | OUTPATIENT
Start: 2020-06-12 | End: 2020-06-15

## 2020-06-12 RX ADMIN — MAGNESIUM OXIDE 400 MG ORAL TABLET 400 MILLIGRAM(S): 241.3 TABLET ORAL at 17:04

## 2020-06-12 RX ADMIN — Medication 81 MILLIGRAM(S): at 11:30

## 2020-06-12 RX ADMIN — CHLORHEXIDINE GLUCONATE 1 APPLICATION(S): 213 SOLUTION TOPICAL at 05:45

## 2020-06-12 RX ADMIN — Medication 1 MILLIGRAM(S): at 21:34

## 2020-06-12 RX ADMIN — APIXABAN 5 MILLIGRAM(S): 2.5 TABLET, FILM COATED ORAL at 17:04

## 2020-06-12 RX ADMIN — MAGNESIUM OXIDE 400 MG ORAL TABLET 400 MILLIGRAM(S): 241.3 TABLET ORAL at 10:06

## 2020-06-12 RX ADMIN — SODIUM CHLORIDE 1 GRAM(S): 9 INJECTION INTRAMUSCULAR; INTRAVENOUS; SUBCUTANEOUS at 17:04

## 2020-06-12 RX ADMIN — Medication 105 MILLIGRAM(S): at 16:34

## 2020-06-12 RX ADMIN — AMLODIPINE BESYLATE 10 MILLIGRAM(S): 2.5 TABLET ORAL at 10:06

## 2020-06-12 RX ADMIN — APIXABAN 5 MILLIGRAM(S): 2.5 TABLET, FILM COATED ORAL at 05:44

## 2020-06-12 RX ADMIN — Medication 75 MICROGRAM(S): at 05:45

## 2020-06-12 RX ADMIN — MAGNESIUM OXIDE 400 MG ORAL TABLET 400 MILLIGRAM(S): 241.3 TABLET ORAL at 11:33

## 2020-06-12 RX ADMIN — CEFTRIAXONE 100 MILLIGRAM(S): 500 INJECTION, POWDER, FOR SOLUTION INTRAMUSCULAR; INTRAVENOUS at 11:32

## 2020-06-12 RX ADMIN — Medication 105 MILLIGRAM(S): at 21:34

## 2020-06-12 RX ADMIN — LISINOPRIL 20 MILLIGRAM(S): 2.5 TABLET ORAL at 05:45

## 2020-06-12 RX ADMIN — AMLODIPINE BESYLATE 5 MILLIGRAM(S): 2.5 TABLET ORAL at 05:44

## 2020-06-12 RX ADMIN — ATORVASTATIN CALCIUM 80 MILLIGRAM(S): 80 TABLET, FILM COATED ORAL at 21:34

## 2020-06-12 NOTE — DISCHARGE NOTE PROVIDER - CARE PROVIDER_API CALL
Yoel Santana  INTERNAL MEDICINE  [address] [place] [address]  Phone: [phone]  Fax: [phone]  Follow Up Time: 2 weeks    Herminio Mcmullen  Cardiovascular Disease  [address] [place] [address]  Phone: [phone]  Fax: [phone]  Follow Up Time: 1 week Yoel Santana  INTERNAL MEDICINE  [address] [place] [address]  Phone: [phone]  Fax: [phone]  Follow Up Time: 2 weeks    Tawanda Aguirre  MEDICINE  [address] [place] [address]  Phone: [phone]  Fax: [phone]  Follow Up Time: 1-3 days Yoel Santana  INTERNAL MEDICINE  [address] [place] [address]  Phone: [phone]  Fax: [phone]  Follow Up Time: 1 week    Tawanda Aguirre  MEDICINE  [address] [place] [address]  Phone: [phone]  Fax: [phone]  Follow Up Time: 1-3 days

## 2020-06-12 NOTE — DISCHARGE NOTE PROVIDER - HOSPITAL COURSE
88 year old male with PMH of atrial fibrillation on Eliquis HTN, DLD, hypothyroidism, suspected TIA 2 years ago and has a loop recorder presents with  chief complaint of nausea and abdominal pain. The main history was taken from the daughter. 2 weeks ago the family noted mood changes, he was getting upset at unusual things, such a mowing the lawn and feeling depressed. So the family discussed his situation with his cardiologist who started him on lexapro. Initially he improved but today he was back to depressed mood, did not feel good at all, initially grabbed his chest and showed signs of discomfort, delirium and hence was brought to the hospital by his daughter. While in ED a stroke code was activated for acute onset confusion and difficulty speaking. He was found to have elevated BP and was having difficulty in expressing his concerns and confusion initially. CTH and MRI of head negative for acute strokes. Pt was found to have low sodium level and subsequently diagnosed with SIDADH. Pt was treated with Tolevaptan 15mg x 1 and 3% saline. Pt's sodium improved. Pt's sodium level has been stable now and pt is ready for discharge. 88 year old male with PMH of atrial fibrillation on Eliquis HTN, DLD, hypothyroidism, suspected TIA 2 years ago and has a loop recorder presents with  chief complaint of nausea and abdominal pain. The main history was taken from the daughter. 2 weeks ago the family noted mood changes, he was getting upset at unusual things, such a mowing the lawn and feeling depressed. So the family discussed his situation with his cardiologist who started him on lexapro. Initially he improved but today he was back to depressed mood, did not feel good at all, initially grabbed his chest and showed signs of discomfort, delirium and hence was brought to the hospital by his daughter. While in ED a stroke code was activated for acute onset confusion and difficulty speaking. He was found to have elevated BP and was having difficulty in expressing his concerns and confusion initially. CTH and MRI of head negative for acute strokes. Pt was found to have low sodium level and subsequently diagnosed with SIDADH. Pt was treated with Tolevaptan 15mg x 2 and 3% saline. Pt's sodium improved. Pt's sodium level has been stable now and pt is ready for discharge.

## 2020-06-12 NOTE — PROGRESS NOTE ADULT - SUBJECTIVE AND OBJECTIVE BOX
Hospital Day:  5d    Subjective: Patient is a 88y old  Male who presents with a chief complaint of AMS (12 Jun 2020 12:32)    Pt seen and evaluated at bedside. no over the night acute events reported.   Pt has no complaints and wants to go home.     Past Medical Hx:   Hypothyroidism  Hypertension  Hyperlipidemia  Atrial fibrillation  Coronary artery disease    Past Sx:  History of loop recorder  H/O aortic valve replacement    Allergies:  No Known Allergies    Current Meds:   Standng Meds:  amLODIPine   Tablet 10 milliGRAM(s) Oral daily  apixaban 5 milliGRAM(s) Oral two times a day  aspirin enteric coated 81 milliGRAM(s) Oral daily  atorvastatin 80 milliGRAM(s) Oral at bedtime  chlorhexidine 4% Liquid 1 Application(s) Topical <User Schedule>  folic acid 1 milliGRAM(s) Oral at bedtime  levothyroxine 75 MICROGram(s) Oral daily  lisinopril 20 milliGRAM(s) Oral daily  LORazepam   Injectable 1 milliGRAM(s) IV Push once  magnesium oxide 400 milliGRAM(s) Oral three times a day with meals    PRN Meds:  acetaminophen   Tablet .. 650 milliGRAM(s) Oral every 6 hours PRN Moderate Pain (4 - 6)    HOME MEDICATIONS:  amLODIPine 5 mg oral tablet: 1 tab(s) orally once a day  aspirin 81 mg oral tablet: 1 tab(s) orally once a day  atorvastatin 80 mg oral tablet: 1 tab(s) orally once a day  Eliquis 5 mg oral tablet: 1 tab(s) orally 2 times a day  folic acid 1 mg oral tablet: 1 tab(s) orally once a day (at bedtime)  levothyroxine 75 mcg (0.075 mg) oral tablet: 1 tab(s) orally once a day  lisinopril 20 mg oral tablet: 1 tab(s) orally once a day (at bedtime)  tamsulosin 0.4 mg oral capsule: 1 cap(s) orally once a day      Vital Signs:   T(F): 98.8 (06-12-20 @ 05:15), Max: 98.8 (06-12-20 @ 05:15)  HR: 77 (06-12-20 @ 10:16) (69 - 77)  BP: 170/77 (06-12-20 @ 10:16) (140/79 - 170/77)  RR: 19 (06-12-20 @ 05:15) (18 - 19)  SpO2: --      06-11-20 @ 07:01  -  06-12-20 @ 07:00  --------------------------------------------------------  IN: 1285 mL / OUT: 980 mL / NET: 305 mL        Physical Exam:   GENERAL: NAD, Resting in bed, anxious  HEENT: NCAT  CHEST/LUNG: CTAB  HEART: Regular rate and rhythm; s1 s2 appreciated, No murmurs, rubs, or gallops  ABDOMEN: Soft, Nontender, Nondistended; Bowel sounds present  EXTREMITIES: No LE edema b/l  SKIN: no rashes, no new lesions  NERVOUS SYSTEM:  Alert & Oriented X3    Labs:                         11.9   14.76 )-----------( 242      ( 12 Jun 2020 05:20 )             33.9     Neutophil% 80.4, Lymphocyte% 10.0, Monocyte% 8.2, Bands% 0.3 06-12-20 @ 05:20    12 Jun 2020 05:20    127    |  93     |  18     ----------------------------<  111    4.8     |  24     |  1.2      Ca    8.9        12 Jun 2020 05:20  Mg     2.2       12 Jun 2020 05:20    TPro  5.6    /  Alb  3.6    /  TBili  0.4    /  DBili  x      /  AST  21     /  ALT  15     /  AlkPhos  56     11 Jun 2020 00:32    Radiology:       Assessment and Plan:   87 y/o M w/ pMHx of Afib on Eliquis, CAD, HTN, HLD, and Hypothyroidism presented for AMS    # Metabolic Encephalopathy likely due to 2/2 Hyponatremia +/_ UTI  # 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     # Hypothyroidism  -On synthroid     # Severe hypomagnesemia   - repleted  - stable    # Afib  -Continue with Eliquis    # DVT prophylaxis -On Eliquis  # GI ppx - not indicated  # Diet - DASH  # Activity - IAT  Full Code

## 2020-06-12 NOTE — PROGRESS NOTE ADULT - ASSESSMENT
Addended by: Luisa El on: 1/18/2019 03:57 PM     Modules accepted: Ayleen, Smart[employer] 89 y/o M with hyponatremia in hospital for nausea, abdominal pain, mood changes. started on lexapro 2 weeks by his cardiologist. in ED code stroke for acute confusion and difficulty in speaking.  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   - CT chest noted    - sodium noted, can resume salt tablet 1g po q12h / keep free water restriction    - BP noted, resume home meds monitor BP  - TSH within normal     # ? stroke   - CT head unremarkable.   - s/p contrast imaging.   - neuro following. MRI no evidence of CVA  - BP at goal     will follow    If discharged follow in our office at [facility] on [phone] in one -two weeks

## 2020-06-12 NOTE — DISCHARGE NOTE PROVIDER - PROVIDER TOKENS
PROVIDER:[TOKEN:[[id number]],FOLLOWUP:[2 weeks]],PROVIDER:[TOKEN:[[id number]],FOLLOWUP:[1 week]] PROVIDER:[TOKEN:[[id number]],FOLLOWUP:[2 weeks]],PROVIDER:[TOKEN:[[id number]],FOLLOWUP:[1-3 days]] PROVIDER:[TOKEN:[[id number]],FOLLOWUP:[1 week]],PROVIDER:[TOKEN:[[id number]],FOLLOWUP:[1-3 days]]

## 2020-06-12 NOTE — DISCHARGE NOTE PROVIDER - NSDCMRMEDTOKEN_GEN_ALL_CORE_FT
amLODIPine 10 mg oral tablet: 1 tab(s) orally once a day  amLODIPine 5 mg oral tablet: 1 tab(s) orally once a day  aspirin 81 mg oral tablet: 1 tab(s) orally once a day  atorvastatin 80 mg oral tablet: 1 tab(s) orally once a day  Eliquis 5 mg oral tablet: 1 tab(s) orally 2 times a day  folic acid 1 mg oral tablet: 1 tab(s) orally once a day (at bedtime)  levothyroxine 75 mcg (0.075 mg) oral tablet: 1 tab(s) orally once a day  lisinopril 20 mg oral tablet: 1 tab(s) orally once a day (at bedtime)  magnesium oxide 400 mg (241.3 mg elemental magnesium) oral tablet: 1 tab(s) orally 3 times a day (with meals)  tamsulosin 0.4 mg oral capsule: 1 cap(s) orally once a day aspirin 81 mg oral tablet: 1 tab(s) orally once a day  atorvastatin 80 mg oral tablet: 1 tab(s) orally once a day  Eliquis 5 mg oral tablet: 1 tab(s) orally 2 times a day  folic acid 1 mg oral tablet: 1 tab(s) orally once a day (at bedtime)  levothyroxine 75 mcg (0.075 mg) oral tablet: 1 tab(s) orally once a day  lisinopril 20 mg oral tablet: 1 tab(s) orally once a day (at bedtime)  magnesium oxide 400 mg (241.3 mg elemental magnesium) oral tablet: 1 tab(s) orally 3 times a day (with meals)  Multiple Vitamins oral tablet: 1 tab(s) orally once a day  NIFEdipine 30 mg oral tablet, extended release: 1 tab(s) orally once a day  sodium chloride 1 g oral tablet: 2 tab(s) orally every 8 hours  tamsulosin 0.4 mg oral capsule: 1 cap(s) orally once a day aspirin 81 mg oral tablet: 1 tab(s) orally once a day  atorvastatin 80 mg oral tablet: 1 tab(s) orally once a day  Eliquis 5 mg oral tablet: 1 tab(s) orally 2 times a day  folic acid 1 mg oral tablet: 1 tab(s) orally once a day (at bedtime)  lactulose 10 g/15 mL oral syrup: 20 milliliter(s) orally 2 times a day, As Needed -for constipation   levothyroxine 75 mcg (0.075 mg) oral tablet: 1 tab(s) orally once a day  lisinopril 20 mg oral tablet: 1 tab(s) orally once a day (at bedtime)  Multiple Vitamins oral tablet: 1 tab(s) orally once a day  NIFEdipine 30 mg oral tablet, extended release: 1 tab(s) orally once a day  senna oral tablet: 2 tab(s) orally once a day (at bedtime)   sodium chloride 1 g oral tablet: 2 tab(s) orally every 8 hours  tamsulosin 0.4 mg oral capsule: 1 cap(s) orally once a day  thiamine 100 mg oral tablet: 1 tab(s) orally once a day

## 2020-06-12 NOTE — DISCHARGE NOTE PROVIDER - NSDCFUSCHEDAPPT_GEN_ALL_CORE_FT
AMA HARDY ; 06/16/2020 ; [facility] AMA HARDY ; 06/16/2020 ; [facility] [address] [facility]

## 2020-06-12 NOTE — PROGRESS NOTE ADULT - ASSESSMENT
# Hypotonic euvolemic hyponatremia ? 2/2 SIADH   -MRI brain negative for CVA   -Echo unremarkable  -received 1 dose of 3%NaC but sodium did not improve significantly  -did not improve w/ NS but sodium worsened c/w SIADH   -on 6/10 due to pt being very symptomatic and uncomfortable and after d/w renal, pt  received 100 cc of 3%NaCL and 1 dose of Tolvaptan   -Sodium improved and pt is clinically better than on 6/10 but still not at baseline and having a difficult time walking  -CT C/A/P negative for malignancy    #Generalized weakness/Dysequlibrium  -d/w neuro - check B12, Thiamine replacement  -check MRI brain w/ and w/out  -cont PT  -daughter is also concerned for possible depression    #H/o EtOH abuse/Possible thiamine defic  -replete vitamines  -counselled     # Possible UTI  - finished  ceftriaxone course    # HTN  -Continue with Home medications.     # Hypothyroidism  -On synthroid     # Afib  -Continue with Eliquis    # DVT prophylaxis  -On Eliquis    High risk pt. D/w daughter in details yesterday and today. Daughter wants home w/ services and definately no to SNF.    #Progress Note Handoff  Pending (specify):  Consults____Clinical improvement and stability__x___Tests__MRI_____PT____x____  Pt/Family discussion: Pt/daughter informed and agree with the current plan  Disposition: Home____x__/SNF_______/To be determined________

## 2020-06-12 NOTE — DISCHARGE NOTE PROVIDER - NSDCCPCAREPLAN_GEN_ALL_CORE_FT
PRINCIPAL DISCHARGE DIAGNOSIS  Diagnosis: SIADH (syndrome of inappropriate ADH production)  Assessment and Plan of Treatment: Your sysmptoms were likely due to hyponatremia (low sodium) secondary to SIADH. You have been treated with Tolevaptan x1 and 3% saline x1 and your sodium level improved. Please follow up with neprhologist outpatient in 2 weeks for repeat blood work. You are advised not to use Hydrocholrothiazide (HCTz) and Lexapro for your depression as it can further worsen your symptoms.      SECONDARY DISCHARGE DIAGNOSES  Diagnosis: Hypertension  Assessment and Plan of Treatment: Please follow up with your primary care provider within 1 week to check your BP and BP med adjustment.    Diagnosis: Hypomagnesemia  Assessment and Plan of Treatment: You are started on magnesium supplement. Please follow up with your PCP in 1 week to recheck the levels. PRINCIPAL DISCHARGE DIAGNOSIS  Diagnosis: SIADH (syndrome of inappropriate ADH production)  Assessment and Plan of Treatment: Your sysmptoms were likely due to hyponatremia (low sodium) secondary to SIADH. You have been treated with medication (tolvaptan) and fluids that resulted in improvement of  your sodium level. Please follow up with your PCP in 3 days to repeat BMP (lab). Please follow up with neprhologist Dr. Marinelli outpatient in 2 weeks. You are advised not to use Hydrocholrothiazide (HCTz) for BP management and Lexapro for your depression as it can further worsen your symptoms.      SECONDARY DISCHARGE DIAGNOSES  Diagnosis: Hypertension  Assessment and Plan of Treatment: Please follow up with your primary care provider within 3 days to check your BP and BP med adjustment.    Diagnosis: Hypomagnesemia  Assessment and Plan of Treatment: You are started on magnesium supplement. Please follow up with your PCP in 3 days to recheck the levels. PRINCIPAL DISCHARGE DIAGNOSIS  Diagnosis: SIADH (syndrome of inappropriate ADH production)  Assessment and Plan of Treatment: Your sysmptoms were likely due to hyponatremia (low sodium) secondary to SIADH. You have been treated with medication (tolvaptan) and sodium tablets that resulted in improvement of  your sodium level. Please follow up with your PCP in 3 days to repeat BMP (lab). Please follow up with neprhologist Dr. Marinelli outpatient in 1 week. You are advised not to use Hydrocholrothiazide (HCTz) for BP management and Lexapro for your depression as it can further worsen your symptoms.      SECONDARY DISCHARGE DIAGNOSES  Diagnosis: Mild alcohol abuse  Assessment and Plan of Treatment: please cut down on your alcohol consumption    Diagnosis: Pulmonary nodules  Assessment and Plan of Treatment: discuss with Dr. Aguirre the need for repeat CT chest in 12 months for screening    Diagnosis: Hypertension  Assessment and Plan of Treatment: Please follow up with your primary care provider within 3 days to check your BP and BP med adjustment. Your Norvasc was switched to Procardia.

## 2020-06-12 NOTE — PROGRESS NOTE ADULT - SUBJECTIVE AND OBJECTIVE BOX
Patient is a 88y old  Male who presents with a chief complaint of AMS; Hyponatremia; UTI (08 Jun 2020 15:16)    INTERVAL HPI/OVERNIGHT EVENTS:  today reports not feeling well again. No specific complaints.  ROS: Denies CP, SOB, AP  All other systems reviewed and are within normal limits.  InitialHPI:  88 year old male with PMH of atrial fibrillation on Eliquis HTN, DLD, hypothyroidism, suspected TIA 2 years ago and has a loop recorder presents with  chief complaint of nausea and abdominal pain. The main history was taken from the daughter. 2 weeks ago the family noted mood changes, he was getting upset at unusual things, such a mowing the lawn and feeling depressed. So the family discussed his situation with his cardiologist who started him on lexapro. Initially he improved but today he was back to depressed mood, did not feel good at all, initially grabbed his chest and showed signs of discomfort, delirium and hence was brought to the hospital by his daughter. While in ED a stroke code was activated for acute onset confusion and difficulty speaking. He was found to have elevated BP and was having difficulty in expressing his concerns and confusion initially.  Currently he is alert oriented X2-3 and no signs of seizures. (07 Jun 2020 02:00)    TIA (TRANSIENT ISCHEMIC ATTACK);HYPONATREMIA  ^POSS HEART ATTACK  No pertinent family history in first degree relatives  Handoff  MEWS Score  Hypothyroidism  Hypertension  Hyperlipidemia  Atrial fibrillation  Coronary artery disease  TIA (transient ischemic attack)  History of loop recorder  H/O aortic valve replacement  POSS HEART ATTACK  90+  Nausea  Hyponatremia    PAST MEDICAL & SURGICAL HISTORY:  Hypothyroidism  Hypertension  Hyperlipidemia  Atrial fibrillation  Coronary artery disease  History of loop recorder: 2017  H/O aortic valve replacement: porcine      General: weak appearing, AAO3, ?depressed, [place]  CV: S1 S2  Resp: decreased breath sounds at bases  GI: NT/ND/S +BS  MS: no clubbing/cyanosis/edema, 2+ pulses b/l  Neuro: motor 5/5, sensory intact, 2+reflexes thruout but unsteady on his feet and ataxic            MEDICATIONS  (STANDING):  amLODIPine   Tablet 10 milliGRAM(s) Oral daily  apixaban 5 milliGRAM(s) Oral two times a day  aspirin enteric coated 81 milliGRAM(s) Oral daily  atorvastatin 80 milliGRAM(s) Oral at bedtime  chlorhexidine 4% Liquid 1 Application(s) Topical <User Schedule>  folic acid 1 milliGRAM(s) Oral at bedtime  levothyroxine 75 MICROGram(s) Oral daily  lisinopril 20 milliGRAM(s) Oral daily  LORazepam   Injectable 1 milliGRAM(s) IV Push once  magnesium oxide 400 milliGRAM(s) Oral three times a day with meals  multivitamin 1 Tablet(s) Oral daily  sodium chloride 1 Gram(s) Oral every 12 hours  thiamine IVPB 500 milliGRAM(s) IV Intermittent every 8 hours    MEDICATIONS  (PRN):  acetaminophen   Tablet .. 650 milliGRAM(s) Oral every 6 hours PRN Moderate Pain (4 - 6)    Home Medications:  amLODIPine 5 mg oral tablet: 1 tab(s) orally once a day (07 Jun 2020 01:59)  aspirin 81 mg oral tablet: 1 tab(s) orally once a day (10 Sep 2018 07:17)  atorvastatin 80 mg oral tablet: 1 tab(s) orally once a day (10 Sep 2018 07:17)  Eliquis 5 mg oral tablet: 1 tab(s) orally 2 times a day (10 Sep 2018 07:17)  folic acid 1 mg oral tablet: 1 tab(s) orally once a day (at bedtime) (10 Sep 2018 07:17)  levothyroxine 75 mcg (0.075 mg) oral tablet: 1 tab(s) orally once a day (10 Sep 2018 07:17)  lisinopril 20 mg oral tablet: 1 tab(s) orally once a day (at bedtime) (10 Sep 2018 07:17)  tamsulosin 0.4 mg oral capsule: 1 cap(s) orally once a day (10 Sep 2018 07:17)    Vital Signs Last 24 Hrs  T(C): 36 (12 Jun 2020 13:46), Max: 37.1 (12 Jun 2020 05:15)  T(F): 96.8 (12 Jun 2020 13:46), Max: 98.8 (12 Jun 2020 05:15)  HR: 66 (12 Jun 2020 13:46) (66 - 77)  BP: 137/65 (12 Jun 2020 13:46) (137/65 - 170/77)  BP(mean): --  RR: 18 (12 Jun 2020 13:46) (18 - 19)  SpO2: --  CAPILLARY BLOOD GLUCOSE        LABS:                        11.9   14.76 )-----------( 242      ( 12 Jun 2020 05:20 )             33.9     06-12    127<L>  |  93<L>  |  18  ----------------------------<  111<H>  4.8   |  24  |  1.2    Ca    8.9      12 Jun 2020 05:20  Mg     2.2     06-12    TPro  5.6<L>  /  Alb  3.6  /  TBili  0.4  /  DBili  x   /  AST  21  /  ALT  15  /  AlkPhos  56  06-11    LIVER FUNCTIONS - ( 11 Jun 2020 00:32 )  Alb: 3.6 g/dL / Pro: 5.6 g/dL / ALK PHOS: 56 U/L / ALT: 15 U/L / AST: 21 U/L / GGT: x                           Consultant Notes Reviewed:  [x ] YES  [ ] NO  Care Discussed with Consultants/Other Providers/ Housestaff [ x] YES  [ ] NO  Radiology, labs, new studies personally reviewed.

## 2020-06-12 NOTE — PROGRESS NOTE ADULT - SUBJECTIVE AND OBJECTIVE BOX
Nephrology progress note    THIS IS AN INCOMPLETE NOTE . FULL NOTE TO FOLLOW SHORTLY    Patient is seen and examined, events over the last 24 h noted .    Allergies:  No Known Allergies    Hospital Medications:   MEDICATIONS  (STANDING):  amLODIPine   Tablet 10 milliGRAM(s) Oral daily  apixaban 5 milliGRAM(s) Oral two times a day  aspirin enteric coated 81 milliGRAM(s) Oral daily  atorvastatin 80 milliGRAM(s) Oral at bedtime  cefTRIAXone   IVPB 1000 milliGRAM(s) IV Intermittent every 24 hours  chlorhexidine 4% Liquid 1 Application(s) Topical <User Schedule>  folic acid 1 milliGRAM(s) Oral at bedtime  levothyroxine 75 MICROGram(s) Oral daily  lisinopril 20 milliGRAM(s) Oral daily  magnesium oxide 400 milliGRAM(s) Oral three times a day with meals        VITALS:  T(F): 98.8 (20 @ 05:15), Max: 98.8 (20 @ 05:15)  HR: 71 (20 @ 05:15)  BP: 160/83 (20 @ 05:15)  RR: 19 (20 @ 05:15)  SpO2: --  Wt(kg): --    06-10 @ 07:01  -   @ 07:00  --------------------------------------------------------  IN: 1120 mL / OUT: 2220 mL / NET: -1100 mL     @ 07:01  -   @ 07:00  --------------------------------------------------------  IN: 1285 mL / OUT: 980 mL / NET: 305 mL      Height (cm): 182.88 ( @ 18:39)  Weight (kg): 92.3 ( @ 18:39)  BMI (kg/m2): 27.6 ( 18:39)  BSA (m2): 2.15 ( @ 18:39)    PHYSICAL EXAM:  Constitutional: NAD  HEENT: anicteric sclera, oropharynx clear, MMM  Neck: No JVD  Respiratory: CTAB, no wheezes, rales or rhonchi  Cardiovascular: S1, S2, RRR  Gastrointestinal: BS+, soft, NT/ND  Extremities: No cyanosis or clubbing. No peripheral edema  :  No ricardo.   Skin: No rashes    LABS:      127<L>  |  93<L>  |  18  ----------------------------<  111<H>  4.8   |  24  |  1.2    SODIUM TREND:  Sodium 127 [ @ 05:20]  Sodium 127 [ @ 01:03]  Sodium 125 [ @ 21:36]  Sodium 129 [ @ 17:59]  Sodium 129 [ @ 11:04]  Sodium 128 [ @ 05:10]  Sodium 125 [ @ 00:32]  Sodium 124 [06-10 @ 20:20]  Sodium 122 [06-10 @ 18:26]  Sodium 115 [06-10 @ 11:30]    Ca    8.9      2020 05:20  Mg     2.2         TPro  5.6<L>  /  Alb  3.6  /  TBili  0.4  /  DBili      /  AST  21  /  ALT  15  /  AlkPhos  56                            11.9   14.76 )-----------( 242      ( 2020 05:20 )             33.9       Urine Studies:  Urinalysis Basic - ( 2020 09:00 )    Color: Light Yellow / Appearance: Clear / S.022 / pH:   Gluc:  / Ketone: Negative  / Bili: Negative / Urobili: <2 mg/dL   Blood:  / Protein: Trace / Nitrite: Positive   Leuk Esterase: Large / RBC: 1 /HPF / WBC 94 /HPF   Sq Epi:  / Non Sq Epi: 0 /HPF / Bacteria: Moderate      Creatinine, Random Urine: 90 mg/dL ( @ 15:04)  Sodium, Random Urine: 60.0 mmoL/L ( @ 15:04)  Osmolality, Random Urine: 457 mos/kg ( @ 15:04)  Sodium, Random Urine: 80.0 mmoL/L ( @ 09:00)  Creatinine, Random Urine: 32 mg/dL ( @ 09:00)  Osmolality, Random Urine: 325 mos/kg ( @ 09:00)    RADIOLOGY & ADDITIONAL STUDIES: Nephrology progress note  Patient is seen and examined, events over the last 24 h noted .  lying in bed comfortable     Allergies:  No Known Allergies    Hospital Medications:   MEDICATIONS  (STANDING):    amLODIPine   Tablet 10 milliGRAM(s) Oral daily  apixaban 5 milliGRAM(s) Oral two times a day  aspirin enteric coated 81 milliGRAM(s) Oral daily  atorvastatin 80 milliGRAM(s) Oral at bedtime  cefTRIAXone   IVPB 1000 milliGRAM(s) IV Intermittent every 24 hours  folic acid 1 milliGRAM(s) Oral at bedtime  levothyroxine 75 MICROGram(s) Oral daily  lisinopril 20 milliGRAM(s) Oral daily  magnesium oxide 400 milliGRAM(s) Oral three times a day with meals        VITALS:  T(F): 98.8 (20 @ 05:15), Max: 98.8 (20 @ 05:15)  HR: 71 (20 @ 05:15)  BP: 160/83 (20 @ 05:15)  RR: 19 (20 @ 05:15)      06-10 @ 07:01  -   @ 07:00  --------------------------------------------------------  IN: 1120 mL / OUT: 2220 mL / NET: -1100 mL     @ 07:01  -   @ 07:00  --------------------------------------------------------  IN: 1285 mL / OUT: 980 mL / NET: 305 mL      Height (cm): 182.88 ( @ 18:39)  Weight (kg): 92.3 ( @ 18:39)  BMI (kg/m2): 27.6 ( @ 18:39)  BSA (m2): 2.15 ( 18:39)    PHYSICAL EXAM:  Constitutional: NAD  HEENT: anicteric sclera, oropharynx clear, MMM  Neck: No JVD  Respiratory: CTAB, no wheezes, rales or rhonchi  Cardiovascular: S1, S2, RRR  Gastrointestinal: BS+, soft, NT/ND  Extremities: No cyanosis or clubbing. No peripheral edema  :  No ricardo.   Skin: No rashes    LABS:      127<L>  |  93<L>  |  18  ----------------------------<  111<H>  4.8   |  24  |  1.2    SODIUM TREND:  Sodium 127 [ @ 05:20]  Sodium 127 [ @ 01:03]  Sodium 125 [ @ 21:36]  Sodium 129 [ @ 17:59]  Sodium 129 [ @ 11:04]  Sodium 128 [ @ 05:10]  Sodium 125 [ @ 00:32]  Sodium 124 [06-10 @ 20:20]  Sodium 122 [06-10 @ 18:26]  Sodium 115 [06-10 @ 11:30]    Ca    8.9      2020 05:20  Mg     2.2         TPro  5.6<L>  /  Alb  3.6  /  TBili  0.4  /  DBili      /  AST  21  /  ALT  15  /  AlkPhos  56                            11.9   14.76 )-----------( 242      ( 2020 05:20 )             33.9       Urine Studies:  Urinalysis Basic - ( 2020 09:00 )    Color: Light Yellow / Appearance: Clear / S.022 / pH:   Gluc:  / Ketone: Negative  / Bili: Negative / Urobili: <2 mg/dL   Blood:  / Protein: Trace / Nitrite: Positive   Leuk Esterase: Large / RBC: 1 /HPF / WBC 94 /HPF   Sq Epi:  / Non Sq Epi: 0 /HPF / Bacteria: Moderate      Creatinine, Random Urine: 90 mg/dL ( @ 15:04)  Sodium, Random Urine: 60.0 mmoL/L ( @ 15:04)  Osmolality, Random Urine: 457 mos/kg ( @ 15:04)  Sodium, Random Urine: 80.0 mmoL/L ( @ 09:00)  Creatinine, Random Urine: 32 mg/dL (06-07 @ 09:00)  Osmolality, Random Urine: 325 mos/kg (-07 @ 09:00)    RADIOLOGY & ADDITIONAL STUDIES:

## 2020-06-13 LAB
ALBUMIN SERPL ELPH-MCNC: 3.5 G/DL — SIGNIFICANT CHANGE UP (ref 3.5–5.2)
ALP SERPL-CCNC: 53 U/L — SIGNIFICANT CHANGE UP (ref 30–115)
ALT FLD-CCNC: 15 U/L — SIGNIFICANT CHANGE UP (ref 0–41)
ANION GAP SERPL CALC-SCNC: 10 MMOL/L — SIGNIFICANT CHANGE UP (ref 7–14)
ANION GAP SERPL CALC-SCNC: 11 MMOL/L — SIGNIFICANT CHANGE UP (ref 7–14)
ANION GAP SERPL CALC-SCNC: 9 MMOL/L — SIGNIFICANT CHANGE UP (ref 7–14)
APPEARANCE UR: CLEAR — SIGNIFICANT CHANGE UP
AST SERPL-CCNC: 15 U/L — SIGNIFICANT CHANGE UP (ref 0–41)
BACTERIA # UR AUTO: NEGATIVE — SIGNIFICANT CHANGE UP
BASOPHILS # BLD AUTO: 0.07 K/UL — SIGNIFICANT CHANGE UP (ref 0–0.2)
BASOPHILS NFR BLD AUTO: 0.7 % — SIGNIFICANT CHANGE UP (ref 0–1)
BILIRUB SERPL-MCNC: 0.4 MG/DL — SIGNIFICANT CHANGE UP (ref 0.2–1.2)
BILIRUB UR-MCNC: NEGATIVE — SIGNIFICANT CHANGE UP
BUN SERPL-MCNC: 18 MG/DL — SIGNIFICANT CHANGE UP (ref 10–20)
BUN SERPL-MCNC: 19 MG/DL — SIGNIFICANT CHANGE UP (ref 10–20)
BUN SERPL-MCNC: 20 MG/DL — SIGNIFICANT CHANGE UP (ref 10–20)
BUN SERPL-MCNC: 21 MG/DL — HIGH (ref 10–20)
BUN SERPL-MCNC: 22 MG/DL — HIGH (ref 10–20)
CALCIUM SERPL-MCNC: 8.7 MG/DL — SIGNIFICANT CHANGE UP (ref 8.5–10.1)
CALCIUM SERPL-MCNC: 8.8 MG/DL — SIGNIFICANT CHANGE UP (ref 8.5–10.1)
CALCIUM SERPL-MCNC: 9.2 MG/DL — SIGNIFICANT CHANGE UP (ref 8.5–10.1)
CHLORIDE SERPL-SCNC: 88 MMOL/L — LOW (ref 98–110)
CHLORIDE SERPL-SCNC: 89 MMOL/L — LOW (ref 98–110)
CHLORIDE SERPL-SCNC: 90 MMOL/L — LOW (ref 98–110)
CHLORIDE SERPL-SCNC: 91 MMOL/L — LOW (ref 98–110)
CHLORIDE SERPL-SCNC: 91 MMOL/L — LOW (ref 98–110)
CO2 SERPL-SCNC: 23 MMOL/L — SIGNIFICANT CHANGE UP (ref 17–32)
CO2 SERPL-SCNC: 23 MMOL/L — SIGNIFICANT CHANGE UP (ref 17–32)
CO2 SERPL-SCNC: 24 MMOL/L — SIGNIFICANT CHANGE UP (ref 17–32)
CO2 SERPL-SCNC: 24 MMOL/L — SIGNIFICANT CHANGE UP (ref 17–32)
CO2 SERPL-SCNC: 26 MMOL/L — SIGNIFICANT CHANGE UP (ref 17–32)
COLOR SPEC: SIGNIFICANT CHANGE UP
CREAT SERPL-MCNC: 1.2 MG/DL — SIGNIFICANT CHANGE UP (ref 0.7–1.5)
CREAT SERPL-MCNC: 1.2 MG/DL — SIGNIFICANT CHANGE UP (ref 0.7–1.5)
CREAT SERPL-MCNC: 1.3 MG/DL — SIGNIFICANT CHANGE UP (ref 0.7–1.5)
CREAT SERPL-MCNC: 1.4 MG/DL — SIGNIFICANT CHANGE UP (ref 0.7–1.5)
CREAT SERPL-MCNC: 1.4 MG/DL — SIGNIFICANT CHANGE UP (ref 0.7–1.5)
DIFF PNL FLD: NEGATIVE — SIGNIFICANT CHANGE UP
EOSINOPHIL # BLD AUTO: 0.13 K/UL — SIGNIFICANT CHANGE UP (ref 0–0.7)
EOSINOPHIL NFR BLD AUTO: 1.4 % — SIGNIFICANT CHANGE UP (ref 0–8)
EPI CELLS # UR: 1 /HPF — SIGNIFICANT CHANGE UP (ref 0–5)
FOLATE SERPL-MCNC: 19.3 NG/ML — SIGNIFICANT CHANGE UP
GLUCOSE SERPL-MCNC: 104 MG/DL — HIGH (ref 70–99)
GLUCOSE SERPL-MCNC: 107 MG/DL — HIGH (ref 70–99)
GLUCOSE SERPL-MCNC: 114 MG/DL — HIGH (ref 70–99)
GLUCOSE SERPL-MCNC: 161 MG/DL — HIGH (ref 70–99)
GLUCOSE SERPL-MCNC: 91 MG/DL — SIGNIFICANT CHANGE UP (ref 70–99)
GLUCOSE UR QL: NEGATIVE — SIGNIFICANT CHANGE UP
HCT VFR BLD CALC: 31.9 % — LOW (ref 42–52)
HGB BLD-MCNC: 10.6 G/DL — LOW (ref 14–18)
HYALINE CASTS # UR AUTO: 0 /LPF — SIGNIFICANT CHANGE UP (ref 0–7)
IMM GRANULOCYTES NFR BLD AUTO: 0.4 % — HIGH (ref 0.1–0.3)
KETONES UR-MCNC: NEGATIVE — SIGNIFICANT CHANGE UP
LEUKOCYTE ESTERASE UR-ACNC: ABNORMAL
LYMPHOCYTES # BLD AUTO: 1.72 K/UL — SIGNIFICANT CHANGE UP (ref 1.2–3.4)
LYMPHOCYTES # BLD AUTO: 17.9 % — LOW (ref 20.5–51.1)
MAGNESIUM SERPL-MCNC: 2 MG/DL — SIGNIFICANT CHANGE UP (ref 1.8–2.4)
MCHC RBC-ENTMCNC: 31.6 PG — HIGH (ref 27–31)
MCHC RBC-ENTMCNC: 33.2 G/DL — SIGNIFICANT CHANGE UP (ref 32–37)
MCV RBC AUTO: 95.2 FL — HIGH (ref 80–94)
MONOCYTES # BLD AUTO: 0.9 K/UL — HIGH (ref 0.1–0.6)
MONOCYTES NFR BLD AUTO: 9.4 % — HIGH (ref 1.7–9.3)
NEUTROPHILS # BLD AUTO: 6.76 K/UL — HIGH (ref 1.4–6.5)
NEUTROPHILS NFR BLD AUTO: 70.2 % — SIGNIFICANT CHANGE UP (ref 42.2–75.2)
NITRITE UR-MCNC: NEGATIVE — SIGNIFICANT CHANGE UP
NRBC # BLD: 0 /100 WBCS — SIGNIFICANT CHANGE UP (ref 0–0)
PH UR: 7 — SIGNIFICANT CHANGE UP (ref 5–8)
PLATELET # BLD AUTO: 226 K/UL — SIGNIFICANT CHANGE UP (ref 130–400)
POTASSIUM SERPL-MCNC: 4.8 MMOL/L — SIGNIFICANT CHANGE UP (ref 3.5–5)
POTASSIUM SERPL-MCNC: 4.9 MMOL/L — SIGNIFICANT CHANGE UP (ref 3.5–5)
POTASSIUM SERPL-MCNC: 5 MMOL/L — SIGNIFICANT CHANGE UP (ref 3.5–5)
POTASSIUM SERPL-MCNC: 5.1 MMOL/L — HIGH (ref 3.5–5)
POTASSIUM SERPL-MCNC: 5.3 MMOL/L — HIGH (ref 3.5–5)
POTASSIUM SERPL-SCNC: 4.8 MMOL/L — SIGNIFICANT CHANGE UP (ref 3.5–5)
POTASSIUM SERPL-SCNC: 4.9 MMOL/L — SIGNIFICANT CHANGE UP (ref 3.5–5)
POTASSIUM SERPL-SCNC: 5 MMOL/L — SIGNIFICANT CHANGE UP (ref 3.5–5)
POTASSIUM SERPL-SCNC: 5.1 MMOL/L — HIGH (ref 3.5–5)
POTASSIUM SERPL-SCNC: 5.3 MMOL/L — HIGH (ref 3.5–5)
PROT SERPL-MCNC: 5.4 G/DL — LOW (ref 6–8)
PROT UR-MCNC: SIGNIFICANT CHANGE UP
RBC # BLD: 3.35 M/UL — LOW (ref 4.7–6.1)
RBC # FLD: 12.5 % — SIGNIFICANT CHANGE UP (ref 11.5–14.5)
RBC CASTS # UR COMP ASSIST: 0 /HPF — SIGNIFICANT CHANGE UP (ref 0–4)
SODIUM SERPL-SCNC: 123 MMOL/L — LOW (ref 135–146)
SODIUM SERPL-SCNC: 124 MMOL/L — LOW (ref 135–146)
SODIUM SERPL-SCNC: 124 MMOL/L — LOW (ref 135–146)
SODIUM SERPL-SCNC: 125 MMOL/L — LOW (ref 135–146)
SODIUM SERPL-SCNC: 125 MMOL/L — LOW (ref 135–146)
SP GR SPEC: 1.01 — SIGNIFICANT CHANGE UP (ref 1.01–1.02)
TSH SERPL-MCNC: 3.7 UIU/ML — SIGNIFICANT CHANGE UP (ref 0.27–4.2)
UROBILINOGEN FLD QL: SIGNIFICANT CHANGE UP
VIT B12 SERPL-MCNC: 441 PG/ML — SIGNIFICANT CHANGE UP (ref 232–1245)
WBC # BLD: 9.62 K/UL — SIGNIFICANT CHANGE UP (ref 4.8–10.8)
WBC # FLD AUTO: 9.62 K/UL — SIGNIFICANT CHANGE UP (ref 4.8–10.8)
WBC UR QL: 4 /HPF — SIGNIFICANT CHANGE UP (ref 0–5)

## 2020-06-13 PROCEDURE — 99233 SBSQ HOSP IP/OBS HIGH 50: CPT

## 2020-06-13 RX ORDER — FUROSEMIDE 40 MG
20 TABLET ORAL ONCE
Refills: 0 | Status: COMPLETED | OUTPATIENT
Start: 2020-06-13 | End: 2020-06-13

## 2020-06-13 RX ORDER — SODIUM CHLORIDE 9 MG/ML
1 INJECTION INTRAMUSCULAR; INTRAVENOUS; SUBCUTANEOUS EVERY 8 HOURS
Refills: 0 | Status: DISCONTINUED | OUTPATIENT
Start: 2020-06-13 | End: 2020-06-15

## 2020-06-13 RX ADMIN — Medication 1 MILLIGRAM(S): at 22:03

## 2020-06-13 RX ADMIN — CHLORHEXIDINE GLUCONATE 1 APPLICATION(S): 213 SOLUTION TOPICAL at 05:43

## 2020-06-13 RX ADMIN — Medication 1 TABLET(S): at 12:43

## 2020-06-13 RX ADMIN — LISINOPRIL 20 MILLIGRAM(S): 2.5 TABLET ORAL at 05:43

## 2020-06-13 RX ADMIN — MAGNESIUM OXIDE 400 MG ORAL TABLET 400 MILLIGRAM(S): 241.3 TABLET ORAL at 07:50

## 2020-06-13 RX ADMIN — Medication 105 MILLIGRAM(S): at 05:43

## 2020-06-13 RX ADMIN — MAGNESIUM OXIDE 400 MG ORAL TABLET 400 MILLIGRAM(S): 241.3 TABLET ORAL at 12:42

## 2020-06-13 RX ADMIN — Medication 81 MILLIGRAM(S): at 12:42

## 2020-06-13 RX ADMIN — SODIUM CHLORIDE 1 GRAM(S): 9 INJECTION INTRAMUSCULAR; INTRAVENOUS; SUBCUTANEOUS at 05:43

## 2020-06-13 RX ADMIN — Medication 105 MILLIGRAM(S): at 22:03

## 2020-06-13 RX ADMIN — APIXABAN 5 MILLIGRAM(S): 2.5 TABLET, FILM COATED ORAL at 05:43

## 2020-06-13 RX ADMIN — Medication 20 MILLIGRAM(S): at 14:52

## 2020-06-13 RX ADMIN — AMLODIPINE BESYLATE 10 MILLIGRAM(S): 2.5 TABLET ORAL at 05:43

## 2020-06-13 RX ADMIN — SODIUM CHLORIDE 1 GRAM(S): 9 INJECTION INTRAMUSCULAR; INTRAVENOUS; SUBCUTANEOUS at 22:03

## 2020-06-13 RX ADMIN — APIXABAN 5 MILLIGRAM(S): 2.5 TABLET, FILM COATED ORAL at 17:32

## 2020-06-13 RX ADMIN — MAGNESIUM OXIDE 400 MG ORAL TABLET 400 MILLIGRAM(S): 241.3 TABLET ORAL at 17:32

## 2020-06-13 RX ADMIN — Medication 75 MICROGRAM(S): at 05:43

## 2020-06-13 RX ADMIN — ATORVASTATIN CALCIUM 80 MILLIGRAM(S): 80 TABLET, FILM COATED ORAL at 22:03

## 2020-06-13 RX ADMIN — Medication 105 MILLIGRAM(S): at 13:01

## 2020-06-13 RX ADMIN — SODIUM CHLORIDE 1 GRAM(S): 9 INJECTION INTRAMUSCULAR; INTRAVENOUS; SUBCUTANEOUS at 17:32

## 2020-06-13 NOTE — DIETITIAN INITIAL EVALUATION ADULT. - ADD RECOMMEND
continue current diet order, encourage po intake (particularly salty snacks when possible), provide assistance with meals PRN

## 2020-06-13 NOTE — PROGRESS NOTE ADULT - SUBJECTIVE AND OBJECTIVE BOX
seen and examined  no distress   lying comfortable         PAST HISTORY  --------------------------------------------------------------------------------  No significant changes to PMH, PSH, FHx, SHx, unless otherwise noted    ALLERGIES & MEDICATIONS  --------------------------------------------------------------------------------  Allergies    No Known Allergies    Intolerances      Standing Inpatient Medications  amLODIPine   Tablet 10 milliGRAM(s) Oral daily  apixaban 5 milliGRAM(s) Oral two times a day  aspirin enteric coated 81 milliGRAM(s) Oral daily  atorvastatin 80 milliGRAM(s) Oral at bedtime  chlorhexidine 4% Liquid 1 Application(s) Topical <User Schedule>  folic acid 1 milliGRAM(s) Oral at bedtime  levothyroxine 75 MICROGram(s) Oral daily  lisinopril 20 milliGRAM(s) Oral daily  LORazepam   Injectable 1 milliGRAM(s) IV Push once  magnesium oxide 400 milliGRAM(s) Oral three times a day with meals  multivitamin 1 Tablet(s) Oral daily  sodium chloride 1 Gram(s) Oral every 12 hours  thiamine IVPB 500 milliGRAM(s) IV Intermittent every 8 hours      VITALS/PHYSICAL EXAM  --------------------------------------------------------------------------------  T(C): 36.4 (06-13-20 @ 05:30), Max: 37.1 (06-12-20 @ 22:40)  HR: 68 (06-13-20 @ 05:30) (66 - 77)  BP: 169/84 (06-13-20 @ 05:30) (137/65 - 170/77)  RR: 18 (06-13-20 @ 05:30) (18 - 19)  SpO2: --  Wt(kg): --  Height (cm): 182.88 (06-12-20 @ 15:57)  Weight (kg): 92.3 (06-12-20 @ 15:57)  BMI (kg/m2): 27.6 (06-12-20 @ 15:57)  BSA (m2): 2.15 (06-12-20 @ 15:57)      06-11-20 @ 07:01  -  06-12-20 @ 07:00  --------------------------------------------------------  IN: 1285 mL / OUT: 980 mL / NET: 305 mL    06-12-20 @ 07:01  -  06-13-20 @ 06:20  --------------------------------------------------------  IN: 100 mL / OUT: 780 mL / NET: -680 mL      Physical Exam:  	Gen: NAD  	Pulm: CTA B/L  	CV: S1S2; no rub  	Abd: +distended  	    LABS/STUDIES  --------------------------------------------------------------------------------              11.9   14.76 >-----------<  242      [06-12-20 @ 05:20]              33.9     123  |  90  |  20  ----------------------------<  107      [06-13-20 @ 01:00]  4.8   |  23  |  1.2        Ca     8.7     [06-13-20 @ 01:00]      Mg     2.2     [06-12-20 @ 05:20]        Creatinine Trend:  SCr 1.2 [06-13 @ 01:00]  SCr 1.3 [06-12 @ 21:00]  SCr 1.3 [06-12 @ 16:35]  SCr 1.2 [06-12 @ 05:20]  SCr 1.2 [06-12 @ 01:03]    Urinalysis - [06-13-20 @ 00:30]      Color Light Yellow / Appearance Clear / SG 1.015 / pH 7.0      Gluc Negative / Ketone Negative  / Bili Negative / Urobili <2 mg/dL       Blood Negative / Protein Trace / Leuk Est Small / Nitrite Negative      RBC 0 / WBC 4 / Hyaline 0 / Gran  / Sq Epi  / Non Sq Epi 1 / Bacteria Negative    Urine Creatinine 90      [06-09-20 @ 15:04]  Urine Sodium 60.0      [06-09-20 @ 15:04]  Urine Urea Nitrogen 585      [06-09-20 @ 15:04]  Urine Osmolality 457      [06-09-20 @ 15:04]    HbA1c 5.7      [09-10-18 @ 16:28]  TSH 2.05      [06-07-20 @ 06:04]  Lipid: chol 120, TG 73, HDL 54, LDL 55      [06-07-20 @ 06:04]

## 2020-06-13 NOTE — DIETITIAN INITIAL EVALUATION ADULT. - FACTORS AFF FOOD INTAKE
persistent lack of appetite/pt states that he hasn't been feeling like himself and hasn't been eating like he normally would. Pt reports that he will probably eat better when he gets to go home, because he likes the food better. Per EMR, po intake varies 0-100% and has been % as of recent. RD witnessed pt eating lunch during this assessment, so far ~25% consumed. Of note pt states that he is dying and wants to go home to die and is also noted to be depressed per EMR-- this is likely also affecting po intake. Not interested in oral po supplements. Pt denies nausea/abdominal pain with meals. NKFA/intolerances. No food preferences r/t culture/Worship. Pt states that he takes multiple vitamins pta but is unable to specify. Pt states that he has possibly lost wt, but is unsure of his UBW. There are no physical signs of muscle wasting/fat loss present upon RD observation. Pt denies diarrhea/constipation and states that he had a BM yesterday (6/12)

## 2020-06-13 NOTE — PROGRESS NOTE ADULT - ASSESSMENT
89 y/o M with hyponatremia in hospital for nausea, abdominal pain, mood changes. started on lexapro 2 weeks by his cardiologist. in ED code stroke for acute confusion and difficulty in speaking.  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   - CT chest noted    - sodium trending down, will give 1 dose of lasix 20, continue salt tablet    - BP noted, on amlodipine and lisinopril , if remains elevated start hydralazine 25 q 12  - TSH within normal     # ? stroke   - CT head unremarkable.   - s/p contrast imaging.   - neuro following. MRI no evidence of CVA    will follow

## 2020-06-13 NOTE — PROGRESS NOTE ADULT - SUBJECTIVE AND OBJECTIVE BOX
Hospital Day:  6d    Subjective: Patient is a 88y old  Male who presents with a chief complaint of AMS (2020 13:39)    Pt seen and evaluated at bedside. no over the night acute events reported.   Pt has no complaints. Pt looks clinically improved.     Past Medical Hx:   Hypothyroidism  Hypertension  Hyperlipidemia  Atrial fibrillation  Coronary artery disease    Past Sx:  History of loop recorder  H/O aortic valve replacement    Allergies:  No Known Allergies    Current Meds:   Standng Meds:  amLODIPine   Tablet 10 milliGRAM(s) Oral daily  apixaban 5 milliGRAM(s) Oral two times a day  aspirin enteric coated 81 milliGRAM(s) Oral daily  atorvastatin 80 milliGRAM(s) Oral at bedtime  chlorhexidine 4% Liquid 1 Application(s) Topical <User Schedule>  folic acid 1 milliGRAM(s) Oral at bedtime  levothyroxine 75 MICROGram(s) Oral daily  lisinopril 20 milliGRAM(s) Oral daily  LORazepam   Injectable 1 milliGRAM(s) IV Push once  magnesium oxide 400 milliGRAM(s) Oral three times a day with meals  multivitamin 1 Tablet(s) Oral daily  sodium chloride 1 Gram(s) Oral every 12 hours  thiamine IVPB 500 milliGRAM(s) IV Intermittent every 8 hours    PRN Meds:  acetaminophen   Tablet .. 650 milliGRAM(s) Oral every 6 hours PRN Moderate Pain (4 - 6)    HOME MEDICATIONS:  amLODIPine 5 mg oral tablet: 1 tab(s) orally once a day  aspirin 81 mg oral tablet: 1 tab(s) orally once a day  atorvastatin 80 mg oral tablet: 1 tab(s) orally once a day  Eliquis 5 mg oral tablet: 1 tab(s) orally 2 times a day  folic acid 1 mg oral tablet: 1 tab(s) orally once a day (at bedtime)  levothyroxine 75 mcg (0.075 mg) oral tablet: 1 tab(s) orally once a day  lisinopril 20 mg oral tablet: 1 tab(s) orally once a day (at bedtime)  tamsulosin 0.4 mg oral capsule: 1 cap(s) orally once a day      Vital Signs:   T(F): 97.6 (20 @ 05:30), Max: 98.7 (20 @ 22:40)  HR: 68 (20 @ 05:30) (66 - 68)  BP: 169/84 (20 @ 05:30) (137/65 - 169/84)  RR: 18 (20 @ 05:30) (18 - 19)  SpO2: --      20 @ 07:01  -  20 @ 07:00  --------------------------------------------------------  IN: 100 mL / OUT: 980 mL / NET: -880 mL    20 @ 07:01  -  20 @ 12:42  --------------------------------------------------------  IN: 0 mL / OUT: 300 mL / NET: -300 mL        Physical Exam:   GENERAL: NAD, Resting in bed, anxious  HEENT: NCAT  CHEST/LUNG: CTAB  HEART: Regular rate and rhythm; s1 s2 appreciated, No murmurs, rubs, or gallops  ABDOMEN: Soft, Nontender, Nondistended; Bowel sounds present  EXTREMITIES: No LE edema b/l  SKIN: no rashes, no new lesions  NERVOUS SYSTEM:  Alert & Oriented X3    Labs:                         10.6   9.62  )-----------( 226      ( 2020 06:24 )             31.9     Neutophil% 70.2, Lymphocyte% 17.9, Monocyte% 9.4, Bands% 0.4 20 @ 06:24    2020 06:24    125    |  91     |  18     ----------------------------<  104    5.1     |  23     |  1.2      Ca    8.8        2020 06:24  Mg     2.0       2020 06:24    TPro  5.4    /  Alb  3.5    /  TBili  0.4    /  DBili  x      /  AST  15     /  ALT  15     /  AlkPhos  53     2020 06:24      Urinalysis Basic - ( 2020 00:30 )    Color: Light Yellow / Appearance: Clear / S.015 / pH: x  Gluc: x / Ketone: Negative  / Bili: Negative / Urobili: <2 mg/dL   Blood: x / Protein: Trace / Nitrite: Negative   Leuk Esterase: Small / RBC: 0 /HPF / WBC 4 /HPF   Sq Epi: x / Non Sq Epi: 1 /HPF / Bacteria: Negative    Radiology:       Assessment and Plan:   89 y/o M w/ pMHx of Afib on Eliquis, CAD, HTN, HLD, and Hypothyroidism presented for AMS    # Metabolic Encephalopathy likely due to 2/2 Hyponatremia +/_ UTI  # 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     # Hypothyroidism  -On synthroid     # Severe hypomagnesemia   - stable    # Afib  -Continue with Eliquis    # DVT prophylaxis -On Eliquis  # GI ppx - not indicated  # Diet - Regular  # Activity - IAT  Full Code Hospital Day:  6d    Subjective: Patient is a 88y old  Male who presents with a chief complaint of AMS (2020 13:39)    Pt seen and evaluated at bedside. no over the night acute events reported.   Pt has no complaints. Pt looks clinically improved.     Past Medical Hx:   Hypothyroidism  Hypertension  Hyperlipidemia  Atrial fibrillation  Coronary artery disease    Past Sx:  History of loop recorder  H/O aortic valve replacement    Allergies:  No Known Allergies    Current Meds:   Standng Meds:  amLODIPine   Tablet 10 milliGRAM(s) Oral daily  apixaban 5 milliGRAM(s) Oral two times a day  aspirin enteric coated 81 milliGRAM(s) Oral daily  atorvastatin 80 milliGRAM(s) Oral at bedtime  chlorhexidine 4% Liquid 1 Application(s) Topical <User Schedule>  folic acid 1 milliGRAM(s) Oral at bedtime  levothyroxine 75 MICROGram(s) Oral daily  lisinopril 20 milliGRAM(s) Oral daily  LORazepam   Injectable 1 milliGRAM(s) IV Push once  magnesium oxide 400 milliGRAM(s) Oral three times a day with meals  multivitamin 1 Tablet(s) Oral daily  sodium chloride 1 Gram(s) Oral every 12 hours  thiamine IVPB 500 milliGRAM(s) IV Intermittent every 8 hours    PRN Meds:  acetaminophen   Tablet .. 650 milliGRAM(s) Oral every 6 hours PRN Moderate Pain (4 - 6)    HOME MEDICATIONS:  amLODIPine 5 mg oral tablet: 1 tab(s) orally once a day  aspirin 81 mg oral tablet: 1 tab(s) orally once a day  atorvastatin 80 mg oral tablet: 1 tab(s) orally once a day  Eliquis 5 mg oral tablet: 1 tab(s) orally 2 times a day  folic acid 1 mg oral tablet: 1 tab(s) orally once a day (at bedtime)  levothyroxine 75 mcg (0.075 mg) oral tablet: 1 tab(s) orally once a day  lisinopril 20 mg oral tablet: 1 tab(s) orally once a day (at bedtime)  tamsulosin 0.4 mg oral capsule: 1 cap(s) orally once a day      Vital Signs:   T(F): 97.6 (20 @ 05:30), Max: 98.7 (20 @ 22:40)  HR: 68 (20 @ 05:30) (66 - 68)  BP: 169/84 (20 @ 05:30) (137/65 - 169/84)  RR: 18 (20 @ 05:30) (18 - 19)  SpO2: --      20 @ 07:01  -  20 @ 07:00  --------------------------------------------------------  IN: 100 mL / OUT: 980 mL / NET: -880 mL    20 @ 07:01  -  20 @ 12:42  --------------------------------------------------------  IN: 0 mL / OUT: 300 mL / NET: -300 mL        Physical Exam:   GENERAL: NAD, Resting in bed, anxious  HEENT: NCAT  CHEST/LUNG: CTAB  HEART: Regular rate and rhythm; s1 s2 appreciated, No murmurs, rubs, or gallops  ABDOMEN: Soft, Nontender, Nondistended; Bowel sounds present  EXTREMITIES: No LE edema b/l  SKIN: no rashes, no new lesions  NERVOUS SYSTEM:  Alert & Oriented X3    Labs:                         10.6   9.62  )-----------( 226      ( 2020 06:24 )             31.9     Neutophil% 70.2, Lymphocyte% 17.9, Monocyte% 9.4, Bands% 0.4 20 @ 06:24    2020 06:24    125    |  91     |  18     ----------------------------<  104    5.1     |  23     |  1.2      Ca    8.8        2020 06:24  Mg     2.0       2020 06:24    TPro  5.4    /  Alb  3.5    /  TBili  0.4    /  DBili  x      /  AST  15     /  ALT  15     /  AlkPhos  53     2020 06:24      Urinalysis Basic - ( 2020 00:30 )    Color: Light Yellow / Appearance: Clear / S.015 / pH: x  Gluc: x / Ketone: Negative  / Bili: Negative / Urobili: <2 mg/dL   Blood: x / Protein: Trace / Nitrite: Negative   Leuk Esterase: Small / RBC: 0 /HPF / WBC 4 /HPF   Sq Epi: x / Non Sq Epi: 1 /HPF / Bacteria: Negative    Radiology:       Assessment and Plan:   89 y/o M w/ pMHx of Afib on Eliquis, CAD, HTN, HLD, and Hypothyroidism presented for AMS    # Metabolic Encephalopathy likely due to 2/2 Hyponatremia +/_ UTI  # 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     # Hypothyroidism  -On synthroid     # Severe hypomagnesemia   - stable    # Afib  -Continue with Eliquis    # DVT prophylaxis -On Eliquis  # GI ppx - not indicated  # Diet - Regular  # Activity - IAT  Full Code    Discharge Disposition: Acute in terms of worsening Hyponatremia , undetermined disposition for now Hospital Day:  6d    Subjective: Patient is a 88y old  Male who presents with a chief complaint of AMS (2020 13:39)    Pt seen and evaluated at bedside. no over the night acute events reported.   Pt has no complaints. Pt looks clinically improved.     Past Medical Hx:   Hypothyroidism  Hypertension  Hyperlipidemia  Atrial fibrillation  Coronary artery disease    Past Sx:  History of loop recorder  H/O aortic valve replacement    Allergies:  No Known Allergies    Current Meds:   Standng Meds:  amLODIPine   Tablet 10 milliGRAM(s) Oral daily  apixaban 5 milliGRAM(s) Oral two times a day  aspirin enteric coated 81 milliGRAM(s) Oral daily  atorvastatin 80 milliGRAM(s) Oral at bedtime  chlorhexidine 4% Liquid 1 Application(s) Topical <User Schedule>  folic acid 1 milliGRAM(s) Oral at bedtime  levothyroxine 75 MICROGram(s) Oral daily  lisinopril 20 milliGRAM(s) Oral daily  LORazepam   Injectable 1 milliGRAM(s) IV Push once  magnesium oxide 400 milliGRAM(s) Oral three times a day with meals  multivitamin 1 Tablet(s) Oral daily  sodium chloride 1 Gram(s) Oral every 12 hours  thiamine IVPB 500 milliGRAM(s) IV Intermittent every 8 hours    PRN Meds:  acetaminophen   Tablet .. 650 milliGRAM(s) Oral every 6 hours PRN Moderate Pain (4 - 6)    HOME MEDICATIONS:  amLODIPine 5 mg oral tablet: 1 tab(s) orally once a day  aspirin 81 mg oral tablet: 1 tab(s) orally once a day  atorvastatin 80 mg oral tablet: 1 tab(s) orally once a day  Eliquis 5 mg oral tablet: 1 tab(s) orally 2 times a day  folic acid 1 mg oral tablet: 1 tab(s) orally once a day (at bedtime)  levothyroxine 75 mcg (0.075 mg) oral tablet: 1 tab(s) orally once a day  lisinopril 20 mg oral tablet: 1 tab(s) orally once a day (at bedtime)  tamsulosin 0.4 mg oral capsule: 1 cap(s) orally once a day      Vital Signs:   T(F): 97.6 (20 @ 05:30), Max: 98.7 (20 @ 22:40)  HR: 68 (20 @ 05:30) (66 - 68)  BP: 169/84 (20 @ 05:30) (137/65 - 169/84)  RR: 18 (20 @ 05:30) (18 - 19)  SpO2: --      20 @ 07:01  -  20 @ 07:00  --------------------------------------------------------  IN: 100 mL / OUT: 980 mL / NET: -880 mL    20 @ 07:01  -  20 @ 12:42  --------------------------------------------------------  IN: 0 mL / OUT: 300 mL / NET: -300 mL        Physical Exam:   GENERAL: NAD, Resting in bed, anxious  HEENT: NCAT  CHEST/LUNG: CTAB  HEART: Regular rate and rhythm; s1 s2 appreciated, No murmurs, rubs, or gallops  ABDOMEN: Soft, Nontender, Nondistended; Bowel sounds present  EXTREMITIES: No LE edema b/l  SKIN: no rashes, no new lesions  NERVOUS SYSTEM:  Alert & Oriented X3    Labs:                         10.6   9.62  )-----------( 226      ( 2020 06:24 )             31.9     Neutophil% 70.2, Lymphocyte% 17.9, Monocyte% 9.4, Bands% 0.4 20 @ 06:24    2020 06:24    125    |  91     |  18     ----------------------------<  104    5.1     |  23     |  1.2      Ca    8.8        2020 06:24  Mg     2.0       2020 06:24    TPro  5.4    /  Alb  3.5    /  TBili  0.4    /  DBili  x      /  AST  15     /  ALT  15     /  AlkPhos  53     2020 06:24      Urinalysis Basic - ( 2020 00:30 )    Color: Light Yellow / Appearance: Clear / S.015 / pH: x  Gluc: x / Ketone: Negative  / Bili: Negative / Urobili: <2 mg/dL   Blood: x / Protein: Trace / Nitrite: Negative   Leuk Esterase: Small / RBC: 0 /HPF / WBC 4 /HPF   Sq Epi: x / Non Sq Epi: 1 /HPF / Bacteria: Negative    Radiology:       Assessment and Plan:   89 y/o M w/ pMHx of Afib on Eliquis, CAD, HTN, HLD, and Hypothyroidism presented for AMS    # Metabolic Encephalopathy likely due to 2/2 Hyponatremia +/_ UTI  # 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     # Hypothyroidism  -On synthroid     # Severe hypomagnesemia   - stable    # Afib  -Continue with Eliquis    # DVT prophylaxis -On Eliquis  # GI ppx - not indicated  # Diet - Regular  # Activity - IAT  Full Code    Discharge Disposition: Acute in terms of worsening Hyponatremia , undetermined disposition for now

## 2020-06-13 NOTE — DIETITIAN INITIAL EVALUATION ADULT. - RD TO REMAIN AVAILABLE
yes/INTERVENTION: meals and snacks, coordination of care. ME: RD to monitor diet order, energy intake, body composition, NFPF, electrolyte profile

## 2020-06-13 NOTE — DIETITIAN INITIAL EVALUATION ADULT. - OTHER INFO
Pt admitted d/t TIA and hyponatremia. Per neuro, there is no evidence of CVA on MRI. Sodium is improving, however pt not at baseline per progress note on 6/12 @ 15:57; pt also having difficulty ambulating.

## 2020-06-14 LAB
ANION GAP SERPL CALC-SCNC: 10 MMOL/L — SIGNIFICANT CHANGE UP (ref 7–14)
ANION GAP SERPL CALC-SCNC: 8 MMOL/L — SIGNIFICANT CHANGE UP (ref 7–14)
BASOPHILS # BLD AUTO: 0.07 K/UL — SIGNIFICANT CHANGE UP (ref 0–0.2)
BASOPHILS NFR BLD AUTO: 0.8 % — SIGNIFICANT CHANGE UP (ref 0–1)
BUN SERPL-MCNC: 18 MG/DL — SIGNIFICANT CHANGE UP (ref 10–20)
BUN SERPL-MCNC: 21 MG/DL — HIGH (ref 10–20)
CALCIUM SERPL-MCNC: 8.9 MG/DL — SIGNIFICANT CHANGE UP (ref 8.5–10.1)
CALCIUM SERPL-MCNC: 8.9 MG/DL — SIGNIFICANT CHANGE UP (ref 8.5–10.1)
CHLORIDE SERPL-SCNC: 88 MMOL/L — LOW (ref 98–110)
CHLORIDE SERPL-SCNC: 89 MMOL/L — LOW (ref 98–110)
CO2 SERPL-SCNC: 26 MMOL/L — SIGNIFICANT CHANGE UP (ref 17–32)
CO2 SERPL-SCNC: 26 MMOL/L — SIGNIFICANT CHANGE UP (ref 17–32)
CREAT SERPL-MCNC: 1.3 MG/DL — SIGNIFICANT CHANGE UP (ref 0.7–1.5)
CREAT SERPL-MCNC: 1.3 MG/DL — SIGNIFICANT CHANGE UP (ref 0.7–1.5)
CULTURE RESULTS: SIGNIFICANT CHANGE UP
EOSINOPHIL # BLD AUTO: 0.05 K/UL — SIGNIFICANT CHANGE UP (ref 0–0.7)
EOSINOPHIL NFR BLD AUTO: 0.6 % — SIGNIFICANT CHANGE UP (ref 0–8)
GLUCOSE SERPL-MCNC: 107 MG/DL — HIGH (ref 70–99)
GLUCOSE SERPL-MCNC: 110 MG/DL — HIGH (ref 70–99)
HCT VFR BLD CALC: 32 % — LOW (ref 42–52)
HGB BLD-MCNC: 11.2 G/DL — LOW (ref 14–18)
IMM GRANULOCYTES NFR BLD AUTO: 0.4 % — HIGH (ref 0.1–0.3)
LYMPHOCYTES # BLD AUTO: 1.31 K/UL — SIGNIFICANT CHANGE UP (ref 1.2–3.4)
LYMPHOCYTES # BLD AUTO: 15.7 % — LOW (ref 20.5–51.1)
MAGNESIUM SERPL-MCNC: 1.9 MG/DL — SIGNIFICANT CHANGE UP (ref 1.8–2.4)
MCHC RBC-ENTMCNC: 33.3 PG — HIGH (ref 27–31)
MCHC RBC-ENTMCNC: 35 G/DL — SIGNIFICANT CHANGE UP (ref 32–37)
MCV RBC AUTO: 95.2 FL — HIGH (ref 80–94)
MONOCYTES # BLD AUTO: 0.86 K/UL — HIGH (ref 0.1–0.6)
MONOCYTES NFR BLD AUTO: 10.3 % — HIGH (ref 1.7–9.3)
NEUTROPHILS # BLD AUTO: 6 K/UL — SIGNIFICANT CHANGE UP (ref 1.4–6.5)
NEUTROPHILS NFR BLD AUTO: 72.2 % — SIGNIFICANT CHANGE UP (ref 42.2–75.2)
NRBC # BLD: 0 /100 WBCS — SIGNIFICANT CHANGE UP (ref 0–0)
PLATELET # BLD AUTO: 232 K/UL — SIGNIFICANT CHANGE UP (ref 130–400)
POTASSIUM SERPL-MCNC: 5 MMOL/L — SIGNIFICANT CHANGE UP (ref 3.5–5)
POTASSIUM SERPL-MCNC: 5.1 MMOL/L — HIGH (ref 3.5–5)
POTASSIUM SERPL-SCNC: 5 MMOL/L — SIGNIFICANT CHANGE UP (ref 3.5–5)
POTASSIUM SERPL-SCNC: 5.1 MMOL/L — HIGH (ref 3.5–5)
RBC # BLD: 3.36 M/UL — LOW (ref 4.7–6.1)
RBC # FLD: 12.3 % — SIGNIFICANT CHANGE UP (ref 11.5–14.5)
SODIUM SERPL-SCNC: 122 MMOL/L — LOW (ref 135–146)
SODIUM SERPL-SCNC: 125 MMOL/L — LOW (ref 135–146)
SPECIMEN SOURCE: SIGNIFICANT CHANGE UP
WBC # BLD: 8.32 K/UL — SIGNIFICANT CHANGE UP (ref 4.8–10.8)
WBC # FLD AUTO: 8.32 K/UL — SIGNIFICANT CHANGE UP (ref 4.8–10.8)

## 2020-06-14 PROCEDURE — 99233 SBSQ HOSP IP/OBS HIGH 50: CPT

## 2020-06-14 RX ADMIN — Medication 81 MILLIGRAM(S): at 13:23

## 2020-06-14 RX ADMIN — SODIUM CHLORIDE 1 GRAM(S): 9 INJECTION INTRAMUSCULAR; INTRAVENOUS; SUBCUTANEOUS at 06:07

## 2020-06-14 RX ADMIN — AMLODIPINE BESYLATE 10 MILLIGRAM(S): 2.5 TABLET ORAL at 06:07

## 2020-06-14 RX ADMIN — APIXABAN 5 MILLIGRAM(S): 2.5 TABLET, FILM COATED ORAL at 17:50

## 2020-06-14 RX ADMIN — SODIUM CHLORIDE 1 GRAM(S): 9 INJECTION INTRAMUSCULAR; INTRAVENOUS; SUBCUTANEOUS at 21:05

## 2020-06-14 RX ADMIN — LISINOPRIL 20 MILLIGRAM(S): 2.5 TABLET ORAL at 06:07

## 2020-06-14 RX ADMIN — MAGNESIUM OXIDE 400 MG ORAL TABLET 400 MILLIGRAM(S): 241.3 TABLET ORAL at 13:23

## 2020-06-14 RX ADMIN — MAGNESIUM OXIDE 400 MG ORAL TABLET 400 MILLIGRAM(S): 241.3 TABLET ORAL at 09:38

## 2020-06-14 RX ADMIN — Medication 105 MILLIGRAM(S): at 06:06

## 2020-06-14 RX ADMIN — APIXABAN 5 MILLIGRAM(S): 2.5 TABLET, FILM COATED ORAL at 06:07

## 2020-06-14 RX ADMIN — MAGNESIUM OXIDE 400 MG ORAL TABLET 400 MILLIGRAM(S): 241.3 TABLET ORAL at 17:50

## 2020-06-14 RX ADMIN — SODIUM CHLORIDE 1 GRAM(S): 9 INJECTION INTRAMUSCULAR; INTRAVENOUS; SUBCUTANEOUS at 13:23

## 2020-06-14 RX ADMIN — Medication 105 MILLIGRAM(S): at 13:23

## 2020-06-14 RX ADMIN — ATORVASTATIN CALCIUM 80 MILLIGRAM(S): 80 TABLET, FILM COATED ORAL at 21:05

## 2020-06-14 RX ADMIN — Medication 105 MILLIGRAM(S): at 21:05

## 2020-06-14 RX ADMIN — Medication 1 TABLET(S): at 13:30

## 2020-06-14 RX ADMIN — Medication 75 MICROGRAM(S): at 06:07

## 2020-06-14 RX ADMIN — Medication 1 MILLIGRAM(S): at 21:05

## 2020-06-14 NOTE — PROGRESS NOTE ADULT - ASSESSMENT
Pt changed of mental status , secondary to hyponatremia , Na+ 125 slowly rising , continue to restrict free water

## 2020-06-14 NOTE — PROGRESS NOTE ADULT - SUBJECTIVE AND OBJECTIVE BOX
Progress Note:  Provider Speciality                            Hospitalist      AMA HARDY [id number] 88y Male     CHIEF PRESENTING COMPLAINT:  Patient is a 88y old  Male who presents with a chief complaint of AMS (13 Jun 2020 12:42)        SUBJECTIVE:  Patient was seen and examined at bedside. Reports improvement in  presenting complaint. No significant overnight events reported.     HISTORY OF PRESENTING ILLNESS:  HPI:  88 year old male with PMH of atrial fibrillation on Eliquis HTN, DLD, hypothyroidism, suspected TIA 2 years ago and has a loop recorder presents with  chief complaint of nausea and abdominal pain. The main history was taken from the daughter. 2 weeks ago the family noted mood changes, he was getting upset at unusual things, such a mowing the lawn and feeling depressed. So the family discussed his situation with his cardiologist who started him on lexapro. Initially he improved but today he was back to depressed mood, did not feel good at all, initially grabbed his chest and showed signs of discomfort, delirium and hence was brought to the hospital by his daughter. While in ED a stroke code was activated for acute onset confusion and difficulty speaking. He was found to have elevated BP and was having difficulty in expressing his concerns and confusion initially.  Currently he is alert oriented X2-3 and no signs of seizures. (07 Jun 2020 02:00)        REVIEW OF SYSTEMS:  Patient denies any headache, any vision complaints, runny nose, fever, chills, sore throat. Denies chest pain, shortness of breath, palpitation. Denies nausea, vomiting, abdominal pain, diarrhoea, Denies urinary burning, urgency, frequency, dysuria. Denies weakness in any part of the body or numbness.   At least 10 systems were reviewed in ROS. All systems reviewed  are within normal limits except for the complaints as described in Subjective.    PAST MEDICAL & SURGICAL HISTORY:  PAST MEDICAL & SURGICAL HISTORY:  Hypothyroidism  Hypertension  Hyperlipidemia  Atrial fibrillation  Coronary artery disease  History of loop recorder: 2017  H/O aortic valve replacement: porcine          VITAL SIGNS:  Vital Signs Last 24 Hrs  T(C): 36.6 (14 Jun 2020 13:42), Max: 36.8 (13 Jun 2020 21:00)  T(F): 97.9 (14 Jun 2020 13:42), Max: 98.3 (13 Jun 2020 21:00)  HR: 67 (14 Jun 2020 13:42) (60 - 69)  BP: 156/74 (14 Jun 2020 13:42) (156/74 - 176/72)  BP(mean): --  RR: 18 (14 Jun 2020 13:42) (18 - 18)  SpO2: --          PHYSICAL EXAMINATION:  Not in acute distress  General: No pallor, no icterus  HEENT:   EOMI, no JVD,.  Heart: S1+S2 audible  Lungs: bilateral  fair air entry, no wheezing, no crepitations.  Abdomen: Soft, non-tender, non-distended , no  rigidity or guarding.  CNS: Awake alert, CN  grossly intact.  Extremities:  No edema            CONSULTS:  Consultant(s) Notes Reviewed by me.   Care Discussed with Consultants/Other Providers where required.        MEDICATIONS:  MEDICATIONS  (STANDING):  amLODIPine   Tablet 10 milliGRAM(s) Oral daily  apixaban 5 milliGRAM(s) Oral two times a day  aspirin enteric coated 81 milliGRAM(s) Oral daily  atorvastatin 80 milliGRAM(s) Oral at bedtime  chlorhexidine 4% Liquid 1 Application(s) Topical <User Schedule>  folic acid 1 milliGRAM(s) Oral at bedtime  levothyroxine 75 MICROGram(s) Oral daily  lisinopril 20 milliGRAM(s) Oral daily  LORazepam   Injectable 1 milliGRAM(s) IV Push once  magnesium oxide 400 milliGRAM(s) Oral three times a day with meals  multivitamin 1 Tablet(s) Oral daily  sodium chloride 1 Gram(s) Oral every 8 hours  thiamine IVPB 500 milliGRAM(s) IV Intermittent every 8 hours    MEDICATIONS  (PRN):  acetaminophen   Tablet .. 650 milliGRAM(s) Oral every 6 hours PRN Moderate Pain (4 - 6)            ASSESSMENT:    88 year old man with pMHx of Afib on Eliquis, CAD, HTN, HLD, and Hypothyroidism presented for AMS    Assessment/Plan:   Symptomatic Hyponatremia , possibly SIADH, secondary to medication effect vs infection ( Lexapro vs hctz)  Free water Restriction  Metabolic Encephalopathy- improved  MRI brain non-significant for acute changes .TTE non-significant. No CVA  Suspected Urinary tract infection-completed antibiotic  HTN- controlled. Continue with Home medications.   Hypothyroidism. continue with  synthroid   Severe hypomagnesemia- Electrolyte supplementation and follow up with BMP. Keep K+>4, Mg>2    chronic Afib. continue with anti-coagulation with Eliquis  Discharge Disposition: Anticipate in 24-48 hrs if Hyponatremia improves or remains stable       Activity:  Discharge Disposition:

## 2020-06-14 NOTE — PROGRESS NOTE ADULT - SUBJECTIVE AND OBJECTIVE BOX
[facility] FOLLOW UP NOTE  --------------------------------------------------------------------------------  Chief Complaint:    24 hour events/subjective:        PAST HISTORY  --------------------------------------------------------------------------------  No significant changes to PMH, PSH, FHx, SHx, unless otherwise noted    ALLERGIES & MEDICATIONS  --------------------------------------------------------------------------------  Allergies    No Known Allergies    Intolerances      Standing Inpatient Medications  amLODIPine   Tablet 10 milliGRAM(s) Oral daily  apixaban 5 milliGRAM(s) Oral two times a day  aspirin enteric coated 81 milliGRAM(s) Oral daily  atorvastatin 80 milliGRAM(s) Oral at bedtime  chlorhexidine 4% Liquid 1 Application(s) Topical <User Schedule>  folic acid 1 milliGRAM(s) Oral at bedtime  levothyroxine 75 MICROGram(s) Oral daily  lisinopril 20 milliGRAM(s) Oral daily  LORazepam   Injectable 1 milliGRAM(s) IV Push once  magnesium oxide 400 milliGRAM(s) Oral three times a day with meals  multivitamin 1 Tablet(s) Oral daily  sodium chloride 1 Gram(s) Oral every 8 hours  thiamine IVPB 500 milliGRAM(s) IV Intermittent every 8 hours    PRN Inpatient Medications  acetaminophen   Tablet .. 650 milliGRAM(s) Oral every 6 hours PRN      REVIEW OF SYSTEMS  --------------------------------------------------------------------------------  Gen: No weight changes, fatigue, fevers/chills, weakness  Skin: No rashes  Head/Eyes/Ears/Mouth: No headache; Normal hearing; Normal vision w/o blurriness; No sinus pain/discomfort, sore throat  Respiratory: No dyspnea, cough, wheezing, hemoptysis  CV: No chest pain, PND, orthopnea  GI: No abdominal pain, diarrhea, constipation, nausea, vomiting, melena, hematochezia  : No increased frequency, dysuria, hematuria, nocturia  MSK: No joint pain/swelling; no back pain; no edema  Neuro: No dizziness/lightheadedness, weakness, seizures, numbness, tingling  Heme: No easy bruising or bleeding  Endo: No heat/cold intolerance  Psych: No significant nervousness, anxiety, stress, depression    All other systems were reviewed and are negative, except as noted.    VITALS/PHYSICAL EXAM  --------------------------------------------------------------------------------  T(C): 36.1 (06-14-20 @ 06:26), Max: 36.8 (06-13-20 @ 21:00)  HR: 64 (06-14-20 @ 06:26) (60 - 71)  BP: 164/81 (06-14-20 @ 06:26) (164/81 - 176/72)  RR: 18 (06-14-20 @ 06:26) (18 - 18)  SpO2: --  Wt(kg): --  Height (cm): 182.88 (06-13-20 @ 12:18)  Weight (kg): 92.3 (06-12-20 @ 15:57)  BMI (kg/m2): 27.6 (06-13-20 @ 12:18)  BSA (m2): 2.15 (06-13-20 @ 12:18)      06-13-20 @ 07:01  -  06-14-20 @ 07:00  --------------------------------------------------------  IN: 0 mL / OUT: 1150 mL / NET: -1150 mL      Physical Exam:  	Gen: NAD, well-appearing  	HEENT: PERRL, supple neck, clear oropharynx  	Pulm: CTA B/L  	CV: RRR, S1S2; no rub  	Back: No spinal or CVA tenderness; no sacral edema  	Abd: +BS, soft, nontender/nondistended  	: No suprapubic tenderness  	UE: Warm, FROM, no clubbing, intact strength; no edema; no asterixis  	LE: Warm, FROM, no clubbing, intact strength; no edema  	Neuro: No focal deficits, intact gait  	Psych: Normal affect and mood  	Skin: Warm, without rashes  	Vascular access:    LABS/STUDIES  --------------------------------------------------------------------------------              11.2   8.32  >-----------<  232      [06-14-20 @ 07:27]              32.0     125  |  89  |  18  ----------------------------<  110      [06-14-20 @ 07:27]  5.1   |  26  |  1.3        Ca     8.9     [06-14-20 @ 07:27]      Mg     1.9     [06-14-20 @ 07:27]    TPro  5.4  /  Alb  3.5  /  TBili  0.4  /  DBili  x   /  AST  15  /  ALT  15  /  AlkPhos  53  [06-13-20 @ 06:24]          Creatinine Trend:  SCr 1.3 [06-14 @ 07:27]  SCr 1.3 [06-14 @ 00:18]  SCr 1.3 [06-13 @ 19:54]  SCr 1.4 [06-13 @ 16:00]  SCr 1.4 [06-13 @ 11:06]    Urinalysis - [06-13-20 @ 00:30]      Color Light Yellow / Appearance Clear / SG 1.015 / pH 7.0      Gluc Negative / Ketone Negative  / Bili Negative / Urobili <2 mg/dL       Blood Negative / Protein Trace / Leuk Est Small / Nitrite Negative      RBC 0 / WBC 4 / Hyaline 0 / Gran  / Sq Epi  / Non Sq Epi 1 / Bacteria Negative    Urine Creatinine 90      [06-09-20 @ 15:04]  Urine Sodium 60.0      [06-09-20 @ 15:04]  Urine Urea Nitrogen 585      [06-09-20 @ 15:04]  Urine Osmolality 457      [06-09-20 @ 15:04]    HbA1c 5.7      [09-10-18 @ 16:28]  TSH 3.70      [06-13-20 @ 06:24]  Lipid: chol 120, TG 73, HDL 54, LDL 55      [06-07-20 @ 06:04]

## 2020-06-15 LAB
ALBUMIN SERPL ELPH-MCNC: 3.8 G/DL — SIGNIFICANT CHANGE UP (ref 3.5–5.2)
ALP SERPL-CCNC: 58 U/L — SIGNIFICANT CHANGE UP (ref 30–115)
ALT FLD-CCNC: 16 U/L — SIGNIFICANT CHANGE UP (ref 0–41)
ANION GAP SERPL CALC-SCNC: 12 MMOL/L — SIGNIFICANT CHANGE UP (ref 7–14)
ANION GAP SERPL CALC-SCNC: 12 MMOL/L — SIGNIFICANT CHANGE UP (ref 7–14)
ANION GAP SERPL CALC-SCNC: 13 MMOL/L — SIGNIFICANT CHANGE UP (ref 7–14)
AST SERPL-CCNC: 17 U/L — SIGNIFICANT CHANGE UP (ref 0–41)
BASOPHILS # BLD AUTO: 0.08 K/UL — SIGNIFICANT CHANGE UP (ref 0–0.2)
BASOPHILS NFR BLD AUTO: 0.9 % — SIGNIFICANT CHANGE UP (ref 0–1)
BILIRUB SERPL-MCNC: 0.8 MG/DL — SIGNIFICANT CHANGE UP (ref 0.2–1.2)
BUN SERPL-MCNC: 18 MG/DL — SIGNIFICANT CHANGE UP (ref 10–20)
BUN SERPL-MCNC: 21 MG/DL — HIGH (ref 10–20)
BUN SERPL-MCNC: 25 MG/DL — HIGH (ref 10–20)
CALCIUM SERPL-MCNC: 8.8 MG/DL — SIGNIFICANT CHANGE UP (ref 8.5–10.1)
CALCIUM SERPL-MCNC: 8.9 MG/DL — SIGNIFICANT CHANGE UP (ref 8.5–10.1)
CALCIUM SERPL-MCNC: 9.1 MG/DL — SIGNIFICANT CHANGE UP (ref 8.5–10.1)
CHLORIDE SERPL-SCNC: 87 MMOL/L — LOW (ref 98–110)
CHLORIDE SERPL-SCNC: 89 MMOL/L — LOW (ref 98–110)
CHLORIDE SERPL-SCNC: 94 MMOL/L — LOW (ref 98–110)
CO2 SERPL-SCNC: 20 MMOL/L — SIGNIFICANT CHANGE UP (ref 17–32)
CO2 SERPL-SCNC: 22 MMOL/L — SIGNIFICANT CHANGE UP (ref 17–32)
CO2 SERPL-SCNC: 23 MMOL/L — SIGNIFICANT CHANGE UP (ref 17–32)
CREAT SERPL-MCNC: 1.2 MG/DL — SIGNIFICANT CHANGE UP (ref 0.7–1.5)
CREAT SERPL-MCNC: 1.3 MG/DL — SIGNIFICANT CHANGE UP (ref 0.7–1.5)
CREAT SERPL-MCNC: 1.3 MG/DL — SIGNIFICANT CHANGE UP (ref 0.7–1.5)
EOSINOPHIL # BLD AUTO: 0.06 K/UL — SIGNIFICANT CHANGE UP (ref 0–0.7)
EOSINOPHIL NFR BLD AUTO: 0.7 % — SIGNIFICANT CHANGE UP (ref 0–8)
GLUCOSE SERPL-MCNC: 105 MG/DL — HIGH (ref 70–99)
GLUCOSE SERPL-MCNC: 94 MG/DL — SIGNIFICANT CHANGE UP (ref 70–99)
GLUCOSE SERPL-MCNC: 99 MG/DL — SIGNIFICANT CHANGE UP (ref 70–99)
HCT VFR BLD CALC: 33 % — LOW (ref 42–52)
HGB BLD-MCNC: 11.5 G/DL — LOW (ref 14–18)
IMM GRANULOCYTES NFR BLD AUTO: 0.5 % — HIGH (ref 0.1–0.3)
LYMPHOCYTES # BLD AUTO: 1.69 K/UL — SIGNIFICANT CHANGE UP (ref 1.2–3.4)
LYMPHOCYTES # BLD AUTO: 19.8 % — LOW (ref 20.5–51.1)
MAGNESIUM SERPL-MCNC: 1.8 MG/DL — SIGNIFICANT CHANGE UP (ref 1.8–2.4)
MCHC RBC-ENTMCNC: 32.6 PG — HIGH (ref 27–31)
MCHC RBC-ENTMCNC: 34.8 G/DL — SIGNIFICANT CHANGE UP (ref 32–37)
MCV RBC AUTO: 93.5 FL — SIGNIFICANT CHANGE UP (ref 80–94)
MONOCYTES # BLD AUTO: 0.87 K/UL — HIGH (ref 0.1–0.6)
MONOCYTES NFR BLD AUTO: 10.2 % — HIGH (ref 1.7–9.3)
NEUTROPHILS # BLD AUTO: 5.79 K/UL — SIGNIFICANT CHANGE UP (ref 1.4–6.5)
NEUTROPHILS NFR BLD AUTO: 67.9 % — SIGNIFICANT CHANGE UP (ref 42.2–75.2)
NRBC # BLD: 0 /100 WBCS — SIGNIFICANT CHANGE UP (ref 0–0)
PLATELET # BLD AUTO: 264 K/UL — SIGNIFICANT CHANGE UP (ref 130–400)
POTASSIUM SERPL-MCNC: 4.9 MMOL/L — SIGNIFICANT CHANGE UP (ref 3.5–5)
POTASSIUM SERPL-MCNC: 4.9 MMOL/L — SIGNIFICANT CHANGE UP (ref 3.5–5)
POTASSIUM SERPL-MCNC: 5.1 MMOL/L — HIGH (ref 3.5–5)
POTASSIUM SERPL-SCNC: 4.9 MMOL/L — SIGNIFICANT CHANGE UP (ref 3.5–5)
POTASSIUM SERPL-SCNC: 4.9 MMOL/L — SIGNIFICANT CHANGE UP (ref 3.5–5)
POTASSIUM SERPL-SCNC: 5.1 MMOL/L — HIGH (ref 3.5–5)
PROT SERPL-MCNC: 5.8 G/DL — LOW (ref 6–8)
RBC # BLD: 3.53 M/UL — LOW (ref 4.7–6.1)
RBC # FLD: 12.2 % — SIGNIFICANT CHANGE UP (ref 11.5–14.5)
SODIUM SERPL-SCNC: 121 MMOL/L — LOW (ref 135–146)
SODIUM SERPL-SCNC: 124 MMOL/L — LOW (ref 135–146)
SODIUM SERPL-SCNC: 127 MMOL/L — LOW (ref 135–146)
WBC # BLD: 8.53 K/UL — SIGNIFICANT CHANGE UP (ref 4.8–10.8)
WBC # FLD AUTO: 8.53 K/UL — SIGNIFICANT CHANGE UP (ref 4.8–10.8)

## 2020-06-15 PROCEDURE — 99233 SBSQ HOSP IP/OBS HIGH 50: CPT

## 2020-06-15 RX ORDER — TOLVAPTAN 15 MG/1
15 TABLET ORAL ONCE
Refills: 0 | Status: COMPLETED | OUTPATIENT
Start: 2020-06-15 | End: 2020-06-15

## 2020-06-15 RX ORDER — SODIUM CHLORIDE 9 MG/ML
2 INJECTION INTRAMUSCULAR; INTRAVENOUS; SUBCUTANEOUS EVERY 8 HOURS
Refills: 0 | Status: DISCONTINUED | OUTPATIENT
Start: 2020-06-15 | End: 2020-06-16

## 2020-06-15 RX ORDER — NIFEDIPINE 30 MG
30 TABLET, EXTENDED RELEASE 24 HR ORAL DAILY
Refills: 0 | Status: DISCONTINUED | OUTPATIENT
Start: 2020-06-15 | End: 2020-06-16

## 2020-06-15 RX ADMIN — AMLODIPINE BESYLATE 10 MILLIGRAM(S): 2.5 TABLET ORAL at 06:22

## 2020-06-15 RX ADMIN — Medication 1 MILLIGRAM(S): at 21:08

## 2020-06-15 RX ADMIN — Medication 75 MICROGRAM(S): at 06:22

## 2020-06-15 RX ADMIN — SODIUM CHLORIDE 1 GRAM(S): 9 INJECTION INTRAMUSCULAR; INTRAVENOUS; SUBCUTANEOUS at 06:22

## 2020-06-15 RX ADMIN — APIXABAN 5 MILLIGRAM(S): 2.5 TABLET, FILM COATED ORAL at 18:14

## 2020-06-15 RX ADMIN — Medication 105 MILLIGRAM(S): at 06:25

## 2020-06-15 RX ADMIN — SODIUM CHLORIDE 2 GRAM(S): 9 INJECTION INTRAMUSCULAR; INTRAVENOUS; SUBCUTANEOUS at 21:08

## 2020-06-15 RX ADMIN — ATORVASTATIN CALCIUM 80 MILLIGRAM(S): 80 TABLET, FILM COATED ORAL at 21:08

## 2020-06-15 RX ADMIN — Medication 1 TABLET(S): at 11:36

## 2020-06-15 RX ADMIN — MAGNESIUM OXIDE 400 MG ORAL TABLET 400 MILLIGRAM(S): 241.3 TABLET ORAL at 18:14

## 2020-06-15 RX ADMIN — MAGNESIUM OXIDE 400 MG ORAL TABLET 400 MILLIGRAM(S): 241.3 TABLET ORAL at 11:36

## 2020-06-15 RX ADMIN — Medication 81 MILLIGRAM(S): at 11:35

## 2020-06-15 RX ADMIN — Medication 105 MILLIGRAM(S): at 13:36

## 2020-06-15 RX ADMIN — LISINOPRIL 20 MILLIGRAM(S): 2.5 TABLET ORAL at 06:22

## 2020-06-15 RX ADMIN — Medication 30 MILLIGRAM(S): at 09:45

## 2020-06-15 RX ADMIN — MAGNESIUM OXIDE 400 MG ORAL TABLET 400 MILLIGRAM(S): 241.3 TABLET ORAL at 09:02

## 2020-06-15 RX ADMIN — SODIUM CHLORIDE 2 GRAM(S): 9 INJECTION INTRAMUSCULAR; INTRAVENOUS; SUBCUTANEOUS at 13:37

## 2020-06-15 RX ADMIN — APIXABAN 5 MILLIGRAM(S): 2.5 TABLET, FILM COATED ORAL at 06:22

## 2020-06-15 RX ADMIN — TOLVAPTAN 15 MILLIGRAM(S): 15 TABLET ORAL at 13:45

## 2020-06-15 NOTE — PROGRESS NOTE ADULT - SUBJECTIVE AND OBJECTIVE BOX
Hospital Day:  8d    Subjective: Patient is a 88y old  Male who presents with a chief complaint of AMS (2020 12:42)    Pt seen and evaluated at bedside. no over the night acute events reported.   Pt has no complaints. Pt looks clinically improved.     Past Medical Hx:   Hypothyroidism  Hypertension  Hyperlipidemia  Atrial fibrillation  Coronary artery disease    Past Sx:  History of loop recorder  H/O aortic valve replacement    Allergies:  No Known Allergies    Current Meds:   Standng Meds:  apixaban 5 milliGRAM(s) Oral two times a day  aspirin enteric coated 81 milliGRAM(s) Oral daily  atorvastatin 80 milliGRAM(s) Oral at bedtime  chlorhexidine 4% Liquid 1 Application(s) Topical <User Schedule>  folic acid 1 milliGRAM(s) Oral at bedtime  levothyroxine 75 MICROGram(s) Oral daily  lisinopril 20 milliGRAM(s) Oral daily  LORazepam   Injectable 1 milliGRAM(s) IV Push once  magnesium oxide 400 milliGRAM(s) Oral three times a day with meals  multivitamin 1 Tablet(s) Oral daily  NIFEdipine XL 30 milliGRAM(s) Oral daily  sodium chloride 2 Gram(s) Oral every 8 hours  thiamine IVPB 500 milliGRAM(s) IV Intermittent every 8 hours  tolvaptan 15 milliGRAM(s) Oral once    PRN Meds:  acetaminophen   Tablet .. 650 milliGRAM(s) Oral every 6 hours PRN Moderate Pain (4 - 6)    HOME MEDICATIONS:  amLODIPine 5 mg oral tablet: 1 tab(s) orally once a day  aspirin 81 mg oral tablet: 1 tab(s) orally once a day  atorvastatin 80 mg oral tablet: 1 tab(s) orally once a day  Eliquis 5 mg oral tablet: 1 tab(s) orally 2 times a day  folic acid 1 mg oral tablet: 1 tab(s) orally once a day (at bedtime)  levothyroxine 75 mcg (0.075 mg) oral tablet: 1 tab(s) orally once a day  lisinopril 20 mg oral tablet: 1 tab(s) orally once a day (at bedtime)  tamsulosin 0.4 mg oral capsule: 1 cap(s) orally once a day      Vital Signs:   T(F): 98.7 (06-15-20 @ 05:37), Max: 98.7 (06-15-20 @ 05:37)  HR: 66 (06-15-20 @ 09:51) (63 - 67)  BP: 132/66 (06-15-20 @ 09:51) (132/66 - 186/84)  RR: 20 (06-15-20 @ 05:37) (18 - 20)  SpO2: 97% (06-15-20 @ 09:51) (97% - 97%)      20 @ 07:01  -  06-15-20 @ 07:00  --------------------------------------------------------  IN: 0 mL / OUT: 1725 mL / NET: -1725 mL    06-15-20 @ 07:01  -  06-15-20 @ 13:00  --------------------------------------------------------  IN: 0 mL / OUT: 600 mL / NET: -600 mL    Physical Exam:   GENERAL: NAD, Resting in bed, anxious  HEENT: NCAT  CHEST/LUNG: CTAB  HEART: Regular rate and rhythm; s1 s2 appreciated, No murmurs, rubs, or gallops  ABDOMEN: Soft, Nontender, Nondistended; Bowel sounds present  EXTREMITIES: No LE edema b/l  SKIN: no rashes, no new lesions  NERVOUS SYSTEM:  Alert & Oriented X3      Labs:                         11.5   8.53  )-----------( 264      ( 15 Robi 2020 06:09 )             33.0     Neutophil% 67.9, Lymphocyte% 19.8, Monocyte% 10.2, Bands% 0.5 06-15-20 @ 06:09    15 Robi 2020 06:09    124    |  89     |  18     ----------------------------<  99     4.9     |  23     |  1.2      Ca    8.8        15 Robi 2020 06:09  Mg     1.8       15 Robi 2020 06:09    TPro  5.8    /  Alb  3.8    /  TBili  0.8    /  DBili  x      /  AST  17     /  ALT  16     /  AlkPhos  58     15 Robi 2020 06:09    Urinalysis Basic - ( 2020 00:30 )    Color: Light Yellow / Appearance: Clear / S.015 / pH: x  Gluc: x / Ketone: Negative  / Bili: Negative / Urobili: <2 mg/dL   Blood: x / Protein: Trace / Nitrite: Negative   Leuk Esterase: Small / RBC: 0 /HPF / WBC 4 /HPF   Sq Epi: x / Non Sq Epi: 1 /HPF / Bacteria: Negative    Culture - Blood (collected 20 @ 16:35)  Source: .Blood None  Preliminary Report (20 @ 02:01):    No growth to date.        Radiology:   < from: MR Head w/wo IV Cont (20 @ 19:20) >  IMPRESSION:  1. No evidence of central pontine myelinolysis.  2. No intracranial mass, abnormal intracranial enhancement, or diffusion evidence of acute infarction.  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 (?drug induced, Lexapro vs hctz)   - MRI brain negative for CVA   - Echo unremarkable   - s/p 3% saline 100mg x2 and Tolvaptan 15mg PO X1  - Repeat MRI of brain: Unremarkable  - Sodium level 124, will give 1 time dose Tolvaptan 15mg again  - BMP q6h    # Asymptomatic UTI  - Urine Cx - Kluyvera ascorbata  - sensitive to Ceftriaxone  - s/p ceftriaxone 1g IV x 5 days    # HTN  - Lisinopril 20mg qd, Procardia 30mg qd     # Hypothyroidism  -On synthroid     # Severe hypomagnesemia   - stable    # Afib  -Continue with Eliquis    # Hx of Alcohol abuse  - Thiamine and folate supplementation     # DVT prophylaxis -On Eliquis  # GI ppx - not indicated  # Diet - Regular  # Activity - IAT  Full Code Hospital Day:  8d    Subjective: Patient is a 88y old  Male who presents with a chief complaint of AMS (2020 12:42)    Pt seen and evaluated at bedside. no over the night acute events reported.   Pt has no complaints. Pt looks clinically improved.     Past Medical Hx:   Hypothyroidism  Hypertension  Hyperlipidemia  Atrial fibrillation  Coronary artery disease    Past Sx:  History of loop recorder  H/O aortic valve replacement    Allergies:  No Known Allergies    Current Meds:   Standng Meds:  apixaban 5 milliGRAM(s) Oral two times a day  aspirin enteric coated 81 milliGRAM(s) Oral daily  atorvastatin 80 milliGRAM(s) Oral at bedtime  chlorhexidine 4% Liquid 1 Application(s) Topical <User Schedule>  folic acid 1 milliGRAM(s) Oral at bedtime  levothyroxine 75 MICROGram(s) Oral daily  lisinopril 20 milliGRAM(s) Oral daily  LORazepam   Injectable 1 milliGRAM(s) IV Push once  magnesium oxide 400 milliGRAM(s) Oral three times a day with meals  multivitamin 1 Tablet(s) Oral daily  NIFEdipine XL 30 milliGRAM(s) Oral daily  sodium chloride 2 Gram(s) Oral every 8 hours  thiamine IVPB 500 milliGRAM(s) IV Intermittent every 8 hours  tolvaptan 15 milliGRAM(s) Oral once    PRN Meds:  acetaminophen   Tablet .. 650 milliGRAM(s) Oral every 6 hours PRN Moderate Pain (4 - 6)    HOME MEDICATIONS:  amLODIPine 5 mg oral tablet: 1 tab(s) orally once a day  aspirin 81 mg oral tablet: 1 tab(s) orally once a day  atorvastatin 80 mg oral tablet: 1 tab(s) orally once a day  Eliquis 5 mg oral tablet: 1 tab(s) orally 2 times a day  folic acid 1 mg oral tablet: 1 tab(s) orally once a day (at bedtime)  levothyroxine 75 mcg (0.075 mg) oral tablet: 1 tab(s) orally once a day  lisinopril 20 mg oral tablet: 1 tab(s) orally once a day (at bedtime)  tamsulosin 0.4 mg oral capsule: 1 cap(s) orally once a day      Vital Signs:   T(F): 98.7 (06-15-20 @ 05:37), Max: 98.7 (06-15-20 @ 05:37)  HR: 66 (06-15-20 @ 09:51) (63 - 67)  BP: 132/66 (06-15-20 @ 09:51) (132/66 - 186/84)  RR: 20 (06-15-20 @ 05:37) (18 - 20)  SpO2: 97% (06-15-20 @ 09:51) (97% - 97%)      20 @ 07:01  -  06-15-20 @ 07:00  --------------------------------------------------------  IN: 0 mL / OUT: 1725 mL / NET: -1725 mL    06-15-20 @ 07:01  -  06-15-20 @ 13:00  --------------------------------------------------------  IN: 0 mL / OUT: 600 mL / NET: -600 mL    Physical Exam:   GENERAL: NAD, Resting in bed, anxious  HEENT: NCAT  CHEST/LUNG: CTAB  HEART: Regular rate and rhythm; s1 s2 appreciated, No murmurs, rubs, or gallops  ABDOMEN: Soft, Nontender, Nondistended; Bowel sounds present  EXTREMITIES: No LE edema b/l  SKIN: no rashes, no new lesions  NERVOUS SYSTEM:  Alert & Oriented X3      Labs:                         11.5   8.53  )-----------( 264      ( 15 Robi 2020 06:09 )             33.0     Neutophil% 67.9, Lymphocyte% 19.8, Monocyte% 10.2, Bands% 0.5 06-15-20 @ 06:09    15 Robi 2020 06:09    124    |  89     |  18     ----------------------------<  99     4.9     |  23     |  1.2      Ca    8.8        15 Robi 2020 06:09  Mg     1.8       15 Robi 2020 06:09    TPro  5.8    /  Alb  3.8    /  TBili  0.8    /  DBili  x      /  AST  17     /  ALT  16     /  AlkPhos  58     15 Robi 2020 06:09    Urinalysis Basic - ( 2020 00:30 )    Color: Light Yellow / Appearance: Clear / S.015 / pH: x  Gluc: x / Ketone: Negative  / Bili: Negative / Urobili: <2 mg/dL   Blood: x / Protein: Trace / Nitrite: Negative   Leuk Esterase: Small / RBC: 0 /HPF / WBC 4 /HPF   Sq Epi: x / Non Sq Epi: 1 /HPF / Bacteria: Negative    Culture - Blood (collected 20 @ 16:35)  Source: .Blood None  Preliminary Report (20 @ 02:01):    No growth to date.        Radiology:   < from: MR Head w/wo IV Cont (20 @ 19:20) >  IMPRESSION:  1. No evidence of central pontine myelinolysis.  2. No intracranial mass, abnormal intracranial enhancement, or diffusion evidence of acute infarction.  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 (?drug induced, Lexapro vs hctz)   - MRI brain negative for CVA   - Echo unremarkable   - s/p 3% saline 100mg x2 and Tolvaptan 15mg PO X1  - Repeat MRI of brain: Unremarkable  - Sodium level 124, will give 1 time dose Tolvaptan 15mg again  - BMP q6h    # Asymptomatic UTI  - Urine Cx - Kluyvera ascorbata  - sensitive to Ceftriaxone  - s/p ceftriaxone 1g IV x 5 days    # HTN  - Lisinopril 20mg qd, Procardia 30mg qd     # Hypothyroidism  -On synthroid     # Severe hypomagnesemia   - stable    # Afib  -Continue with Eliquis    # Hx of Alcohol abuse/ Thiamine and Folate deficiency  - Thiamine and folate supplementation     # DVT prophylaxis -On Eliquis  # GI ppx - not indicated  # Diet - Regular  # Activity - IAT  Full Code

## 2020-06-15 NOTE — PROGRESS NOTE ADULT - SUBJECTIVE AND OBJECTIVE BOX
Nephrology progress note    THIS IS AN INCOMPLETE NOTE . FULL NOTE TO FOLLOW SHORTLY    Patient is seen and examined, events over the last 24 h noted .    Allergies:  No Known Allergies    Hospital Medications:   MEDICATIONS  (STANDING):  apixaban 5 milliGRAM(s) Oral two times a day  aspirin enteric coated 81 milliGRAM(s) Oral daily  atorvastatin 80 milliGRAM(s) Oral at bedtime  chlorhexidine 4% Liquid 1 Application(s) Topical <User Schedule>  folic acid 1 milliGRAM(s) Oral at bedtime  levothyroxine 75 MICROGram(s) Oral daily  lisinopril 20 milliGRAM(s) Oral daily  LORazepam   Injectable 1 milliGRAM(s) IV Push once  magnesium oxide 400 milliGRAM(s) Oral three times a day with meals  multivitamin 1 Tablet(s) Oral daily  NIFEdipine XL 30 milliGRAM(s) Oral daily  sodium chloride 2 Gram(s) Oral every 8 hours  thiamine IVPB 500 milliGRAM(s) IV Intermittent every 8 hours        VITALS:  T(F): 98.7 (06-15-20 @ 05:37), Max: 98.7 (06-15-20 @ 05:37)  HR: 63 (06-15-20 @ 05:37)  BP: 172/71 (06-15-20 @ 05:37)  RR: 20 (06-15-20 @ 05:37)  SpO2: --  Wt(kg): --     @ 07:01  -   @ 07:00  --------------------------------------------------------  IN: 0 mL / OUT: 1150 mL / NET: -1150 mL    14 @ 07:01  -  06-15 @ 07:00  --------------------------------------------------------  IN: 0 mL / OUT: 1725 mL / NET: -1725 mL    06-15 @ 07:01  -  06-15 @ 09:37  --------------------------------------------------------  IN: 0 mL / OUT: 600 mL / NET: -600 mL          PHYSICAL EXAM:  Constitutional: NAD  HEENT: anicteric sclera, oropharynx clear, MMM  Neck: No JVD  Respiratory: CTAB, no wheezes, rales or rhonchi  Cardiovascular: S1, S2, RRR  Gastrointestinal: BS+, soft, NT/ND  Extremities: No cyanosis or clubbing. No peripheral edema  :  No ricardo.   Skin: No rashes    LABS:  06-15    124<L>  |  89<L>  |  18  ----------------------------<  99  4.9   |  23  |  1.2    Ca    8.8      15 Robi 2020 06:09  Mg     1.8     06-15    TPro  5.8<L>  /  Alb  3.8  /  TBili  0.8  /  DBili      /  AST  17  /  ALT  16  /  AlkPhos  58  06-15                          11.5   8.53  )-----------( 264      ( 15 Robi 2020 06:09 )             33.0       Urine Studies:  Urinalysis Basic - ( 2020 00:30 )    Color: Light Yellow / Appearance: Clear / S.015 / pH:   Gluc:  / Ketone: Negative  / Bili: Negative / Urobili: <2 mg/dL   Blood:  / Protein: Trace / Nitrite: Negative   Leuk Esterase: Small / RBC: 0 /HPF / WBC 4 /HPF   Sq Epi:  / Non Sq Epi: 1 /HPF / Bacteria: Negative      Creatinine, Random Urine: 90 mg/dL ( @ 15:04)  Sodium, Random Urine: 60.0 mmoL/L ( @ 15:04)  Osmolality, Random Urine: 457 mos/kg ( @ 15:04)    RADIOLOGY & ADDITIONAL STUDIES: Nephrology progress note  Patient is seen and examined, events over the last 24 h noted .  lying in bed comfortable  No events     Allergies:  No Known Allergies    Hospital Medications:   MEDICATIONS  (STANDING):  apixaban 5 milliGRAM(s) Oral two times a day  aspirin enteric coated 81 milliGRAM(s) Oral daily  atorvastatin 80 milliGRAM(s) Oral at bedtime  folic acid 1 milliGRAM(s) Oral at bedtime  levothyroxine 75 MICROGram(s) Oral daily  lisinopril 20 milliGRAM(s) Oral daily  LORazepam   Injectable 1 milliGRAM(s) IV Push once  magnesium oxide 400 milliGRAM(s) Oral three times a day with meals  multivitamin 1 Tablet(s) Oral daily  NIFEdipine XL 30 milliGRAM(s) Oral daily  sodium chloride 2 Gram(s) Oral every 8 hours  thiamine IVPB 500 milliGRAM(s) IV Intermittent every 8 hours        VITALS:  T(F): 98.7 (06-15-20 @ 05:37), Max: 98.7 (06-15-20 @ 05:37)  HR: 63 (06-15-20 @ 05:37)  BP: 172/71 (06-15-20 @ 05:37)  RR: 20 (06-15-20 @ 05:37)       @ 07:  -   @ 07:00  --------------------------------------------------------  IN: 0 mL / OUT: 1150 mL / NET: -1150 mL     @ 07:  -  06-15 @ 07:00  --------------------------------------------------------  IN: 0 mL / OUT: 1725 mL / NET: -1725 mL    06-15 @ 07:01  -  06-15 @ 09:37  --------------------------------------------------------  IN: 0 mL / OUT: 600 mL / NET: -600 mL          PHYSICAL EXAM:  Constitutional: NAD  HEENT: anicteric sclera, oropharynx clear, MMM  Neck: No JVD  Respiratory: CTAB, no wheezes, rales or rhonchi  Cardiovascular: S1, S2, RRR  Gastrointestinal: BS+, soft, NT/ND  Extremities: No cyanosis or clubbing. No peripheral edema  :  No ricardo.   Skin: No rashes    LABS:  06-15    124<L>  |  89<L>  |  18  ----------------------------<  99  4.9   |  23  |  1.2    SODIUM TREND:  Sodium 124 [06-15 @ 06:09]  Sodium 125 [ @ 07:27]  Sodium 122 [ @ 00:18]  Sodium 125 [ @ 19:54]  Sodium 124 [ @ 16:00]  Sodium 124 [ @ 11:06]  Sodium 125 [ @ 06:24]  Sodium 123 [ @ 01:00]  Sodium 125 [ @ 21:00]  Sodium 125 [ @ 16:35]    Ca    8.8      15 Robi 2020 06:09  Mg     1.8     06-15    TPro  5.8<L>  /  Alb  3.8  /  TBili  0.8  /  DBili      /  AST  17  /  ALT  16  /  AlkPhos  58  06-15                          11.5   8.53  )-----------( 264      ( 15 Robi 2020 06:09 )             33.0       Urine Studies:  Urinalysis Basic - ( 2020 00:30 )    Color: Light Yellow / Appearance: Clear / S.015 / pH:   Gluc:  / Ketone: Negative  / Bili: Negative / Urobili: <2 mg/dL   Blood:  / Protein: Trace / Nitrite: Negative   Leuk Esterase: Small / RBC: 0 /HPF / WBC 4 /HPF   Sq Epi:  / Non Sq Epi: 1 /HPF / Bacteria: Negative      Creatinine, Random Urine: 90 mg/dL ( @ 15:04)  Sodium, Random Urine: 60.0 mmoL/L ( @ 15:04)  Osmolality, Random Urine: 457 mos/kg ( @ 15:04)    RADIOLOGY & ADDITIONAL STUDIES:

## 2020-06-15 NOTE — PROGRESS NOTE ADULT - SUBJECTIVE AND OBJECTIVE BOX
Patient is a 88y old  Male who presents with a chief complaint of AMS; Hyponatremia; UTI (08 Jun 2020 15:16)    INTERVAL HPI/OVERNIGHT EVENTS:  reports feeling much better, also ambulating better, smiles  ROS: Denies CP, SOB, AP  All other systems reviewed and are within normal limits.  InitialHPI:  88 year old male with PMH of atrial fibrillation on Eliquis HTN, DLD, hypothyroidism, suspected TIA 2 years ago and has a loop recorder presents with  chief complaint of nausea and abdominal pain. The main history was taken from the daughter. 2 weeks ago the family noted mood changes, he was getting upset at unusual things, such a mowing the lawn and feeling depressed. So the family discussed his situation with his cardiologist who started him on lexapro. Initially he improved but today he was back to depressed mood, did not feel good at all, initially grabbed his chest and showed signs of discomfort, delirium and hence was brought to the hospital by his daughter. While in ED a stroke code was activated for acute onset confusion and difficulty speaking. He was found to have elevated BP and was having difficulty in expressing his concerns and confusion initially.  Currently he is alert oriented X2-3 and no signs of seizures. (07 Jun 2020 02:00)    TIA (TRANSIENT ISCHEMIC ATTACK);HYPONATREMIA  ^POSS HEART ATTACK  No pertinent family history in first degree relatives  Handoff  MEWS Score  Hypothyroidism  Hypertension  Hyperlipidemia  Atrial fibrillation  Coronary artery disease  TIA (transient ischemic attack)  History of loop recorder  H/O aortic valve replacement  POSS HEART ATTACK  90+  Nausea  Hyponatremia    PAST MEDICAL & SURGICAL HISTORY:  Hypothyroidism  Hypertension  Hyperlipidemia  Atrial fibrillation  Coronary artery disease  History of loop recorder: 2017  H/O aortic valve replacement: porcine      General: NAD, AAO3, smiling  CV: S1 S2  Resp: decreased breath sounds at bases  GI: NT/ND/S +BS  MS: no clubbing/cyanosis/edema, 2+ pulses b/l  Neuro: motor 5/5, sensory intact, 2+reflexes thruout but unsteady on his feet and ataxic            MEDICATIONS  (STANDING):  apixaban 5 milliGRAM(s) Oral two times a day  aspirin enteric coated 81 milliGRAM(s) Oral daily  atorvastatin 80 milliGRAM(s) Oral at bedtime  chlorhexidine 4% Liquid 1 Application(s) Topical <User Schedule>  folic acid 1 milliGRAM(s) Oral at bedtime  levothyroxine 75 MICROGram(s) Oral daily  lisinopril 20 milliGRAM(s) Oral daily  LORazepam   Injectable 1 milliGRAM(s) IV Push once  magnesium oxide 400 milliGRAM(s) Oral three times a day with meals  multivitamin 1 Tablet(s) Oral daily  NIFEdipine XL 30 milliGRAM(s) Oral daily  sodium chloride 2 Gram(s) Oral every 8 hours    MEDICATIONS  (PRN):  acetaminophen   Tablet .. 650 milliGRAM(s) Oral every 6 hours PRN Moderate Pain (4 - 6)    Home Medications:  amLODIPine 5 mg oral tablet: 1 tab(s) orally once a day (07 Jun 2020 01:59)  aspirin 81 mg oral tablet: 1 tab(s) orally once a day (10 Sep 2018 07:17)  atorvastatin 80 mg oral tablet: 1 tab(s) orally once a day (10 Sep 2018 07:17)  Eliquis 5 mg oral tablet: 1 tab(s) orally 2 times a day (10 Sep 2018 07:17)  folic acid 1 mg oral tablet: 1 tab(s) orally once a day (at bedtime) (10 Sep 2018 07:17)  levothyroxine 75 mcg (0.075 mg) oral tablet: 1 tab(s) orally once a day (10 Sep 2018 07:17)  lisinopril 20 mg oral tablet: 1 tab(s) orally once a day (at bedtime) (10 Sep 2018 07:17)  tamsulosin 0.4 mg oral capsule: 1 cap(s) orally once a day (10 Sep 2018 07:17)    Vital Signs Last 24 Hrs  T(C): 36.3 (15 Robi 2020 21:37), Max: 37.1 (15 Robi 2020 05:37)  T(F): 97.4 (15 Robi 2020 21:37), Max: 98.7 (15 Robi 2020 05:37)  HR: 86 (15 Robi 2020 21:37) (63 - 86)  BP: 127/64 (15 Robi 2020 21:37) (100/62 - 172/71)  BP(mean): --  RR: 20 (15 Robi 2020 21:37) (20 - 20)  SpO2: 97% (15 Robi 2020 09:51) (97% - 97%)  CAPILLARY BLOOD GLUCOSE        LABS:                        11.5   8.53  )-----------( 264      ( 15 Robi 2020 06:09 )             33.0     06-15    121<L>  |  87<L>  |  21<H>  ----------------------------<  105<H>  5.1<H>   |  22  |  1.3    Ca    8.9      15 Robi 2020 17:01  Mg     1.8     06-15    TPro  5.8<L>  /  Alb  3.8  /  TBili  0.8  /  DBili  x   /  AST  17  /  ALT  16  /  AlkPhos  58  06-15    LIVER FUNCTIONS - ( 15 Robi 2020 06:09 )  Alb: 3.8 g/dL / Pro: 5.8 g/dL / ALK PHOS: 58 U/L / ALT: 16 U/L / AST: 17 U/L / GGT: x                           Culture - Urine (collected 13 Jun 2020 00:30)  Source: .Urine Clean Catch (Midstream)  Final Report (14 Jun 2020 12:32):    <10,000 CFU/mL Normal Urogenital Christine      Consultant Notes Reviewed:  [x ] YES  [ ] NO  Care Discussed with Consultants/Other Providers/ Housestaff [ x] YES  [ ] NO  Radiology, labs, new studies personally reviewed.

## 2020-06-15 NOTE — PROGRESS NOTE ADULT - ASSESSMENT
89 y/o M with hyponatremia in hospital for nausea, abdominal pain, mood changes. started on lexapro 2 weeks by his cardiologist. in ED code stroke for acute confusion and difficulty in speaking.  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   - CT chest noted    - sodium noted, can resume salt tablet 1g po q12h / keep free water restriction   - If serum sodium worsening can consider tolvaptan    - BP noted, resume home meds monitor BP  - TSH within normal     # ? stroke   - CT head unremarkable.   - s/p contrast imaging.   - neuro following. MRI no evidence of CVA  - BP at goal      If discharged follow in our office at [facility] on [phone] in one -two weeks

## 2020-06-15 NOTE — PROGRESS NOTE ADULT - ASSESSMENT
# Hypotonic euvolemic hyponatremia ? 2/2 SIADH   -MRI brain negative for CVA   -Echo unremarkable  -received 1 dose of 3%NaC but sodium did not improve significantly  -did not improve w/ NS but sodium worsened c/w SIADH   -on 6/10 due to pt being very symptomatic and uncomfortable and after d/w renal, pt  received 100 cc of 3%NaCL and 1 dose of Tolvaptan   -Sodium improved and pt is clinically better than on 6/10 but still not at baseline and having a difficult time walking  -CT C/A/P negative for malignancy  -6/15: sodium downtrending but clinically feels much better. Renal recommends another dose of Tolvaptan.    #Generalized weakness/Dysequlibrium  -check MRI brain w/ and w/out unremarkable  -cont PT  -daughter is also concerned for possible depression    #H/o EtOH abuse/Possible thiamine defic  -replete vitamines  -counselled     # Possible UTI  - finished  ceftriaxone course    # HTN  -Continue with Home medications.     # Hypothyroidism  -On synthroid     # Afib  -Continue with Eliquis    # DVT prophylaxis  -On Eliquis    High risk pt. D/w daughter in details yesterday and today. Daughter wants home w/ services and definately no to SNF.    #Progress Note Handoff  Pending (specify):  Consults_Renal___Clinical improvement and stability__x___Tests__BMP____PT____x____Improvement in hyponatremia  Pt/Family discussion: Pt/daughter informed and agree with the current plan  Disposition: Home____x__/SNF_______/To be determined________

## 2020-06-16 ENCOUNTER — TRANSCRIPTION ENCOUNTER (OUTPATIENT)
Age: 85
End: 2020-06-16

## 2020-06-16 ENCOUNTER — APPOINTMENT (OUTPATIENT)
Dept: CARDIOLOGY | Facility: CLINIC | Age: 85
End: 2020-06-16
Payer: MEDICARE

## 2020-06-16 VITALS
TEMPERATURE: 97 F | DIASTOLIC BLOOD PRESSURE: 61 MMHG | HEART RATE: 72 BPM | SYSTOLIC BLOOD PRESSURE: 114 MMHG | RESPIRATION RATE: 20 BRPM

## 2020-06-16 LAB
ANION GAP SERPL CALC-SCNC: 10 MMOL/L — SIGNIFICANT CHANGE UP (ref 7–14)
ANION GAP SERPL CALC-SCNC: 11 MMOL/L — SIGNIFICANT CHANGE UP (ref 7–14)
ANION GAP SERPL CALC-SCNC: 12 MMOL/L — SIGNIFICANT CHANGE UP (ref 7–14)
BUN SERPL-MCNC: 23 MG/DL — HIGH (ref 10–20)
BUN SERPL-MCNC: 25 MG/DL — HIGH (ref 10–20)
BUN SERPL-MCNC: 27 MG/DL — HIGH (ref 10–20)
CALCIUM SERPL-MCNC: 9 MG/DL — SIGNIFICANT CHANGE UP (ref 8.5–10.1)
CALCIUM SERPL-MCNC: 9.3 MG/DL — SIGNIFICANT CHANGE UP (ref 8.5–10.1)
CALCIUM SERPL-MCNC: 9.4 MG/DL — SIGNIFICANT CHANGE UP (ref 8.5–10.1)
CHLORIDE SERPL-SCNC: 95 MMOL/L — LOW (ref 98–110)
CHLORIDE SERPL-SCNC: 98 MMOL/L — SIGNIFICANT CHANGE UP (ref 98–110)
CHLORIDE SERPL-SCNC: 98 MMOL/L — SIGNIFICANT CHANGE UP (ref 98–110)
CO2 SERPL-SCNC: 19 MMOL/L — SIGNIFICANT CHANGE UP (ref 17–32)
CO2 SERPL-SCNC: 22 MMOL/L — SIGNIFICANT CHANGE UP (ref 17–32)
CO2 SERPL-SCNC: 23 MMOL/L — SIGNIFICANT CHANGE UP (ref 17–32)
CREAT SERPL-MCNC: 1.4 MG/DL — SIGNIFICANT CHANGE UP (ref 0.7–1.5)
GLUCOSE SERPL-MCNC: 101 MG/DL — HIGH (ref 70–99)
GLUCOSE SERPL-MCNC: 89 MG/DL — SIGNIFICANT CHANGE UP (ref 70–99)
GLUCOSE SERPL-MCNC: 93 MG/DL — SIGNIFICANT CHANGE UP (ref 70–99)
POTASSIUM SERPL-MCNC: 4.8 MMOL/L — SIGNIFICANT CHANGE UP (ref 3.5–5)
POTASSIUM SERPL-MCNC: 5.2 MMOL/L — HIGH (ref 3.5–5)
POTASSIUM SERPL-MCNC: 5.7 MMOL/L — HIGH (ref 3.5–5)
POTASSIUM SERPL-SCNC: 4.8 MMOL/L — SIGNIFICANT CHANGE UP (ref 3.5–5)
POTASSIUM SERPL-SCNC: 5.2 MMOL/L — HIGH (ref 3.5–5)
POTASSIUM SERPL-SCNC: 5.7 MMOL/L — HIGH (ref 3.5–5)
SODIUM SERPL-SCNC: 126 MMOL/L — LOW (ref 135–146)
SODIUM SERPL-SCNC: 131 MMOL/L — LOW (ref 135–146)
SODIUM SERPL-SCNC: 131 MMOL/L — LOW (ref 135–146)

## 2020-06-16 PROCEDURE — 99239 HOSP IP/OBS DSCHRG MGMT >30: CPT

## 2020-06-16 PROCEDURE — G2066: CPT

## 2020-06-16 PROCEDURE — 93298 REM INTERROG DEV EVAL SCRMS: CPT

## 2020-06-16 RX ORDER — MAGNESIUM HYDROXIDE 400 MG/1
30 TABLET, CHEWABLE ORAL ONCE
Refills: 0 | Status: COMPLETED | OUTPATIENT
Start: 2020-06-16 | End: 2020-06-16

## 2020-06-16 RX ORDER — SODIUM CHLORIDE 9 MG/ML
2 INJECTION INTRAMUSCULAR; INTRAVENOUS; SUBCUTANEOUS
Qty: 90 | Refills: 0
Start: 2020-06-16 | End: 2020-06-30

## 2020-06-16 RX ORDER — LACTULOSE 10 G/15ML
20 SOLUTION ORAL
Qty: 600 | Refills: 0
Start: 2020-06-16 | End: 2020-06-30

## 2020-06-16 RX ORDER — THIAMINE MONONITRATE (VIT B1) 100 MG
100 TABLET ORAL DAILY
Refills: 0 | Status: DISCONTINUED | OUTPATIENT
Start: 2020-06-16 | End: 2020-06-16

## 2020-06-16 RX ORDER — NIFEDIPINE 30 MG
1 TABLET, EXTENDED RELEASE 24 HR ORAL
Qty: 30 | Refills: 0
Start: 2020-06-16 | End: 2020-07-15

## 2020-06-16 RX ORDER — AMLODIPINE BESYLATE 2.5 MG/1
1 TABLET ORAL
Qty: 0 | Refills: 0 | DISCHARGE

## 2020-06-16 RX ORDER — THIAMINE MONONITRATE (VIT B1) 100 MG
1 TABLET ORAL
Qty: 0 | Refills: 0 | DISCHARGE
Start: 2020-06-16

## 2020-06-16 RX ORDER — SENNA PLUS 8.6 MG/1
2 TABLET ORAL
Qty: 30 | Refills: 0
Start: 2020-06-16 | End: 2020-06-30

## 2020-06-16 RX ADMIN — LISINOPRIL 20 MILLIGRAM(S): 2.5 TABLET ORAL at 05:45

## 2020-06-16 RX ADMIN — APIXABAN 5 MILLIGRAM(S): 2.5 TABLET, FILM COATED ORAL at 05:45

## 2020-06-16 RX ADMIN — SODIUM CHLORIDE 2 GRAM(S): 9 INJECTION INTRAMUSCULAR; INTRAVENOUS; SUBCUTANEOUS at 13:09

## 2020-06-16 RX ADMIN — MAGNESIUM HYDROXIDE 30 MILLILITER(S): 400 TABLET, CHEWABLE ORAL at 13:41

## 2020-06-16 RX ADMIN — MAGNESIUM OXIDE 400 MG ORAL TABLET 400 MILLIGRAM(S): 241.3 TABLET ORAL at 08:00

## 2020-06-16 RX ADMIN — Medication 30 MILLIGRAM(S): at 05:45

## 2020-06-16 RX ADMIN — CHLORHEXIDINE GLUCONATE 1 APPLICATION(S): 213 SOLUTION TOPICAL at 05:46

## 2020-06-16 RX ADMIN — Medication 75 MICROGRAM(S): at 05:46

## 2020-06-16 RX ADMIN — Medication 81 MILLIGRAM(S): at 12:22

## 2020-06-16 RX ADMIN — Medication 100 MILLIGRAM(S): at 13:41

## 2020-06-16 RX ADMIN — SODIUM CHLORIDE 2 GRAM(S): 9 INJECTION INTRAMUSCULAR; INTRAVENOUS; SUBCUTANEOUS at 05:46

## 2020-06-16 RX ADMIN — Medication 1 TABLET(S): at 12:23

## 2020-06-16 RX ADMIN — MAGNESIUM OXIDE 400 MG ORAL TABLET 400 MILLIGRAM(S): 241.3 TABLET ORAL at 12:22

## 2020-06-16 NOTE — DISCHARGE NOTE NURSING/CASE MANAGEMENT/SOCIAL WORK - PATIENT PORTAL LINK FT
You can access the FollowMyHealth Patient Portal offered by [facility] by registering at the following website: [email or web address]. By joining [employer]’s FollowMyHealth portal, you will also be able to view your health information using other applications (apps) compatible with our system.

## 2020-06-16 NOTE — PROGRESS NOTE ADULT - SUBJECTIVE AND OBJECTIVE BOX
Patient is a 88y old  Male who presents with a chief complaint of AMS; Hyponatremia; UTI (08 Jun 2020 15:16)    INTERVAL HPI/OVERNIGHT EVENTS:  doing great, wants to go home ASAP, ambulating w/ walker  ROS: Denies CP, SOB, AP  All other systems reviewed and are within normal limits.  InitialHPI:  88 year old male with PMH of atrial fibrillation on Eliquis HTN, DLD, hypothyroidism, suspected TIA 2 years ago and has a loop recorder presents with  chief complaint of nausea and abdominal pain. The main history was taken from the daughter. 2 weeks ago the family noted mood changes, he was getting upset at unusual things, such a mowing the lawn and feeling depressed. So the family discussed his situation with his cardiologist who started him on lexapro. Initially he improved but today he was back to depressed mood, did not feel good at all, initially grabbed his chest and showed signs of discomfort, delirium and hence was brought to the hospital by his daughter. While in ED a stroke code was activated for acute onset confusion and difficulty speaking. He was found to have elevated BP and was having difficulty in expressing his concerns and confusion initially.  Currently he is alert oriented X2-3 and no signs of seizures. (07 Jun 2020 02:00)    TIA (TRANSIENT ISCHEMIC ATTACK);HYPONATREMIA  ^POSS HEART ATTACK  No pertinent family history in first degree relatives  Handoff  MEWS Score  Hypothyroidism  Hypertension  Hyperlipidemia  Atrial fibrillation  Coronary artery disease  TIA (transient ischemic attack)  History of loop recorder  H/O aortic valve replacement  POSS HEART ATTACK  90+  Nausea  Hyponatremia    PAST MEDICAL & SURGICAL HISTORY:  Hypothyroidism  Hypertension  Hyperlipidemia  Atrial fibrillation  Coronary artery disease  History of loop recorder: 2017  H/O aortic valve replacement: porcine      General: NAD, AAO3, smiling  CV: S1 S2  Resp: decreased breath sounds at bases  GI: NT/ND/S +BS  MS: no clubbing/cyanosis/edema, 2+ pulses b/l  Neuro: motor 5/5, sensory intact, 2+reflexes thruout but unsteady on his feet and ataxic            MEDICATIONS  (STANDING):  apixaban 5 milliGRAM(s) Oral two times a day  aspirin enteric coated 81 milliGRAM(s) Oral daily  atorvastatin 80 milliGRAM(s) Oral at bedtime  chlorhexidine 4% Liquid 1 Application(s) Topical <User Schedule>  folic acid 1 milliGRAM(s) Oral at bedtime  levothyroxine 75 MICROGram(s) Oral daily  lisinopril 20 milliGRAM(s) Oral daily  LORazepam   Injectable 1 milliGRAM(s) IV Push once  magnesium oxide 400 milliGRAM(s) Oral three times a day with meals  multivitamin 1 Tablet(s) Oral daily  NIFEdipine XL 30 milliGRAM(s) Oral daily  saline laxative (FLEET) Rectal Enema 1 Enema Rectal once  sodium chloride 2 Gram(s) Oral every 8 hours  thiamine 100 milliGRAM(s) Oral daily    MEDICATIONS  (PRN):  acetaminophen   Tablet .. 650 milliGRAM(s) Oral every 6 hours PRN Moderate Pain (4 - 6)    Home Medications:  aspirin 81 mg oral tablet: 1 tab(s) orally once a day (10 Sep 2018 07:17)  atorvastatin 80 mg oral tablet: 1 tab(s) orally once a day (10 Sep 2018 07:17)  Eliquis 5 mg oral tablet: 1 tab(s) orally 2 times a day (10 Sep 2018 07:17)  folic acid 1 mg oral tablet: 1 tab(s) orally once a day (at bedtime) (10 Sep 2018 07:17)  levothyroxine 75 mcg (0.075 mg) oral tablet: 1 tab(s) orally once a day (10 Sep 2018 07:17)  lisinopril 20 mg oral tablet: 1 tab(s) orally once a day (at bedtime) (10 Sep 2018 07:17)  tamsulosin 0.4 mg oral capsule: 1 cap(s) orally once a day (10 Sep 2018 07:17)  thiamine 100 mg oral tablet: 1 tab(s) orally once a day (16 Jun 2020 12:42)    Vital Signs Last 24 Hrs  T(C): 36.4 (16 Jun 2020 04:54), Max: 36.4 (16 Jun 2020 04:54)  T(F): 97.6 (16 Jun 2020 04:54), Max: 97.6 (16 Jun 2020 04:54)  HR: 64 (16 Jun 2020 04:54) (64 - 86)  BP: 121/59 (16 Jun 2020 04:54) (121/59 - 144/63)  BP(mean): --  RR: 19 (16 Jun 2020 04:54) (19 - 20)  SpO2: --  CAPILLARY BLOOD GLUCOSE        LABS:                        11.5   8.53  )-----------( 264      ( 15 Robi 2020 06:09 )             33.0     06-16    131<L>  |  98  |  27<H>  ----------------------------<  89  4.8   |  23  |  1.4    Ca    9.4      16 Jun 2020 11:08  Mg     1.8     06-15    TPro  5.8<L>  /  Alb  3.8  /  TBili  0.8  /  DBili  x   /  AST  17  /  ALT  16  /  AlkPhos  58  06-15    LIVER FUNCTIONS - ( 15 Robi 2020 06:09 )  Alb: 3.8 g/dL / Pro: 5.8 g/dL / ALK PHOS: 58 U/L / ALT: 16 U/L / AST: 17 U/L / GGT: x                           Consultant Notes Reviewed:  [x ] YES  [ ] NO  Care Discussed with Consultants/Other Providers/ Housestaff [ x] YES  [ ] NO  Radiology, labs, new studies personally reviewed.

## 2020-06-16 NOTE — PROGRESS NOTE ADULT - ASSESSMENT
# Hypotonic euvolemic hyponatremia ? 2/2 SIADH   -MRI brain negative for CVA   -Echo unremarkable  -received 1 dose of 3%NaC but sodium did not improve significantly  -did not improve w/ NS but sodium worsened c/w SIADH   -on 6/10 due to pt being very symptomatic and uncomfortable and after d/w renal, pt  received 100 cc of 3%NaCL and 1 dose of Tolvaptan   -Sodium improved and pt is clinically better than on 6/10 but still not at baseline and having a difficult time walking  -CT C/A/P negative for malignancy  -6/15: sodium downtrending but clinically feels much better. Renal recommends another dose of Tolvaptan .      #Generalized weakness/Dysequlibrium  -check MRI brain w/ and w/out unremarkable  -cont PT  -daughter is also concerned for possible depression    #H/o EtOH abuse/Possible thiamine defic  -replete vitamines  -counselled     # Possible UTI  - finished  ceftriaxone course    # HTN  -Continue with Home medications.     # Hypothyroidism  -On synthroid     # Afib  -Continue with Eliquis    # DVT prophylaxis  -On Eliquis    High risk pt. D/w daughter in details yesterday and today. Daughter wants home w/ services and definately no to SNF.    #Progress Note Handoff  Pending (specify):  Consults_Renal___Clinical improvement and stability__x___Tests__BMP____PT____x____Improvement in hyponatremia  Pt/Family discussion: Pt/daughter informed and agree with the current plan  Disposition: Home____x__/SNF_______/To be determined________ # Hypotonic euvolemic hyponatremia ? 2/2 SIADH   -MRI brain negative for CVA   -Echo unremarkable  -received 1 dose of 3%NaC but sodium did not improve significantly  -did not improve w/ NS but sodium worsened c/w SIADH   -on 6/10 due to pt being very symptomatic and uncomfortable and after d/w renal, pt  received 100 cc of 3%NaCL and 1 dose of Tolvaptan   -Sodium improved and pt is clinically better than on 6/10 but still not at baseline and having a difficult time walking  -CT C/A/P negative for malignancy  -6/15: sodium downtrending but clinically feels much better. Renal recommends another dose of Tolvaptan .  -d/w renal Dr. Garcia - stable for outpt f/u     #Generalized weakness/Dysequlibrium  -MRI brain w/ and w/out unremarkable  -cont PT    #H/o EtOH abuse/Possible thiamine defic  -replete vitamines  -counselled     # Possible UTI  - finished  ceftriaxone course    # HTN  -Continue with Home medications.     # Hypothyroidism  -On synthroid     # Afib  -Continue with Eliquis    # DVT prophylaxis  -On Eliquis    Discharge instructions discussed and patient/daughter know when to seek immediate medical attention.  Patient has proper follow up.  All results discussed and patient/daughter aware they require further work up.  Stressed importance of proper follow up.  Medications prescribed and changes discussed.  All questions and concerns from patient and family addressed. Understanding of instructions verbalized.    Time spent in completing discharge process and coordinating care 45 minutes.    Discussed with housestaff, nursing, social work, renal, daughter Zoie outpt PMD Dr. Tawanda Aguirre

## 2020-06-16 NOTE — PROGRESS NOTE ADULT - ASSESSMENT
87 y/o M with hyponatremia in hospital for nausea, abdominal pain, mood changes. started on lexapro 2 weeks by his cardiologist. in ED code stroke for acute confusion and difficulty in speaking.  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   - CT chest noted    - sodium  improving   - no need for tolvaptan   - TSH within normal     # ? stroke   - CT head unremarkable.   - s/p contrast imaging.   - neuro following. MRI no evidence of CVA  - BP at goal      will follow

## 2020-06-16 NOTE — CHART NOTE - NSCHARTNOTEFT_GEN_A_CORE
<<<RESIDENT DISCHARGE NOTE>>>     AMA HARDY  MRN-[id number]    VITAL SIGNS:  T(F): 97.6 (06-16-20 @ 04:54), Max: 97.9 (06-15-20 @ 13:34)  HR: 64 (06-16-20 @ 04:54)  BP: 121/59 (06-16-20 @ 04:54)  SpO2: 97% (06-15-20 @ 09:51)  Weight (kg): 92.3 (06-15-20 @ 22:21)  BMI (kg/m2): 27.6 (06-15-20 @ 22:21)      TEST RESULTS:                        11.5   8.53  )-----------( 264      ( 15 Robi 2020 06:09 )             33.0       06-16    131<L>  |  98  |  23<H>  ----------------------------<  101<H>  5.2<H>   |  22  |  1.4    Ca    9.3      16 Jun 2020 05:45  Mg     1.8     06-15    TPro  5.8<L>  /  Alb  3.8  /  TBili  0.8  /  DBili  x   /  AST  17  /  ALT  16  /  AlkPhos  58  06-15    Pt seen and evaluated at bedside. Appears clinically improved.   Sodium level improved. Stable for discharge.     FINAL DISCHARGE INTERVIEW:  Resident(s) Present: (Name: Alonso Mena DO)    DISPOSITION:   [ x ] Home,    [  ] Home with Visiting Nursing Services,   [    ]  SNF/ NH,    [   ] Acute Rehab (4A),   [   ] Other (Specify:_________)

## 2020-06-16 NOTE — PROGRESS NOTE ADULT - SUBJECTIVE AND OBJECTIVE BOX
seen and examined  no distress   lying comfortable         PAST HISTORY  --------------------------------------------------------------------------------  No significant changes to PMH, PSH, FHx, SHx, unless otherwise noted    ALLERGIES & MEDICATIONS  --------------------------------------------------------------------------------  Allergies    No Known Allergies    Intolerances      Standing Inpatient Medications  apixaban 5 milliGRAM(s) Oral two times a day  aspirin enteric coated 81 milliGRAM(s) Oral daily  atorvastatin 80 milliGRAM(s) Oral at bedtime  chlorhexidine 4% Liquid 1 Application(s) Topical <User Schedule>  folic acid 1 milliGRAM(s) Oral at bedtime  levothyroxine 75 MICROGram(s) Oral daily  lisinopril 20 milliGRAM(s) Oral daily  LORazepam   Injectable 1 milliGRAM(s) IV Push once  magnesium oxide 400 milliGRAM(s) Oral three times a day with meals  multivitamin 1 Tablet(s) Oral daily  NIFEdipine XL 30 milliGRAM(s) Oral daily  sodium chloride 2 Gram(s) Oral every 8 hours    PRN Inpatient Medications  acetaminophen   Tablet .. 650 milliGRAM(s) Oral every 6 hours PRN      VITALS/PHYSICAL EXAM  --------------------------------------------------------------------------------  T(C): 36.4 (06-16-20 @ 04:54), Max: 36.6 (06-15-20 @ 13:34)  HR: 64 (06-16-20 @ 04:54) (64 - 86)  BP: 121/59 (06-16-20 @ 04:54) (100/62 - 144/63)  RR: 19 (06-16-20 @ 04:54) (19 - 20)  SpO2: 97% (06-15-20 @ 09:51) (97% - 97%)  Wt(kg): --  Height (cm): 182.88 (06-15-20 @ 22:21)  Weight (kg): 92.3 (06-15-20 @ 22:21)  BMI (kg/m2): 27.6 (06-15-20 @ 22:21)  BSA (m2): 2.15 (06-15-20 @ 22:21)      06-15-20 @ 07:01  -  06-16-20 @ 07:00  --------------------------------------------------------  IN: 0 mL / OUT: 2700 mL / NET: -2700 mL      Physical Exam:  	Gen: NAD  	Pulm: decrease BS  B/L  	CV:  S1S2; no rub  	Abd: +distended  	LE:  no edema    LABS/STUDIES  --------------------------------------------------------------------------------              11.5   8.53  >-----------<  264      [06-15-20 @ 06:09]              33.0     131  |  98  |  23  ----------------------------<  101      [06-16-20 @ 05:45]  5.2   |  22  |  1.4        Ca     9.3     [06-16-20 @ 05:45]      Mg     1.8     [06-15-20 @ 06:09]    TPro  5.8  /  Alb  3.8  /  TBili  0.8  /  DBili  x   /  AST  17  /  ALT  16  /  AlkPhos  58  [06-15-20 @ 06:09]      Creatinine Trend:  SCr 1.4 [06-16 @ 05:45]  SCr 1.4 [06-16 @ 01:33]  SCr 1.3 [06-15 @ 22:03]  SCr 1.3 [06-15 @ 17:01]  SCr 1.2 [06-15 @ 06:09]    Urinalysis - [06-13-20 @ 00:30]      Color Light Yellow / Appearance Clear / SG 1.015 / pH 7.0      Gluc Negative / Ketone Negative  / Bili Negative / Urobili <2 mg/dL       Blood Negative / Protein Trace / Leuk Est Small / Nitrite Negative      RBC 0 / WBC 4 / Hyaline 0 / Gran  / Sq Epi  / Non Sq Epi 1 / Bacteria Negative    Urine Creatinine 90      [06-09-20 @ 15:04]  Urine Sodium 60.0      [06-09-20 @ 15:04]  Urine Urea Nitrogen 585      [06-09-20 @ 15:04]  Urine Osmolality 457      [06-09-20 @ 15:04]    HbA1c 5.7      [09-10-18 @ 16:28]  TSH 3.70      [06-13-20 @ 06:24]  Lipid: chol 120, TG 73, HDL 54, LDL 55      [06-07-20 @ 06:04]

## 2020-06-18 DIAGNOSIS — E22.2 SYNDROME OF INAPPROPRIATE SECRETION OF ANTIDIURETIC HORMONE: ICD-10-CM

## 2020-06-18 DIAGNOSIS — N39.0 URINARY TRACT INFECTION, SITE NOT SPECIFIED: ICD-10-CM

## 2020-06-18 DIAGNOSIS — G45.9 TRANSIENT CEREBRAL ISCHEMIC ATTACK, UNSPECIFIED: ICD-10-CM

## 2020-06-18 DIAGNOSIS — R41.0 DISORIENTATION, UNSPECIFIED: ICD-10-CM
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
## 2020-06-18 DIAGNOSIS — I10 ESSENTIAL (PRIMARY) HYPERTENSION: ICD-10-CM

## 2020-06-18 DIAGNOSIS — I48.20 CHRONIC ATRIAL FIBRILLATION, UNSPECIFIED: ICD-10-CM

## 2020-06-18 DIAGNOSIS — E83.42 HYPOMAGNESEMIA: ICD-10-CM

## 2020-06-18 DIAGNOSIS — Z79.01 LONG TERM (CURRENT) USE OF ANTICOAGULANTS: ICD-10-CM

## 2020-06-18 DIAGNOSIS — R91.1 SOLITARY PULMONARY NODULE: ICD-10-CM

## 2020-06-18 DIAGNOSIS — T43.295A ADVERSE EFFECT OF OTHER ANTIDEPRESSANTS, INITIAL ENCOUNTER: ICD-10-CM

## 2020-06-18 DIAGNOSIS — F10.10 ALCOHOL ABUSE, UNCOMPLICATED: ICD-10-CM
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
## 2020-06-18 DIAGNOSIS — I16.0 HYPERTENSIVE URGENCY: ICD-10-CM

## 2020-06-18 DIAGNOSIS — I25.10 ATHEROSCLEROTIC HEART DISEASE OF NATIVE CORONARY ARTERY WITHOUT ANGINA PECTORIS: ICD-10-CM

## 2020-06-18 DIAGNOSIS — G93.41 METABOLIC ENCEPHALOPATHY: ICD-10-CM

## 2020-06-18 DIAGNOSIS — Z87.891 PERSONAL HISTORY OF NICOTINE DEPENDENCE: ICD-10-CM

## 2020-06-18 DIAGNOSIS — R11.0 NAUSEA: ICD-10-CM

## 2020-06-18 DIAGNOSIS — E78.5 HYPERLIPIDEMIA, UNSPECIFIED: ICD-10-CM

## 2020-06-18 DIAGNOSIS — Z95.3 PRESENCE OF XENOGENIC HEART VALVE: ICD-10-CM

## 2020-06-18 DIAGNOSIS — Z86.73 PERSONAL HISTORY OF TRANSIENT ISCHEMIC ATTACK (TIA), AND CEREBRAL INFARCTION WITHOUT RESIDUAL DEFICITS: ICD-10-CM

## 2020-06-18 DIAGNOSIS — E51.9 THIAMINE DEFICIENCY, UNSPECIFIED: ICD-10-CM

## 2020-06-18 DIAGNOSIS — E03.9 HYPOTHYROIDISM, UNSPECIFIED: ICD-10-CM

## 2020-06-18 DIAGNOSIS — E53.8 DEFICIENCY OF OTHER SPECIFIED B GROUP VITAMINS: ICD-10-CM

## 2020-06-18 DIAGNOSIS — R42 DIZZINESS AND GIDDINESS: ICD-10-CM

## 2020-06-18 LAB
CULTURE RESULTS: SIGNIFICANT CHANGE UP
SPECIMEN SOURCE: SIGNIFICANT CHANGE UP

## 2020-06-29 DIAGNOSIS — Z79.82 LONG TERM (CURRENT) USE OF ASPIRIN: ICD-10-CM

## 2020-07-20 ENCOUNTER — APPOINTMENT (OUTPATIENT)
Dept: CARDIOLOGY | Facility: CLINIC | Age: 85
End: 2020-07-20
Payer: MEDICARE

## 2020-07-20 PROCEDURE — 93298 REM INTERROG DEV EVAL SCRMS: CPT

## 2020-07-20 PROCEDURE — G2066: CPT

## 2020-07-23 NOTE — ED ADULT NURSE NOTE - ALCOHOL PRE SCREEN (AUDIT - C)
Statement Selected No Residual Tumor Seen Histology Text: There were no malignant cells seen in the sections examined.

## 2020-08-14 ENCOUNTER — INPATIENT (INPATIENT)
Facility: HOSPITAL | Age: 85
LOS: 3 days | Discharge: HOME HEALTH | End: 2020-08-18
Attending: INTERNAL MEDICINE | Admitting: INTERNAL MEDICINE
Payer: MEDICARE

## 2020-08-14 VITALS
HEART RATE: 71 BPM | TEMPERATURE: 99 F | DIASTOLIC BLOOD PRESSURE: 93 MMHG | HEIGHT: 72 IN | SYSTOLIC BLOOD PRESSURE: 203 MMHG | OXYGEN SATURATION: 98 % | WEIGHT: 179.9 LBS | RESPIRATION RATE: 18 BRPM

## 2020-08-14 DIAGNOSIS — Z95.2 PRESENCE OF PROSTHETIC HEART VALVE: Chronic | ICD-10-CM

## 2020-08-14 DIAGNOSIS — Z98.890 OTHER SPECIFIED POSTPROCEDURAL STATES: Chronic | ICD-10-CM

## 2020-08-14 LAB
ALBUMIN SERPL ELPH-MCNC: 4.3 G/DL — SIGNIFICANT CHANGE UP (ref 3.5–5.2)
ALP SERPL-CCNC: 58 U/L — SIGNIFICANT CHANGE UP (ref 30–115)
ALT FLD-CCNC: 12 U/L — SIGNIFICANT CHANGE UP (ref 0–41)
ANION GAP SERPL CALC-SCNC: 9 MMOL/L — SIGNIFICANT CHANGE UP (ref 7–14)
APPEARANCE UR: CLEAR — SIGNIFICANT CHANGE UP
AST SERPL-CCNC: 15 U/L — SIGNIFICANT CHANGE UP (ref 0–41)
BASOPHILS # BLD AUTO: 0.04 K/UL — SIGNIFICANT CHANGE UP (ref 0–0.2)
BASOPHILS NFR BLD AUTO: 0.4 % — SIGNIFICANT CHANGE UP (ref 0–1)
BILIRUB SERPL-MCNC: 0.3 MG/DL — SIGNIFICANT CHANGE UP (ref 0.2–1.2)
BILIRUB UR-MCNC: NEGATIVE — SIGNIFICANT CHANGE UP
BUN SERPL-MCNC: 19 MG/DL — SIGNIFICANT CHANGE UP (ref 10–20)
CALCIUM SERPL-MCNC: 9.1 MG/DL — SIGNIFICANT CHANGE UP (ref 8.5–10.1)
CHLORIDE SERPL-SCNC: 100 MMOL/L — SIGNIFICANT CHANGE UP (ref 98–110)
CO2 SERPL-SCNC: 25 MMOL/L — SIGNIFICANT CHANGE UP (ref 17–32)
COLOR SPEC: SIGNIFICANT CHANGE UP
CREAT SERPL-MCNC: 1.4 MG/DL — SIGNIFICANT CHANGE UP (ref 0.7–1.5)
DIFF PNL FLD: NEGATIVE — SIGNIFICANT CHANGE UP
EOSINOPHIL # BLD AUTO: 0.05 K/UL — SIGNIFICANT CHANGE UP (ref 0–0.7)
EOSINOPHIL NFR BLD AUTO: 0.5 % — SIGNIFICANT CHANGE UP (ref 0–8)
GLUCOSE SERPL-MCNC: 99 MG/DL — SIGNIFICANT CHANGE UP (ref 70–99)
GLUCOSE UR QL: NEGATIVE — SIGNIFICANT CHANGE UP
HCT VFR BLD CALC: 36.4 % — LOW (ref 42–52)
HGB BLD-MCNC: 11.8 G/DL — LOW (ref 14–18)
IMM GRANULOCYTES NFR BLD AUTO: 0.4 % — HIGH (ref 0.1–0.3)
KETONES UR-MCNC: NEGATIVE — SIGNIFICANT CHANGE UP
LEUKOCYTE ESTERASE UR-ACNC: NEGATIVE — SIGNIFICANT CHANGE UP
LYMPHOCYTES # BLD AUTO: 1.4 K/UL — SIGNIFICANT CHANGE UP (ref 1.2–3.4)
LYMPHOCYTES # BLD AUTO: 13.9 % — LOW (ref 20.5–51.1)
MCHC RBC-ENTMCNC: 32.4 G/DL — SIGNIFICANT CHANGE UP (ref 32–37)
MCHC RBC-ENTMCNC: 33 PG — HIGH (ref 27–31)
MCV RBC AUTO: 101.7 FL — HIGH (ref 80–94)
MONOCYTES # BLD AUTO: 1.13 K/UL — HIGH (ref 0.1–0.6)
MONOCYTES NFR BLD AUTO: 11.3 % — HIGH (ref 1.7–9.3)
NEUTROPHILS # BLD AUTO: 7.38 K/UL — HIGH (ref 1.4–6.5)
NEUTROPHILS NFR BLD AUTO: 73.5 % — SIGNIFICANT CHANGE UP (ref 42.2–75.2)
NITRITE UR-MCNC: NEGATIVE — SIGNIFICANT CHANGE UP
NRBC # BLD: 0 /100 WBCS — SIGNIFICANT CHANGE UP (ref 0–0)
PH UR: 6 — SIGNIFICANT CHANGE UP (ref 5–8)
PLATELET # BLD AUTO: 200 K/UL — SIGNIFICANT CHANGE UP (ref 130–400)
POTASSIUM SERPL-MCNC: 4.9 MMOL/L — SIGNIFICANT CHANGE UP (ref 3.5–5)
POTASSIUM SERPL-SCNC: 4.9 MMOL/L — SIGNIFICANT CHANGE UP (ref 3.5–5)
PROT SERPL-MCNC: 6.4 G/DL — SIGNIFICANT CHANGE UP (ref 6–8)
PROT UR-MCNC: NEGATIVE — SIGNIFICANT CHANGE UP
RBC # BLD: 3.58 M/UL — LOW (ref 4.7–6.1)
RBC # FLD: 12.8 % — SIGNIFICANT CHANGE UP (ref 11.5–14.5)
SODIUM SERPL-SCNC: 134 MMOL/L — LOW (ref 135–146)
SP GR SPEC: 1.01 — SIGNIFICANT CHANGE UP (ref 1.01–1.02)
TROPONIN T SERPL-MCNC: <0.01 NG/ML — SIGNIFICANT CHANGE UP
UROBILINOGEN FLD QL: SIGNIFICANT CHANGE UP
WBC # BLD: 10.04 K/UL — SIGNIFICANT CHANGE UP (ref 4.8–10.8)
WBC # FLD AUTO: 10.04 K/UL — SIGNIFICANT CHANGE UP (ref 4.8–10.8)

## 2020-08-14 PROCEDURE — 93010 ELECTROCARDIOGRAM REPORT: CPT

## 2020-08-14 PROCEDURE — 99285 EMERGENCY DEPT VISIT HI MDM: CPT | Mod: CS,GC

## 2020-08-14 PROCEDURE — 70450 CT HEAD/BRAIN W/O DYE: CPT | Mod: 26

## 2020-08-14 NOTE — ED ADULT TRIAGE NOTE - CHIEF COMPLAINT QUOTE
87 yo M presents from home after brief episode of expressive aphasia, which since has resolved. Patient was hyper tensive at home /120. upon arrival bp 207/93. PMH of hyponatremia last Na level 132 end of june. no neuro deficits noted at this time. patient NIH 0 GCS 15.

## 2020-08-14 NOTE — ED ADULT NURSE NOTE - ISOLATION TYPE:
Patient asking if Dr. Chaney would order Sildenafil 20 mg tablets to treat ED.  Cost of Viagra is over $300.00 for 5 pills.  Please call patient back if approved-[phone] (H)  MOJGAN Madrigal RN     None

## 2020-08-14 NOTE — ED ADULT NURSE NOTE - NSIMPLEMENTINTERV_GEN_ALL_ED
Implemented All Fall with Harm Risk Interventions:  [place] to call system. Call bell, personal items and telephone within reach. Instruct patient to call for assistance. Room bathroom lighting operational. Non-slip footwear when patient is off stretcher. Physically safe environment: no spills, clutter or unnecessary equipment. Stretcher in lowest position, wheels locked, appropriate side rails in place. Provide visual cue, wrist band, yellow gown, etc. Monitor gait and stability. Monitor for mental status changes and reorient to person, place, and time. Review medications for side effects contributing to fall risk. Reinforce activity limits and safety measures with patient and family. Provide visual clues: red socks.

## 2020-08-14 NOTE — ED ADULT NURSE NOTE - OBJECTIVE STATEMENT
87 yo M presents from home after brief episode of expressive aphasia, which since has resolved. Patient was hyper tensive at home /120. upon arrival bp 207/93. PMH of hyponatremia last Na level 132 end of june. no neuro deficits noted at this time. patient NIH 0 GCS 15. Patient denies any complaints, states he is at baseline.

## 2020-08-15 LAB
ALBUMIN SERPL ELPH-MCNC: 4.3 G/DL — SIGNIFICANT CHANGE UP (ref 3.5–5.2)
ALP SERPL-CCNC: 62 U/L — SIGNIFICANT CHANGE UP (ref 30–115)
ALT FLD-CCNC: 12 U/L — SIGNIFICANT CHANGE UP (ref 0–41)
ANION GAP SERPL CALC-SCNC: 11 MMOL/L — SIGNIFICANT CHANGE UP (ref 7–14)
APTT BLD: 35.7 SEC — SIGNIFICANT CHANGE UP (ref 27–39.2)
AST SERPL-CCNC: 16 U/L — SIGNIFICANT CHANGE UP (ref 0–41)
BASOPHILS # BLD AUTO: 0.05 K/UL — SIGNIFICANT CHANGE UP (ref 0–0.2)
BASOPHILS NFR BLD AUTO: 0.6 % — SIGNIFICANT CHANGE UP (ref 0–1)
BILIRUB SERPL-MCNC: 0.8 MG/DL — SIGNIFICANT CHANGE UP (ref 0.2–1.2)
BUN SERPL-MCNC: 16 MG/DL — SIGNIFICANT CHANGE UP (ref 10–20)
CALCIUM SERPL-MCNC: 9.5 MG/DL — SIGNIFICANT CHANGE UP (ref 8.5–10.1)
CHLORIDE SERPL-SCNC: 103 MMOL/L — SIGNIFICANT CHANGE UP (ref 98–110)
CHOLEST SERPL-MCNC: 161 MG/DL — SIGNIFICANT CHANGE UP (ref 100–200)
CO2 SERPL-SCNC: 24 MMOL/L — SIGNIFICANT CHANGE UP (ref 17–32)
CREAT SERPL-MCNC: 1.4 MG/DL — SIGNIFICANT CHANGE UP (ref 0.7–1.5)
EOSINOPHIL # BLD AUTO: 0.08 K/UL — SIGNIFICANT CHANGE UP (ref 0–0.7)
EOSINOPHIL NFR BLD AUTO: 0.9 % — SIGNIFICANT CHANGE UP (ref 0–8)
GLUCOSE SERPL-MCNC: 155 MG/DL — HIGH (ref 70–99)
HCT VFR BLD CALC: 39 % — LOW (ref 42–52)
HDLC SERPL-MCNC: 65 MG/DL — SIGNIFICANT CHANGE UP
HGB BLD-MCNC: 12.5 G/DL — LOW (ref 14–18)
IMM GRANULOCYTES NFR BLD AUTO: 0.3 % — SIGNIFICANT CHANGE UP (ref 0.1–0.3)
INR BLD: 1.7 RATIO — HIGH (ref 0.65–1.3)
LIPID PNL WITH DIRECT LDL SERPL: 81 MG/DL — SIGNIFICANT CHANGE UP (ref 4–129)
LYMPHOCYTES # BLD AUTO: 1.56 K/UL — SIGNIFICANT CHANGE UP (ref 1.2–3.4)
LYMPHOCYTES # BLD AUTO: 17.5 % — LOW (ref 20.5–51.1)
MAGNESIUM SERPL-MCNC: 1.6 MG/DL — LOW (ref 1.8–2.4)
MAGNESIUM SERPL-MCNC: 2.1 MG/DL — SIGNIFICANT CHANGE UP (ref 1.8–2.4)
MCHC RBC-ENTMCNC: 32.1 G/DL — SIGNIFICANT CHANGE UP (ref 32–37)
MCHC RBC-ENTMCNC: 32.5 PG — HIGH (ref 27–31)
MCV RBC AUTO: 101.3 FL — HIGH (ref 80–94)
MONOCYTES # BLD AUTO: 1.03 K/UL — HIGH (ref 0.1–0.6)
MONOCYTES NFR BLD AUTO: 11.5 % — HIGH (ref 1.7–9.3)
NEUTROPHILS # BLD AUTO: 6.17 K/UL — SIGNIFICANT CHANGE UP (ref 1.4–6.5)
NEUTROPHILS NFR BLD AUTO: 69.2 % — SIGNIFICANT CHANGE UP (ref 42.2–75.2)
NRBC # BLD: 0 /100 WBCS — SIGNIFICANT CHANGE UP (ref 0–0)
PHOSPHATE SERPL-MCNC: 3.4 MG/DL — SIGNIFICANT CHANGE UP (ref 2.1–4.9)
PLATELET # BLD AUTO: 209 K/UL — SIGNIFICANT CHANGE UP (ref 130–400)
POTASSIUM SERPL-MCNC: 4.1 MMOL/L — SIGNIFICANT CHANGE UP (ref 3.5–5)
POTASSIUM SERPL-SCNC: 4.1 MMOL/L — SIGNIFICANT CHANGE UP (ref 3.5–5)
PROT SERPL-MCNC: 6.7 G/DL — SIGNIFICANT CHANGE UP (ref 6–8)
PROTHROM AB SERPL-ACNC: 19.5 SEC — HIGH (ref 9.95–12.87)
RBC # BLD: 3.85 M/UL — LOW (ref 4.7–6.1)
RBC # FLD: 12.8 % — SIGNIFICANT CHANGE UP (ref 11.5–14.5)
SARS-COV-2 RNA SPEC QL NAA+PROBE: SIGNIFICANT CHANGE UP
SODIUM SERPL-SCNC: 138 MMOL/L — SIGNIFICANT CHANGE UP (ref 135–146)
TOTAL CHOLESTEROL/HDL RATIO MEASUREMENT: 2.5 RATIO — LOW (ref 4–5.5)
TRIGL SERPL-MCNC: 82 MG/DL — SIGNIFICANT CHANGE UP (ref 10–149)
TROPONIN T SERPL-MCNC: <0.01 NG/ML — SIGNIFICANT CHANGE UP
WBC # BLD: 8.92 K/UL — SIGNIFICANT CHANGE UP (ref 4.8–10.8)
WBC # FLD AUTO: 8.92 K/UL — SIGNIFICANT CHANGE UP (ref 4.8–10.8)

## 2020-08-15 PROCEDURE — 93880 EXTRACRANIAL BILAT STUDY: CPT | Mod: 26

## 2020-08-15 PROCEDURE — 99221 1ST HOSP IP/OBS SF/LOW 40: CPT

## 2020-08-15 PROCEDURE — 99223 1ST HOSP IP/OBS HIGH 75: CPT

## 2020-08-15 RX ORDER — CHLORHEXIDINE GLUCONATE 213 G/1000ML
1 SOLUTION TOPICAL DAILY
Refills: 0 | Status: DISCONTINUED | OUTPATIENT
Start: 2020-08-15 | End: 2020-08-18

## 2020-08-15 RX ORDER — ATORVASTATIN CALCIUM 80 MG/1
80 TABLET, FILM COATED ORAL AT BEDTIME
Refills: 0 | Status: DISCONTINUED | OUTPATIENT
Start: 2020-08-15 | End: 2020-08-18

## 2020-08-15 RX ORDER — ASPIRIN/CALCIUM CARB/MAGNESIUM 324 MG
81 TABLET ORAL DAILY
Refills: 0 | Status: DISCONTINUED | OUTPATIENT
Start: 2020-08-15 | End: 2020-08-18

## 2020-08-15 RX ORDER — FOLIC ACID 0.8 MG
1 TABLET ORAL AT BEDTIME
Refills: 0 | Status: DISCONTINUED | OUTPATIENT
Start: 2020-08-15 | End: 2020-08-18

## 2020-08-15 RX ORDER — MAGNESIUM SULFATE 500 MG/ML
2 VIAL (ML) INJECTION ONCE
Refills: 0 | Status: COMPLETED | OUTPATIENT
Start: 2020-08-15 | End: 2020-08-15

## 2020-08-15 RX ORDER — LISINOPRIL 2.5 MG/1
20 TABLET ORAL DAILY
Refills: 0 | Status: DISCONTINUED | OUTPATIENT
Start: 2020-08-15 | End: 2020-08-18

## 2020-08-15 RX ORDER — TAMSULOSIN HYDROCHLORIDE 0.4 MG/1
0.4 CAPSULE ORAL AT BEDTIME
Refills: 0 | Status: DISCONTINUED | OUTPATIENT
Start: 2020-08-15 | End: 2020-08-18

## 2020-08-15 RX ORDER — THIAMINE MONONITRATE (VIT B1) 100 MG
100 TABLET ORAL DAILY
Refills: 0 | Status: DISCONTINUED | OUTPATIENT
Start: 2020-08-15 | End: 2020-08-18

## 2020-08-15 RX ORDER — NIFEDIPINE 30 MG
30 TABLET, EXTENDED RELEASE 24 HR ORAL DAILY
Refills: 0 | Status: DISCONTINUED | OUTPATIENT
Start: 2020-08-15 | End: 2020-08-18

## 2020-08-15 RX ORDER — LISINOPRIL 2.5 MG/1
10 TABLET ORAL ONCE
Refills: 0 | Status: COMPLETED | OUTPATIENT
Start: 2020-08-15 | End: 2020-08-15

## 2020-08-15 RX ORDER — SODIUM CHLORIDE 9 MG/ML
1 INJECTION INTRAMUSCULAR; INTRAVENOUS; SUBCUTANEOUS
Refills: 0 | Status: DISCONTINUED | OUTPATIENT
Start: 2020-08-15 | End: 2020-08-18

## 2020-08-15 RX ORDER — LISINOPRIL 2.5 MG/1
10 TABLET ORAL AT BEDTIME
Refills: 0 | Status: DISCONTINUED | OUTPATIENT
Start: 2020-08-15 | End: 2020-08-18

## 2020-08-15 RX ORDER — ENOXAPARIN SODIUM 100 MG/ML
40 INJECTION SUBCUTANEOUS DAILY
Refills: 0 | Status: DISCONTINUED | OUTPATIENT
Start: 2020-08-15 | End: 2020-08-15

## 2020-08-15 RX ORDER — APIXABAN 2.5 MG/1
5 TABLET, FILM COATED ORAL
Refills: 0 | Status: DISCONTINUED | OUTPATIENT
Start: 2020-08-15 | End: 2020-08-18

## 2020-08-15 RX ORDER — FUROSEMIDE 40 MG
20 TABLET ORAL DAILY
Refills: 0 | Status: DISCONTINUED | OUTPATIENT
Start: 2020-08-15 | End: 2020-08-18

## 2020-08-15 RX ORDER — LEVOTHYROXINE SODIUM 125 MCG
75 TABLET ORAL DAILY
Refills: 0 | Status: DISCONTINUED | OUTPATIENT
Start: 2020-08-15 | End: 2020-08-18

## 2020-08-15 RX ORDER — LISINOPRIL 2.5 MG/1
1 TABLET ORAL
Qty: 0 | Refills: 0 | DISCHARGE

## 2020-08-15 RX ADMIN — Medication 1 MILLIGRAM(S): at 22:03

## 2020-08-15 RX ADMIN — CHLORHEXIDINE GLUCONATE 1 APPLICATION(S): 213 SOLUTION TOPICAL at 11:45

## 2020-08-15 RX ADMIN — LISINOPRIL 10 MILLIGRAM(S): 2.5 TABLET ORAL at 04:53

## 2020-08-15 RX ADMIN — Medication 30 MILLIGRAM(S): at 11:41

## 2020-08-15 RX ADMIN — Medication 20 MILLIGRAM(S): at 08:16

## 2020-08-15 RX ADMIN — ATORVASTATIN CALCIUM 80 MILLIGRAM(S): 80 TABLET, FILM COATED ORAL at 22:03

## 2020-08-15 RX ADMIN — Medication 1 TABLET(S): at 11:42

## 2020-08-15 RX ADMIN — Medication 81 MILLIGRAM(S): at 11:44

## 2020-08-15 RX ADMIN — Medication 100 MILLIGRAM(S): at 11:45

## 2020-08-15 RX ADMIN — APIXABAN 5 MILLIGRAM(S): 2.5 TABLET, FILM COATED ORAL at 06:02

## 2020-08-15 RX ADMIN — LISINOPRIL 10 MILLIGRAM(S): 2.5 TABLET ORAL at 22:03

## 2020-08-15 RX ADMIN — TAMSULOSIN HYDROCHLORIDE 0.4 MILLIGRAM(S): 0.4 CAPSULE ORAL at 22:03

## 2020-08-15 RX ADMIN — SODIUM CHLORIDE 1 GRAM(S): 9 INJECTION INTRAMUSCULAR; INTRAVENOUS; SUBCUTANEOUS at 18:33

## 2020-08-15 RX ADMIN — Medication 75 MICROGRAM(S): at 06:02

## 2020-08-15 RX ADMIN — Medication 50 GRAM(S): at 00:51

## 2020-08-15 RX ADMIN — LISINOPRIL 20 MILLIGRAM(S): 2.5 TABLET ORAL at 06:01

## 2020-08-15 RX ADMIN — SODIUM CHLORIDE 1 GRAM(S): 9 INJECTION INTRAMUSCULAR; INTRAVENOUS; SUBCUTANEOUS at 06:03

## 2020-08-15 RX ADMIN — APIXABAN 5 MILLIGRAM(S): 2.5 TABLET, FILM COATED ORAL at 18:33

## 2020-08-15 NOTE — CONSULT NOTE ADULT - SUBJECTIVE AND OBJECTIVE BOX
Neurology Consult    Patient is a 88y old  Male who presents with a chief complaint of transient aphasia    HPI: This is an 87 yo M who presents for evaluation of transient aphasia. The patient was playing cards with his wife around 7 pm when he could not get his words out properly. His wife said that he was talking, but the words he was saying did not make any sense. The patient says that he knew what he really wanted to say, but could not get out the proper words and instead was saying something else.    He denies visual symptoms, facial palsy, dysarthria, or focal weakness. He had similar symptoms in  and was diagnosed with SIADH and placed on salt tabs. He had a negative MRI at that time. SIADH was attributed possibly to Lexapro, which was discontinued and he reports that he is being weaned off the salt tabs.      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  Hypothyroidism  Hypertension  Hyperlipidemia  Atrial fibrillation  Coronary artery disease  History of loop recorder:   H/O aortic valve replacement: porcine      FAMILY HISTORY:  No pertinent family history in first degree relatives: CVA (pt denies)      Social History: (-) x 3    Allergies    No Known Allergies    Intolerances        MEDICATIONS  (STANDING): Include Eliquis    MEDICATIONS  (PRN):      Review of systems:    Constitutional: No fever, weight loss or fatigue    Eyes: No eye pain or discharge  ENMT:  No difficulty hearing; No sinus or throat pain  Neck: No pain or stiffness  Respiratory: No cough, wheezing, chills or hemoptysis  Cardiovascular: No chest pain, palpitations, shortness of breath, dyspnea on exertion  Gastrointestinal: No abdominal pain, nausea, vomiting or hematemesis; No diarrhea or constipation.   Genitourinary: No dysuria, frequency, hematuria or incontinence  Neurological: As per HPI  Skin: No rashes or lesions   Endocrine: No heat or cold intolerance; No hair loss  Musculoskeletal: No joint pain or swelling  Psychiatric: No depression, anxiety, mood swings  Heme/Lymph: No easy bruising or bleeding gums    Vital Signs Last 24 Hrs  T(C): 37.1 (14 Aug 2020 20:31), Max: 37.1 (14 Aug 2020 20:31)  T(F): 98.7 (14 Aug 2020 20:31), Max: 98.7 (14 Aug 2020 20:31)  HR: 71 (14 Aug 2020 20:31) (71 - 71)  BP: 203/93 (14 Aug 2020 20:31) ( - 203)  BP(mean): --  RR: 18 (14 Aug 2020 20:31) (18 - 18)  SpO2: 98% (14 Aug 2020 20:31) (98% - 98%)    Neurologic Examination:  General:  Appearance is consistent with chronologic age.  No abnormal facies.   General: The patient is oriented to person, place, time and date.  Recent and remote memory intact.  Fund of knowledge is intact and normal.  Language with normal repetition, comprehension and naming.  Nondysarthric.    Cranial nerves: intact VA, VFF.  EOMI w/o nystagmus, skew or reported double vision.  PERRL.  No ptosis/weakness of eyelid closure.  Facial sensation is normal with normal bite.  No facial asymmetry.  Hearing grossly intact b/l.  Palate elevates midline.  Tongue midline.  Motor examination:   Normal tone, bulk and range of motion.  No tenderness, twitching, tremors or involuntary movements.  Formal Muscle Strength Testing: (MRC grade R/L) 5/5 UE; 5/5 LE.  No observable drift.  Reflexes:   2+ b/l pectoralis, biceps, triceps, brachioradialis, patella and Achilles.  Plantar response downgoing b/l.  Jaw jerk, Cassie, clonus absent.  Sensory examination:   Intact to light touch and pinprick, pain, temperature and proprioception and vibration in all extremities.  Cerebellum:   FTN/HKS intact with normal GARETH in all limbs.  No dysmetria or dysdiadokinesia.  Gait narrow based and normal.    Labs:   CBC Full  -  ( 14 Aug 2020 21:24 )  WBC Count : 10.04 K/uL  RBC Count : 3.58 M/uL  Hemoglobin : 11.8 g/dL  Hematocrit : 36.4 %  Platelet Count - Automated : 200 K/uL  Mean Cell Volume : 101.7 fL  Mean Cell Hemoglobin : 33.0 pg  Mean Cell Hemoglobin Concentration : 32.4 g/dL  Auto Neutrophil # : 7.38 K/uL  Auto Lymphocyte # : 1.40 K/uL  Auto Monocyte # : 1.13 K/uL  Auto Eosinophil # : 0.05 K/uL  Auto Basophil # : 0.04 K/uL  Auto Neutrophil % : 73.5 %  Auto Lymphocyte % : 13.9 %  Auto Monocyte % : 11.3 %  Auto Eosinophil % : 0.5 %  Auto Basophil % : 0.4 %    08-14    134<L>  |  100  |  19  ----------------------------<  99  4.9   |  25  |  1.4    Ca    9.1      14 Aug 2020 21:24  Mg     1.6         TPro  6.4  /  Alb  4.3  /  TBili  0.3  /  DBili  x   /  AST  15  /  ALT  12  /  AlkPhos  58      LIVER FUNCTIONS - ( 14 Aug 2020 21:24 )  Alb: 4.3 g/dL / Pro: 6.4 g/dL / ALK PHOS: 58 U/L / ALT: 12 U/L / AST: 15 U/L / GGT: x             Urinalysis Basic - ( 14 Aug 2020 23:30 )    Color: Light Yellow / Appearance: Clear / S.013 / pH: x  Gluc: x / Ketone: Negative  / Bili: Negative / Urobili: <2 mg/dL   Blood: x / Protein: Negative / Nitrite: Negative   Leuk Esterase: Negative / RBC: x / WBC x   Sq Epi: x / Non Sq Epi: x / Bacteria: x          Neuroimaging:  NCHCT:    < from: CT Head No Cont (20 @ 23:59) >  IMPRESSION:    No CT evidence of acute intracranial pathology.    < end of copied text >      Assessment:      - Transient aphasia    Plan:   - Serial neuro checks  - Obtain Brain MRI w/o gado  - Routine EEG  - Continue Eliquis  - Continue statin    Recent stroke work up reviewed.    08-15-20 @ 01:05 Neurology Consult    Patient is a 88y old  Male who presents with a chief complaint of transient aphasia    HPI: This is an 87 yo M who presents for evaluation of transient aphasia. The patient was playing cards with his wife around 7 pm when he could not get his words out properly. His wife said that he was talking, but the words he was saying did not make any sense. The patient says that he knew what he really wanted to say, but could not get out the proper words and instead was saying something else.    He denies visual symptoms, facial palsy, dysarthria, or focal weakness. He had similar symptoms in  and was diagnosed with SIADH and placed on salt tabs. He had a negative MRI at that time. SIADH was attributed possibly to Lexapro, which was discontinued and he reports that he is being weaned off the salt tabs.      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  Hypothyroidism  Hypertension  Hyperlipidemia  Atrial fibrillation  Coronary artery disease  History of loop recorder:   H/O aortic valve replacement: porcine      FAMILY HISTORY:  No pertinent family history in first degree relatives: CVA (pt denies)      Social History: (-) x 3    Allergies    No Known Allergies    Intolerances        MEDICATIONS  (STANDING): Include Eliquis    MEDICATIONS  (PRN):      Review of systems:    Constitutional: No fever, weight loss or fatigue    Eyes: No eye pain or discharge  ENMT:  No difficulty hearing; No sinus or throat pain  Neck: No pain or stiffness  Respiratory: No cough, wheezing, chills or hemoptysis  Cardiovascular: No chest pain, palpitations, shortness of breath, dyspnea on exertion  Gastrointestinal: No abdominal pain, nausea, vomiting or hematemesis; No diarrhea or constipation.   Genitourinary: No dysuria, frequency, hematuria or incontinence  Neurological: As per HPI  Skin: No rashes or lesions   Endocrine: No heat or cold intolerance; No hair loss  Musculoskeletal: No joint pain or swelling  Psychiatric: No depression, anxiety, mood swings  Heme/Lymph: No easy bruising or bleeding gums    Vital Signs Last 24 Hrs  T(C): 37.1 (14 Aug 2020 20:31), Max: 37.1 (14 Aug 2020 20:31)  T(F): 98.7 (14 Aug 2020 20:31), Max: 98.7 (14 Aug 2020 20:31)  HR: 71 (14 Aug 2020 20:31) (71 - 71)  BP: 203/93 (14 Aug 2020 20:31) ( - 203)  BP(mean): --  RR: 18 (14 Aug 2020 20:31) (18 - 18)  SpO2: 98% (14 Aug 2020 20:31) (98% - 98%)    Neurologic Examination:  General:  Appearance is consistent with chronologic age.  No abnormal facies.   General: The patient is oriented to person, place, time and date.  Recent and remote memory intact.  Fund of knowledge is intact and normal.  Language with normal repetition, comprehension and naming.  Nondysarthric.    Cranial nerves: intact VA, VFF.  EOMI w/o nystagmus, skew or reported double vision.  PERRL.  No ptosis/weakness of eyelid closure.  Facial sensation is normal with normal bite.  No facial asymmetry.  Hearing grossly intact b/l.  Palate elevates midline.  Tongue midline.  Motor examination:   Normal tone, bulk and range of motion.  No tenderness, twitching, tremors or involuntary movements.  Formal Muscle Strength Testing: (MRC grade R/L) 5/5 UE; 5/5 LE.  No observable drift.  Reflexes:   2+ b/l pectoralis, biceps, triceps, brachioradialis, patella and Achilles.  Plantar response downgoing b/l.  Jaw jerk, Cassie, clonus absent.  Sensory examination:   Intact to light touch and pinprick, pain, temperature and proprioception and vibration in all extremities.  Cerebellum:   FTN/HKS intact with normal GARETH in all limbs.  No dysmetria or dysdiadokinesia.  Gait narrow based and normal.    Labs:   CBC Full  -  ( 14 Aug 2020 21:24 )  WBC Count : 10.04 K/uL  RBC Count : 3.58 M/uL  Hemoglobin : 11.8 g/dL  Hematocrit : 36.4 %  Platelet Count - Automated : 200 K/uL  Mean Cell Volume : 101.7 fL  Mean Cell Hemoglobin : 33.0 pg  Mean Cell Hemoglobin Concentration : 32.4 g/dL  Auto Neutrophil # : 7.38 K/uL  Auto Lymphocyte # : 1.40 K/uL  Auto Monocyte # : 1.13 K/uL  Auto Eosinophil # : 0.05 K/uL  Auto Basophil # : 0.04 K/uL  Auto Neutrophil % : 73.5 %  Auto Lymphocyte % : 13.9 %  Auto Monocyte % : 11.3 %  Auto Eosinophil % : 0.5 %  Auto Basophil % : 0.4 %    08-14    134<L>  |  100  |  19  ----------------------------<  99  4.9   |  25  |  1.4    Ca    9.1      14 Aug 2020 21:24  Mg     1.6         TPro  6.4  /  Alb  4.3  /  TBili  0.3  /  DBili  x   /  AST  15  /  ALT  12  /  AlkPhos  58      LIVER FUNCTIONS - ( 14 Aug 2020 21:24 )  Alb: 4.3 g/dL / Pro: 6.4 g/dL / ALK PHOS: 58 U/L / ALT: 12 U/L / AST: 15 U/L / GGT: x             Urinalysis Basic - ( 14 Aug 2020 23:30 )    Color: Light Yellow / Appearance: Clear / S.013 / pH: x  Gluc: x / Ketone: Negative  / Bili: Negative / Urobili: <2 mg/dL   Blood: x / Protein: Negative / Nitrite: Negative   Leuk Esterase: Negative / RBC: x / WBC x   Sq Epi: x / Non Sq Epi: x / Bacteria: x          Neuroimaging:  NCHCT:    < from: CT Head No Cont (20 @ 23:59) >  IMPRESSION:    No CT evidence of acute intracranial pathology.    < end of copied text >

## 2020-08-15 NOTE — H&P ADULT - HISTORY OF PRESENT ILLNESS
88 year old male with PMH of atrial fibrillation on Eliquis HTN, DLD, hypothyroidism, suspected TIA 2 years ago and 2 months ago also has a loop recorder presents with chief complaint of slurred speech that resolved upon coming to ER. Onset started today at 7PM with wife, struggled to get words out. No other complaints. Sxs have since resolved. Denies HA, confusion, unilateral weakness or numbness, dizziness, CP, SOB, abd pain. Pt had similar sxs in June and diagnosed with SIADH, is currently taking 2 salt pills per day    In ED:  /93, all others were WNL. CT head unremarkable. Evaluate by Neuro and admitted for stroke workup. 88 year old male with PMH of atrial fibrillation on Eliquis HTN, DLD, hypothyroidism, suspected TIA 3 years ago ( had a loop recorder that is still in his chest but battery is dead) and another TIA 2 months ago now presenting with a chief complaint of slurred speech that resolved upon coming to ER. Onset started today at 7PM with wife, struggled to get words out. No other complaints. Sxs have since resolved. Denies HA, confusion, unilateral weakness or numbness, dizziness, CP, SOB, abd pain. Pt had similar sxs in June and diagnosed with SIADH, is currently taking 2 salt pills per day    In ED:  /93, all others were WNL. CT head unremarkable. Evaluate by Neuro and admitted for stroke workup.

## 2020-08-15 NOTE — H&P ADULT - ASSESSMENT
88 year old male with PMH of atrial fibrillation on Eliquis HTN, DLD, hypothyroidism, suspected TIA 2 years ago and 2 months ago also has a loop recorder presents with chief complaint of slurred speech that resolved upon coming to ER.    # TIA  - CT head - unremarkable  - MR head without gadolinium  - CTA H + N - ?  - Carotid duplex - ?  - Echo with bubble study - no evidence of PFO, normal global function 6/8/2020   - Cardiac monitoring   - Neuro checks q 4 hours  - NPO  - Follow labs - CBC, CMP, Mag, Phos, PT/aPTT, Lipid profile and HbA1c and cardiac enzymes.  - C/w aspirin, plavix and statins  - PT/OT eval    #Generalized weakness/Dysequlibrium  -MRI brain w/ and w/out unremarkable  -cont PT    #H/o EtOH abuse/Possible thiamine defic  -replete vitamines  -counselled     # Possible UTI  - finished  ceftriaxone course    # HTN  -Continue with Home medications.     # Hypothyroidism  -On synthroid     # Afib  -Continue with Eliquis    Diet:   Activity:  DVT Prophylaxis:  GI Prophylaxis:  CHG Order  Code Status:  Disposition: 88 year old male with PMH of atrial fibrillation on Eliquis HTN, DLD, hypothyroidism, suspected TIA 3 years ago and 2 months ago chief complaint of slurred speech that resolved upon coming to ER.    # TIA  - CT head - unremarkable  - MR head without gadolinium - ordered  - CTA H + N - ?  - Carotid duplex - ?  - Echo with bubble study - no evidence of PFO, normal global function 6/8/2020   - Cardiac monitoring   - Neuro checks q 4 hours  - Follow labs - CBC, CMP, Mag, Phos, PT/aPTT, Lipid profile and HbA1c and cardiac enzymes.  - C/w aspirin, and statins.  - Pt had 3rd episode of TIA on aspirin and Eliquis, consider starting Plavix after neuro clearance   - PT/OT eval    #H/o EtOH abuse/Possible thiamine defic  -replete vitamines  -counselled     # HTN  -Continue with Home medications.     # H/o HLD  - follow lipid profile in am   - c/w statins    # Hypothyroidism  -On synthroid     # Afib  -Continue with Eliquis    Diet: DASH  Activity: IAT  DVT Prophylaxis: ELIQUIS  GI Prophylaxis: PPI  CHG Ordered  Code Status: FULL  Disposition: pending stroke workup

## 2020-08-15 NOTE — CONSULT NOTE ADULT - SUBJECTIVE AND OBJECTIVE BOX
HPI:  88 year old male with PMH of atrial fibrillation on Eliquis HTN, DLD, hypothyroidism, suspected TIA 3 years ago ( had a loop recorder that is still in his chest but battery is dead) and another TIA 2 months ago now presenting with a chief complaint of slurred speech that resolved upon coming to ER. Onset started today at 7PM with wife, struggled to get words out. No other complaints. Sxs have since resolved. Denies HA, confusion, unilateral weakness or numbness, dizziness, CP, SOB, abd pain. Pt had similar sxs in  and diagnosed with SIADH, is currently taking 2 salt pills per day    In ED:  /93, all others were WNL. CT head unremarkable. Evaluate by Neuro and admitted for stroke workup. (15 Aug 2020 01:14)      PAST MEDICAL & SURGICAL HISTORY:  Hypothyroidism  Hypertension  Hyperlipidemia  Atrial fibrillation  Coronary artery disease  History of loop recorder:   H/O aortic valve replacement: porcine      HOSPITAL COURSE:  Managed medically          MEDICATIONS  (STANDING):  apixaban 5 milliGRAM(s) Oral two times a day  aspirin  chewable 81 milliGRAM(s) Oral daily  atorvastatin 80 milliGRAM(s) Oral at bedtime  chlorhexidine 4% Liquid 1 Application(s) Topical daily  folic acid 1 milliGRAM(s) Oral at bedtime  furosemide    Tablet 20 milliGRAM(s) Oral daily  levothyroxine 75 MICROGram(s) Oral daily  lisinopril 10 milliGRAM(s) Oral at bedtime  lisinopril 20 milliGRAM(s) Oral daily  multivitamin 1 Tablet(s) Oral daily  NIFEdipine XL 30 milliGRAM(s) Oral daily  sodium chloride 1 Gram(s) Oral two times a day  tamsulosin 0.4 milliGRAM(s) Oral at bedtime  thiamine 100 milliGRAM(s) Oral daily    MEDICATIONS  (PRN):      FAMILY HISTORY:  FH: hypertension      Allergies    No Known Allergies    Intolerances        SOCIAL HISTORY:    [  ] Smoking  [  ] EtOH  [  ] Substance abuse     Home Environment:  [  ] Alone  [ X ] Lives with Family--wife, daughter upstairs  [  ] Home Health Aide    Dwelling:  [ X ] 2-family Private House  [  ] Apartment  [  ] Adult Home/Assisted Living Facility  [  ] Skilled Nursing Facility      [  ] Short Term  [  ] Long Term  [ X ] Stairs only to visit daughter      Elevator [  ]    FUNCTIONAL STATUS PTA: (Check all that apply)  Ambulation: [ X  ]Independent    [  ] Dependent     [  ] Non-Ambulatory  Assistive Device: [  ] Straight Cane  [  ]  Quad Cane  [  ] Walker  [  ]  Wheelchair  ADL: [X  ] Independent  [  ]  Dependent       Vital Signs Last 24 Hrs  T(C): 36.1 (15 Aug 2020 09:35), Max: 37.1 (14 Aug 2020 20:31)  T(F): 97 (15 Aug 2020 09:35), Max: 98.7 (14 Aug 2020 20:31)  HR: 63 (15 Aug 2020 09:35) (63 - 71)  BP: 167/77 (15 Aug 2020 09:35) (167/77 - 221/100)  BP(mean): --  RR: 18 (15 Aug 2020 09:35) (18 - 18)  SpO2: 95% (15 Aug 2020 09:35) (95% - 99%)      FUNCTIONAL STATUS:  Bed Mobility: Independent [  ]  Supervision [  ]  Needs Assistance [  ]  N/A [  ]  Transfers: Independent [  ]  Supervision [  ]  Needs Assistance [ X ]  N/A [  ]   Ambulation: Independent [  ]  Supervision [  ]  Needs Assistance [ X ]  N/A [  ]  ADLs: Independent [  ] Requires Assistance [  ] N/A [  ]      LABS:                        12.5   8.92  )-----------( 209      ( 15 Aug 2020 08:30 )             39.0     08-15    138  |  103  |  16  ----------------------------<  155<H>  4.1   |  24  |  1.4    Ca    9.5      15 Aug 2020 08:30  Phos  3.4     08-15  Mg     2.1     08-15    TPro  6.7  /  Alb  4.3  /  TBili  0.8  /  DBili  x   /  AST  16  /  ALT  12  /  AlkPhos  62  08-15    PT/INR - ( 15 Aug 2020 08:30 )   PT: 19.50 sec;   INR: 1.70 ratio         PTT - ( 15 Aug 2020 08:30 )  PTT:35.7 sec  Urinalysis Basic - ( 14 Aug 2020 23:30 )    Color: Light Yellow / Appearance: Clear / S.013 / pH: x  Gluc: x / Ketone: Negative  / Bili: Negative / Urobili: <2 mg/dL   Blood: x / Protein: Negative / Nitrite: Negative   Leuk Esterase: Negative / RBC: x / WBC x   Sq Epi: x / Non Sq Epi: x / Bacteria: x        RADIOLOGY & ADDITIONAL STUDIES:    EXAM:  CAROTID DUPLEX BILATERAL            PROCEDURE DATE:  08/15/2020            INTERPRETATION:  CLINICAL INFORMATION: The patient is 88-year-old male with carotid bruit.  The study was performed to evaluate the patient for carotid artery stenosis.    Duplex evaluation of the carotid arteries was performed bilaterally.  In the right carotid system, the internal carotid artery is noted to be patent with a peak systolic velocity of 69.7, cm/sec over end diastolic velocity of  11.5 cm/sec.    Inthe left carotid system, the internal carotid artery is noted to be patent with a peak systolic velocity of 68.6 cm/sec over end diastolic velocity of 17.1 cm/sec.    The right vertebral arterial flow was antegrade.  The left vertebral arterial flow was antegrade.    Impression:    20-39% stenosis of the right internal carotid artery.  20-39% stenosis of the left internal carotid artery.      EXAM:  CT BRAIN            PROCEDURE DATE:  2020            INTERPRETATION:  CLINICAL HISTORY / REASON FOR EXAM: Dysarthria    COMPARISON: CT head without contrast from 2020    TECHNIQUE: Multiple axial CT images of the head were obtained with sagittal and coronal reformats from the base of the skull to the vertex without the administration of IV contrast.    FINDINGS:    The ventricles and cerebral sulci are age-appropriate.    There is no evidence of acute intracranial hemorrhage, mass effect or midline shift.    Gray-white differentiation is maintained. Patchy subcortical white matter hypodensities that are nonspecific but typically attributed to chronic small vessel ischemic change. Chronic right cerebellar infarction.    There is no fracture to the calvarium. Mastoid air cells are well aerated bilaterally. The visualized portions of the sinuses are unremarkable.      IMPRESSION:    No CT evidence of acute intracranial pathology.      EXAM:  MR BRAIN WAW             PROCEDURE DATE:  2020            INTERPRETATION:  EXAM TYPE: MR BRAIN WITHOUT AND WITH IV CONTRAST   EXAM DATE AND TIME: 2020 7:20 PM  INDICATION: Hyponatremia   COMPARISON: MRI brain dated 2020.    TECHNIQUE: MR imaging of the brain was obtained before and after administration of intravenous contrast (9 ml of Gadavist, 1 mL discarded).    FINDINGS: Ventricles and sulci are prominent in keeping with cerebral volume loss. No extra axial collection. No mass effect or edema.  No acute infarction seen on the diffusion sequence. There are multiple chronic infarcts in the right more than left cerebellar hemispheres. There is confluent T2/FLAIR hyperintensity in the periventricular and deep white matter of the frontal and parietal lobes, and minimally within the rosemary, most compatible with microangiopathy . On the gradient echo sequence, there are punctate microhemorrhages in the left cerebellar hemisphere and right frontal lobe. The major vesselflow voids are preserved at the skull base.  Cerebellar tonsils are in normal location. The sella is not expanded.    There is no abnormal intracranial enhancement.    No gross orbital mass. Bilateral lens replacements. Visualized paranasal sinuses and mastoids are normal in signal. Bone marrow signal is unremarkable.      IMPRESSION:    1. No evidence of central pontine myelinolysis.    2. No intracranial mass, abnormal intracranial enhancement, or diffusion evidence of acute infarction.      EXAM:  MR BRAIN            PROCEDURE DATE:  2020            INTERPRETATION:  Clinical History / Reason for exam: Aphasia.    MRI OF THE BRAIN WITHOUT CONTRAST    TECHNIQUE:    Sagittal T1, axial FLAIR, and axial diffusion weighted images of the brain were obtained.    The patient refused further examination.    COMPARISON:    Noncontrast CT scan of the brain dated 2020 and noncontrast MRI of the brain dated 2017.    FINDINGS:    The study is incomplete and limited by motion artifact.    No MRI evidence to suggest acute ischemic change.    The third and lateral ventricles are mildly enlarged as are the cortical sulci consistent with a mild degree of cortical atrophy. The fourth ventricle is normal in size and position.    There is no shift of midline structures.    On the FLAIR images there is extensive hyperintense signal intensity in the periventricular white matter incompletely evaluated.    IMPRESSION:    Incomplete limited examination demonstrates no MRI evidence to suggest acute ischemic change.

## 2020-08-15 NOTE — CONSULT NOTE ADULT - ASSESSMENT
IMPRESSION: Rehabilitation for gait disorder    PRECAUTIONS: [  ] Cardiac  [  ] Respiratory  [  ] Seizures [  ] Contact Precautions  [  ] Droplet Isolation  [  ] Other:      Weight Bearing Status:  WBAT    RECOMMENDATION:    Out of bed to chair     DVT/decubitus ulcer prophylaxis    REHABILITATION PLAN:     [ X  ] Bedside PT 3-5 times a week   [   ]   Bedside OT  2-3 times a week             [   ] No Rehab Therapy Indicated                   [   ]  Speech Therapy   Conditioning/ROM                                    ADL  Bed Mobility                                               Conditioning/ROM  Transfers                                                     Bed Mobility  Sitting /Standing Balance                         Transfers                                        Gait Training                                               Sitting/Standing Balance  Stair Training  [  X ] Applicable (only to visit daughter)                    Home Equipment Evaluation                                                                        Splinting  [   ] Only      GOALS:   ADL   [   ]   Independent                    Transfers  [ X  ] Independent                          Ambulation  [ X  ] Independent     [  X  ] With device                            [   ]  CG                                                         [   ]  CG                                                                  [   ] CG                            [    ] Min A                                                   [   ] Min A                                                              [   ] Min  A          DISCHARGE PLAN:  [    ]  Good candidate for Intensive Rehabilitation/Hospital-based 4A [facility]                                             Will tolerate 3 hours of Intensive Rehab Daily                                       [  X  ]  Short Term Rehabilitation in Skilled Nursing Facility                                       [  X  ]  Home with Outpatient or  services                                         [    ]  Possible Candidate for Intensive Hospital-Based Rehabilitation      Thank you.
Assessment:  This is an 87 yo M with multiple vascular risk factors including HTN, HLD and AFIB who presents for evaluation of transient aphasia. The patient was playing cards with his wife around 7 pm when he could not get his words out properly. His recent Brain MRI showed no infarct. CT H/N in June showed large vessel occlusion but showed calcifications and noncalcified plaque in the distal common carotid arteries and proximal internal carotid arteries. He is currently on Eliquis and ASA.     - Transient aphasia. Likely TIA    Plan:   - Serial neuro checks  - Obtain Brain MRI w/o gado  - Routine EEG  - Continue Eliquis and ASA   - Continue statin  - Will decide about changing AC based on MRI results

## 2020-08-15 NOTE — H&P ADULT - ATTENDING COMMENTS
Patient seen and examined at bedside. Not in acute distress.   Agree with the assessment and plan above. Changes made to HPI as needed.   Please note the changes below.    88 year old male with PMH of atrial fibrillation on Eliquis HTN, DLD, hypothyroidism admitted for loss of the ability to produce language. Today evening, patient started to difficult to put words together, however his comprehension remained intact. Incident was witnessed by wife.     # Transient expressive aphasia  - resolved, patient speaking clearly at encounter  - patient admitted for similar symptoms 2 months back --> with negative work up   - ECHO (6/8/2020): no evidence of PFO, normal global function  - CT head - negative for any pathology   - neurocheck q4  - c/w aspirin, and statins  - f/u MR head  - tele monitoring  - f/u neurology recommendations    # Chronic Afib  - rate controlled   - c/w Eliquis  - not on rate control drugs (HR 60s)    # HTN  - elevated 185/79  - c/w lisinopril   - start amlodipine 10mg daily     # Full Code Patient seen and examined at bedside. Not in acute distress.   Agree with the assessment and plan above. Changes made to HPI as needed.   Please note the changes below.    88 year old male with PMH of atrial fibrillation on Eliquis HTN, DLD, hypothyroidism admitted for loss of the ability to produce language. Today evening, patient started to difficult to put words together, however his comprehension remained intact. Incident was witnessed by wife. His symptoms resolved in ER.     # Transient expressive aphasia  - resolved, patient speaking clearly at encounter, no complains  - patient admitted for similar symptoms 2 months back --> with negative work up   - ECHO (6/8/2020): no evidence of PFO, normal global function  - CT head - negative for any pathology   - neurocheck q4  - c/w aspirin, and statins  - f/u MR head  - tele monitoring  - f/u neurology recommendations    # Chronic Afib  - rate controlled   - c/w Eliquis  - not on rate control drugs (HR 60s)    # B/L LE edema  - will start lasix 20mg dally     # HTN  - elevated 185/79  - c/w lisinopril   - start Lasix    # Full Code

## 2020-08-15 NOTE — CONSULT NOTE ADULT - ATTENDING COMMENTS
I have personally seen and examined this patient on 8/15.  I have fully participated in the care of this patient.  I have reviewed all pertinent clinical information, including history, physical exam, plan and note. Patient reports transient word finding difficulty. Exam is currently non focal. Recommend to obtain Brain MRI. Will reassess the AC and Antiplatelet therapy.  I have reviewed all pertinent clinical information and reviewed all relevant imaging and diagnostic studies personally.  Recommendations as above.  Agree with above assessment except as noted.

## 2020-08-15 NOTE — CHART NOTE - NSCHARTNOTEFT_GEN_A_CORE
Patient seen today and examined  Neurological exam done: Conscious, cooperative, and oriented, no focal deficit, CN intact, gait not assessed  BP was high reaching 220/90-->Patient was completely asymptomatic.  Assessment and plan:  Admitted for TIA, Could be hypertensive encephalopathy?  -Continue stroke workup  -Continue ASA, Eliquis, and statin  -MRI brain  -BP control--> start nifedipine 30 mg Po daily  -Keep SBP <180  --180

## 2020-08-15 NOTE — PHYSICAL THERAPY INITIAL EVALUATION ADULT - GAIT DEVIATIONS NOTED, PT EVAL
decreased step length/increased stride width/decreased stride length/decreased heel strike and push off/decreased mukund

## 2020-08-15 NOTE — H&P ADULT - NSHPLABSRESULTS_GEN_ALL_CORE
LABS:                        11.8   10.04 )-----------( 200      ( 14 Aug 2020 21:24 )             36.4     08-14    134<L>  |  100  |  19  ----------------------------<  99  4.9   |  25  |  1.4    Ca    9.1      14 Aug 2020 21:24  Mg     1.6         TPro  6.4  /  Alb  4.3  /  TBili  0.3  /  DBili  x   /  AST  15  /  ALT  12  /  AlkPhos  58        Urinalysis Basic - ( 14 Aug 2020 23:30 )    Color: Light Yellow / Appearance: Clear / S.013 / pH: x  Gluc: x / Ketone: Negative  / Bili: Negative / Urobili: <2 mg/dL   Blood: x / Protein: Negative / Nitrite: Negative   Leuk Esterase: Negative / RBC: x / WBC x   Sq Epi: x / Non Sq Epi: x / Bacteria: x        Troponin T, Serum: <0.01 ng/mL (20 @ 21:24)      CARDIAC MARKERS ( 14 Aug 2020 21:24 )  x     / <0.01 ng/mL / x     / x     / x LABS:                        11.8   10.04 )-----------( 200      ( 14 Aug 2020 21:24 )             36.4     -    134<L>  |  100  |  19  ----------------------------<  99  4.9   |  25  |  1.4    Ca    9.1      14 Aug 2020 21:24  Mg     1.6         TPro  6.4  /  Alb  4.3  /  TBili  0.3  /  DBili  x   /  AST  15  /  ALT  12  /  AlkPhos  58        Urinalysis Basic - ( 14 Aug 2020 23:30 )    Color: Light Yellow / Appearance: Clear / S.013 / pH: x  Gluc: x / Ketone: Negative  / Bili: Negative / Urobili: <2 mg/dL   Blood: x / Protein: Negative / Nitrite: Negative   Leuk Esterase: Negative / RBC: x / WBC x   Sq Epi: x / Non Sq Epi: x / Bacteria: x        Troponin T, Serum: <0.01 ng/mL (20 @ 21:24)      CARDIAC MARKERS ( 14 Aug 2020 21:24 )  x     / <0.01 ng/mL / x     / x     / x      < from: CT Head No Cont (20 @ 23:59) >    No CT evidence of acute intracranial pathology.    < end of copied text >

## 2020-08-15 NOTE — ED PROVIDER NOTE - OBJECTIVE STATEMENT
88M h/o afib on eliquis, CAD s/p stenting, HLD, HTN p/w dysarthria. Onset today at 7PM with wife, strugged to get words out. No other complaints. Sxs have since resolved. Denies HA, confusion, unilateral weakness or numbness, dizziness, CP, SOB, abd pain. Pt had similar sxs in June and diagnosed with SIADH, is currently taking 2 salt pills per day.

## 2020-08-15 NOTE — PHYSICAL THERAPY INITIAL EVALUATION ADULT - GENERAL OBSERVATIONS, REHAB EVAL
9:30- 10:00. Pt chart reviewed. Pt agreed to participate in PT IE. Pt encountered supine in bed, + IV, + call bed, + low position, in NAD.

## 2020-08-15 NOTE — ED PROVIDER NOTE - ATTENDING CONTRIBUTION TO CARE
patient is BIB family for an episode of trouble speaking, which had resolved, denies f/c/n/v/HA/dizziness.   No trauma.   Vitals noted  Patient is awake, alert, comfortable  lungs: CTA, no crackles  abd: +BS, NT, ND, soft  CNS: awake, alert, no focal neurologic deficits  A/P: TIA  labs, CT  reevaluation

## 2020-08-15 NOTE — ED PROVIDER NOTE - NS ED ROS FT
General: No fever, chills, or weakness.  Eyes:  No visual changes, eye pain or discharge.  ENMT:  No hearing changes, pain, no sore throat or runny nose, no difficulty swallowing  Cardiac:  No chest pain, SOB or edema. No chest pain with exertion.  Respiratory:  No cough or respiratory distress. No hemoptysis. No history of asthma or RAD.  GI:  No nausea, vomiting, diarrhea or abdominal pain.  :  No dysuria, frequency or burning.  MS:  No myalgia, muscle weakness, joint pain or back pain.  Neuro: Dsyarthria. No headache.  No LOC. No change in ambulation. No dizziness.  Skin:  No skin rash.   Endocrine: No history of thyroid disease or diabetes.

## 2020-08-15 NOTE — PHYSICAL THERAPY INITIAL EVALUATION ADULT - ADDITIONAL COMMENTS
Pt states he lives in a two family home with him and his wife on the first floor and his daughter is on the second floor. Pt states he only needs to go up stairs to go see his daughter. Pt states he was independent with ADLs and  transfers and used a SC to ambulate, but not all the time.

## 2020-08-15 NOTE — ED PROVIDER NOTE - NSCAREINITIATED _GEN_ER
Waiting for prior authorization which could take 1 to 5 days for approval  Otoniel Del Toro(Resident)

## 2020-08-16 PROCEDURE — 99233 SBSQ HOSP IP/OBS HIGH 50: CPT

## 2020-08-16 RX ORDER — LEVETIRACETAM 250 MG/1
500 TABLET, FILM COATED ORAL
Refills: 0 | Status: DISCONTINUED | OUTPATIENT
Start: 2020-08-16 | End: 2020-08-18

## 2020-08-16 RX ADMIN — LEVETIRACETAM 500 MILLIGRAM(S): 250 TABLET, FILM COATED ORAL at 17:07

## 2020-08-16 RX ADMIN — Medication 75 MICROGRAM(S): at 05:52

## 2020-08-16 RX ADMIN — LISINOPRIL 20 MILLIGRAM(S): 2.5 TABLET ORAL at 05:52

## 2020-08-16 RX ADMIN — Medication 81 MILLIGRAM(S): at 11:28

## 2020-08-16 RX ADMIN — TAMSULOSIN HYDROCHLORIDE 0.4 MILLIGRAM(S): 0.4 CAPSULE ORAL at 21:10

## 2020-08-16 RX ADMIN — Medication 100 MILLIGRAM(S): at 11:28

## 2020-08-16 RX ADMIN — Medication 30 MILLIGRAM(S): at 05:52

## 2020-08-16 RX ADMIN — CHLORHEXIDINE GLUCONATE 1 APPLICATION(S): 213 SOLUTION TOPICAL at 11:28

## 2020-08-16 RX ADMIN — SODIUM CHLORIDE 1 GRAM(S): 9 INJECTION INTRAMUSCULAR; INTRAVENOUS; SUBCUTANEOUS at 05:52

## 2020-08-16 RX ADMIN — SODIUM CHLORIDE 1 GRAM(S): 9 INJECTION INTRAMUSCULAR; INTRAVENOUS; SUBCUTANEOUS at 17:07

## 2020-08-16 RX ADMIN — APIXABAN 5 MILLIGRAM(S): 2.5 TABLET, FILM COATED ORAL at 17:07

## 2020-08-16 RX ADMIN — APIXABAN 5 MILLIGRAM(S): 2.5 TABLET, FILM COATED ORAL at 05:52

## 2020-08-16 RX ADMIN — LISINOPRIL 10 MILLIGRAM(S): 2.5 TABLET ORAL at 21:10

## 2020-08-16 RX ADMIN — ATORVASTATIN CALCIUM 80 MILLIGRAM(S): 80 TABLET, FILM COATED ORAL at 21:10

## 2020-08-16 RX ADMIN — Medication 1 MILLIGRAM(S): at 21:10

## 2020-08-16 RX ADMIN — Medication 20 MILLIGRAM(S): at 05:52

## 2020-08-16 RX ADMIN — Medication 1 TABLET(S): at 11:28

## 2020-08-16 NOTE — PROGRESS NOTE ADULT - SUBJECTIVE AND OBJECTIVE BOX
S: No new events./complaints  No such repeat Aphasia episodes      All other pertinent ROS negative.      Vital Signs Last 24 Hrs  T(C): 36.6 (16 Aug 2020 12:28), Max: 36.7 (15 Aug 2020 20:10)  T(F): 97.9 (16 Aug 2020 12:28), Max: 98 (15 Aug 2020 20:10)  HR: 89 (16 Aug 2020 12:28) (75 - 89)  BP: 136/71 (16 Aug 2020 12:28) (131/81 - 178/82)  BP(mean): --  RR: 18 (16 Aug 2020 12:28) (18 - 18)  SpO2: 95% (16 Aug 2020 07:59) (95% - 95%)  PHYSICAL EXAM:    Constitutional: NAD, awake and alert, well-developed  HEENT: PERR, EOMI, Normal Hearing, MMM  Neck: Soft and supple, No LAD, No JVD  Respiratory: Breath sounds are clear bilaterally, No wheezing, rales or rhonchi  Cardiovascular: S1 and S2, regular rate and rhythm, no Murmurs, gallops or rubs  Gastrointestinal: Bowel Sounds present, soft, nontender, nondistended, no guarding, no rebound  Extremities: No peripheral edema  Neuro: non focal exam      MEDICATIONS:  MEDICATIONS  (STANDING):  apixaban 5 milliGRAM(s) Oral two times a day  aspirin  chewable 81 milliGRAM(s) Oral daily  atorvastatin 80 milliGRAM(s) Oral at bedtime  chlorhexidine 4% Liquid 1 Application(s) Topical daily  folic acid 1 milliGRAM(s) Oral at bedtime  furosemide    Tablet 20 milliGRAM(s) Oral daily  levETIRAcetam 500 milliGRAM(s) Oral two times a day  levothyroxine 75 MICROGram(s) Oral daily  lisinopril 10 milliGRAM(s) Oral at bedtime  lisinopril 20 milliGRAM(s) Oral daily  multivitamin 1 Tablet(s) Oral daily  NIFEdipine XL 30 milliGRAM(s) Oral daily  sodium chloride 1 Gram(s) Oral two times a day  tamsulosin 0.4 milliGRAM(s) Oral at bedtime  thiamine 100 milliGRAM(s) Oral daily      LABS: All Labs Reviewed:                        12.5   8.92  )-----------( 209      ( 15 Aug 2020 08:30 )             39.0     08-15    138  |  103  |  16  ----------------------------<  155<H>  4.1   |  24  |  1.4    Ca    9.5      15 Aug 2020 08:30  Phos  3.4     08-15  Mg     2.1     08-15    TPro  6.7  /  Alb  4.3  /  TBili  0.8  /  DBili  x   /  AST  16  /  ALT  12  /  AlkPhos  62  08-15    PT/INR - ( 15 Aug 2020 08:30 )   PT: 19.50 sec;   INR: 1.70 ratio         PTT - ( 15 Aug 2020 08:30 )  PTT:35.7 sec  CARDIAC MARKERS ( 15 Aug 2020 08:30 )  x     / <0.01 ng/mL / x     / x     / x      CARDIAC MARKERS ( 14 Aug 2020 21:24 )  x     / <0.01 ng/mL / x     / x     / x          Blood Culture:     Radiology: reviewed

## 2020-08-16 NOTE — PROGRESS NOTE ADULT - ASSESSMENT
88 year old male with PMH of atrial fibrillation on Eliquis HTN, DLD, hypothyroidism admitted for loss of the ability to produce language. Today evening, patient started to difficult to put words together, however his comprehension remained intact. Incident was witnessed by wife. His symptoms resolved in ER.     # Transient expressive aphasia:  Sypmtoms completely resolved. Never recurred.  Probably TIA  CT Head normal.  MRI brain w/ Gd pending.   REEG results pending.       - patient admitted for similar symptoms 2 months back --> with negative work up   - ECHO (6/8/2020): no evidence of PFO, normal global function    - neurocheck q4  - c/w aspirin,eliquis and lipitor 80.     Not sure why patient started on Keppra on 8/16 (not his home med)    # Chronic Afib  - rate controlled   - c/w Eliquis  - not on rate control drugs (HR 60s)    # B/L LE edema  - started  lasix 20mg daily    # HTN  - c/w lisinopril   - staredt Lasix  controlled now.     # Full Code.

## 2020-08-17 ENCOUNTER — TRANSCRIPTION ENCOUNTER (OUTPATIENT)
Age: 85
End: 2020-08-17

## 2020-08-17 LAB
ALBUMIN SERPL ELPH-MCNC: 3.9 G/DL — SIGNIFICANT CHANGE UP (ref 3.5–5.2)
ALP SERPL-CCNC: 59 U/L — SIGNIFICANT CHANGE UP (ref 30–115)
ALT FLD-CCNC: 11 U/L — SIGNIFICANT CHANGE UP (ref 0–41)
ANION GAP SERPL CALC-SCNC: 10 MMOL/L — SIGNIFICANT CHANGE UP (ref 7–14)
AST SERPL-CCNC: 15 U/L — SIGNIFICANT CHANGE UP (ref 0–41)
BILIRUB SERPL-MCNC: 0.5 MG/DL — SIGNIFICANT CHANGE UP (ref 0.2–1.2)
BUN SERPL-MCNC: 19 MG/DL — SIGNIFICANT CHANGE UP (ref 10–20)
CALCIUM SERPL-MCNC: 9.5 MG/DL — SIGNIFICANT CHANGE UP (ref 8.5–10.1)
CHLORIDE SERPL-SCNC: 101 MMOL/L — SIGNIFICANT CHANGE UP (ref 98–110)
CO2 SERPL-SCNC: 24 MMOL/L — SIGNIFICANT CHANGE UP (ref 17–32)
CREAT SERPL-MCNC: 1.4 MG/DL — SIGNIFICANT CHANGE UP (ref 0.7–1.5)
GLUCOSE SERPL-MCNC: 107 MG/DL — HIGH (ref 70–99)
HCT VFR BLD CALC: 37.4 % — LOW (ref 42–52)
HGB BLD-MCNC: 12.3 G/DL — LOW (ref 14–18)
MCHC RBC-ENTMCNC: 32.5 PG — HIGH (ref 27–31)
MCHC RBC-ENTMCNC: 32.9 G/DL — SIGNIFICANT CHANGE UP (ref 32–37)
MCV RBC AUTO: 98.9 FL — HIGH (ref 80–94)
NRBC # BLD: 0 /100 WBCS — SIGNIFICANT CHANGE UP (ref 0–0)
PLATELET # BLD AUTO: 201 K/UL — SIGNIFICANT CHANGE UP (ref 130–400)
POTASSIUM SERPL-MCNC: 4.5 MMOL/L — SIGNIFICANT CHANGE UP (ref 3.5–5)
POTASSIUM SERPL-SCNC: 4.5 MMOL/L — SIGNIFICANT CHANGE UP (ref 3.5–5)
PROT SERPL-MCNC: 6.2 G/DL — SIGNIFICANT CHANGE UP (ref 6–8)
RBC # BLD: 3.78 M/UL — LOW (ref 4.7–6.1)
RBC # FLD: 12.4 % — SIGNIFICANT CHANGE UP (ref 11.5–14.5)
SODIUM SERPL-SCNC: 135 MMOL/L — SIGNIFICANT CHANGE UP (ref 135–146)
WBC # BLD: 7.34 K/UL — SIGNIFICANT CHANGE UP (ref 4.8–10.8)
WBC # FLD AUTO: 7.34 K/UL — SIGNIFICANT CHANGE UP (ref 4.8–10.8)

## 2020-08-17 PROCEDURE — 70551 MRI BRAIN STEM W/O DYE: CPT | Mod: 26

## 2020-08-17 PROCEDURE — 95819 EEG AWAKE AND ASLEEP: CPT | Mod: 26

## 2020-08-17 PROCEDURE — 99233 SBSQ HOSP IP/OBS HIGH 50: CPT

## 2020-08-17 PROCEDURE — 93291 INTERROG DEV EVAL SCRMS IP: CPT | Mod: 26

## 2020-08-17 RX ADMIN — LISINOPRIL 20 MILLIGRAM(S): 2.5 TABLET ORAL at 05:44

## 2020-08-17 RX ADMIN — Medication 75 MICROGRAM(S): at 05:45

## 2020-08-17 RX ADMIN — APIXABAN 5 MILLIGRAM(S): 2.5 TABLET, FILM COATED ORAL at 18:13

## 2020-08-17 RX ADMIN — ATORVASTATIN CALCIUM 80 MILLIGRAM(S): 80 TABLET, FILM COATED ORAL at 22:35

## 2020-08-17 RX ADMIN — APIXABAN 5 MILLIGRAM(S): 2.5 TABLET, FILM COATED ORAL at 05:45

## 2020-08-17 RX ADMIN — SODIUM CHLORIDE 1 GRAM(S): 9 INJECTION INTRAMUSCULAR; INTRAVENOUS; SUBCUTANEOUS at 05:43

## 2020-08-17 RX ADMIN — Medication 100 MILLIGRAM(S): at 11:49

## 2020-08-17 RX ADMIN — LEVETIRACETAM 500 MILLIGRAM(S): 250 TABLET, FILM COATED ORAL at 05:44

## 2020-08-17 RX ADMIN — Medication 1 MILLIGRAM(S): at 22:35

## 2020-08-17 RX ADMIN — SODIUM CHLORIDE 1 GRAM(S): 9 INJECTION INTRAMUSCULAR; INTRAVENOUS; SUBCUTANEOUS at 18:13

## 2020-08-17 RX ADMIN — Medication 20 MILLIGRAM(S): at 05:44

## 2020-08-17 RX ADMIN — TAMSULOSIN HYDROCHLORIDE 0.4 MILLIGRAM(S): 0.4 CAPSULE ORAL at 22:35

## 2020-08-17 RX ADMIN — LISINOPRIL 10 MILLIGRAM(S): 2.5 TABLET ORAL at 22:36

## 2020-08-17 RX ADMIN — Medication 1 TABLET(S): at 11:49

## 2020-08-17 RX ADMIN — Medication 1 MILLIGRAM(S): at 16:43

## 2020-08-17 RX ADMIN — Medication 30 MILLIGRAM(S): at 05:43

## 2020-08-17 RX ADMIN — LEVETIRACETAM 500 MILLIGRAM(S): 250 TABLET, FILM COATED ORAL at 18:13

## 2020-08-17 RX ADMIN — Medication 81 MILLIGRAM(S): at 11:49

## 2020-08-17 NOTE — PROGRESS NOTE ADULT - SUBJECTIVE AND OBJECTIVE BOX
Hospital Day:  2d    Subjective:    Patient is a 88y old  Male who presents with a chief complaint of Slurred speech (16 Aug 2020 18:18)      Past Medical Hx:   Hypothyroidism  Hypertension  Hyperlipidemia  Atrial fibrillation  Coronary artery disease    Past Sx:  History of loop recorder  H/O aortic valve replacement    Allergies:  No Known Allergies    Current Meds:   Standng Meds:  apixaban 5 milliGRAM(s) Oral two times a day  aspirin  chewable 81 milliGRAM(s) Oral daily  atorvastatin 80 milliGRAM(s) Oral at bedtime  chlorhexidine 4% Liquid 1 Application(s) Topical daily  folic acid 1 milliGRAM(s) Oral at bedtime  furosemide    Tablet 20 milliGRAM(s) Oral daily  levETIRAcetam 500 milliGRAM(s) Oral two times a day  levothyroxine 75 MICROGram(s) Oral daily  lisinopril 10 milliGRAM(s) Oral at bedtime  lisinopril 20 milliGRAM(s) Oral daily  multivitamin 1 Tablet(s) Oral daily  NIFEdipine XL 30 milliGRAM(s) Oral daily  sodium chloride 1 Gram(s) Oral two times a day  tamsulosin 0.4 milliGRAM(s) Oral at bedtime  thiamine 100 milliGRAM(s) Oral daily    PRN Meds:    HOME MEDICATIONS:  aspirin 81 mg oral tablet: 1 tab(s) orally once a day  atorvastatin 80 mg oral tablet: 1 tab(s) orally once a day  Eliquis 5 mg oral tablet: 1 tab(s) orally 2 times a day  folic acid 1 mg oral tablet: 1 tab(s) orally once a day (at bedtime)  levothyroxine 75 mcg (0.075 mg) oral tablet: 1 tab(s) orally once a day  lisinopril 10 mg oral tablet: 1 tab(s) orally once a day (at bedtime)  lisinopril 20 mg oral tablet: 1 tab(s) orally once a day  Sodium Chloride 1 g oral tablet: 1 tab(s) orally 2 times a day  tamsulosin 0.4 mg oral capsule: 1 cap(s) orally once a day  thiamine 100 mg oral tablet: 1 tab(s) orally once a day      Vital Signs:   T(F): 97.2 (20 @ 05:00), Max: 97.9 (20 @ 12:28)  HR: 64 (20 @ 05:00) (64 - 89)  BP: 181/78 (20 @ 05:00) (136/71 - 194/87)  RR: 18 (20 @ 05:00) (18 - 18)  SpO2: 95% (20 @ 07:59) (95% - 95%)        Physical Exam:   GENERAL: NAD  HEENT: NCAT  CHEST/LUNG: CTAB  HEART: Regular rate and rhythm; s1 s2 appreciated, No murmurs, rubs, or gallops  ABDOMEN: Soft, Nontender, Nondistended; Bowel sounds present  EXTREMITIES: No LE edema b/l  SKIN: no rashes, no new lesions  NERVOUS SYSTEM:  Alert & Oriented X3  LINES/CATHETERS:        Labs:                         12.5   8.92  )-----------( 209      ( 15 Aug 2020 08:30 )             39.0       15 Aug 2020 08:30    138    |  103    |  16     ----------------------------<  155    4.1     |  24     |  1.4      Ca    9.5        15 Aug 2020 08:30  Phos  3.4       15 Aug 2020 08:30  Mg     2.1       15 Aug 2020 08:30    TPro  6.7    /  Alb  4.3    /  TBili  0.8    /  DBili  x      /  AST  16     /  ALT  12     /  AlkPhos  62     15 Aug 2020 08:30              Troponin <0.01, CKMB --, CK -- 08-15-20 @ 08:30  Troponin <0.01, CKMB --, CK -- 20 @ 21:24        Urinalysis Basic - ( 14 Aug 2020 23:30 )    Color: Light Yellow / Appearance: Clear / S.013 / pH: x  Gluc: x / Ketone: Negative  / Bili: Negative / Urobili: <2 mg/dL   Blood: x / Protein: Negative / Nitrite: Negative   Leuk Esterase: Negative / RBC: x / WBC x   Sq Epi: x / Non Sq Epi: x / Bacteria: x Hospital Day:  2d    Subjective:    Patient is a 88y old  Male who presents with a chief complaint of Slurred speech. No acute events overnight and in no distress this am.     Admitted to medicine for a diagnosis of slurred speech       Past Medical Hx:   Hypothyroidism  Hypertension  Hyperlipidemia  Atrial fibrillation  Coronary artery disease    Past Sx:  History of loop recorder  H/O aortic valve replacement    Allergies:  No Known Allergies    Current Meds:   Standng Meds:  apixaban 5 milliGRAM(s) Oral two times a day  aspirin  chewable 81 milliGRAM(s) Oral daily  atorvastatin 80 milliGRAM(s) Oral at bedtime  chlorhexidine 4% Liquid 1 Application(s) Topical daily  folic acid 1 milliGRAM(s) Oral at bedtime  furosemide    Tablet 20 milliGRAM(s) Oral daily  levETIRAcetam 500 milliGRAM(s) Oral two times a day  levothyroxine 75 MICROGram(s) Oral daily  lisinopril 10 milliGRAM(s) Oral at bedtime  lisinopril 20 milliGRAM(s) Oral daily  multivitamin 1 Tablet(s) Oral daily  NIFEdipine XL 30 milliGRAM(s) Oral daily  sodium chloride 1 Gram(s) Oral two times a day  tamsulosin 0.4 milliGRAM(s) Oral at bedtime  thiamine 100 milliGRAM(s) Oral daily    PRN Meds:    HOME MEDICATIONS:  aspirin 81 mg oral tablet: 1 tab(s) orally once a day  atorvastatin 80 mg oral tablet: 1 tab(s) orally once a day  Eliquis 5 mg oral tablet: 1 tab(s) orally 2 times a day  folic acid 1 mg oral tablet: 1 tab(s) orally once a day (at bedtime)  levothyroxine 75 mcg (0.075 mg) oral tablet: 1 tab(s) orally once a day  lisinopril 10 mg oral tablet: 1 tab(s) orally once a day (at bedtime)  lisinopril 20 mg oral tablet: 1 tab(s) orally once a day  Sodium Chloride 1 g oral tablet: 1 tab(s) orally 2 times a day  tamsulosin 0.4 mg oral capsule: 1 cap(s) orally once a day  thiamine 100 mg oral tablet: 1 tab(s) orally once a day      Vital Signs:   T(F): 97.2 (20 @ 05:00), Max: 97.9 (20 @ 12:28)  HR: 64 (20 @ 05:00) (64 - 89)  BP: 181/78 (20 @ 05:00) (136/71 - 194/87)  RR: 18 (20 @ 05:00) (18 - 18)  SpO2: 95% (20 @ 07:59) (95% - 95%)        Physical Exam:   GENERAL: NAD  HEENT: NCAT  CHEST/LUNG: CTAB  HEART: Regular rate and rhythm; s1 s2 appreciated, No murmurs, rubs, or gallops  ABDOMEN: Soft, Nontender, Nondistended; Bowel sounds present  EXTREMITIES: No LE edema b/l  SKIN: no rashes, no new lesions  NERVOUS SYSTEM:  Alert & Oriented X3          Labs:                         12.5   8.92  )-----------( 209      ( 15 Aug 2020 08:30 )             39.0       15 Aug 2020 08:30    138    |  103    |  16     ----------------------------<  155    4.1     |  24     |  1.4      Ca    9.5        15 Aug 2020 08:30  Phos  3.4       15 Aug 2020 08:30  Mg     2.1       15 Aug 2020 08:30    TPro  6.7    /  Alb  4.3    /  TBili  0.8    /  DBili  x      /  AST  16     /  ALT  12     /  AlkPhos  62     15 Aug 2020 08:30              Troponin <0.01, CKMB --, CK -- 08-15-20 @ 08:30  Troponin <0.01, CKMB --, CK -- 20 @ 21:24        Urinalysis Basic - ( 14 Aug 2020 23:30 )    Color: Light Yellow / Appearance: Clear / S.013 / pH: x  Gluc: x / Ketone: Negative  / Bili: Negative / Urobili: <2 mg/dL   Blood: x / Protein: Negative / Nitrite: Negative   Leuk Esterase: Negative / RBC: x / WBC x   Sq Epi: x / Non Sq Epi: x / Bacteria: x

## 2020-08-17 NOTE — CHART NOTE - NSCHARTNOTEFT_GEN_A_CORE
Device: [employer] Loop recorder     Interrogation complete    Events: In AF. AF burden 27.5 %. Battery reached end of service on 8/3/2020.    Recommendations: F/u Dr. Perdomo as scheduled on 9/21 at 14:45, [facility] office    Reviewed by: Dr. Evangelista    EPS [id number]

## 2020-08-17 NOTE — PROGRESS NOTE ADULT - ASSESSMENT
88 year old male with PMH of atrial fibrillation on Eliquis HTN, DLD, hypothyroidism, suspected TIA 3 years ago and 2 months ago chief complaint of slurred speech that resolved upon coming to ER.    # TIA  - CT head - unremarkable  - MR head without gadolinium - ordered  - CTA H + N - ?  - Carotid duplex - ?  - Echo with bubble study - no evidence of PFO, normal global function 6/8/2020   - Cardiac monitoring   - Neuro checks q 4 hours  - Follow labs - CBC, CMP, Mag, Phos, PT/aPTT, Lipid profile and HbA1c and cardiac enzymes.  - C/w aspirin, and statins.  - Pt had 3rd episode of TIA on aspirin and Eliquis, consider starting Plavix after neuro clearance   - PT/OT eval    #H/o EtOH abuse/Possible thiamine defic  -replete vitamins  -counselled     # HTN  -Continue with Home medications.     # H/o HLD  - Lipid profile wnl   - c/w statins    # Hypothyroidism  -On synthroid     # Afib  -Continue with Eliquis    Diet: DASH  Activity: IAT  DVT Prophylaxis: ELIQUIS  GI Prophylaxis: PPI  Code Status: FULL  Disposition: pending stroke workup 88 year old male with PMH of atrial fibrillation on Eliquis HTN, DLD, hypothyroidism, suspected TIA 3 years ago and 2 months ago chief complaint of slurred speech that resolved upon coming to ER.    # TIA  - CT head - unremarkable  - MR head without gado: no evidence of infarct, mass  - CTA H + N - ?  - Carotid duplex - ?  20-39% stenosis of the right internal carotid artery.   20-39% stenosis of the left internal carotid artery.   - Echo with bubble study - no evidence of PFO, normal global function 6/8/2020  - Follow labs - CBC, CMP, Mag, Phos, PT/aPTT, Lipid profile and HbA1c and cardiac enzymes.  - C/w aspirin, and statins.  - C/w Keppra 500mg BID   - Pt had 3rd episode of TIA on aspirin and Eliquis, consider starting Plavix after neuro clearance   - PT/OT eval    #H/o EtOH abuse/Possible thiamine defic  -replete vitamins  -counselled     # HTN  -Continue with Home medications.     # H/o HLD  - Lipid profile wnl   - c/w statins    # Hypothyroidism  -On synthroid     # Afib  -Continue with Eliquis    Diet: DASH  Activity: IAT  DVT Prophylaxis: ELIQUIS  GI Prophylaxis: PPI  Code Status: FULL  Disposition: pending stroke workup 88 year old male with PMH of atrial fibrillation on Eliquis HTN, DLD, hypothyroidism, suspected TIA 3 years ago and 2 months ago chief complaint of slurred speech that resolved upon coming to ER.    # Slurred speech, Resolved  - Likely due to TIA   - Pt had 3rd episode of TIA on aspirin and Eliquis, consider starting Plavix after neuro clearance   - CTH - no evidence of intracranial pathology  - Carotid duplex - 20-39% stenosis of the COSTA. 20-39% stenosis of the LICA  - Echo with bubble study 6/8/2020 - no evidence of PFO, normal global function  - C/w aspirin, and statins  - Seen by neurology: EEG generalized slowing  - C/w Keppra 500mg BID, per neuro   - PT/OT eval  - F/u MRI Head  without gado. Will need Ativan 1mg IV push 20mnts prior to MRI   - F/u neuro whether Keppra can be d/c and if Eliquis can be changed to Plavix     # Afib   - Rate controlled off meds, HR 60s   - C/w Eliquis  - Pt has implanted loop with dead battery  - F/u EP c/s for interrogation Dr. Denis     # H/o EtOH abuse/Possible thiamine defic  -replete vitamins  -counselled     # HTN  -Continue with Home medications.     # H/o HLD  - Lipid profile wnl   - c/w statins    # Hypothyroidism  -On synthroid     Diet: DASH  Activity: IAT  DVT Prophylaxis: ELIQUIS  GI Prophylaxis: PPI  Code Status: FULL  Disposition: pending stroke workup 88 year old male with PMH of atrial fibrillation on Eliquis HTN, DLD, hypothyroidism, suspected TIA 3 years ago and 2 months ago chief complaint of slurred speech that resolved upon coming to ER.    # Slurred speech, Resolved  - Likely due to TIA   - Pt had 3rd episode of TIA on aspirin and Eliquis, consider starting Plavix after neuro clearance   - CTH - no evidence of intracranial pathology  - Carotid duplex - 20-39% stenosis of the COSTA. 20-39% stenosis of the LICA  - Echo with bubble study 6/8/2020 - no evidence of PFO, normal global function  - C/w aspirin, and statins  - Seen by neurology: EEG generalized slowing  - C/w Keppra 500mg BID, per neuro   - PT/OT eval  - F/u MRI Head  without gado. Will need Ativan 1mg IV push 20mnts prior to MRI   - F/u neuro whether Keppra can be d/c and if Plavix should be initiated     # Afib   - Rate controlled off meds, HR 60s   - C/w Eliquis  - Pt has implanted loop with dead battery  - F/u EP c/s for interrogation Dr. Denis     # H/o EtOH abuse/Possible thiamine defic  -replete vitamins  -counselled     # HTN  -Continue with Home medications.     # H/o HLD  - Lipid profile wnl   - c/w statins    # Hypothyroidism  -On synthroid     Diet: DASH  Activity: IAT  DVT Prophylaxis: ELIQUIS  GI Prophylaxis: PPI  Code Status: FULL  Disposition: pending stroke workup

## 2020-08-17 NOTE — PROGRESS NOTE ADULT - ATTENDING COMMENTS
88 year old male with PMH of atrial fibrillation on Eliquis HTN, DLD, hypothyroidism admitted for loss of the ability to produce language. Today evening, patient started to difficult to put words together, however his comprehension remained intact. Incident was witnessed by wife. His symptoms resolved in ER.     # Transient expressive aphasia:  Sypmtoms completely resolved. Never recurred.  Probably TIA, HAs had multiple of those.  CT Head normal.  MRI brain w/ Gd pending.   REEG : Generalized slowing. f/u Neuro    - patient admitted for similar symptoms 2 months back --> with negative work up   - ECHO (6/8/2020): no evidence of PFO, normal global function    - neurocheck q4  - c/w aspirin,eliquis and lipitor 80.   d/w neuro if we can switch asa with plavix since patient has been having TIAs on ASA.     Not sure why patient started on Keppra on 8/16 (not his home med). d/w neuro.    Loop recorder interrogated by EP - In AF. AF burden 27.5%. F/u Dr. Perdomo in clinic .    # Chronic Afib  - rate controlled   - c/w Eliquis  - not on rate control drugs (HR 60s)    # B/L LE edema  - started  lasix 20mg daily    # HTN  - c/w lisinopril   - staredt Lasix  controlled now.     # Full Code.  Pending MRI Brain with Ativan

## 2020-08-17 NOTE — DISCHARGE NOTE NURSING/CASE MANAGEMENT/SOCIAL WORK - NSDCPEPTSTRK_GEN_ALL_CORE
Stroke support groups for patients, families, and friends/Need for follow up after discharge/Prescribed medications/Stroke warning signs and symptoms/Signs and symptoms of stroke/Stroke education booklet/Risk factors for stroke/Call 911 for stroke

## 2020-08-17 NOTE — DISCHARGE NOTE NURSING/CASE MANAGEMENT/SOCIAL WORK - PATIENT PORTAL LINK FT
You can access the FollowMyHealth Patient Portal offered by [facility] by registering at the following website: [email or web address]. By joining [employer]’s FollowMyHealth portal, you will also be able to view your health information using other applications (apps) compatible with our system.

## 2020-08-18 ENCOUNTER — TRANSCRIPTION ENCOUNTER (OUTPATIENT)
Age: 85
End: 2020-08-18

## 2020-08-18 VITALS
DIASTOLIC BLOOD PRESSURE: 70 MMHG | SYSTOLIC BLOOD PRESSURE: 144 MMHG | HEART RATE: 66 BPM | TEMPERATURE: 96 F | RESPIRATION RATE: 18 BRPM

## 2020-08-18 PROCEDURE — 99239 HOSP IP/OBS DSCHRG MGMT >30: CPT

## 2020-08-18 RX ORDER — NIFEDIPINE 30 MG
1 TABLET, EXTENDED RELEASE 24 HR ORAL
Qty: 30 | Refills: 0
Start: 2020-08-18 | End: 2020-09-16

## 2020-08-18 RX ADMIN — SODIUM CHLORIDE 1 GRAM(S): 9 INJECTION INTRAMUSCULAR; INTRAVENOUS; SUBCUTANEOUS at 06:26

## 2020-08-18 RX ADMIN — APIXABAN 5 MILLIGRAM(S): 2.5 TABLET, FILM COATED ORAL at 06:24

## 2020-08-18 RX ADMIN — Medication 100 MILLIGRAM(S): at 11:41

## 2020-08-18 RX ADMIN — Medication 1 TABLET(S): at 11:41

## 2020-08-18 RX ADMIN — LISINOPRIL 20 MILLIGRAM(S): 2.5 TABLET ORAL at 06:26

## 2020-08-18 RX ADMIN — Medication 75 MICROGRAM(S): at 06:25

## 2020-08-18 RX ADMIN — LEVETIRACETAM 500 MILLIGRAM(S): 250 TABLET, FILM COATED ORAL at 06:24

## 2020-08-18 RX ADMIN — Medication 20 MILLIGRAM(S): at 06:24

## 2020-08-18 RX ADMIN — Medication 30 MILLIGRAM(S): at 06:26

## 2020-08-18 RX ADMIN — Medication 81 MILLIGRAM(S): at 11:41

## 2020-08-18 NOTE — DISCHARGE NOTE PROVIDER - NSDCFUADDINST_GEN_ALL_CORE_FT
Please follow up at Stroke clinic at [address] [place] [address], 1st floor with Dr. Turner. Dr. Turner at Stroke clinic at [address] [place] [address], 1st floor. Please call for an appointment.     Dr. Perdomo as scheduled on 9/21 at 14:45, [facility]

## 2020-08-18 NOTE — DISCHARGE NOTE PROVIDER - NSDCCPCAREPLAN_GEN_ALL_CORE_FT
PRINCIPAL DISCHARGE DIAGNOSIS  Diagnosis: TIA on medication  Assessment and Plan of Treatment: PRINCIPAL DISCHARGE DIAGNOSIS  Diagnosis: TIA on medication  Assessment and Plan of Treatment: TIA is a temporary blockage of blood flow to the brain which doesn’t cause permanent damage.  TIAs may signal a full-blown stroke ahead. You underwent a CT head and MRI both which did not show signs of acute stroke. You were seen by neurology and underwent an EEG which did not show evidence of seizures. Please continue taking Eliquis for A.fib, aspirin and statin as prescribed and follow up at the Stroke Clinic at [address] [place] [address] with Dr. Turner for further evaluation.   Also please keep your appointment with Dr. Perdomo as scheduled on 9/21 at 14:45, [facility] office for loop recorder evaluation. PRINCIPAL DISCHARGE DIAGNOSIS  Diagnosis: TIA on medication  Assessment and Plan of Treatment: TIA is a temporary blockage of blood flow to the brain which doesn’t cause permanent damage.  TIAs may signal a full-blown stroke ahead. You underwent a CT head and MRI both which did not show signs of acute stroke. You were seen by neurology and underwent an EEG which did not show evidence of seizures. Please continue taking Eliquis for A.fib, aspirin and statin as prescribed and follow up at the Stroke Clinic at [address] [place] [address] with Dr. Turner for further evaluation.   Also please keep your appointment with Dr. Perdomo as scheduled on 9/21 at 14:45, [facility] office for loop recorder evaluation.  Also f/u PMD.

## 2020-08-18 NOTE — DISCHARGE NOTE PROVIDER - PROVIDER TOKENS
PROVIDER:[TOKEN:[[id number]]],PROVIDER:[TOKEN:[[id number]],ESTABLISHEDPATIENT:[T]],PROVIDER:[TOKEN:[[id number]]] PROVIDER:[TOKEN:[[id number]]],PROVIDER:[TOKEN:[[id number]],ESTABLISHEDPATIENT:[T]],PROVIDER:[TOKEN:[[id number]]],FREE:[LAST:[Your PMD],PHONE:[(   )    -],FAX:[(   )    -]]

## 2020-08-18 NOTE — DISCHARGE NOTE PROVIDER - CARE PROVIDER_API CALL
Domingo Helton  EEG/EPILEPSY  [address] [facility] [address]  [place] [address]  Phone: [phone]  Fax: [phone]  Follow Up Time:     Kemal Perdomo)  Cardiac Electrophysiology; Cardiovascular Disease; [facility]  [address] [place] [address]  Phone: [phone]  Fax: [phone]  Established Patient  Follow Up Time:     Herminio Mcmullen)  Cardiovascular Disease; Interventional Cardiology  [address] [facility] [address]  [place] [address]  Phone: [phone]  Fax: [phone]  Follow Up Time: Domingo Helton  EEG/EPILEPSY  [address] [facility] [address]  [place] [address]  Phone: [phone]  Fax: [phone]  Follow Up Time:     Kemal Perdomo)  Cardiac Electrophysiology; Cardiovascular Disease; [facility]  [address] [place] [address]  Phone: [phone]  Fax: [phone]  Established Patient  Follow Up Time:     Herminio Mcmullen)  Cardiovascular Disease; Interventional Cardiology  [address] [facility] [address]  [place] [address]  Phone: [phone]  Fax: [phone]  Follow Up Time:     Your PMD,   Phone: (   )    -  Fax: (   )    -  Follow Up Time:

## 2020-08-18 NOTE — DISCHARGE NOTE PROVIDER - HOSPITAL COURSE
87 yo M with PMH of  A.fibrillation on Eliquis HTN, DLD, hypothyroidism, suspected TIA 3 years ago ( had a loop recorder that is still in his chest but battery is dead) and another TIA 2 months ago now presenting with a chief complaint of slurred speech that resolved upon coming to ER. Onset started today at 7PM with wife, struggled to get words out. No other complaints. Sxs have since resolved. Denies HA, confusion, unilateral weakness or numbness, dizziness, CP, SOB, abd pain. Pt had similar sxs in June and diagnosed with SIADH, is currently taking 2 salt pills per day.         Pt was admitted to medicine for a diagnosis of suspected TIA. CTH showed no evidence of intracranial pathology. Carotid duplex - 20-39% stenosis of the COSTA. 20-39% stenosis of the LICA. Echo with bubble study 6/8/2020 - no evidence of PFO, normal global function. Pt also underwent MRI brain w/o gado which showed no evidence of acute ischemia. Lipid profile wnl.         Pt had no neurological deficits, no facial droop, no dysarthria, cognition remained intact since ED admission.         He was seen by neurology and underwent an EEG which showed generalized study, no seizures. Keppra was started during inpt, but per neuro pt does not need to continue on d/c. Also, per neuro pt does not need to start Plavix, as MRI was not c/w acute infarct. He is to c/w ASA and statins.         EP was c/s for loop recorder interrogation and for dead battery and they re: keeping outpt f/u with Dr. Perdomo. A.fib remained rate controlled off meds, HR 60s and pt remained on Eliquis.        Pt is medically stable and ready to be d/c home today with outpt f/u at Stroke clinic and EP 89 yo M with PMH of  A.fibrillation on Eliquis HTN, DLD, hypothyroidism, suspected TIA 3 years ago ( had a loop recorder that is still in his chest but battery is dead) and another TIA 2 months ago now presenting with a chief complaint of slurred speech that resolved upon coming to ER. Onset started today at 7PM with wife, struggled to get words out. No other complaints. Sxs have since resolved. Denies HA, confusion, unilateral weakness or numbness, dizziness, CP, SOB, abd pain. Pt had similar sxs in June and diagnosed with SIADH, is currently taking 2 salt pills per day.         Pt was admitted to medicine for a diagnosis of suspected TIA. CTH showed no evidence of intracranial pathology. Carotid duplex - 20-39% stenosis of the COSTA. 20-39% stenosis of the LICA. Echo with bubble study 6/8/2020 - no evidence of PFO, normal global function. Pt also underwent MRI brain w/o gado which showed no evidence of acute ischemia. Lipid profile wnl.         Pt had no neurological deficits, no facial droop, no dysarthria, cognition remained intact since ED admission.         He was seen by neurology and underwent an EEG which showed generalized study, no seizures. Keppra was started during inpt, but per neuro pt does not need to continue on d/c. Also, per neuro pt does not need to start Plavix, as MRI was not c/w acute infarct. He is to c/w ASA and statins.         EP was c/s for loop recorder interrogation and for dead battery and they re: keeping outpt f/u with Dr. Perdomo. A.fib remained rate controlled off meds, HR 60s and pt remained on Eliquis.        Pt is medically stable and ready to be d/c home today with outpt f/u at Stroke clinic and EP                  Attending Note:             Patient seen and examined independently. I personally had a face-to-face encounter with the patient, examined the patient myself and reviewed the plan of care with the housestaff. Agree with resident's note but my note supersedes that of the resident in the matters hereby listed.         PAtient p/w TIA presneting as transient expressive aphasia. Has had TIAs in the past as well.    Symptoms completely resolved. Back to baseline.    MRI Head negative.     EEG - non specific generalized slowing.         Per Neuro, c/w ASA, Lipitor 80. c/w Eliquis  for P.A Fib (EP interrogated Loop recorder - just P.A.Fib).    f/u Neuro, Dr. Perdomo, PMD as outpatient.

## 2020-08-18 NOTE — DISCHARGE NOTE PROVIDER - CARE PROVIDERS DIRECT ADDRESSES
[email or web address]_Unknown [email or web address],DirectAddress_Unknown,DirectAddress_Unknown

## 2020-08-18 NOTE — DISCHARGE NOTE PROVIDER - NSDCMRMEDTOKEN_GEN_ALL_CORE_FT
aspirin 81 mg oral tablet: 1 tab(s) orally once a day  atorvastatin 80 mg oral tablet: 1 tab(s) orally once a day  Eliquis 5 mg oral tablet: 1 tab(s) orally 2 times a day  folic acid 1 mg oral tablet: 1 tab(s) orally once a day (at bedtime)  levothyroxine 75 mcg (0.075 mg) oral tablet: 1 tab(s) orally once a day  lisinopril 10 mg oral tablet: 1 tab(s) orally once a day (at bedtime)  lisinopril 20 mg oral tablet: 1 tab(s) orally once a day  Multiple Vitamins oral tablet: 1 tab(s) orally once a day  Sodium Chloride 1 g oral tablet: 1 tab(s) orally 2 times a day  tamsulosin 0.4 mg oral capsule: 1 cap(s) orally once a day  thiamine 100 mg oral tablet: 1 tab(s) orally once a day aspirin 81 mg oral tablet: 1 tab(s) orally once a day  atorvastatin 80 mg oral tablet: 1 tab(s) orally once a day  Eliquis 5 mg oral tablet: 1 tab(s) orally 2 times a day  folic acid 1 mg oral tablet: 1 tab(s) orally once a day (at bedtime)  levothyroxine 75 mcg (0.075 mg) oral tablet: 1 tab(s) orally once a day  lisinopril 10 mg oral tablet: 1 tab(s) orally once a day (at bedtime)  lisinopril 20 mg oral tablet: 1 tab(s) orally once a day  Multiple Vitamins oral tablet: 1 tab(s) orally once a day  NIFEdipine 30 mg oral tablet, extended release: 1 tab(s) orally once a day  Sodium Chloride 1 g oral tablet: 1 tab(s) orally 2 times a day  tamsulosin 0.4 mg oral capsule: 1 cap(s) orally once a day  thiamine 100 mg oral tablet: 1 tab(s) orally once a day

## 2020-08-18 NOTE — DISCHARGE NOTE PROVIDER - NSDCFUSCHEDAPPT_GEN_ALL_CORE_FT
AMA HARDY ; 09/21/2020 ; [facility] Card[facility] [address] [facility] AMA HARDY ; 09/21/2020 ; [facility] Cardio [address] [facility] AMA HARDY ; 09/21/2020 ; [facility] Cardio [address] [facility] AMA HARDY ; 09/21/2020 ; [facility] Cardio [address] [facility] AMA HARDY ; 09/21/2020 ; NPP Cardio [address] [facility]

## 2020-08-19 ENCOUNTER — LABORATORY RESULT (OUTPATIENT)
Age: 85
End: 2020-08-19

## 2020-08-19 ENCOUNTER — APPOINTMENT (OUTPATIENT)
Dept: NEUROLOGY | Facility: CLINIC | Age: 85
End: 2020-08-19
Payer: MEDICARE

## 2020-08-19 VITALS
OXYGEN SATURATION: 98 % | SYSTOLIC BLOOD PRESSURE: 138 MMHG | WEIGHT: 180 LBS | HEART RATE: 76 BPM | BODY MASS INDEX: 24.38 KG/M2 | TEMPERATURE: 97.4 F | DIASTOLIC BLOOD PRESSURE: 84 MMHG | HEIGHT: 72 IN

## 2020-08-19 DIAGNOSIS — G62.9 POLYNEUROPATHY, UNSPECIFIED: ICD-10-CM

## 2020-08-19 PROCEDURE — 99214 OFFICE O/P EST MOD 30 MIN: CPT

## 2020-08-20 DIAGNOSIS — E78.5 HYPERLIPIDEMIA, UNSPECIFIED: ICD-10-CM

## 2020-08-20 DIAGNOSIS — G45.9 TRANSIENT CEREBRAL ISCHEMIC ATTACK, UNSPECIFIED: ICD-10-CM

## 2020-08-20 DIAGNOSIS — Z79.01 LONG TERM (CURRENT) USE OF ANTICOAGULANTS: ICD-10-CM

## 2020-08-20 DIAGNOSIS — I10 ESSENTIAL (PRIMARY) HYPERTENSION: ICD-10-CM

## 2020-08-20 DIAGNOSIS — Z95.3 PRESENCE OF XENOGENIC HEART VALVE: ICD-10-CM

## 2020-08-20 DIAGNOSIS — Z79.82 LONG TERM (CURRENT) USE OF ASPIRIN: ICD-10-CM

## 2020-08-20 DIAGNOSIS — Z95.5 PRESENCE OF CORONARY ANGIOPLASTY IMPLANT AND GRAFT: ICD-10-CM

## 2020-08-20 DIAGNOSIS — Z87.891 PERSONAL HISTORY OF NICOTINE DEPENDENCE: ICD-10-CM

## 2020-08-20 DIAGNOSIS — I25.10 ATHEROSCLEROTIC HEART DISEASE OF NATIVE CORONARY ARTERY WITHOUT ANGINA PECTORIS: ICD-10-CM

## 2020-08-20 DIAGNOSIS — I48.20 CHRONIC ATRIAL FIBRILLATION, UNSPECIFIED: ICD-10-CM

## 2020-08-20 DIAGNOSIS — E03.9 HYPOTHYROIDISM, UNSPECIFIED: ICD-10-CM

## 2020-08-20 DIAGNOSIS — R47.1 DYSARTHRIA AND ANARTHRIA: ICD-10-CM

## 2020-08-20 DIAGNOSIS — R47.01 APHASIA: ICD-10-CM

## 2020-08-20 DIAGNOSIS — E22.2 SYNDROME OF INAPPROPRIATE SECRETION OF ANTIDIURETIC HORMONE: ICD-10-CM

## 2020-08-20 DIAGNOSIS — E51.9 THIAMINE DEFICIENCY, UNSPECIFIED: ICD-10-CM

## 2020-08-20 DIAGNOSIS — R26.9 UNSPECIFIED ABNORMALITIES OF GAIT AND MOBILITY: ICD-10-CM

## 2020-08-20 NOTE — REVIEW OF SYSTEMS
[Difficulty with Language] : ~M difficulty with language [Tingling] : tingling [Numbness] : numbness [Difficulty Walking] : difficulty walking [Negative] : Heme/Lymph

## 2020-08-21 LAB
25(OH)D3 SERPL-MCNC: 68 NG/ML
ALBUMIN MFR SERPL ELPH: 57.4 %
ALBUMIN SERPL-MCNC: 3.8 G/DL
ALBUMIN/GLOB SERPL: 1.4 RATIO
ALPHA1 GLOB MFR SERPL ELPH: 4.7 %
ALPHA1 GLOB SERPL ELPH-MCNC: 0.3 G/DL
ALPHA2 GLOB MFR SERPL ELPH: 11.8 %
ALPHA2 GLOB SERPL ELPH-MCNC: 0.8 G/DL
ANION GAP SERPL CALC-SCNC: 16 MMOL/L
B-GLOBULIN MFR SERPL ELPH: 12.7 %
B-GLOBULIN SERPL ELPH-MCNC: 0.8 G/DL
BUN SERPL-MCNC: 33 MG/DL
C3 SERPL-MCNC: 117 MG/DL
C4 SERPL-MCNC: 24 MG/DL
CALCIUM SERPL-MCNC: 9.6 MG/DL
CHLORIDE SERPL-SCNC: 94 MMOL/L
CO2 SERPL-SCNC: 19 MMOL/L
CREAT SERPL-MCNC: 1.8 MG/DL
ENA SS-A AB SER IA-ACNC: <0.2 AL
ENA SS-B AB SER IA-ACNC: <0.2 AL
ESTIMATED AVERAGE GLUCOSE: 103 MG/DL
FOLATE SERPL-MCNC: >20 NG/ML
GAMMA GLOB FLD ELPH-MCNC: 0.9 G/DL
GAMMA GLOB MFR SERPL ELPH: 13.4 %
GLUCOSE SERPL-MCNC: 103 MG/DL
HBA1C MFR BLD HPLC: 5.2 %
INTERPRETATION SERPL IEP-IMP: NORMAL
POTASSIUM SERPL-SCNC: 4.6 MMOL/L
PROT SERPL-MCNC: 6.6 G/DL
PROT SERPL-MCNC: 6.6 G/DL
SODIUM SERPL-SCNC: 129 MMOL/L
THYROPEROXIDASE AB SERPL IA-ACNC: 12.3 IU/ML
TSH SERPL-ACNC: 2.89 UIU/ML
VIT B12 SERPL-MCNC: 428 PG/ML

## 2020-08-21 NOTE — PHYSICAL EXAM
[FreeTextEntry1] : Mental status: Awake, alert and oriented x3.  Recent and remote memory intact.  Naming, repetition and comprehension intact.  Attention/concentration intact.  No dysarthria, no aphasia.  Fund of knowledge appropriate.  \par Cranial nerves: Pupils equally round and reactive to light, visual fields full, no nystagmus, extraocular muscles intact, V1 through V3 intact bilaterally and symmetric, face symmetric, hearing intact to finger rub, palate elevation symmetric, tongue was midline.\par Motor:  MRC grading 5/5 b/l UE/LE.   strength 5/5.  Normal tone and bulk.  No abnormal movements.  \par Sensation: Reduced vibration sensation in the toes. \par Coordination: No dysmetria on finger-to-nose and heel-to-shin.  No dysdiadokinesia.\par Reflexes: 2+ in bilateral UE and 1+LE, downgoing toes bilaterally. (-) Cortes.\par Gait: Unable to walk tandem. Wide based. No ataxia.  Romberg positive \par \par

## 2020-08-21 NOTE — ASSESSMENT
[FreeTextEntry1] :  88 year old man with PMH of atrial fibrillation on Eliquis 5 mg BID, HTN, DLD,hypothyroidism and suspected TIA 3 years ago ( had a loop recorder that is still in his chest but battery is dead) and another TIA 2 months is being seen as a follow up visit after his recent hospital admission for different episode of suspected TIA. For the past week he has two episode of slurred speech and word finding difficulty and expressive aphasia. For the fist episode he was noted to have high systolic blood pressure of 220. He was admitted to the hospital where Brain MRI showed no acute infarct. He was discharged with no change on his medication. The last episode was yesterday and lasted for 15 minutes. Currently exam is at the baseline with no focal deficit or word finding difficulty. Carotid Doppler in the hospital showed 20-39% stenosis of bilateral carotid arteries. His CT angiogram of head and neck in June showed vascular calcifications and noncalcified plaque in the distal common carotid arteries and proximal internal carotid arteries without stenosis. Given the recurrent TIA episodes, he might benefit from changing anticoagulation treatment. I talked to Dr Garcia  and he agreed to visit the patient in vascular office next week. Meanwhile I asked them to continue proper hydration and will repeat the CT angiogram of head and neck. \par \par Continue current medication. Eliquis and ASA 81. \par

## 2020-08-21 NOTE — HISTORY OF PRESENT ILLNESS
[FreeTextEntry1] : AMA HARDY is a 88 year old man with PMH of atrial fibrillation on Eliquis 5 mg BID, HTN, DLD,\par hypothyroidism and suspected TIA 3 years ago ( had a loop recorder that is still\par in his chest but battery is dead) and another TIA 2 months is being seen as a follow up visit after his recent hospital admission for different episode of suspected TIA. On 8/14 he was noted to have on episode of slurred speech for 5 minutes prompted him to come to the hospital. His daughter is a nurse and checked his blood pressure on that time which was 220 systolic. In the hospital Ivan MRI showed no acute infarct. He was discharged with no change on medication on Eliquis and ASA. Of note patient had a  similar sxs in June and diagnosed with SIADH, is currently taking 2 salt pills per day. His CT angiogram of head and neck showed no large vessel occlusion but showed showed vascular calcifications and noncalcified plaque in the distal common carotid arteries and proximal internal carotid arteries without stenosis. During the recent admission Carotid Doppler showed 20-30 percent stenosis. After discharge, yesterday he was noted to have another episode of word finding difficulty and slurred  speech. His daughter was there and explained the symptoms as he was slurring with words significantly which was followed by paraphasic errors for 5 minutes. Total episode lasted for 15 minutes. During that time his daughter checked him and found no focal deficit. She took his blood pressure which was 145 systolic. \par

## 2020-08-24 ENCOUNTER — APPOINTMENT (OUTPATIENT)
Dept: CARDIOLOGY | Facility: CLINIC | Age: 85
End: 2020-08-24

## 2020-08-25 ENCOUNTER — OUTPATIENT (OUTPATIENT)
Dept: OUTPATIENT SERVICES | Facility: HOSPITAL | Age: 85
LOS: 1 days | Discharge: HOME | End: 2020-08-25

## 2020-08-25 DIAGNOSIS — Z98.890 OTHER SPECIFIED POSTPROCEDURAL STATES: Chronic | ICD-10-CM

## 2020-08-25 DIAGNOSIS — Z95.2 PRESENCE OF PROSTHETIC HEART VALVE: Chronic | ICD-10-CM

## 2020-08-25 DIAGNOSIS — G45.9 TRANSIENT CEREBRAL ISCHEMIC ATTACK, UNSPECIFIED: ICD-10-CM

## 2020-08-25 LAB
ALBUPE: 18.6 %
ALPHA1UPE: 44 %
ALPHA2UPE: 18.1 %
ANA PAT FLD IF-IMP: ABNORMAL
ANA SER IF-ACNC: ABNORMAL
BETAUPE: 10.7 %
CREAT 24H UR-MCNC: NORMAL G/24 H
CREATININE UR (MAYO): 115 MG/DL
GAMMAUPE: 8.6 %
IGA 24H UR QL IFE: NORMAL
KAPPA LC 24H UR QL: NORMAL
PROT PATTERN 24H UR ELPH-IMP: NORMAL
PROT UR-MCNC: 9 MG/DL
PROT UR-MCNC: 9 MG/DL
SPECIMEN VOL 24H UR: NORMAL ML
VIT B6 SERPL-MCNC: 16.2 UG/L

## 2020-08-27 ENCOUNTER — APPOINTMENT (OUTPATIENT)
Dept: NEUROLOGY | Facility: CLINIC | Age: 85
End: 2020-08-27
Payer: MEDICARE

## 2020-08-27 VITALS
HEIGHT: 72 IN | OXYGEN SATURATION: 98 % | BODY MASS INDEX: 24.38 KG/M2 | DIASTOLIC BLOOD PRESSURE: 69 MMHG | SYSTOLIC BLOOD PRESSURE: 128 MMHG | TEMPERATURE: 97.4 F | HEART RATE: 75 BPM | WEIGHT: 180 LBS

## 2020-08-27 DIAGNOSIS — Z86.79 PERSONAL HISTORY OF OTHER DISEASES OF THE CIRCULATORY SYSTEM: ICD-10-CM

## 2020-08-27 DIAGNOSIS — I65.23 OCCLUSION AND STENOSIS OF BILATERAL CAROTID ARTERIES: ICD-10-CM

## 2020-08-27 PROCEDURE — 99215 OFFICE O/P EST HI 40 MIN: CPT

## 2020-08-27 NOTE — REVIEW OF SYSTEMS
[Feeling Tired] : feeling tired [Depression] : depression [Difficulty Walking] : difficulty walking [Nocturia] : nocturia [Lower Ext Edema] : lower extremity edema [Joint Swelling] : joint swelling [Joint Pain] : joint pain [Joint Stiffness] : joint stiffness [Negative] : Heme/Lymph

## 2020-08-28 NOTE — ASSESSMENT
[FreeTextEntry1] : An 88 years old right handed man with h/o transient aphasia for few/several minutes which happened twice over past several months was seen by Dr. Steward initieally. He had a brain MRI and CTA of head and neck as well as carotid duplex. We reviewed his imaging. 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 We could not appreciate significant large vessel sever stenosis justifying the aphasia; however there were a lot of calcified and non-calcified plaques in the major vessels. He is on OAC and Aspirin per cardiology recommendation. He denies having any staring spells, jerky movement, incontinence during those episodes. The possibility of his symptoms can be secondary to small vessel disease, non-convulsive partial seizure, flactuation of mentation or etc. However from Neuroendovascular perspective, for which he is in this clinic, no NI is warranted at this point. \par \par \par \par PLAN:\par - No neuroendovascular intervention is warranted at this time\par - Maximize medical management and  risk factors/ lifestyle modifications per SAMMPRIS Trial\par - Encourage oral hydration with at least 3 L of water intake daily\par - Avoid dehydration, hypotension, and hypovolemia\par - Encourage Mediterranean Diet\par - Encourage aerobic exercise at least 30 min/day and 5x/wk\par - C/w high dose statin therapy \par - Follow up with [facility] Stroke clinic for Neurovascular management\par - Refer to Dr. Pope for evaluation of possible non-convulsive seizure \par - Medical management per PMD\par - Instructed patient to call 911 or report to ED if any acute neurological changes occur\par \par Will sign off\par \par

## 2020-08-28 NOTE — PHYSICAL EXAM
[FreeTextEntry1] : NIH STROKE SCALE\par \par Item	                                                        Score\par 1 a.	Level of Consciousness	            0\par 1 b.          LOC Questions	                            0\par 1 c.	LOC Commands	                            0\par 2.	Best Gaze	                            0\par 3.	Visual	                                            0\par 4.	Facial Palsy	                            0\par 5 a.	Motor Arm - Left	                            0\par 5 b.	Motor Arm - Right	                            0\par 6 a.	Motor Leg - Left	                            0\par 6 b.	Motor Leg - Right	                            0\par 7.	Limb Ataxia	                            0\par 8.	Sensory	                                            0\par 9.	Language	                            0\par 10.	Dysarthria	                            0\par 11.	Extinction and Inattention  	            0\par ___________________________________________\par TOTAL	                                                            0\par \par mRS: 1 No significant disability despite symptoms; able to carry out all usual duties and activities without assistance\par \par \par Neurologic Exam:\par \par Mental status: Awake, alert and oriented x 3. \par Language: Follows all commands. Naming, repetition and comprehension intact. Attention/concentration intact. No dysarthria, no aphasia.  \par Cranial nerves: Pupils equally round and reactive to light, visual fields full, no nystagmus, EOMI, face symmetric, hard of hearing, palate elevation symmetric, tongue was midline, sternocleidomastoid/ shoulder shrug strength bilaterally 5/5.  \par Motor: No drifting in all extremities. Normal bulk and tone, strength 5/5 in bilateral upper and lower extremities.   strength 5/5. No tremors noted\par Sensation: Intact to light touch.  No neglect. \par Coordination: No dysmetria on finger-to-nose and heel-to-shin.\par Reflexes: 2+ in upper and lower extremities, downgoing toes bilaterally\par Gait: Steady with cane\par \par Cardio: +S1S2 No M/R/G\par Pulm: CTA B/L no W/R/R\par Skin: Warm, Dry, Capillary refill <2 seconds, good skin turgor\par Abd: Soft, NTND\par Ext: no c/c, mild b/l LE edema\par

## 2020-08-28 NOTE — REASON FOR VISIT
[Initial Evaluation] : an initial evaluation [Family Member] : family member [FreeTextEntry1] : Carotid stenosis

## 2020-08-28 NOTE — DATA REVIEWED
[de-identified] : \par EXAM: MR BRAIN \par \par \par PROCEDURE DATE: 08/17/2020 \par \par \par \par \par INTERPRETATION: Clinical History / Reason for exam: Slurred speech. \par \par MRI OF THE BRAIN WITHOUT CONTRAST \par \par TECHNIQUE: \par \par Multiple transaxial and sagittal T1-weighted images and transaxial T2, FLAIR, gradient echo and Diffusion weighted images of the brain were obtained. \par \par Comparison: \par \par MRI the brain without and with contrast dated June 12, 2020 and noncontrast CT scan of the brain dated August 14, 2020. \par \par FINDINGS: \par \par The third and lateral ventricles are mildly enlarged as are the cortical sulci consistent with a mild degree of cortical atrophy. The fourth ventricle is normal in size and position. \par \par There is no shift of the midline structures. \par \par Moderate thinning of the body of the corpus callosum consistent with moderate corpus callosal atrophy. \par \par Confluent areas of T2/FLAIR hyperintense signal intensity in the periventricular white matter and patchy foci in the right thalamus brainstem and right cerebellar hemisphere consistent with chronic ischemic change. \par \par No MRI evidence to suggest acute ischemic change. \par \par Small rounded focus of hypointense signal intensity on the gradient echo images in the left cerebellar hemisphere likely etiology would be that of hypertension. \par \par IMPRESSION: No MRI evidence to suggest acute ischemic change.  [de-identified] : EXAM: CT ANGIO NECK (W)AW IC \par EXAM: CT ANGIO BRAIN (W)AW IC \par \par \par PROCEDURE DATE: 06/06/2020 \par \par \par \par \par INTERPRETATION: Clinical History / Reason for exam: Stroke code. Acute \par onset aphasia. \par \par Technique: CT angiogram of the head and neck. CTA of the head and neck was \par performed following the intravenous administration of 125 cc (75 cc \par discarded) Optiray 320 with coronal, sagittal and multiple 3-D MIP and \par volume rendered reformats. \par \par Combined reports \par Comparison/correlation: MRA brain 3/18/2017. \par Findings: \par NECK: \par The great vessel origins contain calcifications without significant stenosis. \par Vascular calcifications and noncalcified plaque in the distal common carotid arteries and proximal internal carotid arteries. \par Common carotid arteries, internal carotid arteries, external carotid arteries, and vertebral arteries are patent. \par The bilateral vertebral arteries (left dominant) are patent. Calcification of the left distal vertebral artery. \par \par HEAD: \par The distal internal carotid arteries contain calcifications without significant stenosis. \par The right A1 segment is hypoplastic, normal anatomic variation. \par The anterior cerebral arteries, middle cerebral arteries, posterior cerebral arteries, vertebral arteries and basilar artery are patent. \par OTHER: \par Degenerative changes of the cervical spine. \par Coarsening of the trabecular pattern in the right side of T4 consistent with a hemangioma. \par Sternal wires are noted. \par IMPRESSION: No significant vascular stenosis or large vessel occlusion. \par

## 2020-08-28 NOTE — HISTORY OF PRESENT ILLNESS
[FreeTextEntry1] : Mr. 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 Steward. Patient has had two transient episodes at different times with symptoms of slurred speech, dysarthria, and expressive aphasia that were suspicious of TIA. His Brain MRI which was negative for any acute infarction. CTA H/N in 6/2020 reports of no LVO but vascular calcifications and calcified plaque in the distal common carotid arteries and proximal internal carotid arteries. B/l carotid artery duplex reports of 20/39% stenosis of b/l ICA. Patient is with history of Afib (on Eliquis), HTN, DLD, prior TIA, chronic right cerebellar infarction, s/p LOOP recorder in which is a dead battery, CAD, aortic valve replacement, daily wine drinker, and former smoker.

## 2020-09-15 ENCOUNTER — RX RENEWAL (OUTPATIENT)
Age: 85
End: 2020-09-15

## 2020-09-15 RX ORDER — APIXABAN 2.5 MG/1
2.5 TABLET, FILM COATED ORAL
Qty: 180 | Refills: 0 | Status: DISCONTINUED | COMMUNITY
Start: 2019-05-02 | End: 2020-09-15

## 2020-09-21 ENCOUNTER — APPOINTMENT (OUTPATIENT)
Dept: CARDIOLOGY | Facility: CLINIC | Age: 85
End: 2020-09-21
Payer: MEDICARE

## 2020-09-21 VITALS
HEIGHT: 72 IN | WEIGHT: 180 LBS | SYSTOLIC BLOOD PRESSURE: 112 MMHG | DIASTOLIC BLOOD PRESSURE: 69 MMHG | HEART RATE: 70 BPM | TEMPERATURE: 97.3 F | BODY MASS INDEX: 24.38 KG/M2

## 2020-09-21 PROCEDURE — 99214 OFFICE O/P EST MOD 30 MIN: CPT

## 2020-09-21 RX ORDER — LACTULOSE 10 G/15ML
10 SOLUTION ORAL; RECTAL
Qty: 600 | Refills: 0 | Status: COMPLETED | COMMUNITY
Start: 2020-06-16 | End: 2020-09-21

## 2020-09-21 RX ORDER — HYDROCHLOROTHIAZIDE 12.5 MG/1
12.5 TABLET ORAL
Qty: 90 | Refills: 0 | Status: COMPLETED | COMMUNITY
Start: 2020-03-13 | End: 2020-09-21

## 2020-09-21 RX ORDER — SENNOSIDES 8.6 MG
8.6 TABLET ORAL
Qty: 30 | Refills: 0 | Status: COMPLETED | COMMUNITY
Start: 2020-06-16 | End: 2020-09-21

## 2020-09-21 RX ORDER — LISINOPRIL 10 MG/1
10 TABLET ORAL
Qty: 90 | Refills: 0 | Status: COMPLETED | COMMUNITY
Start: 2020-07-29 | End: 2020-09-21

## 2020-09-21 RX ORDER — AMLODIPINE BESYLATE 5 MG/1
5 TABLET ORAL
Qty: 30 | Refills: 0 | Status: COMPLETED | COMMUNITY
Start: 2020-06-04 | End: 2020-09-21

## 2020-09-21 RX ORDER — MULTIVITAMIN WITH FOLIC ACID 400 MCG
TABLET ORAL
Qty: 30 | Refills: 0 | Status: COMPLETED | COMMUNITY
Start: 2020-06-16 | End: 2020-09-21

## 2020-09-21 RX ORDER — FLUOROURACIL 50 MG/G
5 CREAM TOPICAL
Qty: 40 | Refills: 0 | Status: COMPLETED | COMMUNITY
Start: 2019-01-25 | End: 2020-09-21

## 2020-09-21 RX ORDER — AMLODIPINE BESYLATE 2.5 MG/1
2.5 TABLET ORAL
Qty: 90 | Refills: 0 | Status: COMPLETED | COMMUNITY
Start: 2019-11-04 | End: 2020-09-21

## 2020-09-23 ENCOUNTER — APPOINTMENT (OUTPATIENT)
Dept: NEUROLOGY | Facility: CLINIC | Age: 85
End: 2020-09-23
Payer: MEDICARE

## 2020-09-23 VITALS
SYSTOLIC BLOOD PRESSURE: 115 MMHG | HEIGHT: 72 IN | BODY MASS INDEX: 25.06 KG/M2 | WEIGHT: 185 LBS | TEMPERATURE: 97.7 F | OXYGEN SATURATION: 97 % | HEART RATE: 70 BPM | DIASTOLIC BLOOD PRESSURE: 60 MMHG

## 2020-09-23 DIAGNOSIS — Z86.39 PERSONAL HISTORY OF OTHER ENDOCRINE, NUTRITIONAL AND METABOLIC DISEASE: ICD-10-CM

## 2020-09-23 PROCEDURE — 99204 OFFICE O/P NEW MOD 45 MIN: CPT

## 2020-09-23 PROCEDURE — 99214 OFFICE O/P EST MOD 30 MIN: CPT

## 2020-09-23 RX ORDER — ESCITALOPRAM OXALATE 5 MG/1
5 TABLET ORAL
Qty: 30 | Refills: 0 | Status: DISCONTINUED | COMMUNITY
Start: 2020-06-04 | End: 2020-09-23

## 2020-09-23 RX ORDER — NIFEDIPINE 30 MG
30 TABLET, EXTENDED RELEASE ORAL DAILY
Refills: 0 | Status: DISCONTINUED | COMMUNITY
End: 2020-09-23

## 2020-09-23 RX ORDER — LISINOPRIL 20 MG/1
20 TABLET ORAL DAILY
Refills: 0 | Status: DISCONTINUED | COMMUNITY
End: 2020-09-23

## 2020-09-24 NOTE — HISTORY OF PRESENT ILLNESS
[FreeTextEntry1] : Roly is an 88 year old male with PMH of atrial fibrillation on Eliquis HTN, DLD, hypothyroidism, suspected TIA 2 years ago and has a loop recorder. Pt is here today for evaluation after recent hospitalization in August. \par Pt is accompanied by his wife who is the primary historian. In June pt was hospitalized for abd pain and nausea. While in the ER stroke code was activated for acute onset confusion and difficulty speaking. He was found to have elevated BP and was having difficulty in expressing his concerns and confusion initially. No acute stroke on MRI. Pt was  Hyponatremic at a level of 117. 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 Pt is now under the care of a nephrologist and is taking sodium tablets. Most recent sodium 138.\par In August pt was in the yard sitting playing cards with his wife and all of a sudden he has talking but things that he was saying did not make sense. This lasted about 5 minutes and during this episode pt became agitated. Pt had no other neurological deficit and was back to baseline minutes later. In the evening of the same day pt once again had a similar episode where he was talking but things that he was saying did not make sense. The patient says that he knew what he really wanted to say, but could not get out the proper words and instead was saying something else. At this point he was taken to the hospital.\par Pt had an MRI, Carotid ultrasound and a REEG. No significant finding except an abnormal EEG. Pt was discharged home on the same meds and the day of discharge pt had the same episode in the evening but refused to go to the hospital at this time.\par Social: Pt is  with children. Retired for 30 years. Likes to do gardening and helps his wife with cooking. \par Risk Factors: ? TIA, denies head trauma, no CNS infections.

## 2020-09-24 NOTE — PHYSICAL EXAM
[General Appearance - Alert] : alert [General Appearance - In No Acute Distress] : in no acute distress [Romberg's Sign] : a positive Romberg's sign was present [Limited Balance] : the patient's balance was impaired [FreeTextEntry1] : Roly is awake alert and oriented x3. Has good attention but does exhibit some episodes of forgetfulness.   No dysarthria, no aphasia.  Fund of knowledge appropriate.  \par Cranial nerves: Pupils equally round and reactive to light, visual fields full, no nystagmus, extraocular muscles intact, face symmetric, hearing intact to finger rub, palate elevation symmetric, tongue was midline.\par Motor:  MRC grading 5/5 b/l UE/LE.   strength 5/5.  Normal tone and bulk.  No abnormal movements.  \par Sensation: Intact to light touch, proprioception, and pinprick in upper extremities but diminished in lower extremities worse in toes.\par Coordination: No dysmetria on finger-to-nose and heel-to-shin.  No dysdiadokinesia.\par Reflexes: symmetric in UE's reduced in LE's.  \par  [Dysdiadochokinesia Bilaterally] : not present

## 2020-09-24 NOTE — DISCUSSION/SUMMARY
[Inpatient video EEG study ordered with length of stay anticipated in days: _____] : Inpatient video EEG study ordered with length of stay anticipated in days: [unfilled] [Event capture (for localization, differential diagnosis) (typically 3-5 days)] : Event capture (for localization, differential diagnosis) (typically 3-5 days)  [FreeTextEntry1] : Roly is an 88 year old male with PMH of atrial fibrillation on Eliquis HTN, DLD, hypothyroidism, suspected TIA 2 years ago and has a loop recorder. Based on pts history and physical exam pt had at least 3 stereotypical events that are highly suggestive of seizure disorder. Will need to admit pt for VEEG as soon as possible for further craterization of the event and treatment plan.\par \par Case discussed with Dr. Pope.\par \par Jenelle Valadez, BERT, ACNP-BC

## 2020-09-24 NOTE — DATA REVIEWED
[de-identified] : EXAM:  MR BRAIN         PROCEDURE DATE:  08/17/2020       INTERPRETATION:  Clinical History / Reason for exam: Slurred speech.  MRI OF THE BRAIN WITHOUT CONTRAST  TECHNIQUE:  Multiple transaxial and sagittal T1-weighted images and transaxial T2, FLAIR, gradient echo and Diffusion weighted images of the brain were obtained.  Comparison:  MRI the brain without and with contrast dated June 12, 2020 and noncontrast CT scan of the brain dated August 14, 2020.  FINDINGS:  The third and lateral ventricles are mildly enlarged as are the cortical sulci consistent with a mild degree of cortical atrophy. The fourth ventricle is normal in size and position.  There is no shift of the midline structures.  Moderate thinning of the body of the corpus callosum consistent with moderate corpus callosal atrophy.  Confluent areas of T2/FLAIR hyperintense signal intensity in the periventricular white matter and patchy foci in the right thalamus brainstem and right cerebellar hemisphere consistent with chronic ischemic change.  No MRI evidence to suggest acute ischemic change.  Small rounded focus of hypointense signal intensity on the gradient echo images in the left cerebellar hemisphere likely etiology would be that of hypertension.  IMPRESSION:  No MRI evidence to suggest acute ischemic change.     [de-identified] : Abnormal REEG due to presence of generalized slowing 8/2020. [de-identified] : EXAM:  CAROTID DUPLEX BILATERAL         PROCEDURE DATE:  08/15/2020       INTERPRETATION:  CLINICAL INFORMATION: The patient is 88-year-old male with carotid bruit.  The study was performed to evaluate the patient for carotid artery stenosis.  Duplex evaluation of the carotid arteries was performed bilaterally. In the right carotid system, the internal carotid artery is noted to be patent with a peak systolic velocity of 69.7, cm/sec over end diastolic velocity of  11.5 cm/sec.  In the left carotid system, the internal carotid artery is noted to be patent with a peak systolic velocity of 68.6 cm/sec over end diastolic velocity of 17.1 cm/sec.  The right vertebral arterial flow was antegrade. The left vertebral arterial flow was antegrade.  Impression:  20-39% stenosis of the right internal carotid artery. 20-39% stenosis of the left internal carotid artery.  ICD-10: R09.89          RAINA RDORIGUEZ M.D., ATTENDING VASCULAR This document has been electronically signed. Aug 15 2020 12:13PM          [id number]^RI^DC

## 2020-09-26 ENCOUNTER — OUTPATIENT (OUTPATIENT)
Dept: OUTPATIENT SERVICES | Facility: HOSPITAL | Age: 85
LOS: 1 days | Discharge: HOME | End: 2020-09-26

## 2020-09-26 DIAGNOSIS — Z95.2 PRESENCE OF PROSTHETIC HEART VALVE: Chronic | ICD-10-CM

## 2020-09-26 DIAGNOSIS — Z11.59 ENCOUNTER FOR SCREENING FOR OTHER VIRAL DISEASES: ICD-10-CM

## 2020-09-26 DIAGNOSIS — Z98.890 OTHER SPECIFIED POSTPROCEDURAL STATES: Chronic | ICD-10-CM

## 2020-09-26 NOTE — HISTORY OF PRESENT ILLNESS
[FreeTextEntry1] : Referring Physician: Dr. Herminio Mcmullen\par \par TIA in March 3/2017 \par s/p ILR 3/22/2017 \par Dx with Subclinical Afib on loop recorder, started on ELiquis 4/13/2017 \par Admitted to [facility] in 6/2020 with slurred speech ? TIA , found to have hyponatremia,  CT head was unremarkable. MRI no evidence of acute CVA . \par Loop recorder battery depleted 7/2020 \par \par Patient is returning for follow up; \par \par Accompanied by wife . \par Denies chest pain, SOB or palpitations. Denies near syncope or syncope. \par \par \par CARDIAC TESTING:\par ECG (9/21/2020): NSR, 1 AVB  70 BPM, non -specific TWI  \par Carotid duplex (8/15/2020): No stenosis \par ECG (8/26/2019) sinus rhythm at 92 bpm  ms\par ECG (2/25/2019) sinus rhythm at 67 bpm  ms\par ECG (8/28/2018) Sinus rhythm at 64 bpm, QTc 420 ms \par ECG (2/5/2018): sinus rhythm at 66 bpm, , QRS 82, no significant ST/T abnormalities.\par ECG (7/10/2017): sinus rhythm at 71 bpm, , QRS 76, QTc 415, no significant ST/T abnormalities.\par 2D Echo (1/19/2018): EF 50-55%, trace MR, mild bioprosthetic aortic valve sclerosis

## 2020-09-26 NOTE — END OF VISIT
[Time Spent: ___ minutes] : I have spent [unfilled] minutes of time on the encounter. [>50% of the face to face encounter time was spent on counseling and/or coordination of care for ___] : Greater than 50% of the face to face encounter time was spent on counseling and/or coordination of care for [unfilled] [FreeTextEntry3] : I was present with the NP/PA during the history and exam of the patient. I discussed patient's management with her in details.I reviewed the NP’s note and agree with the documented findings and plan of care. I would like to take this opportunity to thank you for involving me in this patient care. Please do not hesitate to contact me if you have any further questions at [phone].\par

## 2020-09-26 NOTE — HISTORY OF PRESENT ILLNESS
[FreeTextEntry1] : Referring Physician: Dr. Herminio Mcmullen\par \par TIA in March 3/2017 \par s/p ILR 3/22/2017 \par Dx with Subclinical Afib on loop recorder, started on ELiquis 4/13/2017 \par Admitted to [facility] in 6/2020 with slurred speech ? TIA , found to have hyponatremia,  CT head was unremarkable. MRI no evidence of acute CVA . \par Loop recorder battery depleted 7/2020 \par \par Patient is returning for follow up; \par \par Accompanied by wife . \par Denies chest pain, SOB or palpitations. Denies near syncope or syncope. \par \par \par CARDIAC TESTING:\par ECG (9/21/2020): NSR, 1 AVB  70 BPM, non -specific TWI  \par Carotid duplex (8/15/2020): No stenosis \par ECG (8/26/2019) sinus rhythm at 92 bpm  ms\par ECG (2/25/2019) sinus rhythm at 67 bpm  ms\par ECG (8/28/2018) Sinus rhythm at 64 bpm, QTc 420 ms \par ECG (2/5/2018): sinus rhythm at 66 bpm, , QRS 82, no significant ST/T abnormalities.\par ECG (7/10/2017): sinus rhythm at 71 bpm, , QRS 76, QTc 415, no significant ST/T abnormalities.\par 2D Echo (1/19/2018): EF 50-55%, trace MR, mild bioprosthetic aortic valve sclerosis

## 2020-09-26 NOTE — DISCUSSION/SUMMARY
[FreeTextEntry1] : Mr. Roly Nielson is an 87  year-old man with hypertension, hyperlipidemia, hypothyroidism, coronary artery disease, history of myocardial infarction, transient ischemic attack (TIA) in March 2017, and paroxysmal atrial fibrillation. After TIA in March 2017 he underwent the placement of an implantable loop recorder on 3/22/2017 for detection of atrial fibrillation. Few days later, he had ILR transmission with atrial fibrillation. His CHADSVASC score is 6. He was placed on Eliquis 5 mg q12h.  \par Eliquis was reduced to 2.5 mg q12h in May 2019 due to Cr 1.54. Repeat lab work in 6/2020 showed Cr 1.0 his weight was >130 lbs , Eliquis was increased back to 5 mg BID.   \par Admitted to [facility] with questionable TIA , found to have hyponatremia. He is following with neuro/Moussavi\par \par Patient reports c/w meds, including ELiquis 5 mg BID and Asa 81 mg daily . No s/s bleeding reported. \par \par \par Patient was seen and examined with Dr. Perdomo:  \par Mr Nielson has PAF, rate controlled , he is asymptomatic during Afib . His BP well controlled.\par Loop recorder battery reached end of service in 7/2020 . Family opted not to explant, reassured patient and wife there is no harm in leaving the loop recorder in. \par At this time, we recommend to continue his current medication regimen and follow up with his cardiologist for routine cardiac care. Follow up with PCP /neuro as scheduled. F/U with nephrology for hyponatremia.  Return to EP on prn basis.  \par \par \par Please do not hesitate to contact us at [phone] if you have any further questions regarding this patient care.

## 2020-09-26 NOTE — PHYSICAL EXAM
[General Appearance - Well Developed] : well developed [Normal Appearance] : normal appearance [Well Groomed] : well groomed [General Appearance - Well Nourished] : well nourished [No Deformities] : no deformities [General Appearance - In No Acute Distress] : no acute distress [Normal Conjunctiva] : the conjunctiva exhibited no abnormalities [Normal Oral Mucosa] : normal oral mucosa [Normal Oropharynx] : normal oropharynx [JVD Elevated _____cm] : JVD elevated [unfilled] ~U cm above clavicle [Normal Jugular Venous V Waves Present] : normal jugular venous V waves present [Respiration, Rhythm And Depth] : normal respiratory rhythm and effort [Exaggerated Use Of Accessory Muscles For Inspiration] : no accessory muscle use [Auscultation Breath Sounds / Voice Sounds] : lungs were clear to auscultation bilaterally [Heart Rate And Rhythm] : heart rate and rhythm were normal [Heart Sounds] : normal S1 and S2 [Murmurs] : no murmurs present [Arterial Pulses Normal] : the arterial pulses were normal [Bowel Sounds] : normal bowel sounds [Abdomen Soft] : soft [Abdomen Tenderness] : non-tender [Abdomen Mass (___ Cm)] : no abdominal mass palpated [Abnormal Walk] : normal gait [Nail Clubbing] : no clubbing of the fingernails [Cyanosis, Localized] : no localized cyanosis [Petechial Hemorrhages (___cm)] : no petechial hemorrhages [Skin Color & Pigmentation] : normal skin color and pigmentation [Skin Turgor] : normal skin turgor [] : no rash [Oriented To Time, Place, And Person] : oriented to person, place, and time [Affect] : the affect was normal [Clean] : clean [Dry] : dry [Well-Healed] : well-healed [Bleeding] : no active bleeding [Erythema] : not erythematous [Warm] : not warm [FreeTextEntry1] : left parasternal

## 2020-09-26 NOTE — DISCUSSION/SUMMARY
[FreeTextEntry1] : Mr. Roly Nielson is an 87  year-old man with hypertension, hyperlipidemia, hypothyroidism, coronary artery disease, history of myocardial infarction, transient ischemic attack (TIA) in March 2017, and paroxysmal atrial fibrillation. After TIA in March 2017 he underwent the placement of an implantable loop recorder on 3/22/2017 for detection of atrial fibrillation. Few days later, he had ILR transmission with atrial fibrillation. His CHADSVASC score is 6. He was placed on Eliquis 5 mg q12h.  \par Eliquis was reduced to 2.5 mg q12h in May 2019 due to Cr 1.54. Repeat lab work in 6/2020 showed Cr 1.0 his weight was >130 lbs , Eliquis was increased back to 5 mg BID.   \par Admitted to [facility] with questionable TIA , found to have hyponatremia. He is following with neuro/Moussavi\par \par Patient reports c/w meds, including ELiquis 5 mg BID and Asa 81 mg daily . No s/s bleeding reported. \par \par \par Patient was seen and examined with Dr. Perdomo:  \par Mr Nielson has PAF, rate controlled , he is asymptomatic during Afib . His BP well controlled.\par Loop recorder battery reached end of service in 7/2020 . Family opted not to explant, reassured patient and wife there is no harm in leaving the loop recorder in. \par At this time, we recommend to continue his current medication regimen and follow up with his cardiologist for routine cardiac care. Follow up with PCP /neuro as scheduled. F/U with nephrology for hyponatremia.  Return to EP on prn basis.  \par \par \par Please do not hesitate to contact us at [phone] if you have any further questions regarding this patient care.

## 2020-09-26 NOTE — REVIEW OF SYSTEMS
[see HPI] : see HPI [Nocturia] : nocturia [Negative] : Endocrine [Fever] : no fever [Blurry Vision] : no blurred vision [Loss Of Hearing] : no hearing loss [Shortness Of Breath] : no shortness of breath [Dyspnea on exertion] : not dyspnea during exertion [Chest Pain] : no chest pain [Cough] : no cough [Abdominal Pain] : no abdominal pain [Urinary Frequency] : no change in urinary frequency [Joint Pain] : no joint pain [Skin: A Rash] : no rash: [Dizziness] : no dizziness [Confusion] : no confusion was observed [Easy Bleeding] : no tendency for easy bleeding [Easy Bruising] : no tendency for easy bruising

## 2020-09-26 NOTE — END OF VISIT
[Time Spent: ___ minutes] : I have spent [unfilled] minutes of time on the encounter. [>50% of the face to face encounter time was spent on counseling and/or coordination of care for ___] : Greater than 50% of the face to face encounter time was spent on counseling and/or coordination of care for [unfilled] [FreeTextEntry3] : I was present with the NP/PA during the history and exam of the patient. I discussed patient's management with her in details.I reviewed the NP’s note and agree with the documented findings and plan of care. I would like to take this opportunity to thank you for involving me in this patient care. Please do not hesitate to contact me if you have any further questions at [phone].\par

## 2020-09-29 LAB — SARS-COV-2 N GENE NPH QL NAA+PROBE: NOT DETECTED

## 2020-09-30 ENCOUNTER — INPATIENT (INPATIENT)
Facility: HOSPITAL | Age: 85
LOS: 1 days | Discharge: HOME | End: 2020-10-02
Attending: HOSPITALIST | Admitting: HOSPITALIST
Payer: MEDICARE

## 2020-09-30 VITALS
HEIGHT: 72 IN | SYSTOLIC BLOOD PRESSURE: 123 MMHG | DIASTOLIC BLOOD PRESSURE: 75 MMHG | HEART RATE: 81 BPM | TEMPERATURE: 96 F | WEIGHT: 194.89 LBS

## 2020-09-30 DIAGNOSIS — I10 ESSENTIAL (PRIMARY) HYPERTENSION: ICD-10-CM

## 2020-09-30 DIAGNOSIS — E03.9 HYPOTHYROIDISM, UNSPECIFIED: ICD-10-CM

## 2020-09-30 DIAGNOSIS — Z98.890 OTHER SPECIFIED POSTPROCEDURAL STATES: Chronic | ICD-10-CM

## 2020-09-30 DIAGNOSIS — Z95.2 PRESENCE OF PROSTHETIC HEART VALVE: Chronic | ICD-10-CM

## 2020-09-30 DIAGNOSIS — E78.5 HYPERLIPIDEMIA, UNSPECIFIED: ICD-10-CM

## 2020-09-30 DIAGNOSIS — Z90.49 ACQUIRED ABSENCE OF OTHER SPECIFIED PARTS OF DIGESTIVE TRACT: Chronic | ICD-10-CM

## 2020-09-30 LAB
ALBUMIN SERPL ELPH-MCNC: 3.7 G/DL — SIGNIFICANT CHANGE UP (ref 3.5–5.2)
ALP SERPL-CCNC: 48 U/L — SIGNIFICANT CHANGE UP (ref 30–115)
ALT FLD-CCNC: 11 U/L — SIGNIFICANT CHANGE UP (ref 0–41)
ANION GAP SERPL CALC-SCNC: 7 MMOL/L — SIGNIFICANT CHANGE UP (ref 7–14)
AST SERPL-CCNC: 12 U/L — SIGNIFICANT CHANGE UP (ref 0–41)
BASOPHILS # BLD AUTO: 0.04 K/UL — SIGNIFICANT CHANGE UP (ref 0–0.2)
BASOPHILS NFR BLD AUTO: 0.5 % — SIGNIFICANT CHANGE UP (ref 0–1)
BILIRUB SERPL-MCNC: 0.3 MG/DL — SIGNIFICANT CHANGE UP (ref 0.2–1.2)
BUN SERPL-MCNC: 28 MG/DL — HIGH (ref 10–20)
CALCIUM SERPL-MCNC: 9 MG/DL — SIGNIFICANT CHANGE UP (ref 8.5–10.1)
CHLORIDE SERPL-SCNC: 99 MMOL/L — SIGNIFICANT CHANGE UP (ref 98–110)
CO2 SERPL-SCNC: 25 MMOL/L — SIGNIFICANT CHANGE UP (ref 17–32)
CREAT SERPL-MCNC: 1.5 MG/DL — SIGNIFICANT CHANGE UP (ref 0.7–1.5)
EOSINOPHIL # BLD AUTO: 0.04 K/UL — SIGNIFICANT CHANGE UP (ref 0–0.7)
EOSINOPHIL NFR BLD AUTO: 0.5 % — SIGNIFICANT CHANGE UP (ref 0–8)
GLUCOSE SERPL-MCNC: 106 MG/DL — HIGH (ref 70–99)
HCT VFR BLD CALC: 33.2 % — LOW (ref 42–52)
HGB BLD-MCNC: 11 G/DL — LOW (ref 14–18)
IMM GRANULOCYTES NFR BLD AUTO: 0.2 % — SIGNIFICANT CHANGE UP (ref 0.1–0.3)
LYMPHOCYTES # BLD AUTO: 1.75 K/UL — SIGNIFICANT CHANGE UP (ref 1.2–3.4)
LYMPHOCYTES # BLD AUTO: 21.2 % — SIGNIFICANT CHANGE UP (ref 20.5–51.1)
MAGNESIUM SERPL-MCNC: 1.7 MG/DL — LOW (ref 1.8–2.4)
MCHC RBC-ENTMCNC: 32.4 PG — HIGH (ref 27–31)
MCHC RBC-ENTMCNC: 33.1 G/DL — SIGNIFICANT CHANGE UP (ref 32–37)
MCV RBC AUTO: 97.6 FL — HIGH (ref 80–94)
MONOCYTES # BLD AUTO: 0.82 K/UL — HIGH (ref 0.1–0.6)
MONOCYTES NFR BLD AUTO: 9.9 % — HIGH (ref 1.7–9.3)
NEUTROPHILS # BLD AUTO: 5.59 K/UL — SIGNIFICANT CHANGE UP (ref 1.4–6.5)
NEUTROPHILS NFR BLD AUTO: 67.7 % — SIGNIFICANT CHANGE UP (ref 42.2–75.2)
NRBC # BLD: 0 /100 WBCS — SIGNIFICANT CHANGE UP (ref 0–0)
PLATELET # BLD AUTO: 206 K/UL — SIGNIFICANT CHANGE UP (ref 130–400)
POTASSIUM SERPL-MCNC: 5.4 MMOL/L — HIGH (ref 3.5–5)
POTASSIUM SERPL-SCNC: 5.4 MMOL/L — HIGH (ref 3.5–5)
PROT SERPL-MCNC: 5.7 G/DL — LOW (ref 6–8)
RBC # BLD: 3.4 M/UL — LOW (ref 4.7–6.1)
RBC # FLD: 12.2 % — SIGNIFICANT CHANGE UP (ref 11.5–14.5)
SODIUM SERPL-SCNC: 131 MMOL/L — LOW (ref 135–146)
WBC # BLD: 8.26 K/UL — SIGNIFICANT CHANGE UP (ref 4.8–10.8)
WBC # FLD AUTO: 8.26 K/UL — SIGNIFICANT CHANGE UP (ref 4.8–10.8)

## 2020-09-30 PROCEDURE — 99221 1ST HOSP IP/OBS SF/LOW 40: CPT

## 2020-09-30 PROCEDURE — 99223 1ST HOSP IP/OBS HIGH 75: CPT | Mod: AI

## 2020-09-30 RX ORDER — APIXABAN 2.5 MG/1
1 TABLET, FILM COATED ORAL
Qty: 0 | Refills: 0 | DISCHARGE

## 2020-09-30 RX ORDER — PANTOPRAZOLE SODIUM 20 MG/1
40 TABLET, DELAYED RELEASE ORAL
Refills: 0 | Status: DISCONTINUED | OUTPATIENT
Start: 2020-09-30 | End: 2020-10-02

## 2020-09-30 RX ORDER — LISINOPRIL 2.5 MG/1
20 TABLET ORAL DAILY
Refills: 0 | Status: DISCONTINUED | OUTPATIENT
Start: 2020-09-30 | End: 2020-10-01

## 2020-09-30 RX ORDER — ESOMEPRAZOLE MAGNESIUM 40 MG/1
1 CAPSULE, DELAYED RELEASE ORAL
Qty: 0 | Refills: 0 | DISCHARGE

## 2020-09-30 RX ORDER — NIFEDIPINE 30 MG
30 TABLET, EXTENDED RELEASE 24 HR ORAL DAILY
Refills: 0 | Status: DISCONTINUED | OUTPATIENT
Start: 2020-09-30 | End: 2020-10-01

## 2020-09-30 RX ORDER — ATORVASTATIN CALCIUM 80 MG/1
80 TABLET, FILM COATED ORAL AT BEDTIME
Refills: 0 | Status: DISCONTINUED | OUTPATIENT
Start: 2020-09-30 | End: 2020-10-02

## 2020-09-30 RX ORDER — LEVOTHYROXINE SODIUM 125 MCG
75 TABLET ORAL DAILY
Refills: 0 | Status: DISCONTINUED | OUTPATIENT
Start: 2020-09-30 | End: 2020-10-02

## 2020-09-30 RX ORDER — SODIUM CHLORIDE 9 MG/ML
1 INJECTION INTRAMUSCULAR; INTRAVENOUS; SUBCUTANEOUS DAILY
Refills: 0 | Status: DISCONTINUED | OUTPATIENT
Start: 2020-09-30 | End: 2020-10-02

## 2020-09-30 RX ORDER — ASPIRIN/CALCIUM CARB/MAGNESIUM 324 MG
1 TABLET ORAL
Qty: 0 | Refills: 0 | DISCHARGE

## 2020-09-30 RX ORDER — LEVOTHYROXINE SODIUM 125 MCG
1 TABLET ORAL
Qty: 0 | Refills: 0 | DISCHARGE

## 2020-09-30 RX ORDER — TAMSULOSIN HYDROCHLORIDE 0.4 MG/1
1 CAPSULE ORAL
Qty: 0 | Refills: 0 | DISCHARGE

## 2020-09-30 RX ORDER — FERROUS SULFATE 325(65) MG
1 TABLET ORAL
Qty: 0 | Refills: 0 | DISCHARGE

## 2020-09-30 RX ORDER — CHOLECALCIFEROL (VITAMIN D3) 125 MCG
1000 CAPSULE ORAL DAILY
Refills: 0 | Status: DISCONTINUED | OUTPATIENT
Start: 2020-09-30 | End: 2020-10-02

## 2020-09-30 RX ORDER — FOLIC ACID 0.8 MG
1 TABLET ORAL
Qty: 0 | Refills: 0 | DISCHARGE

## 2020-09-30 RX ORDER — APIXABAN 2.5 MG/1
5 TABLET, FILM COATED ORAL EVERY 12 HOURS
Refills: 0 | Status: DISCONTINUED | OUTPATIENT
Start: 2020-09-30 | End: 2020-10-02

## 2020-09-30 RX ORDER — CHLORHEXIDINE GLUCONATE 213 G/1000ML
1 SOLUTION TOPICAL
Refills: 0 | Status: DISCONTINUED | OUTPATIENT
Start: 2020-09-30 | End: 2020-10-02

## 2020-09-30 RX ORDER — FOLIC ACID 0.8 MG
1 TABLET ORAL AT BEDTIME
Refills: 0 | Status: DISCONTINUED | OUTPATIENT
Start: 2020-09-30 | End: 2020-10-02

## 2020-09-30 RX ORDER — ATORVASTATIN CALCIUM 80 MG/1
1 TABLET, FILM COATED ORAL
Qty: 0 | Refills: 0 | DISCHARGE

## 2020-09-30 RX ORDER — THIAMINE MONONITRATE (VIT B1) 100 MG
100 TABLET ORAL DAILY
Refills: 0 | Status: DISCONTINUED | OUTPATIENT
Start: 2020-09-30 | End: 2020-10-02

## 2020-09-30 RX ORDER — CHOLECALCIFEROL (VITAMIN D3) 125 MCG
1 CAPSULE ORAL
Qty: 0 | Refills: 0 | DISCHARGE

## 2020-09-30 RX ORDER — MAGNESIUM OXIDE 400 MG ORAL TABLET 241.3 MG
400 TABLET ORAL DAILY
Refills: 0 | Status: DISCONTINUED | OUTPATIENT
Start: 2020-09-30 | End: 2020-10-01

## 2020-09-30 RX ORDER — TAMSULOSIN HYDROCHLORIDE 0.4 MG/1
0.4 CAPSULE ORAL DAILY
Refills: 0 | Status: DISCONTINUED | OUTPATIENT
Start: 2020-09-30 | End: 2020-10-02

## 2020-09-30 RX ORDER — MAGNESIUM OXIDE 400 MG ORAL TABLET 241.3 MG
1 TABLET ORAL
Qty: 0 | Refills: 0 | DISCHARGE

## 2020-09-30 RX ORDER — INFLUENZA VIRUS VACCINE 15; 15; 15; 15 UG/.5ML; UG/.5ML; UG/.5ML; UG/.5ML
0.5 SUSPENSION INTRAMUSCULAR ONCE
Refills: 0 | Status: COMPLETED | OUTPATIENT
Start: 2020-09-30 | End: 2020-10-02

## 2020-09-30 RX ORDER — ASPIRIN/CALCIUM CARB/MAGNESIUM 324 MG
81 TABLET ORAL DAILY
Refills: 0 | Status: DISCONTINUED | OUTPATIENT
Start: 2020-09-30 | End: 2020-10-02

## 2020-09-30 RX ORDER — LISINOPRIL 2.5 MG/1
10 TABLET ORAL AT BEDTIME
Refills: 0 | Status: DISCONTINUED | OUTPATIENT
Start: 2020-09-30 | End: 2020-10-01

## 2020-09-30 RX ORDER — FERROUS SULFATE 325(65) MG
325 TABLET ORAL DAILY
Refills: 0 | Status: DISCONTINUED | OUTPATIENT
Start: 2020-09-30 | End: 2020-10-02

## 2020-09-30 RX ADMIN — ATORVASTATIN CALCIUM 80 MILLIGRAM(S): 80 TABLET, FILM COATED ORAL at 22:02

## 2020-09-30 RX ADMIN — APIXABAN 5 MILLIGRAM(S): 2.5 TABLET, FILM COATED ORAL at 19:10

## 2020-09-30 RX ADMIN — LISINOPRIL 10 MILLIGRAM(S): 2.5 TABLET ORAL at 22:02

## 2020-09-30 RX ADMIN — Medication 1 MILLIGRAM(S): at 22:02

## 2020-09-30 NOTE — H&P ADULT - NSHPLABSRESULTS_GEN_ALL_CORE
LABS PENDING LABS PENDING    < from: MR Head No Cont (08.17.20 @ 17:59) >      IMPRESSION:    No MRI evidence to suggest acute ischemic change.    < end of copied text >

## 2020-09-30 NOTE — H&P ADULT - NSHPPHYSICALEXAM_GEN_ALL_CORE
VITALS: PENDING     GENERAL: NAD, lying in bed comfortably  HEAD:  Atraumatic, Normocephalic  EYES: EOMI, PERRLA, conjunctiva and sclera clear  ENT: Moist mucous membranes  NECK: Supple, No JVD  CHEST/LUNG: Clear to auscultation bilaterally  HEART: S1S2+  ABDOMEN: Bowel sounds present; Soft, Nontender, Nondistended.   EXTREMITIES:  2+ Peripheral Pulses, brisk capillary refill. No clubbing, cyanosis, or edema  NERVOUS SYSTEM:  Alert & Oriented X3, speech clear.   MSK: FROM all 4 extremities, full and equal strength  SKIN: +Dry Vital Signs Last 24 Hrs  T(C): 35.3 (30 Sep 2020 14:36), Max: 35.5 (30 Sep 2020 12:09)  T(F): 95.6 (30 Sep 2020 14:36), Max: 95.9 (30 Sep 2020 12:09)  HR: 77 (30 Sep 2020 14:36) (77 - 81)  BP: 127/60 (30 Sep 2020 14:36) (123/75 - 127/60)  BP(mean): --  RR: 18 (30 Sep 2020 14:36) (18 - 18)  SpO2: --    GENERAL: NAD, lying in bed comfortably  HEAD:  Atraumatic, Normocephalic  EYES: EOMI, PERRLA, conjunctiva and sclera clear  ENT: Moist mucous membranes  NECK: Supple, No JVD  CHEST/LUNG: Clear to auscultation bilaterally  HEART: S1S2+  ABDOMEN: Bowel sounds present; Soft, Nontender, Nondistended.   EXTREMITIES:  2+ Peripheral Pulses, brisk capillary refill. No clubbing, cyanosis, or edema  NERVOUS SYSTEM:  Alert & Oriented X3, speech clear.   MSK: FROM all 4 extremities, full and equal strength  SKIN: +Dry

## 2020-09-30 NOTE — CONSULT NOTE ADULT - ATTENDING COMMENTS
Agree with the Hx and plan  the events are highly suggestive of partial seizures perhaps left hemispheric  will start the monitoring  NO AED  seizure precaution

## 2020-09-30 NOTE — H&P ADULT - NSICDXPASTSURGICALHX_GEN_ALL_CORE_FT
PAST SURGICAL HISTORY:  H/O aortic valve replacement porcine    History of loop recorder 2017    Status post appendectomy

## 2020-09-30 NOTE — CONSULT NOTE ADULT - SUBJECTIVE AND OBJECTIVE BOX
Neurology/Epilepsy Consult:    AMA HARDY 88y Male  MRN-[id number]    Patient is a 88y old  Male who presents with a chief complaint of VEEG (30 Sep 2020 10:43)        HPI:  88M with past medical history of Afib on Eliquis, suspected TIA 2 years ago, Hypothyroid, HTN/HLD presents today for Video EEG study.    In , while patient was hospitalized for nausea and vomiting, patient developed acute onset of confusion and difficulty speaking, Stroke code was activated. MRI revealed no acute stroke.   In August, while playing cards with his wife, wife noticed that he was not making sense with his words. This lasted for 5 minutes and patient got back to normal. A similar episode occured on the same day evening. Which then, wife took him to the ER. MRI, Carotid US was done and was negative, REEG was abnormal.   Patient saw Dr. Pope on  who recommended the Video EEG study.     Currently, Patient denies fevers, chills, nausea, vomiting, new onset of headaches, visual changes, auditory changes, sore throat, neck stiffness, chest pain, shortness of breath, palpitations, abdominal pain, flank pain, new onset of / worsening low back pain, diarrhea, constipation, dysuria, new onset of leg swelling, new onset of rashes, abnormal gait.  (30 Sep 2020 10:43)        PAST MEDICAL & SURGICAL HISTORY:  TIA  Hypothyroidism  Hypertension  Hyperlipidemia  Atrial fibrillation  Coronary artery disease  H/o appendectomy  History of loop recorder 2017 (battery depleted)  H/O aortic valve replacement        FAMILY HISTORY:  FH: hypertension        Social History:  Retired  Lives with wife  No smoking  Occasional wine with dinner        Allergy:  No Known Allergies        Home Medications:  aspirin 81 mg oral tablet: 1 tab(s) orally once a day (30 Sep 2020 11:05)  atorvastatin 80 mg oral tablet: 1 tab(s) orally once a day at bedtime  Eliquis 5 mg oral tablet: 1 tab(s) orally 2 times a day (30 Sep 2020 11:05)  ferrous sulfate 75 mg (15 mg elemental iron) oral tablet: 1 tab(s) orally once a day (30 Sep 2020 11:05)  folic acid 1 mg oral tablet: 1 tab(s) orally once a day (at bedtime) (30 Sep 2020 11:05)  levothyroxine 75 mcg (0.075 mg) oral tablet: 1 tab(s) orally once a day (30 Sep 2020 11:05)  Nifedipine ER 30 mg daily  lisinopril 10 mg oral tablet: 1 tab(s) orally once a day (at bedtime) (30 Sep 2020 11:05)  lisinopril 20 mg oral tablet: 1 tab(s) orally once a day (30 Sep 2020 11:05)  magnesium oxide 400 mg (241.3 mg elemental magnesium) oral tablet: 1 tab(s) orally once a day (30 Sep 2020 11:05)  NexIUM 20 mg oral delayed release capsule: 1 cap(s) orally once a day (30 Sep 2020 11:05)  Sodium Chloride 1 g oral tablet: 1 tab(s) orally at bedtime  tamsulosin 0.4 mg oral capsule: 1 cap(s) orally once a day (30 Sep 2020 11:05)  Vitamin B1 100 mg oral tablet: 1 tab(s) orally once a day (30 Sep 2020 11:05)  Vitamin D3 2000 intl units orally once a day         MEDICATIONS  (STANDING):  apixaban 5 milliGRAM(s) Oral every 12 hours  aspirin  chewable 81 milliGRAM(s) Oral daily  atorvastatin 80 milliGRAM(s) Oral at bedtime  chlorhexidine 4% Liquid 1 Application(s) Topical <User Schedule>  cholecalciferol 1000 Unit(s) Oral daily  ferrous    sulfate 325 milliGRAM(s) Oral daily  folic acid 1 milliGRAM(s) Oral at bedtime  levothyroxine 75 MICROGram(s) Oral daily  lisinopril 10 milliGRAM(s) Oral at bedtime  lisinopril 20 milliGRAM(s) Oral daily  magnesium oxide 400 milliGRAM(s) Oral daily  multivitamin 1 Tablet(s) Oral daily  NIFEdipine XL 30 milliGRAM(s) Oral daily  pantoprazole    Tablet 40 milliGRAM(s) Oral before breakfast  sodium chloride 1 Gram(s) Oral daily  tamsulosin 0.4 milliGRAM(s) Oral daily  thiamine 100 milliGRAM(s) Oral daily    MEDICATIONS  (PRN):  LORazepam   Injectable 2 milliGRAM(s) IV Push every 4 hours PRN generalized tonic clonic seizures lasting longer than 2 minutes          T(F): 95.9 (20 @ 12:09), Max: 95.9 (20 @ 12:09)  HR: 81 (20 @ 12:09) (81 - 81)  BP: 123/75 (0930-20 @ 12:09) (123/75 - 123/75)  RR: --  SpO2: --        Neurologic Examination:  General:  Appearance is consistent with chronologic age.  No abnormal facies.   General: The patient is oriented to person, place, time and date.  Recent and remote memory intact.  Follows 4-step directions. Fund of knowledge is intact and normal.  Language with normal repetition, comprehension and naming.  Nondysarthric.    Cranial nerves: EOMI w/o nystagmus, skew or reported double vision.  PERRL.  No ptosis/weakness of eyelid closure.  Facial sensation is normal with normal bite.  No facial asymmetry.  Hearing grossly intact b/l.  Palate elevates midline.  Tongue midline.  Motor examination:   Normal tone, bulk and range of motion.  No tenderness, twitching, tremors or involuntary movements.  Formal Muscle Strength Testin/5 UE; 5/5 LE.  No observable drift.  Reflexes:   2+ b/l pectoralis, biceps, triceps, brachioradialis, patella and Achilles.  Plantar response downgoing b/l.  Jaw jerk, Cassie, clonus absent.  Sensory examination:   Intact to light touch and pinprick, pain, temperature and proprioception and vibration in all extremities.  Cerebellum:   FTN/HKS intact with normal GARETH in all limbs.  No dysmetria or dysdiadokinesia.  Gait narrow based and normal.        Labs:   2020 COVID-19 PCR negative        Neuroimaging:  CT Head:   < from: CT Head No Cont (20 @ 23:59) >  IMPRESSION:    No CT evidence of acute intracranial pathology.    < end of copied text >      CTA:  < from: CT Angio Neck w/ IV Cont (20 @ 22:01) >  IMPRESSION:     No significantvascular stenosis or large vessel occlusion.    < end of copied text >    < from: CT Angio Head w/ IV Cont (20 @ 22:00) >  IMPRESSION:     No significantvascular stenosis or large vessel occlusion.    < end of copied text >      MRI Brain:   < from: MR Head No Cont (20 @ 17:59) >  FINDINGS:    The third and lateral ventricles are mildly enlarged as are the cortical sulci consistent with a mild degree of cortical atrophy. The fourth ventricle is normal in size and position.    There is no shift of the midline structures.    Moderate thinning of the body of the corpus callosum consistent with moderate corpus callosal atrophy.    Confluent areas of T2/FLAIR hyperintense signal intensity in the periventricular white matter and patchy foci in the right thalamus brainstem and right cerebellar hemisphere consistent with chronic ischemic change.    No MRI evidence to suggest acute ischemic change.    Small rounded focus of hypointense signal intensity on the gradient echo images in the left cerebellar hemisphere likely etiology would be that of hypertension.    IMPRESSION:    No MRI evidence to suggest acute ischemic change.    < end of copied text >        Carotid US:   < from: [facility] Duplex Carotid, Bilat (08.15.20 @ 09:02) >  Impression:    20-39% stenosis of the right internal carotid artery.  20-39% stenosis of the left internal carotid artery.    < end of copied text >      REEG:    EXAM:  EEG      PROCEDURE DATE:  2020      INTERPRETATION:  Exam Performed on: 16 Channel Digital Routine EEG done on [employer] recording system.    History  Possible Seizures  DYSARTHRIA, SLURRED SPEECH, HYPOTHRYROIDISM, HLD, A-FIB, CAD    Medications    Medication  Date  -    FLOMAX    PROCARDIA XL    MULTIVITAMIN    ZESTRIL    SYNTHROID    LASIX    LIPITOR    ASPIRIN    ELIQUIS      State  Awake, asleep, and drowsy    Symmetry  Symmetric    Organization  Well organized    PDR  Continuous  Background: 7 hz    Generalized Slowing  Yes  mild - moderate    Focal Slowing  No    Breach Artifact:  No    Activation Procedure  Hyper Ventilation  No    Photic Stimulation  No    Epileptiform Activity  No    Events:  No    Impression  Abnormal due to the presence of: generalized slowing as above    Clinical Correlation & Recommendations  Consistent with diffuse cerebral electrophysiological dysfunction secondary to nonspecific cause    Reviewed by:  Reece Dangelo    Signed by:  Reece DANGELO MD  This document has been electronically signed. Aug 17 2020  7:30AM  (20 @ 14:00)        Assessment:  This is a 88y Male with h/o       Discussed with Dr. Pope      Plan:   - VEEG monitoring for better characterization and assessment  - Seizure precautions  - Ativan 2mg IV PRN for generalized tonic-clonic seizure lasting longer than 2 minutes, or two consecutive seizures without return to baseline in-between (ordered)  - CBC, CMP, Mg, CK, AED levels trough (ordered)  - Keep Mg above 2    Plan discussed with patient in details, all questions answered.    Discussed with hospitalist and PA. Neurology/Epilepsy Consult:    AMA HARDY 88y Male  MRN-[id number]    Patient is a 88y old right-handed Male who presents for elective VEEG monitoring        HPI: History obtained from patient. EMR and outpatient records reviewed.  Since June 2020 patient was hospitalized twice for episodes described as confusion and impaired speech.  In August 2020       88M with past medical history of Afib on Eliquis, suspected TIA 2 years ago, Hypothyroid, HTN/HLD presents today for Video EEG study.    In June, while patient was hospitalized for nausea and vomiting, patient developed acute onset of confusion and difficulty speaking, Stroke code was activated. MRI revealed no acute stroke.   In August, while playing cards with his wife, wife noticed that he was not making sense with his words. This lasted for 5 minutes and patient got back to normal. A similar episode occured on the same day evening. Which then, wife took him to the ER. MRI, Carotid US was done and was negative, REEG was abnormal.   Patient saw Dr. Pope on 09/23 who recommended the Video EEG study.     Currently, Patient denies fevers, chills, nausea, vomiting, new onset of headaches, visual changes, auditory changes, sore throat, neck stiffness, chest pain, shortness of breath, palpitations, abdominal pain, flank pain, new onset of / worsening low back pain, diarrhea, constipation, dysuria, new onset of leg swelling, new onset of rashes, abnormal gait.  (30 Sep 2020 10:43)        PAST MEDICAL & SURGICAL HISTORY:  TIA  Hypothyroidism  Hypertension  Hyperlipidemia  Atrial fibrillation  Coronary artery disease  H/o appendectomy  History of loop recorder 2017 (battery depleted)  H/O aortic valve replacement        FAMILY HISTORY:  FH: hypertension        Social History:  Retired  Lives with wife  No smoking  Occasional wine with dinner        Allergy:  No Known Allergies        Home Medications:  aspirin 81 mg oral tablet: 1 tab(s) orally once a day (30 Sep 2020 11:05)  atorvastatin 80 mg oral tablet: 1 tab(s) orally once a day at bedtime  Eliquis 5 mg oral tablet: 1 tab(s) orally 2 times a day (30 Sep 2020 11:05)  ferrous sulfate 75 mg (15 mg elemental iron) oral tablet: 1 tab(s) orally once a day (30 Sep 2020 11:05)  folic acid 1 mg oral tablet: 1 tab(s) orally once a day (at bedtime) (30 Sep 2020 11:05)  levothyroxine 75 mcg (0.075 mg) oral tablet: 1 tab(s) orally once a day (30 Sep 2020 11:05)  Nifedipine ER 30 mg daily  lisinopril 10 mg oral tablet: 1 tab(s) orally once a day (at bedtime) (30 Sep 2020 11:05)  lisinopril 20 mg oral tablet: 1 tab(s) orally once a day (30 Sep 2020 11:05)  magnesium oxide 400 mg (241.3 mg elemental magnesium) oral tablet: 1 tab(s) orally once a day (30 Sep 2020 11:05)  NexIUM 20 mg oral delayed release capsule: 1 cap(s) orally once a day (30 Sep 2020 11:05)  Sodium Chloride 1 g oral tablet: 1 tab(s) orally at bedtime  tamsulosin 0.4 mg oral capsule: 1 cap(s) orally once a day (30 Sep 2020 11:05)  Vitamin B1 100 mg oral tablet: 1 tab(s) orally once a day (30 Sep 2020 11:05)  Vitamin D3 2000 intl units orally once a day         MEDICATIONS  (STANDING):  apixaban 5 milliGRAM(s) Oral every 12 hours  aspirin  chewable 81 milliGRAM(s) Oral daily  atorvastatin 80 milliGRAM(s) Oral at bedtime  chlorhexidine 4% Liquid 1 Application(s) Topical <User Schedule>  cholecalciferol 1000 Unit(s) Oral daily  ferrous    sulfate 325 milliGRAM(s) Oral daily  folic acid 1 milliGRAM(s) Oral at bedtime  levothyroxine 75 MICROGram(s) Oral daily  lisinopril 10 milliGRAM(s) Oral at bedtime  lisinopril 20 milliGRAM(s) Oral daily  magnesium oxide 400 milliGRAM(s) Oral daily  multivitamin 1 Tablet(s) Oral daily  NIFEdipine XL 30 milliGRAM(s) Oral daily  pantoprazole    Tablet 40 milliGRAM(s) Oral before breakfast  sodium chloride 1 Gram(s) Oral daily  tamsulosin 0.4 milliGRAM(s) Oral daily  thiamine 100 milliGRAM(s) Oral daily    MEDICATIONS  (PRN):  LORazepam   Injectable 2 milliGRAM(s) IV Push every 4 hours PRN generalized tonic clonic seizures lasting longer than 2 minutes          T(F): 95.9 (09-30-20 @ 12:09), Max: 95.9 (09-30-20 @ 12:09)  HR: 81 (09-30-20 @ 12:09) (81 - 81)  BP: 123/75 (09-30-20 @ 12:09) (123/75 - 123/75)  RR: --  SpO2: --        Neurologic Examination:  General:  Appearance is consistent with chronologic age.  No abnormal facies.   General: The patient is oriented to person, place, time and date.  Recent and remote memory intact.  Follows 4-step directions. Fund of knowledge is intact and normal.  Language with normal repetition, comprehension and naming.  Nondysarthric.    Cranial nerves: EOMI w/o nystagmus, skew or reported double vision.  PERRL.  No ptosis/weakness of eyelid closure.  Facial sensation is normal with normal bite.  No facial asymmetry.  Hearing grossly intact b/l.  Palate elevates midline.  Tongue midline.  Motor examination:   Normal tone, bulk and range of motion.  No tenderness, twitching, tremors or involuntary movements.  Formal Muscle Strength Testing: No focal weakness. No observable drift.  Reflexes:   2+ b/l pectoralis, biceps, triceps, brachioradialis, patella and Achilles.  Plantar response downgoing b/l.  Jaw jerk, Cassie, clonus absent.  Sensory examination:   Intact to light touch and pinprick, pain. Romberg +.  Cerebellum:   No dysmetria or dysdiadokinesia.  Ambulating with cane.        Labs:   9/26/2020 COVID-19 PCR negative        Neuroimaging:  CT Head:   < from: CT Head No Cont (08.14.20 @ 23:59) >  IMPRESSION:    No CT evidence of acute intracranial pathology.    < end of copied text >      CTA:  < from: CT Angio Neck w/ IV Cont (06.06.20 @ 22:01) >  IMPRESSION:     No significantvascular stenosis or large vessel occlusion.    < end of copied text >    < from: CT Angio Head w/ IV Cont (06.06.20 @ 22:00) >  IMPRESSION:     No significantvascular stenosis or large vessel occlusion.    < end of copied text >      MRI Brain:   < from: MR Head No Cont (08.17.20 @ 17:59) >  FINDINGS:    The third and lateral ventricles are mildly enlarged as are the cortical sulci consistent with a mild degree of cortical atrophy. The fourth ventricle is normal in size and position.    There is no shift of the midline structures.    Moderate thinning of the body of the corpus callosum consistent with moderate corpus callosal atrophy.    Confluent areas of T2/FLAIR hyperintense signal intensity in the periventricular white matter and patchy foci in the right thalamus brainstem and right cerebellar hemisphere consistent with chronic ischemic change.    No MRI evidence to suggest acute ischemic change.    Small rounded focus of hypointense signal intensity on the gradient echo images in the left cerebellar hemisphere likely etiology would be that of hypertension.    IMPRESSION:    No MRI evidence to suggest acute ischemic change.    < end of copied text >        Carotid US:   < from: [facility] Duplex Carotid, Bilat (08.15.20 @ 09:02) >  Impression:    20-39% stenosis of the right internal carotid artery.  20-39% stenosis of the left internal carotid artery.    < end of copied text >      REEG:    EXAM:  EEG      PROCEDURE DATE:  08/16/2020      INTERPRETATION:  Exam Performed on: 16 Channel Digital Routine EEG done on [employer] recording system.    History  Possible Seizures  DYSARTHRIA, SLURRED SPEECH, HYPOTHRYROIDISM, HLD, A-FIB, CAD    Medications    Medication  Date  -  2020/08/16  FLOMAX  2020/08/16  PROCARDIA XL  2020/08/16  MULTIVITAMIN  2020/08/16  ZESTRIL  2020/08/16  SYNTHROID  2020/08/16  LASIX  2020/08/16  LIPITOR  2020/08/16  ASPIRIN  2020/08/16  ELIQUIS  2020/08/16    State  Awake, asleep, and drowsy    Symmetry  Symmetric    Organization  Well organized    PDR  Continuous  Background: 7 hz    Generalized Slowing  Yes  mild - moderate    Focal Slowing  No    Breach Artifact:  No    Activation Procedure  Hyper Ventilation  No    Photic Stimulation  No    Epileptiform Activity  No    Events:  No    Impression  Abnormal due to the presence of: generalized slowing as above    Clinical Correlation & Recommendations  Consistent with diffuse cerebral electrophysiological dysfunction secondary to nonspecific cause    Reviewed by:  Reece Dangelo    Signed by:  Reece DANGELO MD  This document has been electronically signed. Aug 17 2020  7:30AM  (08-16-20 @ 14:00)        Assessment:  This is a 88y Male with h/o       Discussed with Dr. Pope      Plan:   - VEEG monitoring for better characterization and assessment  - Seizure precautions  - Ativan 2mg IV PRN for generalized tonic-clonic seizure lasting longer than 2 minutes, or two consecutive seizures without return to baseline in-between (ordered)  - CBC, CMP, Mg, CK, AED levels trough (ordered)  - Keep Mg above 2    Plan discussed with patient in details, all questions answered.    Discussed with hospitalist and PA. Neurology/Epilepsy Consult:    AMA HARDY 88y Male  MRN-[id number]    Patient is a 88y old right-handed Male who presents for elective VEEG monitoring        HPI: History obtained from patient. EMR and outpatient records reviewed.  Since June 2020 patient was hospitalized twice. In June 2020 patient presented to ED c/o not feeling good. While in the ED he was found to be confused with expressive aphasia, SBP>200, stroke code was called. Patient's Na was 120. He had routine stroke work up that did not reveal any abnormality. 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  In August 2020 patient was seen in the hospital for 2 transient episodes of impaired speech lasting several minutes that patient clearly recalled. He was making incomprehensible sounds while being he knew what he wanted to say. Patient had additional neurological work up that was normal except for EEG that showed generalized slowing (see reports). His Na remained WNL.   Patient was evaluated post discharge by neurovascular team, then last week was seen by Dr. Pope who referred patient for VEEG monitoring.  Outside of these episodes, patient had several other similar episodes of impaired speech/confusion reported by family in the last several months.          PAST MEDICAL & SURGICAL HISTORY:  TIA  Hypothyroidism  Hypertension  Hyperlipidemia  Atrial fibrillation  Coronary artery disease  SIADH  H/o appendectomy  History of loop recorder 2017 (battery depleted)  H/O aortic valve replacement        FAMILY HISTORY:  FH: hypertension        Social History:  Retired  Lives with wife  No smoking  Occasional wine with dinner        Allergy:  No Known Allergies        Home Medications:  aspirin 81 mg oral tablet: 1 tab(s) orally once a day (30 Sep 2020 11:05)  atorvastatin 80 mg oral tablet: 1 tab(s) orally once a day at bedtime  Eliquis 5 mg oral tablet: 1 tab(s) orally 2 times a day (30 Sep 2020 11:05)  ferrous sulfate 75 mg (15 mg elemental iron) oral tablet: 1 tab(s) orally once a day (30 Sep 2020 11:05)  folic acid 1 mg oral tablet: 1 tab(s) orally once a day (at bedtime) (30 Sep 2020 11:05)  levothyroxine 75 mcg (0.075 mg) oral tablet: 1 tab(s) orally once a day (30 Sep 2020 11:05)  Nifedipine ER 30 mg daily  lisinopril 10 mg oral tablet: 1 tab(s) orally once a day (at bedtime) (30 Sep 2020 11:05)  lisinopril 20 mg oral tablet: 1 tab(s) orally once a day (30 Sep 2020 11:05)  magnesium oxide 400 mg (241.3 mg elemental magnesium) oral tablet: 1 tab(s) orally once a day (30 Sep 2020 11:05)  NexIUM 20 mg oral delayed release capsule: 1 cap(s) orally once a day (30 Sep 2020 11:05)  Sodium Chloride 1 g oral tablet: 1 tab(s) orally at bedtime  tamsulosin 0.4 mg oral capsule: 1 cap(s) orally once a day (30 Sep 2020 11:05)  Vitamin B1 100 mg oral tablet: 1 tab(s) orally once a day (30 Sep 2020 11:05)  Vitamin D3 2000 intl units orally once a day         MEDICATIONS  (STANDING):  apixaban 5 milliGRAM(s) Oral every 12 hours  aspirin  chewable 81 milliGRAM(s) Oral daily  atorvastatin 80 milliGRAM(s) Oral at bedtime  chlorhexidine 4% Liquid 1 Application(s) Topical <User Schedule>  cholecalciferol 1000 Unit(s) Oral daily  ferrous    sulfate 325 milliGRAM(s) Oral daily  folic acid 1 milliGRAM(s) Oral at bedtime  levothyroxine 75 MICROGram(s) Oral daily  lisinopril 10 milliGRAM(s) Oral at bedtime  lisinopril 20 milliGRAM(s) Oral daily  magnesium oxide 400 milliGRAM(s) Oral daily  multivitamin 1 Tablet(s) Oral daily  NIFEdipine XL 30 milliGRAM(s) Oral daily  pantoprazole    Tablet 40 milliGRAM(s) Oral before breakfast  sodium chloride 1 Gram(s) Oral daily  tamsulosin 0.4 milliGRAM(s) Oral daily  thiamine 100 milliGRAM(s) Oral daily    MEDICATIONS  (PRN):  LORazepam   Injectable 2 milliGRAM(s) IV Push every 4 hours PRN generalized tonic clonic seizures lasting longer than 2 minutes          T(F): 95.9 (09-30-20 @ 12:09), Max: 95.9 (09-30-20 @ 12:09)  HR: 81 (09-30-20 @ 12:09) (81 - 81)  BP: 123/75 (09-30-20 @ 12:09) (123/75 - 123/75)  RR: --  SpO2: --        Neurologic Examination:  General:  Appearance is consistent with chronologic age.  No abnormal facies.   General: The patient is oriented to person, place, time and date.  Recent and remote memory intact.  Follows 4-step directions. Fund of knowledge is intact and normal.  Language with normal repetition, comprehension and naming.  Nondysarthric.    Cranial nerves: EOMI w/o nystagmus, skew or reported double vision.  PERRL.  No ptosis/weakness of eyelid closure.  Facial sensation is normal with normal bite.  No facial asymmetry.  Hearing grossly intact b/l.  Palate elevates midline.  Tongue midline.  Motor examination:   Normal tone, bulk and range of motion.  No tenderness, twitching, tremors or involuntary movements.  Formal Muscle Strength Testing: No focal weakness. No observable drift.  Reflexes:   2+ b/l pectoralis, biceps, triceps, brachioradialis, patella and Achilles.  Plantar response downgoing b/l.  Jaw jerk, Cassie, clonus absent.  Sensory examination:   Intact to light touch and pinprick, pain. Romberg +.  Cerebellum:   No dysmetria or dysdiadokinesia.  Ambulating with cane.        Labs:   9/26/2020 COVID-19 PCR negative        Neuroimaging:  CT Head:   < from: CT Head No Cont (08.14.20 @ 23:59) >  IMPRESSION:    No CT evidence of acute intracranial pathology.    < end of copied text >      CTA:  < from: CT Angio Neck w/ IV Cont (06.06.20 @ 22:01) >  IMPRESSION:     No significantvascular stenosis or large vessel occlusion.    < end of copied text >    < from: CT Angio Head w/ IV Cont (06.06.20 @ 22:00) >  IMPRESSION:     No significantvascular stenosis or large vessel occlusion.    < end of copied text >      MRI Brain:   < from: MR Head No Cont (08.17.20 @ 17:59) >  FINDINGS:    The third and lateral ventricles are mildly enlarged as are the cortical sulci consistent with a mild degree of cortical atrophy. The fourth ventricle is normal in size and position.    There is no shift of the midline structures.    Moderate thinning of the body of the corpus callosum consistent with moderate corpus callosal atrophy.    Confluent areas of T2/FLAIR hyperintense signal intensity in the periventricular white matter and patchy foci in the right thalamus brainstem and right cerebellar hemisphere consistent with chronic ischemic change.    No MRI evidence to suggest acute ischemic change.    Small rounded focus of hypointense signal intensity on the gradient echo images in the left cerebellar hemisphere likely etiology would be that of hypertension.    IMPRESSION:    No MRI evidence to suggest acute ischemic change.    < end of copied text >        Carotid US:   < from: [facility] Duplex Carotid, [place]aysha[place]at (08.15.20 @ 09:02) >  Impression:    20-39% stenosis of the right internal carotid artery.  20-39% stenosis of the left internal carotid artery.    < end of copied text >      REEG:    EXAM:  EEG      PROCEDURE DATE:  08/16/2020      INTERPRETATION:  Exam Performed on: 16 Channel Digital Routine EEG done on Modern [employer] recording system.    History  Possible Seizures  DYSARTHRIA, SLURRED SPEECH, HYPOTHRYROIDISM, HLD, A-FIB, CAD    Medications    Medication  Date  -  2020/08/16  FLOMAX  2020/08/16  PROCARDIA XL  2020/08/16  MULTIVITAMIN  2020/08/16  ZESTRIL  2020/08/16  SYNTHROID  2020/08/16  LASIX  2020/08/16  LIPITOR  2020/08/16  ASPIRIN  2020/08/16  ELIQUIS  2020/08/16    State  Awake, asleep, and drowsy    Symmetry  Symmetric    Organization  Well organized    PDR  Continuous  Background: 7 hz    Generalized Slowing  Yes  mild - moderate    Focal Slowing  No    Breach Artifact:  No    Activation Procedure  Hyper Ventilationeg  No    Photic Stimulation  No    Epileptiform Activity  No    Events:  No    Impression  Abnormal due to the presence of: generalized slowing as above    Clinical Correlation & Recommendations  Consistent with diffuse cerebral electrophysiological dysfunction secondary to nonspecific cause    Reviewed by:  Reece Dangelo    Signed by:  Reece DANGELO MD  This document has been electronically signed. Aug 17 2020  7:30AM  (08-16-20 @ 14:00)        Assessment:  This is a 88y Male with h/o HTN, CAD, DLD, SIADH, hypothyroidism, atrial fibrillation, AVR, with stereotypical episodes suspicious for partial seizures.      Discussed with Dr. Pope      Plan:   - VEEG monitoring for better characterization and treatment plan  - Seizure precautions  - No AED for now  - Ativan 2mg IV PRN for generalized tonic-clonic seizure lasting longer than 2 minutes, or two consecutive seizures without return to baseline in-between (ordered)  - CBC, CMP, Mg  - Keep Mg above 2    Plan discussed with patient in details, all questions answered.    Discussed with PARonda

## 2020-09-30 NOTE — H&P ADULT - HISTORY OF PRESENT ILLNESS
88M with past medical history of Afib on Eliquis, suspected TIA 2 years ago, Hypothyroid, HTN/HLD presents today for Video EEG study.    In June, while patient was hospitalized for nausea and vomiting, patient developed acute onset of confusion and difficulty speaking, Stroke code was activated. MRI revealed no acute stroke.   In August, while playing cards with his wife, wife noticed that he was not making sense with his words. This lasted for 5 minutes and patient got back to normal. A similar episode occured on the same day evening. Which then, wife took him to the ER. MRI, Carotid US was done and was negative, REEG was abnormal.   Patient saw Dr. Pope on 09/23 who recommended the Video EEG study.     Currently, Patient denies fevers, chills, nausea, vomiting, new onset of headaches, visual changes, auditory changes, sore throat, neck stiffness, chest pain, shortness of breath, palpitations, abdominal pain, flank pain, new onset of / worsening low back pain, diarrhea, constipation, dysuria, new onset of leg swelling, new onset of rashes, abnormal gait.

## 2020-09-30 NOTE — H&P ADULT - ASSESSMENT
88M with past medical history of Afib on Eliquis, suspected TIA 2 years ago, Hypothyroid, HTN/HLD presents today for Video EEG study.    - admit to medicine for VEEG  - Ativan 2mg IVP q4h PRN for generalized tonic clonic seizures lasting longer than 2 minutes  - Seizure precautions  - Neurology following  - check CBC /CMP (Glucose, Na, K, Ca) / Mg at next lab draw   - Since Pt is not taking AED's at home, no need to check Keppra/Lamictal/Zonegran levels  - DVT prophylaxis with Lovenox   - GI Prophylaxis with protonix   - CHG 4% ordered.  - Will monitor closely for notable changes in behavior such as but not limited to onset of suicidal ideations, depression, anxiety.  - Upon discharge - Avoid driving, avoid operating machinery, avoid working in heights, avoid unsupervised swimming, avoid sharp objects, avoid hot water temperature, caution with cooking; no seizure provoking OTC stimulants till cleared by neurologist.   - Will discuss with the attending 88M with past medical history of Afib on Eliquis, suspected TIA 2 years ago, Hypothyroid, HTN/HLD presents today for Video EEG study.    - admit to medicine for VEEG  - Ativan 2mg IVP q4h PRN for generalized tonic clonic seizures lasting longer than 2 minutes  - Seizure precautions  - Neurology following  - check CBC /CMP (Glucose, Na, K, Ca) / Mg at next lab draw   - Since Pt is not taking AED's at home, no need to check Keppra/Lamictal/Zonegran levels  - DVT prophylaxis - Already on Eliquis  - Will monitor closely for notable changes in behavior such as but not limited to onset of suicidal ideations, depression, anxiety.  - Upon discharge - Avoid driving, avoid operating machinery, avoid working in heights, avoid unsupervised swimming, avoid sharp objects, avoid hot water temperature, caution with cooking; no seizure provoking OTC stimulants till cleared by neurologist.   - Will discuss with the attending

## 2020-09-30 NOTE — H&P ADULT - NSHPSOCIALHISTORY_GEN_ALL_CORE
Tobacco use: Exsmoker, Quit 40 years ago, hx of 1 pack a weekend   EtOH use: Occasional ETOH  Illicit drug use: Denies   Marital Status:

## 2020-09-30 NOTE — H&P ADULT - ATTENDING COMMENTS
Patient seen and examined independently  Agree with medical PA's H&P except where edited by me    This is a 88y Male with h/o HTN, CAD, DLD, SIADH, hypothyroidism, atrial fibrillation, AVR, with stereotypical episodes suspicious for partial seizures.    R/O partial seizures  - VEEG monitoring for better characterization and treatment plan  - Seizure precautions  - No AED for now  - Ativan 2mg IV PRN for generalized tonic-clonic seizure lasting longer than 2 minutes, or two consecutive seizures without return to baseline in-between   - CBC, CMP, Mg  - Keep Mg above 2    Chronic Persistent Afib  - rate controlled   - c/w Eliquis  - not on rate control meds due to bradycardia    HTN/CAD/DL  - stable on home regimen  -monitor bp    Hypothyroid  -on synthroid

## 2020-09-30 NOTE — CONSULT NOTE ADULT - CONSULT REASON
VEEG for better characterization and treatment plan VEEG for better characterization of the events and  risk assessment and  treatment plan

## 2020-10-01 ENCOUNTER — TRANSCRIPTION ENCOUNTER (OUTPATIENT)
Age: 85
End: 2020-10-01

## 2020-10-01 LAB
ANION GAP SERPL CALC-SCNC: 8 MMOL/L — SIGNIFICANT CHANGE UP (ref 7–14)
BUN SERPL-MCNC: 26 MG/DL — HIGH (ref 10–20)
CALCIUM SERPL-MCNC: 9.3 MG/DL — SIGNIFICANT CHANGE UP (ref 8.5–10.1)
CHLORIDE SERPL-SCNC: 100 MMOL/L — SIGNIFICANT CHANGE UP (ref 98–110)
CO2 SERPL-SCNC: 25 MMOL/L — SIGNIFICANT CHANGE UP (ref 17–32)
CREAT SERPL-MCNC: 1.4 MG/DL — SIGNIFICANT CHANGE UP (ref 0.7–1.5)
GLUCOSE SERPL-MCNC: 111 MG/DL — HIGH (ref 70–99)
MAGNESIUM SERPL-MCNC: 1.7 MG/DL — LOW (ref 1.8–2.4)
POTASSIUM SERPL-MCNC: 4.9 MMOL/L — SIGNIFICANT CHANGE UP (ref 3.5–5)
POTASSIUM SERPL-SCNC: 4.9 MMOL/L — SIGNIFICANT CHANGE UP (ref 3.5–5)
SARS-COV-2 IGG SERPL QL IA: NEGATIVE — SIGNIFICANT CHANGE UP
SARS-COV-2 IGM SERPL IA-ACNC: 0.09 INDEX — SIGNIFICANT CHANGE UP
SODIUM SERPL-SCNC: 133 MMOL/L — LOW (ref 135–146)

## 2020-10-01 PROCEDURE — 95720 EEG PHY/QHP EA INCR W/VEEG: CPT

## 2020-10-01 PROCEDURE — 99233 SBSQ HOSP IP/OBS HIGH 50: CPT

## 2020-10-01 PROCEDURE — 99231 SBSQ HOSP IP/OBS SF/LOW 25: CPT

## 2020-10-01 RX ORDER — SODIUM CHLORIDE 9 MG/ML
1 INJECTION INTRAMUSCULAR; INTRAVENOUS; SUBCUTANEOUS ONCE
Refills: 0 | Status: COMPLETED | OUTPATIENT
Start: 2020-10-01 | End: 2020-10-01

## 2020-10-01 RX ORDER — THIAMINE MONONITRATE (VIT B1) 100 MG
1 TABLET ORAL
Qty: 0 | Refills: 0 | DISCHARGE

## 2020-10-01 RX ORDER — MAGNESIUM OXIDE 400 MG ORAL TABLET 241.3 MG
400 TABLET ORAL
Refills: 0 | Status: DISCONTINUED | OUTPATIENT
Start: 2020-10-01 | End: 2020-10-02

## 2020-10-01 RX ORDER — THIAMINE MONONITRATE (VIT B1) 100 MG
1 TABLET ORAL
Qty: 0 | Refills: 0 | DISCHARGE
Start: 2020-10-01

## 2020-10-01 RX ORDER — AMLODIPINE BESYLATE 2.5 MG/1
5 TABLET ORAL DAILY
Refills: 0 | Status: DISCONTINUED | OUTPATIENT
Start: 2020-10-01 | End: 2020-10-02

## 2020-10-01 RX ORDER — MAGNESIUM OXIDE 400 MG ORAL TABLET 241.3 MG
400 TABLET ORAL ONCE
Refills: 0 | Status: COMPLETED | OUTPATIENT
Start: 2020-10-01 | End: 2020-10-01

## 2020-10-01 RX ADMIN — ATORVASTATIN CALCIUM 80 MILLIGRAM(S): 80 TABLET, FILM COATED ORAL at 21:25

## 2020-10-01 RX ADMIN — Medication 100 MILLIGRAM(S): at 12:07

## 2020-10-01 RX ADMIN — SODIUM CHLORIDE 1 GRAM(S): 9 INJECTION INTRAMUSCULAR; INTRAVENOUS; SUBCUTANEOUS at 12:07

## 2020-10-01 RX ADMIN — SODIUM CHLORIDE 1 GRAM(S): 9 INJECTION INTRAMUSCULAR; INTRAVENOUS; SUBCUTANEOUS at 05:20

## 2020-10-01 RX ADMIN — Medication 1 TABLET(S): at 12:07

## 2020-10-01 RX ADMIN — PANTOPRAZOLE SODIUM 40 MILLIGRAM(S): 20 TABLET, DELAYED RELEASE ORAL at 05:21

## 2020-10-01 RX ADMIN — Medication 81 MILLIGRAM(S): at 12:08

## 2020-10-01 RX ADMIN — Medication 1 MILLIGRAM(S): at 21:25

## 2020-10-01 RX ADMIN — AMLODIPINE BESYLATE 5 MILLIGRAM(S): 2.5 TABLET ORAL at 05:48

## 2020-10-01 RX ADMIN — MAGNESIUM OXIDE 400 MG ORAL TABLET 400 MILLIGRAM(S): 241.3 TABLET ORAL at 12:07

## 2020-10-01 RX ADMIN — Medication 0.1 MILLIGRAM(S): at 07:29

## 2020-10-01 RX ADMIN — MAGNESIUM OXIDE 400 MG ORAL TABLET 400 MILLIGRAM(S): 241.3 TABLET ORAL at 18:38

## 2020-10-01 RX ADMIN — MAGNESIUM OXIDE 400 MG ORAL TABLET 400 MILLIGRAM(S): 241.3 TABLET ORAL at 05:20

## 2020-10-01 RX ADMIN — Medication 1000 UNIT(S): at 12:07

## 2020-10-01 RX ADMIN — Medication 325 MILLIGRAM(S): at 12:07

## 2020-10-01 RX ADMIN — APIXABAN 5 MILLIGRAM(S): 2.5 TABLET, FILM COATED ORAL at 18:38

## 2020-10-01 RX ADMIN — Medication 75 MICROGRAM(S): at 05:20

## 2020-10-01 RX ADMIN — TAMSULOSIN HYDROCHLORIDE 0.4 MILLIGRAM(S): 0.4 CAPSULE ORAL at 12:07

## 2020-10-01 RX ADMIN — APIXABAN 5 MILLIGRAM(S): 2.5 TABLET, FILM COATED ORAL at 05:21

## 2020-10-01 NOTE — DISCHARGE NOTE PROVIDER - NSDCMRMEDTOKEN_GEN_ALL_CORE_FT
aspirin 81 mg oral tablet: 1 tab(s) orally once a day  atorvastatin 80 mg oral tablet: 1 tab(s) orally once a day  Eliquis 5 mg oral tablet: 1 tab(s) orally 2 times a day  ferrous sulfate 75 mg (15 mg elemental iron) oral tablet: 1 tab(s) orally once a day  folic acid 1 mg oral tablet: 1 tab(s) orally once a day (at bedtime)  levothyroxine 75 mcg (0.075 mg) oral tablet: 1 tab(s) orally once a day  lisinopril 10 mg oral tablet: 1 tab(s) orally once a day (at bedtime)  lisinopril 20 mg oral tablet: 1 tab(s) orally once a day  magnesium oxide 400 mg (241.3 mg elemental magnesium) oral tablet: 1 tab(s) orally once a day  Multiple Vitamins oral tablet: 1 tab(s) orally once a day  NexIUM 20 mg oral delayed release capsule: 1 cap(s) orally once a day  NIFEdipine 30 mg oral tablet, extended release: 1 tab(s) orally once a day  Sodium Chloride 1 g oral tablet: 1 tab(s) orally 2 times a day  tamsulosin 0.4 mg oral capsule: 1 cap(s) orally once a day  thiamine 100 mg oral tablet: 1 tab(s) orally once a day  Vitamin D3 1000 intl units (25 mcg) oral tablet: 1 tab(s) orally once a day   amLODIPine 10 mg oral tablet: 1 tab(s) orally once a day  aspirin 81 mg oral tablet: 1 tab(s) orally once a day  atorvastatin 80 mg oral tablet: 1 tab(s) orally once a day  Eliquis 5 mg oral tablet: 1 tab(s) orally 2 times a day  ferrous sulfate 75 mg (15 mg elemental iron) oral tablet: 1 tab(s) orally once a day  folic acid 1 mg oral tablet: 1 tab(s) orally once a day (at bedtime)  hydrALAZINE 25 mg oral tablet: 1 tab(s) orally every 12 hours  levETIRAcetam 250 mg oral tablet: take half a tablet every 12 hrs x 1 week, then increase to 1 tablet every 12 hrs  levothyroxine 75 mcg (0.075 mg) oral tablet: 1 tab(s) orally once a day  magnesium oxide 400 mg (241.3 mg elemental magnesium) oral tablet: 1 tab(s) orally once a day  Multiple Vitamins oral tablet: 1 tab(s) orally once a day  NexIUM 20 mg oral delayed release capsule: 1 cap(s) orally once a day  Sodium Chloride 1 g oral tablet: 1 tab(s) orally 2 times a day  tamsulosin 0.4 mg oral capsule: 1 cap(s) orally once a day  thiamine 100 mg oral tablet: 1 tab(s) orally once a day  Vitamin D3 1000 intl units (25 mcg) oral tablet: 1 tab(s) orally once a day

## 2020-10-01 NOTE — PROGRESS NOTE ADULT - SUBJECTIVE AND OBJECTIVE BOX
Epilepsy Attending Note:     AMA HARDY    88y Male  MRN [id number]    Vital Signs Last 24 Hrs  T(C): 36.2 (01 Oct 2020 04:46), Max: 36.2 (01 Oct 2020 04:46)  T(F): 97.1 (01 Oct 2020 04:46), Max: 97.1 (01 Oct 2020 04:46)  HR: 56 (01 Oct 2020 09:17) (51 - 81)  BP: 145/71 (01 Oct 2020 09:17) (123/75 - 183/72)  BP(mean): --  RR: 16 (01 Oct 2020 04:46) (16 - 18)  SpO2: --                          11.0   8.26  )-----------( 206      ( 30 Sep 2020 15:25 )             33.2       09-30    131<L>  |  99  |  28<H>  ----------------------------<  106<H>  5.4<H>   |  25  |  1.5    Ca    9.0      30 Sep 2020 15:25  Mg     1.7     09-30    TPro  5.7<L>  /  Alb  3.7  /  TBili  0.3  /  DBili  x   /  AST  12  /  ALT  11  /  AlkPhos  48  09-30      MEDICATIONS  (STANDING):  amLODIPine   Tablet 5 milliGRAM(s) Oral daily  apixaban 5 milliGRAM(s) Oral every 12 hours  aspirin  chewable 81 milliGRAM(s) Oral daily  atorvastatin 80 milliGRAM(s) Oral at bedtime  chlorhexidine 4% Liquid 1 Application(s) Topical <User Schedule>  cholecalciferol 1000 Unit(s) Oral daily  ferrous    sulfate 325 milliGRAM(s) Oral daily  folic acid 1 milliGRAM(s) Oral at bedtime  influenza   Vaccine 0.5 milliLiter(s) IntraMuscular once  levothyroxine 75 MICROGram(s) Oral daily  lisinopril 10 milliGRAM(s) Oral at bedtime  magnesium oxide 400 milliGRAM(s) Oral daily  multivitamin 1 Tablet(s) Oral daily  pantoprazole    Tablet 40 milliGRAM(s) Oral before breakfast  sodium chloride 1 Gram(s) Oral daily  tamsulosin 0.4 milliGRAM(s) Oral daily  thiamine 100 milliGRAM(s) Oral daily    MEDICATIONS  (PRN):  LORazepam   Injectable 2 milliGRAM(s) IV Push once PRN generalized tonic-clonic seizure lasting longer than 2 minutes            VEEG in the last 24 hours:    Background------------ 8-9 hz    Focal and generalized slowing-------- borderline left frontal/FC slowing    Interictal activity----------- none    Events----------- none( high BP)    Seizures----------- none    Impression:   borderline abnormal as above    Plan - will continue the monitoring           seizure precaution

## 2020-10-01 NOTE — SWALLOW BEDSIDE ASSESSMENT ADULT - H & P REVIEW
yes/88M with past medical history of Afib on Eliquis, suspected TIA 2 years ago, Hypothyroid, HTN/HLD presents today for Video EEG study.

## 2020-10-01 NOTE — PROGRESS NOTE ADULT - ASSESSMENT
This is a 88y Male with h/o HTN, CAD, DLD, SIADH, hypothyroidism, atrial fibrillation, AVR, with stereotypical episodes suspicious for partial seizures.    R/O partial seizures  - continue VEEG monitoring for better characterization and treatment plan  - Seizure precautions  - No AED for now  - Ativan 2mg IV PRN for generalized tonic-clonic seizure lasting longer than 2 minutes, or two consecutive seizures without return to baseline in-between   - Keep Mg above 2; start supplement    Chronic Persistent Afib  - rate controlled   - c/w Eliquis  - not on rate control meds due to bradycardia    HTN/CAD/DL  - stable on home regimen  -monitor bp    Hypothyroid  -on synthroid     suspected magnesium deficiency  -increase supplements to q8hr    CKD III  -stable; outpatient follow up     Hyponatremia  -not clinically significant; repeat bmp in am    Hyperkalemia  -d/c lisinopril   -repeat bmp in am    Progress Note Handoff  Pending Consults: none  Pending Tests: veeg  Pending Results: veeg  Family Discussion: discussed continuing veeg with pt - in agreement   Disposition: Home___x__/SNF______/Other_____/Unknown at this time_____    Please call me with any questions at extension [phone]

## 2020-10-01 NOTE — DISCHARGE NOTE PROVIDER - PROVIDER TOKENS
PROVIDER:[TOKEN:[[id number]],FOLLOWUP:[1 month]],PROVIDER:[TOKEN:[[id number]],FOLLOWUP:[1 week]]

## 2020-10-01 NOTE — DISCHARGE NOTE PROVIDER - NSDCCPCAREPLAN_GEN_ALL_CORE_FT
PRINCIPAL DISCHARGE DIAGNOSIS  Diagnosis: Seizures  Assessment and Plan of Treatment: Outpatient follow up with neurology, Dr. Pope.  - Appointment:       PRINCIPAL DISCHARGE DIAGNOSIS  Diagnosis: Seizures  Assessment and Plan of Treatment: Outpatient follow up with neurology, Dr. Pope.  take medication as prescribed  maintain seizure precautions

## 2020-10-01 NOTE — SBIRT NOTE ADULT - NSSBIRTALCPOSREINDET_GEN_A_CORE
Pt states he drinks 1-2 glasses of wine with ice every other day with dinner.  Pt strongly feels/believes he does not have an issue with drinking.  Pt aware of harm if consumption of alcohol is excessive.  Denies drinking heavily or daily.      Pt's health status and medical hx discussed.  Pt well aware of risks of alcohol use.

## 2020-10-01 NOTE — SWALLOW BEDSIDE ASSESSMENT ADULT - ASR SWALLOW ASPIRATION MONITOR
cough/gurgly voice/change of breathing pattern/position upright (89Y)/fever/oral hygiene/pneumonia/throat clearing/upper respiratory infection

## 2020-10-01 NOTE — SBIRT NOTE ADULT - NSSBIRTNALRESKIT_GEN_A_CORE
Pt is scheduled at 0930.  Call to pt, states contractions are still four minutes, starting to hurt more.  Advised pt will discuss with NP and call her back.  Discussed with Rinku, pt should come in for evaluation, if worsening pt can go straight to L&D.  Call to pt, states contractions getting stronger, advised to go to L&D, L&D and MD notified.       Declined

## 2020-10-01 NOTE — SWALLOW BEDSIDE ASSESSMENT ADULT - DIET PRIOR TO ADMI
Patient reports regular diet with thin liquids. Patient reports staying away from dry foods, taking small bites/sips.

## 2020-10-01 NOTE — SWALLOW BEDSIDE ASSESSMENT ADULT - COMMENTS
Although no s/s of aspiration/penetration, or c/o of globus sensation. Patient reports staying away from dry foods.

## 2020-10-01 NOTE — PROGRESS NOTE ADULT - SUBJECTIVE AND OBJECTIVE BOX
AMA HARDY  88y  Male      Patient is a 88y old  Male who presents for a scheduled VEEG      INTERVAL HPI/OVERNIGHT EVENTS:  Patient seen and examined earlier this morning  Sitting comfortably in bed. No complaints.  Patient continues on VEEG    REVIEW OF SYSTEMS:  CONSTITUTIONAL: No fever, weight loss, or fatigue  EYES: No eye pain, visual disturbances, or discharge  ENMT:  No difficulty hearing, tinnitus, vertigo; No sinus or throat pain  NECK: No pain or stiffness  RESPIRATORY: No cough, wheezing, chills or hemoptysis; No shortness of breath  CARDIOVASCULAR: No chest pain, palpitations, dizziness, or leg swelling  GASTROINTESTINAL: No abdominal or epigastric pain. No nausea, vomiting, or hematemesis; No diarrhea or constipation. No melena or hematochezia.  GENITOURINARY: No dysuria, frequency, hematuria, or incontinence  NEUROLOGICAL: No headaches, memory loss, loss of strength, numbness, or tremors  SKIN: No itching, burning, rashes, or lesions   LYMPH NODES: No enlarged glands  ENDOCRINE: No heat or cold intolerance; No hair loss  MUSCULOSKELETAL: No joint pain or swelling; No muscle, back, or extremity pain  PSYCHIATRIC: No depression, anxiety, mood swings, or difficulty sleeping  HEME/LYMPH: No easy bruising, or bleeding gums  ALLERY AND IMMUNOLOGIC: No hives or eczema    T(C): 36.2 (10-01-20 @ 04:46), Max: 36.2 (10-01-20 @ 04:46)  HR: 56 (10-01-20 @ 09:17) (51 - 77)  BP: 145/71 (10-01-20 @ 09:17) (127/60 - 183/72)  RR: 16 (10-01-20 @ 04:46) (16 - 18)  SpO2: --    PHYSICAL EXAM:  GENERAL: NAD, well-groomed, well-developed  HEAD:  Atraumatic, Normocephalic  EYES: EOMI, PERRLA, conjunctiva and sclera clear  ENMT: No tonsillar erythema, exudates, or enlargement; Moist mucous membranes, Good dentition, No lesions  NECK: Supple, No JVD, Normal thyroid  NERVOUS SYSTEM:  Alert & Oriented X3, Good concentration; Motor Strength 5/5 B/L upper and lower extremities; DTRs 2+ intact and symmetric  CHEST/LUNG: Clear to percussion bilaterally; No rales, rhonchi, wheezing, or rubs  HEART: Regular rate and rhythm; No murmurs, rubs, or gallops  ABDOMEN: Soft, Nontender, Nondistended; Bowel sounds present  EXTREMITIES:  2+ Peripheral Pulses, No clubbing, cyanosis, or edema  LYMPH: No lymphadenopathy noted  SKIN: No rashes or lesions    Consultant(s) Notes Reviewed:  [x ] YES  [ ] NO  Care Discussed with Consultants/Other Providers [ x] YES  [ ] NO    LAB:                        11.0   8.26  )-----------( 206      ( 30 Sep 2020 15:25 )             33.2       09-30    131<L>  |  99  |  28<H>  ----------------------------<  106<H>  5.4<H>   |  25  |  1.5    Ca    9.0      30 Sep 2020 15:25  Mg     1.7     09-30    TPro  5.7<L>  /  Alb  3.7  /  TBili  0.3  /  DBili  x   /  AST  12  /  ALT  11  /  AlkPhos  48  09-30    LIVER FUNCTIONS - ( 30 Sep 2020 15:25 )  Alb: 3.7 g/dL / Pro: 5.7 g/dL / ALK PHOS: 48 U/L / ALT: 11 U/L / AST: 12 U/L / GGT: x               Drug Dosing Weight  Height (cm): 182.9 (30 Sep 2020 12:09)  Weight (kg): 88.4 (30 Sep 2020 12:09)  BMI (kg/m2): 26.4 (30 Sep 2020 12:09)  BSA (m2): 2.11 (30 Sep 2020 12:09)        I&O's Summary    30 Sep 2020 07:01  -  01 Oct 2020 07:00  --------------------------------------------------------  IN: 0 mL / OUT: 500 mL / NET: -500 mL    01 Oct 2020 07:01  -  01 Oct 2020 12:37  --------------------------------------------------------  IN: 440 mL / OUT: 0 mL / NET: 440 mL          RADIOLOGY & ADDITIONAL TESTS:  Imaging Personally Reviewed:  [x] YES  [ ] NO    HEALTH ISSUES - PROBLEM Dx:  Hyperlipidemia  Hyperlipidemia    Hypothyroidism  Hypothyroidism    Hypertension  Hypertension          MEDS:  amLODIPine   Tablet 5 milliGRAM(s) Oral daily  apixaban 5 milliGRAM(s) Oral every 12 hours  aspirin  chewable 81 milliGRAM(s) Oral daily  atorvastatin 80 milliGRAM(s) Oral at bedtime  chlorhexidine 4% Liquid 1 Application(s) Topical <User Schedule>  cholecalciferol 1000 Unit(s) Oral daily  ferrous    sulfate 325 milliGRAM(s) Oral daily  folic acid 1 milliGRAM(s) Oral at bedtime  influenza   Vaccine 0.5 milliLiter(s) IntraMuscular once  levothyroxine 75 MICROGram(s) Oral daily  lisinopril 10 milliGRAM(s) Oral at bedtime  LORazepam   Injectable 2 milliGRAM(s) IV Push once PRN  magnesium oxide 400 milliGRAM(s) Oral daily  multivitamin 1 Tablet(s) Oral daily  pantoprazole    Tablet 40 milliGRAM(s) Oral before breakfast  sodium chloride 1 Gram(s) Oral daily  tamsulosin 0.4 milliGRAM(s) Oral daily  thiamine 100 milliGRAM(s) Oral daily

## 2020-10-01 NOTE — DISCHARGE NOTE PROVIDER - HOSPITAL COURSE
Pt is a 88M admitted for VEEG monitoring. First 24hrs showed borderline abnormalities - neurology decided to keep patient for an additional 24hrs. Pt is a 88M admitted for VEEG monitoring.     VEEG in the last 24 hours:    Background--------------------8-9    Focal and generalized slowing----- borderline left frontal/fronto-central slowing    Interictal activity------------ none    Events-------------------- none    Seizures----------------  none    Impression: borderline abnormal as above                        Plan - will DC the monitoring           considering the history and the subtle abnormality on the EEG would start him on a small dose of Keppra            discussed in length the findings and the plan with him and his wife              Patient seen and examined this morning  sitting up in the chair and eating breakfast   denies any complaints and is asking to go home    Vital Signs Last 24 Hrs  T(C): 36.1 (02 Oct 2020 04:00), Max: 36.1 (02 Oct 2020 04:00)  T(F): 96.9 (02 Oct 2020 04:00), Max: 96.9 (02 Oct 2020 04:00)  HR: 52 (02 Oct 2020 08:14) (52 - 62)  BP: 159/74 (02 Oct 2020 08:14) (156/73 - 206/98)  BP(mean): --  RR: 16 (01 Oct 2020 21:10) (16 - 16)  SpO2: --    PHYSICAL EXAM:  GENERAL: NAD, well-groomed, well-developed  HEAD:  Atraumatic, Normocephalic  EYES: EOMI, PERRLA, conjunctiva and sclera clear  NERVOUS SYSTEM:  Alert & Oriented X 4, Good concentration; Motor Strength 5/5 B/L upper and lower extremities; DTRs 2+ intact and symmetric  CHEST/LUNG: Clear to percussion bilaterally; No rales, rhonchi, wheezing, or rubs  HEART: Regular rate and rhythm; No murmurs, rubs, or gallops  ABDOMEN: Soft, Nontender, Nondistended; Bowel sounds present  EXTREMITIES:  2+ Peripheral Pulses, No clubbing, cyanosis, or edema  SKIN: No rashes or lesions    d/c home today  d/c planning took over 45 min; papers done by me

## 2020-10-01 NOTE — DISCHARGE NOTE PROVIDER - NSDCFUADDAPPT_GEN_ALL_CORE_FT
Appointment with Dr. Pope on ...   [address] [facility], [address], [place] [address]  [phone] Follow up neurology appointment is scheduled with Dr. Pope  for November 11, 2020 at 1 pm.    [address] [place] [address], [address]  [phone]    Patient started on Keppra, Rx sent to the pharmacy:  Keppra 125mg q12hrs x 1 week, then 250mg q12hrs     Patient was also given Rx for CBC, CMP, Keppra level trough to be done prior to follow up.    Patient already has scheduled follow up appointments with cardiologist and nephrologist for next week.

## 2020-10-01 NOTE — DISCHARGE NOTE PROVIDER - CARE PROVIDER_API CALL
Ed Pope  NEUROLOGY  [address] [place] [address], [address]  [place] [address]  Phone: [phone]  Fax: [phone]  Follow Up Time: 1 month    Tawanda Aguirre [address] [place] [address]  Phone: [phone]  Fax: [phone]  Follow Up Time: 1 week

## 2020-10-01 NOTE — SWALLOW BEDSIDE ASSESSMENT ADULT - SWALLOW EVAL: RECOMMENDED FEEDING/EATING TECHNIQUES
maintain upright posture during/after eating for 30 mins/oral hygiene/small sips/bites/allow for swallow between intakes

## 2020-10-01 NOTE — CHART NOTE - NSCHARTNOTEFT_GEN_A_CORE
Asked by RN to review labs. Both magnesium and sodium chloride supplements are scheduled to begin on Oct 1. Will therefore give extra dose of each at 0600 and order repeat labs at 1100. Also eliminate low sodium diet for now Asked by RN to review labs. Both magnesium and sodium chloride supplements are scheduled to begin on Oct 1. Will therefore give extra dose of each at 0600 and order repeat labs at 1100. Also eliminate low sodium diet for now. Due to high potassium level will not give morning dose of lisinopril (pending repeat labs)

## 2020-10-02 ENCOUNTER — TRANSCRIPTION ENCOUNTER (OUTPATIENT)
Age: 85
End: 2020-10-02

## 2020-10-02 VITALS — HEART RATE: 59 BPM | SYSTOLIC BLOOD PRESSURE: 147 MMHG | DIASTOLIC BLOOD PRESSURE: 78 MMHG

## 2020-10-02 PROCEDURE — 99231 SBSQ HOSP IP/OBS SF/LOW 25: CPT

## 2020-10-02 PROCEDURE — 99239 HOSP IP/OBS DSCHRG MGMT >30: CPT

## 2020-10-02 PROCEDURE — 95720 EEG PHY/QHP EA INCR W/VEEG: CPT

## 2020-10-02 RX ORDER — LISINOPRIL 2.5 MG/1
1 TABLET ORAL
Qty: 0 | Refills: 0 | DISCHARGE

## 2020-10-02 RX ORDER — AMLODIPINE BESYLATE 2.5 MG/1
5 TABLET ORAL ONCE
Refills: 0 | Status: COMPLETED | OUTPATIENT
Start: 2020-10-02 | End: 2020-10-02

## 2020-10-02 RX ORDER — LEVETIRACETAM 250 MG/1
1 TABLET, FILM COATED ORAL
Qty: 60 | Refills: 5
Start: 2020-10-02 | End: 2021-03-30

## 2020-10-02 RX ORDER — AMLODIPINE BESYLATE 2.5 MG/1
10 TABLET ORAL DAILY
Refills: 0 | Status: DISCONTINUED | OUTPATIENT
Start: 2020-10-03 | End: 2020-10-02

## 2020-10-02 RX ORDER — HYDRALAZINE HCL 50 MG
1 TABLET ORAL
Qty: 60 | Refills: 0
Start: 2020-10-02

## 2020-10-02 RX ORDER — HYDRALAZINE HCL 50 MG
25 TABLET ORAL EVERY 12 HOURS
Refills: 0 | Status: DISCONTINUED | OUTPATIENT
Start: 2020-10-02 | End: 2020-10-02

## 2020-10-02 RX ORDER — AMLODIPINE BESYLATE 2.5 MG/1
1 TABLET ORAL
Qty: 30 | Refills: 0
Start: 2020-10-02

## 2020-10-02 RX ORDER — LEVETIRACETAM 250 MG/1
125 TABLET, FILM COATED ORAL EVERY 12 HOURS
Refills: 0 | Status: DISCONTINUED | OUTPATIENT
Start: 2020-10-02 | End: 2020-10-02

## 2020-10-02 RX ORDER — AMLODIPINE BESYLATE 2.5 MG/1
10 TABLET ORAL DAILY
Refills: 0 | Status: DISCONTINUED | OUTPATIENT
Start: 2020-10-02 | End: 2020-10-02

## 2020-10-02 RX ADMIN — Medication 81 MILLIGRAM(S): at 11:36

## 2020-10-02 RX ADMIN — AMLODIPINE BESYLATE 5 MILLIGRAM(S): 2.5 TABLET ORAL at 05:29

## 2020-10-02 RX ADMIN — Medication 75 MICROGRAM(S): at 05:29

## 2020-10-02 RX ADMIN — APIXABAN 5 MILLIGRAM(S): 2.5 TABLET, FILM COATED ORAL at 05:29

## 2020-10-02 RX ADMIN — Medication 1 TABLET(S): at 11:38

## 2020-10-02 RX ADMIN — INFLUENZA VIRUS VACCINE 0.5 MILLILITER(S): 15; 15; 15; 15 SUSPENSION INTRAMUSCULAR at 11:31

## 2020-10-02 RX ADMIN — Medication 0.1 MILLIGRAM(S): at 06:58

## 2020-10-02 RX ADMIN — MAGNESIUM OXIDE 400 MG ORAL TABLET 400 MILLIGRAM(S): 241.3 TABLET ORAL at 08:32

## 2020-10-02 RX ADMIN — SODIUM CHLORIDE 1 GRAM(S): 9 INJECTION INTRAMUSCULAR; INTRAVENOUS; SUBCUTANEOUS at 11:37

## 2020-10-02 RX ADMIN — PANTOPRAZOLE SODIUM 40 MILLIGRAM(S): 20 TABLET, DELAYED RELEASE ORAL at 05:29

## 2020-10-02 RX ADMIN — AMLODIPINE BESYLATE 5 MILLIGRAM(S): 2.5 TABLET ORAL at 09:25

## 2020-10-02 RX ADMIN — Medication 325 MILLIGRAM(S): at 11:37

## 2020-10-02 RX ADMIN — Medication 1000 UNIT(S): at 11:36

## 2020-10-02 RX ADMIN — LEVETIRACETAM 125 MILLIGRAM(S): 250 TABLET, FILM COATED ORAL at 11:34

## 2020-10-02 RX ADMIN — MAGNESIUM OXIDE 400 MG ORAL TABLET 400 MILLIGRAM(S): 241.3 TABLET ORAL at 11:38

## 2020-10-02 RX ADMIN — Medication 100 MILLIGRAM(S): at 11:37

## 2020-10-02 RX ADMIN — Medication 25 MILLIGRAM(S): at 11:38

## 2020-10-02 NOTE — DISCHARGE NOTE NURSING/CASE MANAGEMENT/SOCIAL WORK - NSDCFUADDAPPT_GEN_ALL_CORE_FT
Follow up neurology appointment is scheduled with Dr. Pope  for November 11, 2020 at 1 pm.    [address] [place] [address], [address]  [phone]    Patient started on Keppra, Rx sent to the pharmacy:  Keppra 125mg q12hrs x 1 week, then 250mg q12hrs     Patient was also given Rx for CBC, CMP, Keppra level trough to be done prior to follow up.    Patient already has scheduled follow up appointments with cardiologist and nephrologist for next week.

## 2020-10-02 NOTE — PROGRESS NOTE ADULT - SUBJECTIVE AND OBJECTIVE BOX
Epilepsy Attending Note:     AMA HARDY    88y Male  MRN [id number]    Vital Signs Last 24 Hrs  T(C): 36.1 (02 Oct 2020 04:00), Max: 36.1 (02 Oct 2020 04:00)  T(F): 96.9 (02 Oct 2020 04:00), Max: 96.9 (02 Oct 2020 04:00)  HR: 52 (02 Oct 2020 08:14) (52 - 62)  BP: 159/74 (02 Oct 2020 08:14) (156/73 - 206/98)  BP(mean): --  RR: 16 (01 Oct 2020 21:10) (16 - 16)  SpO2: --                          11.0   8.26  )-----------( 206      ( 30 Sep 2020 15:25 )             33.2       10-02    134<L>  |  100  |  23<H>  ----------------------------<  100<H>  4.3   |  26  |  1.4    Ca    9.7      02 Oct 2020 06:25  Mg     1.7     10-01    TPro  5.7<L>  /  Alb  3.7  /  TBili  0.3  /  DBili  x   /  AST  12  /  ALT  11  /  AlkPhos  48  09-30      MEDICATIONS  (STANDING):  apixaban 5 milliGRAM(s) Oral every 12 hours  aspirin  chewable 81 milliGRAM(s) Oral daily  atorvastatin 80 milliGRAM(s) Oral at bedtime  chlorhexidine 4% Liquid 1 Application(s) Topical <User Schedule>  cholecalciferol 1000 Unit(s) Oral daily  ferrous    sulfate 325 milliGRAM(s) Oral daily  folic acid 1 milliGRAM(s) Oral at bedtime  hydrALAZINE 25 milliGRAM(s) Oral every 12 hours  influenza   Vaccine 0.5 milliLiter(s) IntraMuscular once  levothyroxine 75 MICROGram(s) Oral daily  magnesium oxide 400 milliGRAM(s) Oral three times a day with meals  multivitamin 1 Tablet(s) Oral daily  pantoprazole    Tablet 40 milliGRAM(s) Oral before breakfast  sodium chloride 1 Gram(s) Oral daily  tamsulosin 0.4 milliGRAM(s) Oral daily  thiamine 100 milliGRAM(s) Oral daily    MEDICATIONS  (PRN):  LORazepam   Injectable 2 milliGRAM(s) IV Push once PRN generalized tonic-clonic seizure lasting longer than 2 minutes            VEEG in the last 24 hours:    Background--------------------8-9    Focal and generalized slowing----- borderline left frontal/fronto-central slowing    Interictal activity------------ none    Events-------------------- none    Seizures----------------  none    Impression: borderline abnormal as above                        Plan - will DC the monitoring           considering the history and the subtle abnormality on the EEG would start him on a small dose of Keppra            discussed in length the findings and the plan with him and his wife           needs F/U           needs F/U with his PMD and and also cardiology/nephrology for optimizing BP management

## 2020-10-02 NOTE — PROGRESS NOTE ADULT - SUBJECTIVE AND OBJECTIVE BOX
Epilepsy NP Note:    VEEG monitoring completed, patient is clear for discharge from neurology standpoint.    Follow up neurology appointment is scheduled with Dr. Pope  for November 11, 2020 at 1 pm.    [address] [place] [address], [address]  [phone]    Patient started on Keppra, Rx sent to the pharmacy:  Keppra 125mg q12hrs x 1 week, then 250mg q12hrs     Patient was also given Rx for CBC, CMP, Keppra level trough to be done prior to follow up.    Patient already has scheduled follow up appointments with cardiologist and nephrologist for next week.    Detailed written and verbal instructions regarding seizure precautions and follow up plan are given to the patient and wife, all questions answered. Patient and wife verbalized understanding.    Discussed with PA.

## 2020-10-07 DIAGNOSIS — Z79.82 LONG TERM (CURRENT) USE OF ASPIRIN: ICD-10-CM

## 2020-10-07 DIAGNOSIS — I10 ESSENTIAL (PRIMARY) HYPERTENSION: ICD-10-CM

## 2020-10-07 DIAGNOSIS — G40.009 LOCALIZATION-RELATED (FOCAL) (PARTIAL) IDIOPATHIC EPILEPSY AND EPILEPTIC SYNDROMES WITH SEIZURES OF LOCALIZED ONSET, NOT INTRACTABLE, WITHOUT STATUS EPILEPTICUS: ICD-10-CM

## 2020-10-07 DIAGNOSIS — Z90.49 ACQUIRED ABSENCE OF OTHER SPECIFIED PARTS OF DIGESTIVE TRACT: ICD-10-CM

## 2020-10-07 DIAGNOSIS — Z86.73 PERSONAL HISTORY OF TRANSIENT ISCHEMIC ATTACK (TIA), AND CEREBRAL INFARCTION WITHOUT RESIDUAL DEFICITS: ICD-10-CM

## 2020-10-07 DIAGNOSIS — R56.9 UNSPECIFIED CONVULSIONS: ICD-10-CM

## 2020-10-07 DIAGNOSIS — E87.5 HYPERKALEMIA: ICD-10-CM

## 2020-10-07 DIAGNOSIS — I25.10 ATHEROSCLEROTIC HEART DISEASE OF NATIVE CORONARY ARTERY WITHOUT ANGINA PECTORIS: ICD-10-CM

## 2020-10-07 DIAGNOSIS — Z79.01 LONG TERM (CURRENT) USE OF ANTICOAGULANTS: ICD-10-CM

## 2020-10-07 DIAGNOSIS — Z95.3 PRESENCE OF XENOGENIC HEART VALVE: ICD-10-CM

## 2020-10-07 DIAGNOSIS — Z79.899 OTHER LONG TERM (CURRENT) DRUG THERAPY: ICD-10-CM

## 2020-10-07 DIAGNOSIS — E78.5 HYPERLIPIDEMIA, UNSPECIFIED: ICD-10-CM

## 2020-10-07 DIAGNOSIS — Z87.891 PERSONAL HISTORY OF NICOTINE DEPENDENCE: ICD-10-CM

## 2020-10-07 DIAGNOSIS — I48.19 OTHER PERSISTENT ATRIAL FIBRILLATION: ICD-10-CM

## 2020-10-07 DIAGNOSIS — E03.9 HYPOTHYROIDISM, UNSPECIFIED: ICD-10-CM

## 2020-11-10 ENCOUNTER — APPOINTMENT (OUTPATIENT)
Dept: NEUROLOGY | Facility: CLINIC | Age: 85
End: 2020-11-10
Payer: MEDICARE

## 2020-11-10 VITALS
WEIGHT: 180 LBS | TEMPERATURE: 97.3 F | HEIGHT: 72 IN | SYSTOLIC BLOOD PRESSURE: 135 MMHG | DIASTOLIC BLOOD PRESSURE: 82 MMHG | BODY MASS INDEX: 24.38 KG/M2 | OXYGEN SATURATION: 98 % | HEART RATE: 90 BPM

## 2020-11-10 DIAGNOSIS — Z86.69 PERSONAL HISTORY OF OTHER DISEASES OF THE NERVOUS SYSTEM AND SENSE ORGANS: ICD-10-CM

## 2020-11-10 DIAGNOSIS — G45.9 TRANSIENT CEREBRAL ISCHEMIC ATTACK, UNSPECIFIED: ICD-10-CM

## 2020-11-10 PROCEDURE — 99214 OFFICE O/P EST MOD 30 MIN: CPT

## 2020-11-10 RX ORDER — NORMAL SALT TABLETS 1 G/G
TABLET ORAL DAILY
Refills: 0 | Status: DISCONTINUED | COMMUNITY
End: 2020-11-10

## 2020-11-10 RX ORDER — LISINOPRIL 10 MG/1
10 TABLET ORAL DAILY
Refills: 0 | Status: DISCONTINUED | COMMUNITY
End: 2020-11-10

## 2020-11-10 RX ORDER — LISINOPRIL 20 MG/1
20 TABLET ORAL DAILY
Refills: 0 | Status: DISCONTINUED | COMMUNITY
End: 2020-11-10

## 2020-11-10 NOTE — ASSESSMENT
[FreeTextEntry1] : HTN\par episodes suggestive of CPS\par \par plan\par levels\par F/u with his PMD\par F/U neurology

## 2020-11-10 NOTE — HISTORY OF PRESENT ILLNESS
[FreeTextEntry1] : Roly is here for the F/U.\par He was admitted for the V-EEG monitoring. stayed with us for 48 hours.The EEG showed borderline left focal slowing.While in the hospital his BP proved to be very volatile and difficult to control.some adjustments were made.\par Today his BP is 135/82.\par He was also put on the keppra 250 bid. He is taking it. had no events. No side effects and complications.\par Sleeps well. Not complaining of HA or spinal pain.\par NO vertigo. No issues with her balance.\par His wife denies seeing any episodes suggestive of Sz. no change in behavior or mood\par

## 2020-11-10 NOTE — PHYSICAL EXAM
[FreeTextEntry1] : A/A/Ox3\par good attention and concentration\par good vocabulary and language and able to carry on a good conversation. However looks at his wife frequently for confirmation\par good attention\par good executive behavior\par 2/3 recall\par CN- intact\par motor exam--- no drift\par stable gait \par slightly stooped\par No tremor\par no bradykinesia

## 2020-11-11 ENCOUNTER — APPOINTMENT (OUTPATIENT)
Dept: NEUROLOGY | Facility: CLINIC | Age: 85
End: 2020-11-11

## 2020-11-19 LAB — LEVETIRACETAM SERPL-MCNC: 9.3 UG/ML

## 2020-11-22 NOTE — PATIENT PROFILE ADULT - MONEY FOR FOOD
Nephrology Progress Note    Patient resting.    ROS:  Unable to obtain due to clinical condition.    MEDICATIONS:  • cloNIDine  1 patch Transdermal Once per day on Sat   • insulin lispro   Subcutaneous 4 times per day   • furosemide  40 mg Intravenous BID WC   • hydrALAZINE  75 mg Oral 4 times per day   • megestrol  400 mg Oral Daily   • losartan  25 mg Oral BID   • amLODIPine  10 mg Oral BID   • atorvastatin  40 mg Oral Daily   • carvedilol  12.5 mg Oral BID WC   • glipiZIDE  5 mg Oral BID AC   • insulin glargine  50 Units Subcutaneous Nightly   • levothyroxine  225 mcg Oral Daily   • hydrochlorothiazide  25 mg Oral Daily   • enoxaparin  30 mg Subcutaneous Daily          Physical Examination:  Vital Signs:    Visit Vitals  /61 (BP Location: RUE - Right upper extremity, Patient Position: Supine)   Pulse 61   Temp 96.4 °F (35.8 °C)   Resp 16   Ht 5' 4\" (1.626 m)   Wt 92.7 kg (204 lb 5.9 oz)   SpO2 96%   BMI 35.08 kg/m²     General: Confused and lethargic  Head:  Normocephalic-atraumatic.   Neck:  Trachea is midline. No JVD.  Eyes:  Normal conjunctivae.    ENT:   Mucous membranes are dry.    Cardiovascular:  S1, S2 auscultated.  Regular rate and rhythm.  Respiratory:   Bilateral breath sounds heard.  Lungs clear to auscultation.  Symmetric chest wall expansion.  Respirations are non-labored.    Gastrointestinal:  Soft and nontender.  Non-distended.  Hypoactive bowel sounds.    Genitourinary: Taylor in place  Musculoskeletal:  No joint swelling.      Skin: Warm and dry.  No rash noted.    I/O's    Intake/Output Summary (Last 24 hours) at 11/22/2020 1413  Last data filed at 11/22/2020 1400  Gross per 24 hour   Intake 2625 ml   Output 2350 ml   Net 275 ml       Labs:    Recent Labs   Lab 11/22/20  0718 11/21/20  0701 11/20/20  0533   SODIUM 127* 132* 133*   POTASSIUM 4.9 4.3 3.9   CHLORIDE 100 103 104   CO2 21 23 24   BUN 34* 36* 38*   CREATININE 1.93* 1.94* 1.95*   GLUCOSE 207* 177* 149*   CALCIUM 10.5* 11.0*  12.1*      Recent Labs   Lab 11/22/20  0718 11/21/20  0701 11/20/20  0533  11/16/20  0418   SODIUM 127* 132* 133*   < > 144   CHLORIDE 100 103 104   < > 114*   CO2 21 23 24   < > 25   BUN 34* 36* 38*   < > 53*   CREATININE 1.93* 1.94* 1.95*   < > 2.12*   CALCIUM 10.5* 11.0* 12.1*   < > 12.4*   ALBUMIN  --  2.8* 2.9*  --  2.9*   BILIRUBIN  --  0.4 0.5  --  0.6   ALKPT  --  92 89  --  86   GPT  --  29 27  --  26   AST  --  22 20  --  16   GLUCOSE 207* 177* 149*   < > 113*    < > = values in this interval not displayed.      Recent Labs   Lab 11/19/20  1550   WBC 12.4*   RBC 4.45   HGB 12.0   HCT 36.6           Imaging    MRI BRAIN WO CONTRAST   Final Result   1. No gross acute intracranial abnormalities on this markedly limited examination due to motion artifact.      Electronically Signed by: ERVIN DICKINSON M.D.    Signed on: 11/21/2020 1:24 PM          CT CHEST WO CONTRAST   Final Result      1.  No evidence of consolidative airspace disease to suggest aspiration pneumonia.   2.  Several enlarged bilateral axillary lymph nodes in addition to moderately enlarged mediastinal and upper abdominal lymph nodes with suspected splenomegaly which is suspicious for underlying lymphoma.   3.  Bilateral pulmonary nodules, the largest within the subpleural and peripheral left upper lobe and medial left lower lobe which are suspicious for pulmonary metastatic lesions.   4.  Small pericardial effusion.   5.  Other incidental findings, as detailed above.      Electronically Signed by: WILLARD LUNA M.D.    Signed on: 11/20/2020 10:50 AM          NM BONE WHOLE BODY   Final Result   1.  Multifocal activity suggestive of degenerative changes.   2.  Increased activity of the right hip and lumbar spine which could be degenerative.  Correlation with x-ray may provide additional helpful information.      **The absence of findings should not deter follow-up or further evaluation of concerning clinical findings.      Electronically  Signed by: CINDY QUIROS M.D.    Signed on: 11/18/2020 4:57 PM          US KIDNEY LIMITED BILATERAL   Final Result   1.  Initially urinary bladder was noted to be markedly distended with associated mild bilateral pelviectasis/hydronephrosis.  After Taylor catheter was placed hydronephrosis was noted to be improved.   2.  No evidence of solid renal mass.   3.  Bilateral simple cysts.      Electronically Signed by: CHON SCHWARTZ M.D.    Signed on: 11/14/2020 10:55 PM          CT HEAD WO CONTRAST   Final Result   1.   Exam limited by motion artifact.   2.   Stable mild atrophy and chronic ischemic deep white matter changes with no acute intracranial hemorrhage or midline shift identified.      Electronically Signed by: SAHIL MENDEZ M.D.    Signed on: 11/13/2020 9:55 PM          CT HEAD WO CONTRAST   Final Result   Mild to moderate atrophy and scattered foci of presumed chronic ischemic change, without acute intracranial abnormality noted.      Electronically Signed by: MARIANO RESENDIZ M.D.    Signed on: 11/12/2020 5:30 PM          XR CHEST PA OR AP 1 VIEW   Final Result   1.  Mild cardiomegaly and mild vascular congestion.   2.  Nodular opacity in the medial left base.  Pulmonary nodule not excluded.  CT chest advised.      **The absence of findings should not deter follow-up or further evaluation of concerning clinical findings.      Electronically Signed by: CINDY QUIROS M.D.    Signed on: 11/12/2020 4:28 PM          US AXILLA LYMPH NODE BIOPSY RIGHT 1 LESION    (Results Pending)        Assessment and Plan:    1.  Stage IV CKD.  Serum creatinine is fairly stable.  Continue to monitor chemistries.     2.  Hypokalemia.  This is resolved.  Will remove potassium from IVF.    3.  Hypercalcemia.  Bone scan shows degenerative disease.  Status post pamidronate.  Serum calcium level is improved.  Hyponatremia noted - will adjust tonicity of IVF.  Continue Lasix.  CT results noted.  Malignancy work-up per primary/oncology  service.    4.  Hypertension.  Blood pressure Is controlled.  Continue current medications.       no

## 2020-12-22 ENCOUNTER — RX RENEWAL (OUTPATIENT)
Age: 85
End: 2020-12-22

## 2020-12-23 ENCOUNTER — RX RENEWAL (OUTPATIENT)
Age: 85
End: 2020-12-23

## 2021-02-18 NOTE — ED CDU PROVIDER SUBSEQUENT DAY NOTE - PROGRESS NOTE3
What Type Of Note Output Would You Prefer (Optional)?: Standard Output Hpi Title: Evaluation of Skin Lesions How Severe Are Your Spot(S)?: mild Have Your Spot(S) Been Treated In The Past?: has not been treated Stable.

## 2021-03-18 ENCOUNTER — APPOINTMENT (OUTPATIENT)
Dept: NEUROLOGY | Facility: CLINIC | Age: 86
End: 2021-03-18
Payer: MEDICARE

## 2021-03-18 VITALS
WEIGHT: 186 LBS | TEMPERATURE: 96.2 F | SYSTOLIC BLOOD PRESSURE: 130 MMHG | DIASTOLIC BLOOD PRESSURE: 70 MMHG | HEART RATE: 70 BPM | OXYGEN SATURATION: 97 % | BODY MASS INDEX: 25.19 KG/M2 | HEIGHT: 72 IN

## 2021-03-18 PROCEDURE — 99214 OFFICE O/P EST MOD 30 MIN: CPT

## 2021-03-18 RX ORDER — NIFEDIPINE 30 MG/1
30 TABLET, EXTENDED RELEASE ORAL DAILY
Refills: 0 | Status: DISCONTINUED | COMMUNITY
End: 2021-03-18

## 2021-03-19 NOTE — PHYSICAL EXAM
[FreeTextEntry1] : A/A/ Ox3\par good mood \par not tired humerus\par slight difficulty remembering names but sharp with the events memory\par slightly hard of the hearing\par no drift\par no dysmetria\par stable gait\par

## 2021-03-19 NOTE — ASSESSMENT
[FreeTextEntry1] : A-Fib\par Hx of partial CP seizure\par HTN\par \par \par Plan \par continue at the same dose .last Keppra level was  9.3\par F/U

## 2021-03-19 NOTE — HISTORY OF PRESENT ILLNESS
[FreeTextEntry1] : Roly is here for the F/U. he is accompanied by his wife. They are not reporting any episodes suggestive of seizure.\par He says he feels good. His BP is stable.He sleeps well.\par and says losing his taste buds his appetite and eagerness to eat also declined.\par His mood is good. tries to keep himself busy mostly working in the garden and watching some TV.\par \par He lost his balance as he was working in the garden as he tried to reach out and grab something\par No  episode of confusion. Denies having any side effects from his med. Dis not gain weight.Has \par No episode of syncope or presyncope\par He does have some degree of decline in his hearing but as able to have his baseline function\par Not complaining of joint and spine pain

## 2021-06-16 NOTE — ED ADULT NURSE NOTE - NS ED NURSE DISCH DISPOSITION
Per OV note from 4/15/19, note states and phone call from 4/16/19:    Pioglitazone will be discontinued  He will start metformin  He does not believe that he has ever been treated with metformin  Provided his kidney function tests are stable he will start metformin    Spoke to Dorita, relayed pioglitazone was d/c'd and was started on metformin.  She does not think he needs a refill at this time, but will let us know if he is out.  
Telephone Encounter by America Mcneal LPN at 7/1/2019  2:03 PM     Author: America Mcneal LPN Service: -- Author Type: Licensed Nurse    Filed: 7/1/2019  2:10 PM Encounter Date: 7/1/2019 Status: Signed    : America Mcneal LPN (Licensed Nurse)       Medication Question or Clarification  Who is calling: Patient  What medication are you calling about? (include dose and sig)   metFORMIN (GLUCOPHAGE XR) 500 MG 24 hr tablet 180 tablet 3 4/15/2019     Sig - Route: Take 1 tablet (500 mg total) by mouth daily with supper. - Oral      PIOGLITAZONE HCL 45 MG TABLET New     Add as: pioglitazone (ACTOS) 45 MG tablet     1 dispense in past 1 month     TAKE 1 TABLET BY MOUTH ONCE A DAY   External Pharmacy 6/20/2019           Dispense date: 6/20/2019  Qty: 87.00 each            Who prescribed the medication?: Dr Live  What is your question/concern?: Patient gave verbal consent for Dorita to speak for him.   Dorita states the patient does not have Metformin at home any more, but does have Pioglitazone.  Droita would like to know which one of these patient is suppose to take.  Pharmacy: [facility]  Okay to leave a detailed message?: Yes  Site CMT - Please call the pharmacy to obtain any additional needed information.       
Admitted

## 2021-06-23 NOTE — PROGRESS NOTE ADULT - SUBJECTIVE AND OBJECTIVE BOX
Neurology Progress Note    Interval History:      HPI:      PAST MEDICAL & SURGICAL HISTORY:  Hypothyroidism  Hypertension  Hyperlipidemia  Atrial fibrillation  Coronary artery disease  History of loop recorder: 2017  H/O aortic valve replacement: porcine          Vital Signs Last 24 Hrs  T(C): 36.2 (11 Sep 2018 07:28), Max: 36.2 (11 Sep 2018 07:28)  T(F): 97.1 (11 Sep 2018 07:28), Max: 97.1 (11 Sep 2018 07:28)  HR: 58 (11 Sep 2018 07:28) (54 - 72)  BP: 164/75 (11 Sep 2018 07:28) (129/76 - 194/90)  BP(mean): --  RR: 18 (11 Sep 2018 07:28) (18 - 18)  SpO2: 97% (11 Sep 2018 07:28) (97% - 99%)    Neurological Exam:   Mental status: Awake, alert and oriented x3.  Recent and remote memory intact.  Naming, repetition and comprehension intact.  Attention/concentration intact.  No dysarthria, no aphasia.  Fund of knowledge appropriate.    Cranial nerves: Pupils equally round and reactive to light, visual fields full, no nystagmus, extraocular muscles intact, V1 through V3 intact bilaterally and symmetric, face symmetric, hearing intact to finger rub, palate elevation symmetric, tongue was midline.  Motor:  MRC grading 5/5 b/l UE/LE.   strength 5/5.  Normal tone and bulk.  No abnormal movements.    Sensation: Intact to light touch, proprioception, and pinprick.   Coordination: No dysmetria on finger-to-nose and heel-to-shin.  No dysdiadokinesia.  Reflexes: 2+ in bilateral UE/LE, downgoing toes bilaterally. (-) Cortes.  Gait: Narrow and steady. No ataxia.  Romberg negative    apixaban 5 milliGRAM(s) Oral every 12 hours  aspirin  chewable 81 milliGRAM(s) Oral daily  atorvastatin 80 milliGRAM(s) Oral at bedtime  folic acid 1 milliGRAM(s) Oral daily  lisinopril 20 milliGRAM(s) Oral daily  sodium chloride 0.9%. 1000 milliLiter(s) IV Continuous <Continuous>  tamsulosin 0.4 milliGRAM(s) Oral daily      Labs:  CBC Full  -  ( 10 Sep 2018 07:19 )  WBC Count : 6.99 K/uL  Hemoglobin : 13.1 g/dL  Hematocrit : 39.5 %  Platelet Count - Automated : 185 K/uL  Mean Cell Volume : 96.6 fL  Mean Cell Hemoglobin : 32.0 pg  Mean Cell Hemoglobin Concentration : 33.2 g/dL  Auto Neutrophil # : 3.75 K/uL  Auto Lymphocyte # : 2.19 K/uL  Auto Monocyte # : 0.87 K/uL  Auto Eosinophil # : 0.11 K/uL  Auto Basophil # : 0.06 K/uL  Auto Neutrophil % : 53.7 %  Auto Lymphocyte % : 31.3 %  Auto Monocyte % : 12.4 %  Auto Eosinophil % : 1.6 %  Auto Basophil % : 0.9 %    09-10    141  |  104  |  20  ----------------------------<  121<H>  4.6   |  22  |  1.6<H>    Ca    9.5      10 Sep 2018 07:19    TPro  6.9  /  Alb  4.3  /  TBili  0.4  /  DBili  x   /  AST  15  /  ALT  13  /  AlkPhos  64  09-10    LIVER FUNCTIONS - ( 10 Sep 2018 07:19 )  Alb: 4.3 g/dL / Pro: 6.9 g/dL / ALK PHOS: 64 U/L / ALT: 13 U/L / AST: 15 U/L / GGT: x           PT/INR - ( 10 Sep 2018 07:19 )   PT: 15.40 sec;   INR: 1.43 ratio         PTT - ( 10 Sep 2018 07:19 )  PTT:34.7 sec        Plan:< from: Transthoracic Echocardiogram (09.10.18 @ 13:05) >  Summary:   1. Normal global left ventricular systolic function.   2. Mildly increased LV wall thickness.   3. Spectral Doppler shows impaired relaxation pattern of left   ventricular myocardial filling (Grade I diastolic dysfunction).   4. Mean gradient of 19.   5. Moderate mitral annular calcification.   6. Mild tricuspid regurgitation.   7. PSAP at least 45.   8. Bioprosthesis in the aortic position.   9. Trace pulmonic valve regurgitation.  10. Dilatation of the ascending aorta.  11. Peak aortic valve gradient is 32.2 mmHg and the mean gradient is 16.9   mmHg, which is probably normal in the setting of a prosthetic aortic   valve.    < end of copied text >    rpt HCT (9/11, prelim): no acute change from prior HCT 9/10 Yes

## 2021-06-24 ENCOUNTER — INPATIENT (INPATIENT)
Facility: HOSPITAL | Age: 86
LOS: 10 days | Discharge: HOME HEALTH | End: 2021-07-05
Attending: INTERNAL MEDICINE | Admitting: INTERNAL MEDICINE
Payer: MEDICARE

## 2021-06-24 VITALS
WEIGHT: 188.94 LBS | RESPIRATION RATE: 20 BRPM | OXYGEN SATURATION: 94 % | TEMPERATURE: 97 F | HEIGHT: 72 IN | HEART RATE: 64 BPM | SYSTOLIC BLOOD PRESSURE: 159 MMHG | DIASTOLIC BLOOD PRESSURE: 72 MMHG

## 2021-06-24 DIAGNOSIS — J96.91 RESPIRATORY FAILURE, UNSPECIFIED WITH HYPOXIA: ICD-10-CM

## 2021-06-24 DIAGNOSIS — I13.0 HYPERTENSIVE HEART AND CHRONIC KIDNEY DISEASE WITH HEART FAILURE AND STAGE 1 THROUGH STAGE 4 CHRONIC KIDNEY DISEASE, OR UNSPECIFIED CHRONIC KIDNEY DISEASE: ICD-10-CM

## 2021-06-24 DIAGNOSIS — Z90.49 ACQUIRED ABSENCE OF OTHER SPECIFIED PARTS OF DIGESTIVE TRACT: Chronic | ICD-10-CM

## 2021-06-24 DIAGNOSIS — Z95.2 PRESENCE OF PROSTHETIC HEART VALVE: Chronic | ICD-10-CM

## 2021-06-24 DIAGNOSIS — E78.5 HYPERLIPIDEMIA, UNSPECIFIED: ICD-10-CM

## 2021-06-24 DIAGNOSIS — N18.9 CHRONIC KIDNEY DISEASE, UNSPECIFIED: ICD-10-CM

## 2021-06-24 DIAGNOSIS — I24.8 OTHER FORMS OF ACUTE ISCHEMIC HEART DISEASE: ICD-10-CM

## 2021-06-24 DIAGNOSIS — E22.2 SYNDROME OF INAPPROPRIATE SECRETION OF ANTIDIURETIC HORMONE: ICD-10-CM

## 2021-06-24 DIAGNOSIS — R06.02 SHORTNESS OF BREATH: ICD-10-CM

## 2021-06-24 DIAGNOSIS — N40.0 BENIGN PROSTATIC HYPERPLASIA WITHOUT LOWER URINARY TRACT SYMPTOMS: ICD-10-CM

## 2021-06-24 DIAGNOSIS — Z98.890 OTHER SPECIFIED POSTPROCEDURAL STATES: Chronic | ICD-10-CM

## 2021-06-24 DIAGNOSIS — Z79.01 LONG TERM (CURRENT) USE OF ANTICOAGULANTS: ICD-10-CM

## 2021-06-24 DIAGNOSIS — Z79.82 LONG TERM (CURRENT) USE OF ASPIRIN: ICD-10-CM

## 2021-06-24 DIAGNOSIS — I48.19 OTHER PERSISTENT ATRIAL FIBRILLATION: ICD-10-CM

## 2021-06-24 DIAGNOSIS — I50.23 ACUTE ON CHRONIC SYSTOLIC (CONGESTIVE) HEART FAILURE: ICD-10-CM

## 2021-06-24 DIAGNOSIS — Z87.891 PERSONAL HISTORY OF NICOTINE DEPENDENCE: ICD-10-CM

## 2021-06-24 DIAGNOSIS — Z95.3 PRESENCE OF XENOGENIC HEART VALVE: ICD-10-CM

## 2021-06-24 DIAGNOSIS — I49.5 SICK SINUS SYNDROME: ICD-10-CM

## 2021-06-24 DIAGNOSIS — G40.909 EPILEPSY, UNSPECIFIED, NOT INTRACTABLE, WITHOUT STATUS EPILEPTICUS: ICD-10-CM

## 2021-06-24 DIAGNOSIS — Z91.81 HISTORY OF FALLING: ICD-10-CM

## 2021-06-24 DIAGNOSIS — E03.9 HYPOTHYROIDISM, UNSPECIFIED: ICD-10-CM

## 2021-06-24 LAB
ALBUMIN SERPL ELPH-MCNC: 3.8 G/DL — SIGNIFICANT CHANGE UP (ref 3.5–5.2)
ALBUMIN SERPL ELPH-MCNC: 3.8 G/DL — SIGNIFICANT CHANGE UP (ref 3.5–5.2)
ALP SERPL-CCNC: 63 U/L — SIGNIFICANT CHANGE UP (ref 30–115)
ALP SERPL-CCNC: 70 U/L — SIGNIFICANT CHANGE UP (ref 30–115)
ALT FLD-CCNC: 12 U/L — SIGNIFICANT CHANGE UP (ref 0–41)
ALT FLD-CCNC: 13 U/L — SIGNIFICANT CHANGE UP (ref 0–41)
ANION GAP SERPL CALC-SCNC: 8 MMOL/L — SIGNIFICANT CHANGE UP (ref 7–14)
ANION GAP SERPL CALC-SCNC: 9 MMOL/L — SIGNIFICANT CHANGE UP (ref 7–14)
AST SERPL-CCNC: 16 U/L — SIGNIFICANT CHANGE UP (ref 0–41)
AST SERPL-CCNC: 17 U/L — SIGNIFICANT CHANGE UP (ref 0–41)
BASOPHILS # BLD AUTO: 0.02 K/UL — SIGNIFICANT CHANGE UP (ref 0–0.2)
BASOPHILS NFR BLD AUTO: 0.3 % — SIGNIFICANT CHANGE UP (ref 0–1)
BILIRUB SERPL-MCNC: 0.6 MG/DL — SIGNIFICANT CHANGE UP (ref 0.2–1.2)
BILIRUB SERPL-MCNC: 0.7 MG/DL — SIGNIFICANT CHANGE UP (ref 0.2–1.2)
BUN SERPL-MCNC: 14 MG/DL — SIGNIFICANT CHANGE UP (ref 10–20)
BUN SERPL-MCNC: 15 MG/DL — SIGNIFICANT CHANGE UP (ref 10–20)
CALCIUM SERPL-MCNC: 9.1 MG/DL — SIGNIFICANT CHANGE UP (ref 8.5–10.1)
CALCIUM SERPL-MCNC: 9.1 MG/DL — SIGNIFICANT CHANGE UP (ref 8.5–10.1)
CHLORIDE SERPL-SCNC: 86 MMOL/L — LOW (ref 98–110)
CHLORIDE SERPL-SCNC: 88 MMOL/L — LOW (ref 98–110)
CO2 SERPL-SCNC: 21 MMOL/L — SIGNIFICANT CHANGE UP (ref 17–32)
CO2 SERPL-SCNC: 22 MMOL/L — SIGNIFICANT CHANGE UP (ref 17–32)
CREAT SERPL-MCNC: 1.1 MG/DL — SIGNIFICANT CHANGE UP (ref 0.7–1.5)
CREAT SERPL-MCNC: 1.1 MG/DL — SIGNIFICANT CHANGE UP (ref 0.7–1.5)
EOSINOPHIL # BLD AUTO: 0.01 K/UL — SIGNIFICANT CHANGE UP (ref 0–0.7)
EOSINOPHIL NFR BLD AUTO: 0.1 % — SIGNIFICANT CHANGE UP (ref 0–8)
GLUCOSE SERPL-MCNC: 113 MG/DL — HIGH (ref 70–99)
GLUCOSE SERPL-MCNC: 124 MG/DL — HIGH (ref 70–99)
HCT VFR BLD CALC: 28 % — LOW (ref 42–52)
HGB BLD-MCNC: 9.7 G/DL — LOW (ref 14–18)
IMM GRANULOCYTES NFR BLD AUTO: 0.3 % — SIGNIFICANT CHANGE UP (ref 0.1–0.3)
LYMPHOCYTES # BLD AUTO: 0.96 K/UL — LOW (ref 1.2–3.4)
LYMPHOCYTES # BLD AUTO: 12.4 % — LOW (ref 20.5–51.1)
MCHC RBC-ENTMCNC: 30.4 PG — SIGNIFICANT CHANGE UP (ref 27–31)
MCHC RBC-ENTMCNC: 34.6 G/DL — SIGNIFICANT CHANGE UP (ref 32–37)
MCV RBC AUTO: 87.8 FL — SIGNIFICANT CHANGE UP (ref 80–94)
MONOCYTES # BLD AUTO: 0.75 K/UL — HIGH (ref 0.1–0.6)
MONOCYTES NFR BLD AUTO: 9.7 % — HIGH (ref 1.7–9.3)
NEUTROPHILS # BLD AUTO: 5.99 K/UL — SIGNIFICANT CHANGE UP (ref 1.4–6.5)
NEUTROPHILS NFR BLD AUTO: 77.2 % — HIGH (ref 42.2–75.2)
NRBC # BLD: 0 /100 WBCS — SIGNIFICANT CHANGE UP (ref 0–0)
NT-PROBNP SERPL-SCNC: 4015 PG/ML — HIGH (ref 0–300)
PLATELET # BLD AUTO: 219 K/UL — SIGNIFICANT CHANGE UP (ref 130–400)
POTASSIUM SERPL-MCNC: 4 MMOL/L — SIGNIFICANT CHANGE UP (ref 3.5–5)
POTASSIUM SERPL-MCNC: 4 MMOL/L — SIGNIFICANT CHANGE UP (ref 3.5–5)
POTASSIUM SERPL-SCNC: 4 MMOL/L — SIGNIFICANT CHANGE UP (ref 3.5–5)
POTASSIUM SERPL-SCNC: 4 MMOL/L — SIGNIFICANT CHANGE UP (ref 3.5–5)
PROT SERPL-MCNC: 5.9 G/DL — LOW (ref 6–8)
PROT SERPL-MCNC: 6.1 G/DL — SIGNIFICANT CHANGE UP (ref 6–8)
RBC # BLD: 3.19 M/UL — LOW (ref 4.7–6.1)
RBC # FLD: 12.4 % — SIGNIFICANT CHANGE UP (ref 11.5–14.5)
SARS-COV-2 RNA SPEC QL NAA+PROBE: SIGNIFICANT CHANGE UP
SODIUM SERPL-SCNC: 116 MMOL/L — CRITICAL LOW (ref 135–146)
SODIUM SERPL-SCNC: 118 MMOL/L — CRITICAL LOW (ref 135–146)
TROPONIN T SERPL-MCNC: <0.01 NG/ML — SIGNIFICANT CHANGE UP
WBC # BLD: 7.75 K/UL — SIGNIFICANT CHANGE UP (ref 4.8–10.8)
WBC # FLD AUTO: 7.75 K/UL — SIGNIFICANT CHANGE UP (ref 4.8–10.8)

## 2021-06-24 PROCEDURE — 71045 X-RAY EXAM CHEST 1 VIEW: CPT | Mod: 26

## 2021-06-24 PROCEDURE — 93010 ELECTROCARDIOGRAM REPORT: CPT

## 2021-06-24 PROCEDURE — 99285 EMERGENCY DEPT VISIT HI MDM: CPT

## 2021-06-24 RX ORDER — LEVOTHYROXINE SODIUM 125 MCG
75 TABLET ORAL DAILY
Refills: 0 | Status: DISCONTINUED | OUTPATIENT
Start: 2021-06-24 | End: 2021-07-05

## 2021-06-24 RX ORDER — FUROSEMIDE 40 MG
40 TABLET ORAL EVERY 12 HOURS
Refills: 0 | Status: DISCONTINUED | OUTPATIENT
Start: 2021-06-24 | End: 2021-06-25

## 2021-06-24 RX ORDER — APIXABAN 2.5 MG/1
5 TABLET, FILM COATED ORAL EVERY 12 HOURS
Refills: 0 | Status: DISCONTINUED | OUTPATIENT
Start: 2021-06-24 | End: 2021-07-05

## 2021-06-24 RX ORDER — ATORVASTATIN CALCIUM 80 MG/1
80 TABLET, FILM COATED ORAL AT BEDTIME
Refills: 0 | Status: DISCONTINUED | OUTPATIENT
Start: 2021-06-24 | End: 2021-07-05

## 2021-06-24 RX ORDER — PANTOPRAZOLE SODIUM 20 MG/1
40 TABLET, DELAYED RELEASE ORAL
Refills: 0 | Status: DISCONTINUED | OUTPATIENT
Start: 2021-06-24 | End: 2021-07-05

## 2021-06-24 RX ORDER — FUROSEMIDE 40 MG
40 TABLET ORAL ONCE
Refills: 0 | Status: COMPLETED | OUTPATIENT
Start: 2021-06-24 | End: 2021-06-24

## 2021-06-24 RX ORDER — LEVETIRACETAM 250 MG/1
250 TABLET, FILM COATED ORAL
Refills: 0 | Status: DISCONTINUED | OUTPATIENT
Start: 2021-06-24 | End: 2021-07-05

## 2021-06-24 RX ORDER — CHLORHEXIDINE GLUCONATE 213 G/1000ML
1 SOLUTION TOPICAL
Refills: 0 | Status: DISCONTINUED | OUTPATIENT
Start: 2021-06-24 | End: 2021-07-05

## 2021-06-24 RX ORDER — SODIUM CHLORIDE 9 MG/ML
1 INJECTION INTRAMUSCULAR; INTRAVENOUS; SUBCUTANEOUS
Refills: 0 | Status: DISCONTINUED | OUTPATIENT
Start: 2021-06-24 | End: 2021-06-29

## 2021-06-24 RX ORDER — AMLODIPINE BESYLATE 2.5 MG/1
10 TABLET ORAL DAILY
Refills: 0 | Status: DISCONTINUED | OUTPATIENT
Start: 2021-06-24 | End: 2021-06-25

## 2021-06-24 RX ORDER — ASPIRIN/CALCIUM CARB/MAGNESIUM 324 MG
81 TABLET ORAL DAILY
Refills: 0 | Status: DISCONTINUED | OUTPATIENT
Start: 2021-06-24 | End: 2021-07-05

## 2021-06-24 RX ORDER — TAMSULOSIN HYDROCHLORIDE 0.4 MG/1
0.4 CAPSULE ORAL AT BEDTIME
Refills: 0 | Status: DISCONTINUED | OUTPATIENT
Start: 2021-06-24 | End: 2021-07-05

## 2021-06-24 RX ADMIN — SODIUM CHLORIDE 1 GRAM(S): 9 INJECTION INTRAMUSCULAR; INTRAVENOUS; SUBCUTANEOUS at 22:45

## 2021-06-24 RX ADMIN — Medication 40 MILLIGRAM(S): at 16:46

## 2021-06-24 RX ADMIN — ATORVASTATIN CALCIUM 80 MILLIGRAM(S): 80 TABLET, FILM COATED ORAL at 22:45

## 2021-06-24 NOTE — ED PROVIDER NOTE - OBJECTIVE STATEMENT
Pt is a 89 year old male with PMH Hypothyroidism, HTN, HLD, A-fib (on asa), CAD and recently diagnosed CHF presents to ED with complaints of SOB. Pt states over past few days growing increasingly SOB, worse with exertion. Family states was originally 1m prior able to walk few blocks without becoming sob, now walks to car and is winded. Pt also attests to orthopnea (3-4 pillow), denies PND. Pt denies any recent sick contacts, Vaccinated against COVID. Pt denies any fever, chills, bodyaches, chest pain, abdominal pain, NVCD

## 2021-06-24 NOTE — H&P ADULT - HISTORY OF PRESENT ILLNESS
This is an 89 year old M patient with past medical history of who presented to the ED for Shortness of breath. Patient was doing relatively well until 4 says prior to admission when he started experiencing shortness of breath on exertion. According to the patient he used to walk the dogs and work in the garden with no problems until the Monday prior to admission when he started noticing that he is slowing down and get more short of breath and tired with exertion. The dyspnea got progressively worse and he noticed some wheezing with exertion. He mentioned that this dyspnea was associated with cough that is mostly dry but sometimes bring up clear sputum. Patient denied having any chest pain, orthopnea or PNDs. Palpitations or any other symptoms.     In the ED Vital Signs Last 24 Hrs  T(C): 36.4 (24 Jun 2021 19:28), Max: 36.6 (24 Jun 2021 16:19)  T(F): 97.6 (24 Jun 2021 19:28), Max: 97.9 (24 Jun 2021 16:19)  HR: 60 (24 Jun 2021 21:00) (60 - 65)  BP: 167/73 (24 Jun 2021 21:00) (159/72 - 190/84)  BP(mean): 119 (24 Jun 2021 21:00) (119 - 119)  RR: 18 (24 Jun 2021 19:28) (18 - 20)  SpO2: 99% (24 Jun 2021 21:00) (94% - 99%)

## 2021-06-24 NOTE — H&P ADULT - ASSESSMENT
IMPRESSION:  Acute decompensated heart failure   Hyponatremia   Shortness of breath   LE edema bilaterally   Afib on Eliquis   CAD  HTN  DLD  BPH    PLAN:    CNS: Avoid sedation     HEENT:  Oral care    PULMONARY:  HOB @ 45 degrees, Keep Spo2> 92%    CARDIOVASCULAR:   1-Accurate Is and Os  2-Daily weight, Fluid restriction   3-Follow up TTE  4-Start Lasix 40 mg IV BID   5-BiPAP as needed  6-Continue with ASA       GI: GI prophylaxis: Protonix  Feeding: DASH, fluid restriction     RENAL:  F/u  lytes.  BMP q6 hours, avoid overcorrection of Na     INFECTIOUS DISEASE: no indication for Abx     HEMATOLOGICAL:  DVT prophylaxis: Lovenox     ENDOCRINE:  Follow up FS.  Insulin protocol if needed.    MUSCULOSKELETAL: Bed rest     CODE STATUS: FULL CODE    DISPOSITION: MICU

## 2021-06-24 NOTE — ED PROVIDER NOTE - PROGRESS NOTE DETAILS
ATTENDING NOTE:   89 y/o M with PMH of hypothyroid, HTN, HLD, Afib on Eliquis, and TIA here for SOB x few days associated with LE leg swelling and orthopnea. Pt reports yesterday he saw his PMD and was told to take Lasix but notes he still feeling SOB with minimal exertion  Pt in NAD. (+)b/l crackles otherwise CTAB.  S1S2. (+) b/l LE edema. No focal neuro deficits.   Suspected new onset CHF, labs, XR, EKG. as per CCU, not candidate for CCU. as per ICU admit to ICU for hyponatremia “wrap-up note: Pt presented with orthopnea, lower extremity swelling, SOB. Pt found to have heart failure however sodium of 116. Case discussed with ICU and CCU. Admitted to ICU. Lasix given.”

## 2021-06-24 NOTE — ED ADULT NURSE NOTE - PMH
Atrial fibrillation    Coronary artery disease    Hyperlipidemia    Hypertension    Hypothyroidism

## 2021-06-24 NOTE — H&P ADULT - NSHPPHYSICALEXAM_GEN_ALL_CORE
General: NAD  Head and Neck: NC, AT, JVD  Heart: S1, S2 appreciated, no added sounds  Lungs: Bilateral decreased breath sounds, diffuse wheezing, no crackles   Abdomen: Soft, nondistended, nontender  LE: bilateral pitting edema   Neuro: nonfocal

## 2021-06-24 NOTE — H&P ADULT - NSHPLABSRESULTS_GEN_ALL_CORE
9.7    7.75  )-----------( 219      ( 24 Jun 2021 13:24 )             28.0       06-24    118<LL>  |  88<L>  |  14  ----------------------------<  113<H>  4.0   |  21  |  1.1    Ca    9.1      24 Jun 2021 16:01    TPro  5.9<L>  /  Alb  3.8  /  TBili  0.6  /  DBili  x   /  AST  16  /  ALT  12  /  AlkPhos  63  06-24                      Lactate Trend      CARDIAC MARKERS ( 24 Jun 2021 13:24 )  x     / <0.01 ng/mL / x     / x     / x

## 2021-06-24 NOTE — ED PROVIDER NOTE - PHYSICAL EXAMINATION
Physical Exam    Vital Signs: I have reviewed the initial vital signs.  Constitutional: well-nourished, appears stated age, no acute distress  Eyes: Conjunctiva pink, Sclera clear, PERRLA, EOMI.  Cardiovascular: S1 and S2, regular rate, irregular rhythm, well-perfused extremities, radial pulses equal and 2+  Respiratory: labored respiratory effort with tachypnea, rales at bases bilat, no wheezing or rhonchi noted.   Gastrointestinal: soft, non-tender abdomen, no pulsatile mass, normal bowl sounds  Musculoskeletal: supple neck, no lower extremity edema, no midline tenderness  Integumentary: warm, dry, no rash  Neurologic: awake, alert, cranial nerves II-XII grossly intact, extremities’ motor and sensory functions grossly intact  Psychiatric: appropriate mood, appropriate affect

## 2021-06-24 NOTE — ED ADULT NURSE NOTE - OBJECTIVE STATEMENT
patient presents to the ed with complaints of SOB for few days, pt started on po lasix yesterday, took one dose, SOB getting worse

## 2021-06-25 LAB
ALBUMIN SERPL ELPH-MCNC: 3.5 G/DL — SIGNIFICANT CHANGE UP (ref 3.5–5.2)
ALP SERPL-CCNC: 61 U/L — SIGNIFICANT CHANGE UP (ref 30–115)
ALT FLD-CCNC: 12 U/L — SIGNIFICANT CHANGE UP (ref 0–41)
ANION GAP SERPL CALC-SCNC: 10 MMOL/L — SIGNIFICANT CHANGE UP (ref 7–14)
AST SERPL-CCNC: 18 U/L — SIGNIFICANT CHANGE UP (ref 0–41)
BILIRUB SERPL-MCNC: 0.6 MG/DL — SIGNIFICANT CHANGE UP (ref 0.2–1.2)
BUN SERPL-MCNC: 13 MG/DL — SIGNIFICANT CHANGE UP (ref 10–20)
BUN SERPL-MCNC: 14 MG/DL — SIGNIFICANT CHANGE UP (ref 10–20)
BUN SERPL-MCNC: 15 MG/DL — SIGNIFICANT CHANGE UP (ref 10–20)
BUN SERPL-MCNC: 15 MG/DL — SIGNIFICANT CHANGE UP (ref 10–20)
CALCIUM SERPL-MCNC: 8.3 MG/DL — LOW (ref 8.5–10.1)
CALCIUM SERPL-MCNC: 8.3 MG/DL — LOW (ref 8.5–10.1)
CALCIUM SERPL-MCNC: 8.5 MG/DL — SIGNIFICANT CHANGE UP (ref 8.5–10.1)
CALCIUM SERPL-MCNC: 8.6 MG/DL — SIGNIFICANT CHANGE UP (ref 8.5–10.1)
CHLORIDE SERPL-SCNC: 87 MMOL/L — LOW (ref 98–110)
CHLORIDE SERPL-SCNC: 88 MMOL/L — LOW (ref 98–110)
CHLORIDE SERPL-SCNC: 88 MMOL/L — LOW (ref 98–110)
CHLORIDE SERPL-SCNC: 89 MMOL/L — LOW (ref 98–110)
CO2 SERPL-SCNC: 22 MMOL/L — SIGNIFICANT CHANGE UP (ref 17–32)
CO2 SERPL-SCNC: 22 MMOL/L — SIGNIFICANT CHANGE UP (ref 17–32)
CO2 SERPL-SCNC: 23 MMOL/L — SIGNIFICANT CHANGE UP (ref 17–32)
CO2 SERPL-SCNC: 24 MMOL/L — SIGNIFICANT CHANGE UP (ref 17–32)
COVID-19 SPIKE DOMAIN AB INTERP: POSITIVE
COVID-19 SPIKE DOMAIN ANTIBODY RESULT: >250 U/ML — HIGH
CREAT SERPL-MCNC: 1 MG/DL — SIGNIFICANT CHANGE UP (ref 0.7–1.5)
CREAT SERPL-MCNC: 1.1 MG/DL — SIGNIFICANT CHANGE UP (ref 0.7–1.5)
GLUCOSE SERPL-MCNC: 104 MG/DL — HIGH (ref 70–99)
GLUCOSE SERPL-MCNC: 86 MG/DL — SIGNIFICANT CHANGE UP (ref 70–99)
GLUCOSE SERPL-MCNC: 90 MG/DL — SIGNIFICANT CHANGE UP (ref 70–99)
GLUCOSE SERPL-MCNC: 91 MG/DL — SIGNIFICANT CHANGE UP (ref 70–99)
HCT VFR BLD CALC: 27.7 % — LOW (ref 42–52)
HGB BLD-MCNC: 9.4 G/DL — LOW (ref 14–18)
MCHC RBC-ENTMCNC: 30.1 PG — SIGNIFICANT CHANGE UP (ref 27–31)
MCHC RBC-ENTMCNC: 33.9 G/DL — SIGNIFICANT CHANGE UP (ref 32–37)
MCV RBC AUTO: 88.8 FL — SIGNIFICANT CHANGE UP (ref 80–94)
NRBC # BLD: 0 /100 WBCS — SIGNIFICANT CHANGE UP (ref 0–0)
OSMOLALITY SERPL: 250 MOS/KG — LOW (ref 280–301)
PLATELET # BLD AUTO: 202 K/UL — SIGNIFICANT CHANGE UP (ref 130–400)
POTASSIUM SERPL-MCNC: 3.1 MMOL/L — LOW (ref 3.5–5)
POTASSIUM SERPL-MCNC: 3.2 MMOL/L — LOW (ref 3.5–5)
POTASSIUM SERPL-MCNC: 4 MMOL/L — SIGNIFICANT CHANGE UP (ref 3.5–5)
POTASSIUM SERPL-MCNC: 4.1 MMOL/L — SIGNIFICANT CHANGE UP (ref 3.5–5)
POTASSIUM SERPL-SCNC: 3.1 MMOL/L — LOW (ref 3.5–5)
POTASSIUM SERPL-SCNC: 3.2 MMOL/L — LOW (ref 3.5–5)
POTASSIUM SERPL-SCNC: 4 MMOL/L — SIGNIFICANT CHANGE UP (ref 3.5–5)
POTASSIUM SERPL-SCNC: 4.1 MMOL/L — SIGNIFICANT CHANGE UP (ref 3.5–5)
PROT SERPL-MCNC: 5.4 G/DL — LOW (ref 6–8)
RBC # BLD: 3.12 M/UL — LOW (ref 4.7–6.1)
RBC # FLD: 12.2 % — SIGNIFICANT CHANGE UP (ref 11.5–14.5)
SARS-COV-2 IGG+IGM SERPL QL IA: >250 U/ML — HIGH
SARS-COV-2 IGG+IGM SERPL QL IA: POSITIVE
SODIUM SERPL-SCNC: 120 MMOL/L — LOW (ref 135–146)
SODIUM SERPL-SCNC: 121 MMOL/L — LOW (ref 135–146)
WBC # BLD: 7.15 K/UL — SIGNIFICANT CHANGE UP (ref 4.8–10.8)
WBC # FLD AUTO: 7.15 K/UL — SIGNIFICANT CHANGE UP (ref 4.8–10.8)

## 2021-06-25 PROCEDURE — 71045 X-RAY EXAM CHEST 1 VIEW: CPT | Mod: 26

## 2021-06-25 PROCEDURE — 93306 TTE W/DOPPLER COMPLETE: CPT | Mod: 26

## 2021-06-25 PROCEDURE — 99222 1ST HOSP IP/OBS MODERATE 55: CPT

## 2021-06-25 PROCEDURE — 99223 1ST HOSP IP/OBS HIGH 75: CPT

## 2021-06-25 RX ORDER — POTASSIUM CHLORIDE 20 MEQ
40 PACKET (EA) ORAL ONCE
Refills: 0 | Status: DISCONTINUED | OUTPATIENT
Start: 2021-06-25 | End: 2021-06-25

## 2021-06-25 RX ORDER — POTASSIUM CHLORIDE 20 MEQ
20 PACKET (EA) ORAL ONCE
Refills: 0 | Status: COMPLETED | OUTPATIENT
Start: 2021-06-25 | End: 2021-06-25

## 2021-06-25 RX ORDER — POTASSIUM CHLORIDE 20 MEQ
40 PACKET (EA) ORAL EVERY 4 HOURS
Refills: 0 | Status: COMPLETED | OUTPATIENT
Start: 2021-06-25 | End: 2021-06-25

## 2021-06-25 RX ORDER — FUROSEMIDE 40 MG
40 TABLET ORAL DAILY
Refills: 0 | Status: DISCONTINUED | OUTPATIENT
Start: 2021-06-26 | End: 2021-06-26

## 2021-06-25 RX ORDER — POTASSIUM CHLORIDE 20 MEQ
40 PACKET (EA) ORAL EVERY 4 HOURS
Refills: 0 | Status: DISCONTINUED | OUTPATIENT
Start: 2021-06-25 | End: 2021-06-25

## 2021-06-25 RX ORDER — LISINOPRIL 2.5 MG/1
5 TABLET ORAL DAILY
Refills: 0 | Status: DISCONTINUED | OUTPATIENT
Start: 2021-06-25 | End: 2021-07-05

## 2021-06-25 RX ADMIN — APIXABAN 5 MILLIGRAM(S): 2.5 TABLET, FILM COATED ORAL at 05:26

## 2021-06-25 RX ADMIN — ATORVASTATIN CALCIUM 80 MILLIGRAM(S): 80 TABLET, FILM COATED ORAL at 21:48

## 2021-06-25 RX ADMIN — LEVETIRACETAM 250 MILLIGRAM(S): 250 TABLET, FILM COATED ORAL at 17:52

## 2021-06-25 RX ADMIN — APIXABAN 5 MILLIGRAM(S): 2.5 TABLET, FILM COATED ORAL at 17:52

## 2021-06-25 RX ADMIN — Medication 40 MILLIEQUIVALENT(S): at 08:37

## 2021-06-25 RX ADMIN — PANTOPRAZOLE SODIUM 40 MILLIGRAM(S): 20 TABLET, DELAYED RELEASE ORAL at 06:07

## 2021-06-25 RX ADMIN — Medication 75 MICROGRAM(S): at 05:21

## 2021-06-25 RX ADMIN — Medication 50 MILLIEQUIVALENT(S): at 08:37

## 2021-06-25 RX ADMIN — Medication 40 MILLIGRAM(S): at 05:22

## 2021-06-25 RX ADMIN — LEVETIRACETAM 250 MILLIGRAM(S): 250 TABLET, FILM COATED ORAL at 05:21

## 2021-06-25 RX ADMIN — Medication 40 MILLIEQUIVALENT(S): at 10:25

## 2021-06-25 RX ADMIN — LISINOPRIL 5 MILLIGRAM(S): 2.5 TABLET ORAL at 11:33

## 2021-06-25 RX ADMIN — TAMSULOSIN HYDROCHLORIDE 0.4 MILLIGRAM(S): 0.4 CAPSULE ORAL at 21:48

## 2021-06-25 RX ADMIN — Medication 81 MILLIGRAM(S): at 11:33

## 2021-06-25 RX ADMIN — AMLODIPINE BESYLATE 10 MILLIGRAM(S): 2.5 TABLET ORAL at 05:56

## 2021-06-25 RX ADMIN — CHLORHEXIDINE GLUCONATE 1 APPLICATION(S): 213 SOLUTION TOPICAL at 05:31

## 2021-06-25 RX ADMIN — SODIUM CHLORIDE 1 GRAM(S): 9 INJECTION INTRAMUSCULAR; INTRAVENOUS; SUBCUTANEOUS at 17:52

## 2021-06-25 RX ADMIN — SODIUM CHLORIDE 1 GRAM(S): 9 INJECTION INTRAMUSCULAR; INTRAVENOUS; SUBCUTANEOUS at 05:22

## 2021-06-25 NOTE — PROGRESS NOTE ADULT - SUBJECTIVE AND OBJECTIVE BOX
Hospital Day:  1d    Subjective:    Patient is a 89y old  Male who presents with a chief complaint of Shortness of breath (24 Jun 2021 21:43)    Overnight Events:        MICU Course:    Pt presented to the MICU short of breath.    EKG, Trops, TTE,   Pt was treated for acute decompensated HFrEF    HPI:    This is an 89 year old M patient with past medical history of who presented to the ED for Shortness of breath. Patient was doing relatively well until 4 says prior to admission when he started experiencing shortness of breath on exertion. According to the patient he used to walk the dogs and work in the garden with no problems until the Monday prior to admission when he started noticing that he is slowing down and get more short of breath and tired with exertion. The dyspnea got progressively worse and he noticed some wheezing with exertion. He mentioned that this dyspnea was associated with cough that is mostly dry but sometimes bring up clear sputum. Patient denied having any chest pain, orthopnea or PNDs. Palpitations or any other symptoms.     Past Medical Hx:   Coronary artery disease    Atrial fibrillation    Hyperlipidemia    Hypertension    Hypothyroidism      Past Sx:  H/O aortic valve replacement    History of loop recorder    Status post appendectomy      Allergies:  No Known Allergies    Current Meds:   Standng Meds:  amLODIPine   Tablet 10 milliGRAM(s) Oral daily  apixaban 5 milliGRAM(s) Oral every 12 hours  aspirin enteric coated 81 milliGRAM(s) Oral daily  atorvastatin 80 milliGRAM(s) Oral at bedtime  chlorhexidine 4% Liquid 1 Application(s) Topical <User Schedule>  furosemide   Injectable 40 milliGRAM(s) IV Push every 12 hours  levETIRAcetam 250 milliGRAM(s) Oral two times a day  levothyroxine 75 MICROGram(s) Oral daily  pantoprazole    Tablet 40 milliGRAM(s) Oral before breakfast  sodium chloride 1 Gram(s) Oral two times a day  tamsulosin 0.4 milliGRAM(s) Oral at bedtime    PRN Meds:    HOME MEDICATIONS:  aspirin 81 mg oral tablet: 1 tab(s) orally once a day  atorvastatin 80 mg oral tablet: 1 tab(s) orally once a day  Eliquis 5 mg oral tablet: 1 tab(s) orally 2 times a day  ferrous sulfate 75 mg (15 mg elemental iron) oral tablet: 1 tab(s) orally once a day  folic acid 1 mg oral tablet: 1 tab(s) orally once a day (at bedtime)  levothyroxine 75 mcg (0.075 mg) oral tablet: 1 tab(s) orally once a day  magnesium oxide 400 mg (241.3 mg elemental magnesium) oral tablet: 1 tab(s) orally once a day  NexIUM 20 mg oral delayed release capsule: 1 cap(s) orally once a day  Sodium Chloride 1 g oral tablet: 1 tab(s) orally 2 times a day  tamsulosin 0.4 mg oral capsule: 1 cap(s) orally once a day  thiamine 100 mg oral tablet: 1 tab(s) orally once a day  Vitamin D3 1000 intl units (25 mcg) oral tablet: 1 tab(s) orally once a day      Vital Signs:   T(F): 97.6 (06-24-21 @ 19:28), Max: 97.9 (06-24-21 @ 16:19)  HR: 54 (06-25-21 @ 02:00) (52 - 65)  BP: 160/69 (06-25-21 @ 02:00) (148/64 - 190/84)  RR: 18 (06-24-21 @ 19:28) (18 - 20)  SpO2: 95% (06-25-21 @ 02:00) (94% - 99%)        Physical Exam:   GENERAL:   HEENT:   CHEST/LUNG:   HEART:  ABDOMEN:  EXTREMITIES:  SKIN: no rashes, no new lesions  NERVOUS SYSTEM:  Alert & Oriented X3  LINES/CATHETERS:        Labs:                         9.7    7.75  )-----------( 219      ( 24 Jun 2021 13:24 )             28.0     Neutophil% 77.2, Lymphocyte% 12.4, Monocyte% 9.7, Bands% 0.3 06-24-21 @ 13:24    25 Jun 2021 00:43    121    |  87     |  15     ----------------------------<  91     3.2     |  24     |  1.1      Ca    8.6        25 Jun 2021 00:43    TPro  5.9    /  Alb  3.8    /  TBili  0.6    /  DBili  x      /  AST  16     /  ALT  12     /  AlkPhos  63     24 Jun 2021 16:01            Serum Pro-Brain Natriuretic Peptide: 4015 pg/mL (06-24-21 @ 13:24)    Troponin <0.01, CKMB --, CK -- 06-24-21 @ 13:24                    Assessment and Plan:    Hospital Day:  1d    Subjective:    Patient is a 89y old  Male who presents with a chief complaint of Shortness of breath (24 Jun 2021 21:43)    Overnight Events:    No overnight events, pt resting comfortably in bed w/ audible wheezing. Pt denies chest pain, SOB, dyspnia.     MICU Course:    Pt presented to the MICU short of breath.    Pt was treated for acute decompensated HFrEF    HPI:    This is an 89 year old M patient with pmHx of AFib, HLD, HTN who presented to the ED for Shortness of breath. Patient was doing relatively well until 4 says prior to admission when he started experiencing shortness of breath on exertion. According to the patient he used to walk the dogs and work in the garden with no problems until the Monday prior to admission when he started noticing that he is slowing down and get more short of breath and tired with exertion. The dyspnea got progressively worse and he noticed some wheezing with exertion. He mentioned that this dyspnea was associated with cough that is mostly dry but sometimes bring up clear sputum. Patient denied having any chest pain, orthopnea or PNDs. Palpitations or any other symptoms.     Past Medical Hx:   Coronary artery disease    Atrial fibrillation    Hyperlipidemia    Hypertension    Hypothyroidism      Past Sx:  H/O aortic valve replacement    History of loop recorder    Status post appendectomy      Allergies:  No Known Allergies    Current Meds:   Standng Meds:  amLODIPine   Tablet 10 milliGRAM(s) Oral daily  apixaban 5 milliGRAM(s) Oral every 12 hours  aspirin enteric coated 81 milliGRAM(s) Oral daily  atorvastatin 80 milliGRAM(s) Oral at bedtime  chlorhexidine 4% Liquid 1 Application(s) Topical <User Schedule>  furosemide   Injectable 40 milliGRAM(s) IV Push every 12 hours  levETIRAcetam 250 milliGRAM(s) Oral two times a day  levothyroxine 75 MICROGram(s) Oral daily  pantoprazole    Tablet 40 milliGRAM(s) Oral before breakfast  sodium chloride 1 Gram(s) Oral two times a day  tamsulosin 0.4 milliGRAM(s) Oral at bedtime    PRN Meds:    HOME MEDICATIONS:  aspirin 81 mg oral tablet: 1 tab(s) orally once a day  atorvastatin 80 mg oral tablet: 1 tab(s) orally once a day  Eliquis 5 mg oral tablet: 1 tab(s) orally 2 times a day  ferrous sulfate 75 mg (15 mg elemental iron) oral tablet: 1 tab(s) orally once a day  folic acid 1 mg oral tablet: 1 tab(s) orally once a day (at bedtime)  levothyroxine 75 mcg (0.075 mg) oral tablet: 1 tab(s) orally once a day  magnesium oxide 400 mg (241.3 mg elemental magnesium) oral tablet: 1 tab(s) orally once a day  NexIUM 20 mg oral delayed release capsule: 1 cap(s) orally once a day  Sodium Chloride 1 g oral tablet: 1 tab(s) orally 2 times a day  tamsulosin 0.4 mg oral capsule: 1 cap(s) orally once a day  thiamine 100 mg oral tablet: 1 tab(s) orally once a day  Vitamin D3 1000 intl units (25 mcg) oral tablet: 1 tab(s) orally once a day      Vital Signs:   T(F): 97.6 (06-24-21 @ 19:28), Max: 97.9 (06-24-21 @ 16:19)  HR: 54 (06-25-21 @ 02:00) (52 - 65)  BP: 160/69 (06-25-21 @ 02:00) (148/64 - 190/84)  RR: 18 (06-24-21 @ 19:28) (18 - 20)  SpO2: 95% (06-25-21 @ 02:00) (94% - 99%)        Physical Exam:   GENERAL:   HEENT:   CHEST/LUNG:   HEART:  ABDOMEN:  EXTREMITIES:  SKIN: no rashes, no new lesions  NERVOUS SYSTEM:  Alert & Oriented X3  LINES/CATHETERS:        Labs:                         9.7    7.75  )-----------( 219      ( 24 Jun 2021 13:24 )             28.0     Neutophil% 77.2, Lymphocyte% 12.4, Monocyte% 9.7, Bands% 0.3 06-24-21 @ 13:24    25 Jun 2021 00:43    121    |  87     |  15     ----------------------------<  91     3.2     |  24     |  1.1      Ca    8.6        25 Jun 2021 00:43    TPro  5.9    /  Alb  3.8    /  TBili  0.6    /  DBili  x      /  AST  16     /  ALT  12     /  AlkPhos  63     24 Jun 2021 16:01            Serum Pro-Brain Natriuretic Peptide: 4015 pg/mL (06-24-21 @ 13:24)    Troponin <0.01, CKMB --, CK -- 06-24-21 @ 13:24   Hospital Day:  1d    Subjective:    Patient is a 89y old  Male who presents with a chief complaint of Shortness of breath (24 Jun 2021 21:43)    Overnight Events:    No overnight events, pt resting comfortably in bed w/ audible wheezing. Pt denies chest pain, SOB, dyspnia.     MICU Course:    Pt presented to the MICU short of breath.    Pt was treated for acute decompensated HFrEF    HPI:    This is an 89 year old M patient with pmHx of AFib, HLD, HTN who presented to the ED for Shortness of breath. Patient was doing relatively well until 4 says prior to admission when he started experiencing shortness of breath on exertion. According to the patient he used to walk the dogs and work in the garden with no problems until the Monday prior to admission when he started noticing that he is slowing down and get more short of breath and tired with exertion. The dyspnea got progressively worse and he noticed some wheezing with exertion. He mentioned that this dyspnea was associated with cough that is mostly dry but sometimes bring up clear sputum. Patient denied having any chest pain, orthopnea or PNDs. Palpitations or any other symptoms.     Past Medical Hx:   Coronary artery disease    Atrial fibrillation    Hyperlipidemia    Hypertension    Hypothyroidism      Past Sx:  H/O aortic valve replacement    History of loop recorder    Status post appendectomy      Allergies:  No Known Allergies    Current Meds:   Standng Meds:  amLODIPine   Tablet 10 milliGRAM(s) Oral daily  apixaban 5 milliGRAM(s) Oral every 12 hours  aspirin enteric coated 81 milliGRAM(s) Oral daily  atorvastatin 80 milliGRAM(s) Oral at bedtime  chlorhexidine 4% Liquid 1 Application(s) Topical <User Schedule>  furosemide   Injectable 40 milliGRAM(s) IV Push every 12 hours  levETIRAcetam 250 milliGRAM(s) Oral two times a day  levothyroxine 75 MICROGram(s) Oral daily  pantoprazole    Tablet 40 milliGRAM(s) Oral before breakfast  sodium chloride 1 Gram(s) Oral two times a day  tamsulosin 0.4 milliGRAM(s) Oral at bedtime    PRN Meds:    HOME MEDICATIONS:  aspirin 81 mg oral tablet: 1 tab(s) orally once a day  atorvastatin 80 mg oral tablet: 1 tab(s) orally once a day  Eliquis 5 mg oral tablet: 1 tab(s) orally 2 times a day  ferrous sulfate 75 mg (15 mg elemental iron) oral tablet: 1 tab(s) orally once a day  folic acid 1 mg oral tablet: 1 tab(s) orally once a day (at bedtime)  levothyroxine 75 mcg (0.075 mg) oral tablet: 1 tab(s) orally once a day  magnesium oxide 400 mg (241.3 mg elemental magnesium) oral tablet: 1 tab(s) orally once a day  NexIUM 20 mg oral delayed release capsule: 1 cap(s) orally once a day  Sodium Chloride 1 g oral tablet: 1 tab(s) orally 2 times a day  tamsulosin 0.4 mg oral capsule: 1 cap(s) orally once a day  thiamine 100 mg oral tablet: 1 tab(s) orally once a day  Vitamin D3 1000 intl units (25 mcg) oral tablet: 1 tab(s) orally once a day      Vital Signs:   T(F): 97.6 (06-24-21 @ 19:28), Max: 97.9 (06-24-21 @ 16:19)  HR: 54 (06-25-21 @ 02:00) (52 - 65)  BP: 160/69 (06-25-21 @ 02:00) (148/64 - 190/84)  RR: 18 (06-24-21 @ 19:28) (18 - 20)  SpO2: 95% (06-25-21 @ 02:00) (94% - 99%)        Physical Exam:   GENERAL: NAD  HEENT: NCAT  CHEST/LUNG: Mild wheezing   HEART: IRR, blowing murmur   ABDOMEN: soft non tender  EXTREMITIES: pitting edema bilat, Sternectomy scar  SKIN: no rashes, no new lesions  NERVOUS SYSTEM:  Alert & Oriented X3  LINES/CATHETERS: peripheral         Labs:                         9.7    7.75  )-----------( 219      ( 24 Jun 2021 13:24 )             28.0     Neutophil% 77.2, Lymphocyte% 12.4, Monocyte% 9.7, Bands% 0.3 06-24-21 @ 13:24    25 Jun 2021 00:43    121    |  87     |  15     ----------------------------<  91     3.2     |  24     |  1.1      Ca    8.6        25 Jun 2021 00:43    TPro  5.9    /  Alb  3.8    /  TBili  0.6    /  DBili  x      /  AST  16     /  ALT  12     /  AlkPhos  63     24 Jun 2021 16:01            Serum Pro-Brain Natriuretic Peptide: 4015 pg/mL (06-24-21 @ 13:24)    Troponin <0.01, CKMB --, CK -- 06-24-21 @ 13:24

## 2021-06-25 NOTE — CONSULT NOTE ADULT - ASSESSMENT
Cards; Dr Mcmullen    89y Male with h/p HTN, HLD, hypothyroid, AVR, CKD, PAF on Eliquis, SB at baseline, not on any AVN blockers , admitted with SOB and edema, found to be in acute on chronic HF exacerbation  Noted to be bradycardic to 40s bpm, AF with slow VR.    acute on chronic HF exacerbation  AF with slow VR  bradycardia  hyponatremia, chronic     con't tele  diurese aggressively   con't Eliquis, clarify if no doses skipped  will consider DCCV once euvolemic  not a candidate for STEPHY due to esophageal stricture  consider cardiac CTA to evaluate for clots  Maintain electrolytes K>4.0 Mg >2.0  check TSH and free T4  may need PPM prior to discharge

## 2021-06-25 NOTE — PROGRESS NOTE ADULT - ASSESSMENT
Hypoxic Respiratory failure 2/2 Acute decompensated HFrEF  EKG  Trops  TTE  PLAN:  Accurate Is Os daily wt  Lasix 40 IV bid  Keep Spo2> 92% BiPAP as needed       Hyponatremia   Na, Na on admit  PLAN:  BMP Q6, replete       Afib on Eliquis     CAD  ASA    HTN    DLD    BPH    #DVT ppx - lovenox  #GI ppx protonix  #Diet - DASH, fluid restriction  #Activity - Bed Rest    #Hand off 88 yo man w/ cardiac hx presents with ? acute decomp HF    Hypoxic Respiratory failure 2/2 Acute decompensated HFrEF  -EKG - a fib, no ischemic changes  -Trops - <0.01  -TTE - pending (EF 60-65% 6/8/2020)  -Fe - pending  PLAN:  -Accurate Is Os, daily wt  -Lasix 40 IV bid  -Keep Spo2> 92% BiPAP as needed     #Hypervolemic Hyponatremia   -Na 121 6/25, Na 116 on admit   -TSH, U Osm, S Osm, U 'lytes  PLAN:  -BMP q 12  -Restrict free water  -I<O    #Afib on Eliquis - stable  -continue home meds    #CAD - stable  EKG - no ischemic changes  -Continue home meds    #HTN - stable  - /91  -continue home meds    #DLD - stable  - continue home meds    #BPH  - continue home meds    #DVT ppx - lovenox  #GI ppx protonix  #Diet - DASH, fluid restriction  #Activity - Bed Rest    #Hand off 88 yo man w/ cardiac hx presents with ? acute decomp HF    Hypoxic Respiratory failure 2/2 Acute decompensated HFrEF  -EKG - a fib, no ischemic changes  -Trops - <0.01  -TTE - pending (EF 60-65% 6/8/2020)  -Fe - pending  -ABG pending  PLAN:  -Accurate Is Os, daily wt  -Lasix 40 IV qd  -Keep Spo2> 92% BiPAP as needed     #Hypervolemic Hyponatremia   -Na 121 6/25, Na 116 on admit   -TSH, U Osm, S Osm, U 'lytes  PLAN:  -BMP q 12  -Restrict free water  -I<O  -consult nephro    #? symptomatic bradycardia in the setting of Afib, on Eliquis   - pt reema <50 while awake, ? confusion/sxs    PLAN:  - consult EP  - continue eliquis    #CAD - stable  EKG - no ischemic changes  -Continue home meds  -start statin    #HTN - stable  - /91  -d/c amiodarone, start lisinopril     #DLD - stable  - continue home meds    #BPH  - continue home meds    #DVT ppx - lovenox  #GI ppx protonix  #Diet - DASH, fluid restriction  #Activity - Bed Rest   89 yo man w/ cardiac hx presents with ? acute decomp HF    Hypoxic Respiratory failure 2/2 Acute decompensated HFrEF  -EKG - a fib, no ischemic changes  -Trops - <0.01  -TTE - pending (EF 60-65% 6/8/2020)  -Fe - pending  -ABG pending  PLAN:  -Accurate Is Os, daily wt  -Lasix 40 IV qd  -Keep Spo2> 92% BiPAP as needed     #Hypervolemic Hyponatremia   -Na 121 6/25, Na 116 on admit   -TSH, U Osm, S Osm, U 'lytes  PLAN:  -BMP q 12  -Restrict free water  -I<O  -consult nephro    #? symptomatic bradycardia in the setting of Afib, on Eliquis   - pt reema <50 while awake, ? confusion/sxs    PLAN:  - consult EP  - continue eliquis    #CAD - stable  EKG - no ischemic changes  -Continue home meds  -start statin    #HTN - stable  - /91  -d/c amiodarone, start lisinopril     #DLD - stable  - continue home meds    #BPH  - continue home meds    #DVT ppx - lovenox  #GI ppx protonix  #Diet - DASH, fluid restriction  #Activity - Bed Rest    #Dispo - SDU

## 2021-06-25 NOTE — CONSULT NOTE ADULT - ATTENDING COMMENTS
IMPRESSION:  SOB 2/2 Pulmonary edema/Fluid overload  Hypervolemic Hyponatremia- Improving   H/O COPD  H/O Seizure disorder  Afib on Eliquis    Plan as outlined above

## 2021-06-25 NOTE — CONSULT NOTE ADULT - ASSESSMENT
IMPRESSION:  SOB 2/2 Pulmonary edema/Fluid overload  Hyponatremia- Improving   H/O COPD  H/O Seizure disorder  Afib on eliquis    PLAN:    CNS: Avoid CNS depressants.  c/w AEDs    HEENT: Oral care    PULMONARY:  HOB @ 45 degrees. Aspiration Precautions . CXR today. ABG to check for CO2 .     CARDIOVASCULAR: Avoid volume overload . Keep I<O. Lasix 40 IV q24. EP consult . Hold amlodipine and add lisinopril for BP meds      GI: GI prophylaxis.  Feeding as tolerated     RENAL:  Follow up lytes.  Correct as needed. Urine lytes. Nephrology consult    INFECTIOUS DISEASE: Follow up cultures. No ABX for now    HEMATOLOGICAL:  DVT prophylaxis c/w eliquis    ENDOCRINE:  Follow up FS.  Insulin protocol if needed. TSH    MUSCULOSKELETAL: OOB to chair .     Thorne: No  Lines: Peripheral IV  Code status: Full code  Disposition: SDU            IMPRESSION:  SOB 2/2 Pulmonary edema/Fluid overload  Hypervolemic Hyponatremia- Improving   H/O COPD  H/O Seizure disorder  Afib on eliquis    PLAN:    CNS: Avoid CNS depressants.  c/w AEDs    HEENT: Oral care    PULMONARY:  HOB @ 45 degrees. Aspiration Precautions . CXR today. ABG to check for CO2 .     CARDIOVASCULAR: Avoid volume overload . Keep I<O. Lasix 40 IV q24. EP consult . Hold amlodipine and add lisinopril for BP meds      GI: GI prophylaxis.  Feeding as tolerated     RENAL:  Follow up lytes.  Correct as needed. Urine lytes. Nephrology consult    INFECTIOUS DISEASE: Follow up cultures. No ABX for now    HEMATOLOGICAL:  DVT prophylaxis c/w eliquis    ENDOCRINE:  Follow up FS.  Insulin protocol if needed. TSH    MUSCULOSKELETAL: OOB to chair .     Thorne: No  Lines: Peripheral IV  Code status: Full code  Disposition: SDU

## 2021-06-25 NOTE — CHART NOTE - NSCHARTNOTEFT_GEN_A_CORE
MICU Transfer Note    Transfer from: MICU  Transfer to:  (  ) Medicine    (  ) Telemetry    (  ) RCU    (  ) Palliative    (  ) Stroke Unit    ( x ) Vent      MICU COURSE:    Pt presented to the MICU short of breath with audible wheezing, mild crackles, bilateral pitting edema, denies chest pain, n/v/d.  Trop <0.01, EKG negative for ischemic changes, BNP 4015, Na 116. Hypertensive to 174/91, in AFib.    Pt was treated for acute decompensated HFrEF - Lasix 40 BID, never required more than NC for O2 96%, free water was limited, pt home meds were restarted (ASA, AEDs, amlodipine, levothyroxine, Eliquis).     On downgrade pt had no complaints, lasix changed to 40 qd, Na repleted to 121, amlodipine changed to lisinopril.       For Follow-Up:    -f/u nephrology recs, keep I<O, limit free water    Please see most recent progress note for HPI and A&P    *Signed out to  MICU Transfer Note    Transfer from: MICU  Transfer to:  (  ) Medicine    (  ) Telemetry    (  ) RCU    (  ) Palliative    (  ) Stroke Unit    ( x ) Vent      MICU COURSE:    Pt presented to the MICU short of breath with audible wheezing, mild crackles, bilateral pitting edema, denies chest pain, n/v/d.  Trop <0.01, EKG negative for ischemic changes, BNP 4015, Na 116. Hypertensive to 174/91, in AFib.    Pt was treated for acute decompensated HFrEF - Lasix 40 BID, never required more than NC for O2 96%, free water was limited, pt home meds were restarted (ASA, AEDs, amlodipine, levothyroxine, Eliquis).     On downgrade pt had no complaints, lasix changed to 40 qd, Na repleted to 121, amlodipine changed to lisinopril.       For Follow-Up:    -f/u nephrology recs, keep I<O, limit free water    *Please see most recent progress note for HPI and A&P    Signed out to Dr. Sparks on Vent

## 2021-06-25 NOTE — CONSULT NOTE ADULT - ATTENDING COMMENTS
Heart Failure with preserved EF  Hypervolemic hyponatremia  Early Persistent AF  Bradycardia    - Diuresis with Lasix 40 mg IV q12h, Strict I O, Daily weights  - Ischemic work-up if not done recently. Cardio- Dr. Mcmullen.  - Will consider DCCV. Considering history of esophageal stricture as per family, we will avoid STEPHY. CTA to assess for RUSSELL thrombus  - We will consier DC-PPM if remains bradycardiac- can be done IP vs OP- to be decided.  - D/w family at bedside.

## 2021-06-26 LAB
ALBUMIN SERPL ELPH-MCNC: 3.2 G/DL — LOW (ref 3.5–5.2)
ALBUMIN SERPL ELPH-MCNC: 3.4 G/DL — LOW (ref 3.5–5.2)
ALP SERPL-CCNC: 62 U/L — SIGNIFICANT CHANGE UP (ref 30–115)
ALP SERPL-CCNC: 63 U/L — SIGNIFICANT CHANGE UP (ref 30–115)
ALT FLD-CCNC: 11 U/L — SIGNIFICANT CHANGE UP (ref 0–41)
ALT FLD-CCNC: 14 U/L — SIGNIFICANT CHANGE UP (ref 0–41)
ANION GAP SERPL CALC-SCNC: 10 MMOL/L — SIGNIFICANT CHANGE UP (ref 7–14)
ANION GAP SERPL CALC-SCNC: 7 MMOL/L — SIGNIFICANT CHANGE UP (ref 7–14)
APPEARANCE UR: CLEAR — SIGNIFICANT CHANGE UP
AST SERPL-CCNC: 15 U/L — SIGNIFICANT CHANGE UP (ref 0–41)
AST SERPL-CCNC: 26 U/L — SIGNIFICANT CHANGE UP (ref 0–41)
BASOPHILS # BLD AUTO: 0.04 K/UL — SIGNIFICANT CHANGE UP (ref 0–0.2)
BASOPHILS NFR BLD AUTO: 0.5 % — SIGNIFICANT CHANGE UP (ref 0–1)
BILIRUB SERPL-MCNC: 0.5 MG/DL — SIGNIFICANT CHANGE UP (ref 0.2–1.2)
BILIRUB SERPL-MCNC: 0.6 MG/DL — SIGNIFICANT CHANGE UP (ref 0.2–1.2)
BILIRUB UR-MCNC: NEGATIVE — SIGNIFICANT CHANGE UP
BUN SERPL-MCNC: 14 MG/DL — SIGNIFICANT CHANGE UP (ref 10–20)
BUN SERPL-MCNC: 15 MG/DL — SIGNIFICANT CHANGE UP (ref 10–20)
CALCIUM SERPL-MCNC: 8.3 MG/DL — LOW (ref 8.5–10.1)
CALCIUM SERPL-MCNC: 8.7 MG/DL — SIGNIFICANT CHANGE UP (ref 8.5–10.1)
CHLORIDE SERPL-SCNC: 87 MMOL/L — LOW (ref 98–110)
CHLORIDE SERPL-SCNC: 89 MMOL/L — LOW (ref 98–110)
CO2 SERPL-SCNC: 24 MMOL/L — SIGNIFICANT CHANGE UP (ref 17–32)
CO2 SERPL-SCNC: 25 MMOL/L — SIGNIFICANT CHANGE UP (ref 17–32)
COLOR SPEC: SIGNIFICANT CHANGE UP
CREAT SERPL-MCNC: 1 MG/DL — SIGNIFICANT CHANGE UP (ref 0.7–1.5)
CREAT SERPL-MCNC: 1.2 MG/DL — SIGNIFICANT CHANGE UP (ref 0.7–1.5)
DIFF PNL FLD: NEGATIVE — SIGNIFICANT CHANGE UP
EOSINOPHIL # BLD AUTO: 0.05 K/UL — SIGNIFICANT CHANGE UP (ref 0–0.7)
EOSINOPHIL NFR BLD AUTO: 0.6 % — SIGNIFICANT CHANGE UP (ref 0–8)
GLUCOSE SERPL-MCNC: 93 MG/DL — SIGNIFICANT CHANGE UP (ref 70–99)
GLUCOSE SERPL-MCNC: 98 MG/DL — SIGNIFICANT CHANGE UP (ref 70–99)
GLUCOSE UR QL: NEGATIVE — SIGNIFICANT CHANGE UP
HCT VFR BLD CALC: 29 % — LOW (ref 42–52)
HGB BLD-MCNC: 10 G/DL — LOW (ref 14–18)
IMM GRANULOCYTES NFR BLD AUTO: 0.5 % — HIGH (ref 0.1–0.3)
KETONES UR-MCNC: NEGATIVE — SIGNIFICANT CHANGE UP
LEUKOCYTE ESTERASE UR-ACNC: NEGATIVE — SIGNIFICANT CHANGE UP
LYMPHOCYTES # BLD AUTO: 1.27 K/UL — SIGNIFICANT CHANGE UP (ref 1.2–3.4)
LYMPHOCYTES # BLD AUTO: 14.4 % — LOW (ref 20.5–51.1)
MAGNESIUM SERPL-MCNC: 1.6 MG/DL — LOW (ref 1.8–2.4)
MCHC RBC-ENTMCNC: 30.9 PG — SIGNIFICANT CHANGE UP (ref 27–31)
MCHC RBC-ENTMCNC: 34.5 G/DL — SIGNIFICANT CHANGE UP (ref 32–37)
MCV RBC AUTO: 89.5 FL — SIGNIFICANT CHANGE UP (ref 80–94)
MONOCYTES # BLD AUTO: 1.26 K/UL — HIGH (ref 0.1–0.6)
MONOCYTES NFR BLD AUTO: 14.3 % — HIGH (ref 1.7–9.3)
NEUTROPHILS # BLD AUTO: 6.16 K/UL — SIGNIFICANT CHANGE UP (ref 1.4–6.5)
NEUTROPHILS NFR BLD AUTO: 69.7 % — SIGNIFICANT CHANGE UP (ref 42.2–75.2)
NITRITE UR-MCNC: NEGATIVE — SIGNIFICANT CHANGE UP
NRBC # BLD: 0 /100 WBCS — SIGNIFICANT CHANGE UP (ref 0–0)
OSMOLALITY UR: 320 MOS/KG — SIGNIFICANT CHANGE UP (ref 50–1200)
PH UR: 6 — SIGNIFICANT CHANGE UP (ref 5–8)
PLATELET # BLD AUTO: 218 K/UL — SIGNIFICANT CHANGE UP (ref 130–400)
POTASSIUM SERPL-MCNC: 4.2 MMOL/L — SIGNIFICANT CHANGE UP (ref 3.5–5)
POTASSIUM SERPL-MCNC: 4.3 MMOL/L — SIGNIFICANT CHANGE UP (ref 3.5–5)
POTASSIUM SERPL-SCNC: 4.2 MMOL/L — SIGNIFICANT CHANGE UP (ref 3.5–5)
POTASSIUM SERPL-SCNC: 4.3 MMOL/L — SIGNIFICANT CHANGE UP (ref 3.5–5)
POTASSIUM UR-SCNC: 29 MMOL/L — SIGNIFICANT CHANGE UP
PROT SERPL-MCNC: 5.3 G/DL — LOW (ref 6–8)
PROT SERPL-MCNC: 5.7 G/DL — LOW (ref 6–8)
PROT UR-MCNC: NEGATIVE — SIGNIFICANT CHANGE UP
RBC # BLD: 3.24 M/UL — LOW (ref 4.7–6.1)
RBC # FLD: 12.4 % — SIGNIFICANT CHANGE UP (ref 11.5–14.5)
SODIUM SERPL-SCNC: 121 MMOL/L — LOW (ref 135–146)
SODIUM SERPL-SCNC: 121 MMOL/L — LOW (ref 135–146)
SODIUM UR-SCNC: 69 MMOL/L — SIGNIFICANT CHANGE UP
SP GR SPEC: 1.01 — SIGNIFICANT CHANGE UP (ref 1.01–1.03)
TSH SERPL-MCNC: 3.87 UIU/ML — SIGNIFICANT CHANGE UP (ref 0.27–4.2)
UROBILINOGEN FLD QL: SIGNIFICANT CHANGE UP
WBC # BLD: 8.82 K/UL — SIGNIFICANT CHANGE UP (ref 4.8–10.8)
WBC # FLD AUTO: 8.82 K/UL — SIGNIFICANT CHANGE UP (ref 4.8–10.8)

## 2021-06-26 PROCEDURE — 99232 SBSQ HOSP IP/OBS MODERATE 35: CPT

## 2021-06-26 PROCEDURE — 71045 X-RAY EXAM CHEST 1 VIEW: CPT | Mod: 26

## 2021-06-26 RX ORDER — FUROSEMIDE 40 MG
20 TABLET ORAL
Refills: 0 | Status: DISCONTINUED | OUTPATIENT
Start: 2021-06-26 | End: 2021-06-27

## 2021-06-26 RX ORDER — MAGNESIUM SULFATE 500 MG/ML
2 VIAL (ML) INJECTION ONCE
Refills: 0 | Status: COMPLETED | OUTPATIENT
Start: 2021-06-26 | End: 2021-06-26

## 2021-06-26 RX ADMIN — CHLORHEXIDINE GLUCONATE 1 APPLICATION(S): 213 SOLUTION TOPICAL at 06:40

## 2021-06-26 RX ADMIN — TAMSULOSIN HYDROCHLORIDE 0.4 MILLIGRAM(S): 0.4 CAPSULE ORAL at 21:14

## 2021-06-26 RX ADMIN — APIXABAN 5 MILLIGRAM(S): 2.5 TABLET, FILM COATED ORAL at 17:47

## 2021-06-26 RX ADMIN — ATORVASTATIN CALCIUM 80 MILLIGRAM(S): 80 TABLET, FILM COATED ORAL at 21:14

## 2021-06-26 RX ADMIN — Medication 40 MILLIGRAM(S): at 06:39

## 2021-06-26 RX ADMIN — SODIUM CHLORIDE 1 GRAM(S): 9 INJECTION INTRAMUSCULAR; INTRAVENOUS; SUBCUTANEOUS at 06:39

## 2021-06-26 RX ADMIN — LEVETIRACETAM 250 MILLIGRAM(S): 250 TABLET, FILM COATED ORAL at 17:47

## 2021-06-26 RX ADMIN — Medication 75 MICROGRAM(S): at 06:39

## 2021-06-26 RX ADMIN — LISINOPRIL 5 MILLIGRAM(S): 2.5 TABLET ORAL at 06:39

## 2021-06-26 RX ADMIN — Medication 20 MILLIGRAM(S): at 17:47

## 2021-06-26 RX ADMIN — PANTOPRAZOLE SODIUM 40 MILLIGRAM(S): 20 TABLET, DELAYED RELEASE ORAL at 09:03

## 2021-06-26 RX ADMIN — APIXABAN 5 MILLIGRAM(S): 2.5 TABLET, FILM COATED ORAL at 06:39

## 2021-06-26 RX ADMIN — LEVETIRACETAM 250 MILLIGRAM(S): 250 TABLET, FILM COATED ORAL at 06:39

## 2021-06-26 RX ADMIN — Medication 50 GRAM(S): at 10:17

## 2021-06-26 RX ADMIN — SODIUM CHLORIDE 1 GRAM(S): 9 INJECTION INTRAMUSCULAR; INTRAVENOUS; SUBCUTANEOUS at 17:47

## 2021-06-26 RX ADMIN — Medication 81 MILLIGRAM(S): at 12:34

## 2021-06-26 NOTE — CONSULT NOTE ADULT - ASSESSMENT
Hypervolemic hyponatremia  CHF  Afib  HTN  Hypothyroidism  H/O ETOH     Plan:    Change Lasix to 20mg IV BID  Strict fluid restriction, 500 mL/daily  Check UA  Check urine protein creatinine ratio  Check BMP in PM  D/W resident

## 2021-06-26 NOTE — PROGRESS NOTE ADULT - SUBJECTIVE AND OBJECTIVE BOX
OVERNIGHT EVENTS: events noted, EPS reviewed on 2 l NC    Vital Signs Last 24 Hrs  T(C): 36.1 (26 Jun 2021 00:00), Max: 36.8 (25 Jun 2021 12:00)  T(F): 97 (26 Jun 2021 00:00), Max: 98.2 (25 Jun 2021 12:00)  HR: 48 (26 Jun 2021 00:00) (44 - 64)  BP: 116/69 (26 Jun 2021 00:00) (116/69 - 177/110)  BP(mean): 113 (25 Jun 2021 15:46) (89 - 158)  RR: 18 (26 Jun 2021 00:00) (13 - 40)  SpO2: 98% (26 Jun 2021 00:00) (98% - 100%)    PHYSICAL EXAMINATION:    GENERAL: comfortable    HEENT: Head is normocephalic and atraumatic.     NECK: Supple.    LUNGS: dec bs both bases    HEART: irregular LEVY 3/6    ABDOMEN: Soft, nontender, and nondistended.      EXTREMITIES: Without any cyanosis, clubbing, rash, lesions or edema.    NEUROLOGIC: Grossly intact.    SKIN: No ulceration or induration present.      LABS:                        9.4    7.15  )-----------( 202      ( 25 Jun 2021 05:30 )             27.7     06-25    120<L>  |  88<L>  |  15  ----------------------------<  86  4.0   |  22  |  1.1    Ca    8.3<L>      25 Jun 2021 16:00    TPro  5.4<L>  /  Alb  3.5  /  TBili  0.6  /  DBili  x   /  AST  18  /  ALT  12  /  AlkPhos  61  06-25        ABG - ( 25 Jun 2021 10:43 )  pH, Arterial: 7.46  pH, Blood: x     /  pCO2: 35    /  pO2: 82    / HCO3: 25    / Base Excess: 1.5   /  SaO2: 97                CARDIAC MARKERS ( 24 Jun 2021 13:24 )  x     / <0.01 ng/mL / x     / x     / x            Serum Pro-Brain Natriuretic Peptide: 4015 pg/mL (06-24-21 @ 13:24)            06-24-21 @ 07:01  -  06-25-21 @ 07:00  --------------------------------------------------------  IN: 0 mL / OUT: 1300 mL / NET: -1300 mL    06-25-21 @ 07:01  -  06-26-21 @ 06:26  --------------------------------------------------------  IN: 450 mL / OUT: 1250 mL / NET: -800 mL        MICROBIOLOGY:      MEDICATIONS  (STANDING):  apixaban 5 milliGRAM(s) Oral every 12 hours  aspirin enteric coated 81 milliGRAM(s) Oral daily  atorvastatin 80 milliGRAM(s) Oral at bedtime  chlorhexidine 4% Liquid 1 Application(s) Topical <User Schedule>  furosemide   Injectable 40 milliGRAM(s) IV Push daily  levETIRAcetam 250 milliGRAM(s) Oral two times a day  levothyroxine 75 MICROGram(s) Oral daily  lisinopril 5 milliGRAM(s) Oral daily  pantoprazole    Tablet 40 milliGRAM(s) Oral before breakfast  sodium chloride 1 Gram(s) Oral two times a day  tamsulosin 0.4 milliGRAM(s) Oral at bedtime    MEDICATIONS  (PRN):      RADIOLOGY & ADDITIONAL STUDIES:

## 2021-06-26 NOTE — PROGRESS NOTE ADULT - ASSESSMENT
IMPRESSION:  SOB 2/2 Pulmonary edema/Fluid overload  Hyponatremia  H/O COPD  H/O Seizure disorder  Afib on eliquis    PLAN:    CNS: Avoid CNS depressants.  c/w AEDs    HEENT: Oral care    PULMONARY:  HOB @ 45 degrees. Aspiration Precautions Keep SAO2 88 TO 84%    CARDIOVASCULAR: Avoid volume overload . Keep I<O. Lasix 40 IV q24. EP f/up, hold BP meds    GI: GI prophylaxis.  Feeding as tolerated     RENAL:  Follow up lytes.  Correct as needed. Urine lytes. Nephrology consult, u osm    INFECTIOUS DISEASE: Follow up cultures. No ABX for now    HEMATOLOGICAL:  DVT prophylaxis c/w eliquis    ENDOCRINE:  Follow up FS.  Insulin protocol if needed. TSH nl    MUSCULOSKELETAL: OOB to chair .     Thorne: No  Lines: Peripheral IV  Code status: Full code  Disposition: SDU

## 2021-06-27 LAB
ALBUMIN SERPL ELPH-MCNC: 3.4 G/DL — LOW (ref 3.5–5.2)
ALP SERPL-CCNC: 66 U/L — SIGNIFICANT CHANGE UP (ref 30–115)
ALT FLD-CCNC: 13 U/L — SIGNIFICANT CHANGE UP (ref 0–41)
ANION GAP SERPL CALC-SCNC: 10 MMOL/L — SIGNIFICANT CHANGE UP (ref 7–14)
AST SERPL-CCNC: 17 U/L — SIGNIFICANT CHANGE UP (ref 0–41)
BASOPHILS # BLD AUTO: 0.06 K/UL — SIGNIFICANT CHANGE UP (ref 0–0.2)
BASOPHILS NFR BLD AUTO: 0.7 % — SIGNIFICANT CHANGE UP (ref 0–1)
BILIRUB SERPL-MCNC: 0.6 MG/DL — SIGNIFICANT CHANGE UP (ref 0.2–1.2)
BUN SERPL-MCNC: 13 MG/DL — SIGNIFICANT CHANGE UP (ref 10–20)
CALCIUM SERPL-MCNC: 8.3 MG/DL — LOW (ref 8.5–10.1)
CALCIUM UR-MCNC: 9 MG/DL — SIGNIFICANT CHANGE UP
CHLORIDE SERPL-SCNC: 87 MMOL/L — LOW (ref 98–110)
CO2 SERPL-SCNC: 27 MMOL/L — SIGNIFICANT CHANGE UP (ref 17–32)
CREAT ?TM UR-MCNC: 49 MG/DL — SIGNIFICANT CHANGE UP
CREAT SERPL-MCNC: 1.1 MG/DL — SIGNIFICANT CHANGE UP (ref 0.7–1.5)
EOSINOPHIL # BLD AUTO: 0.07 K/UL — SIGNIFICANT CHANGE UP (ref 0–0.7)
EOSINOPHIL NFR BLD AUTO: 0.8 % — SIGNIFICANT CHANGE UP (ref 0–8)
GLUCOSE SERPL-MCNC: 114 MG/DL — HIGH (ref 70–99)
HCT VFR BLD CALC: 30.3 % — LOW (ref 42–52)
HGB BLD-MCNC: 10.3 G/DL — LOW (ref 14–18)
IMM GRANULOCYTES NFR BLD AUTO: 0.4 % — HIGH (ref 0.1–0.3)
LYMPHOCYTES # BLD AUTO: 1.4 K/UL — SIGNIFICANT CHANGE UP (ref 1.2–3.4)
LYMPHOCYTES # BLD AUTO: 16.6 % — LOW (ref 20.5–51.1)
MAGNESIUM SERPL-MCNC: 1.5 MG/DL — LOW (ref 1.8–2.4)
MAGNESIUM UR-MCNC: 2.7 MG/DL — SIGNIFICANT CHANGE UP
MCHC RBC-ENTMCNC: 30.6 PG — SIGNIFICANT CHANGE UP (ref 27–31)
MCHC RBC-ENTMCNC: 34 G/DL — SIGNIFICANT CHANGE UP (ref 32–37)
MCV RBC AUTO: 89.9 FL — SIGNIFICANT CHANGE UP (ref 80–94)
MONOCYTES # BLD AUTO: 0.93 K/UL — HIGH (ref 0.1–0.6)
MONOCYTES NFR BLD AUTO: 11 % — HIGH (ref 1.7–9.3)
NEUTROPHILS # BLD AUTO: 5.94 K/UL — SIGNIFICANT CHANGE UP (ref 1.4–6.5)
NEUTROPHILS NFR BLD AUTO: 70.5 % — SIGNIFICANT CHANGE UP (ref 42.2–75.2)
NRBC # BLD: 0 /100 WBCS — SIGNIFICANT CHANGE UP (ref 0–0)
PLATELET # BLD AUTO: 249 K/UL — SIGNIFICANT CHANGE UP (ref 130–400)
POTASSIUM SERPL-MCNC: 3.5 MMOL/L — SIGNIFICANT CHANGE UP (ref 3.5–5)
POTASSIUM SERPL-SCNC: 3.5 MMOL/L — SIGNIFICANT CHANGE UP (ref 3.5–5)
PROT ?TM UR-MCNC: 8 MG/DLG/24H — SIGNIFICANT CHANGE UP
PROT SERPL-MCNC: 5.5 G/DL — LOW (ref 6–8)
PROT/CREAT UR-RTO: 0.2 RATIO — SIGNIFICANT CHANGE UP (ref 0–0.2)
RBC # BLD: 3.37 M/UL — LOW (ref 4.7–6.1)
RBC # FLD: 12.4 % — SIGNIFICANT CHANGE UP (ref 11.5–14.5)
SODIUM SERPL-SCNC: 124 MMOL/L — LOW (ref 135–146)
WBC # BLD: 8.43 K/UL — SIGNIFICANT CHANGE UP (ref 4.8–10.8)
WBC # FLD AUTO: 8.43 K/UL — SIGNIFICANT CHANGE UP (ref 4.8–10.8)

## 2021-06-27 PROCEDURE — 71045 X-RAY EXAM CHEST 1 VIEW: CPT | Mod: 26

## 2021-06-27 PROCEDURE — 99232 SBSQ HOSP IP/OBS MODERATE 35: CPT

## 2021-06-27 RX ORDER — MAGNESIUM SULFATE 500 MG/ML
2 VIAL (ML) INJECTION ONCE
Refills: 0 | Status: COMPLETED | OUTPATIENT
Start: 2021-06-27 | End: 2021-06-27

## 2021-06-27 RX ORDER — FUROSEMIDE 40 MG
40 TABLET ORAL
Refills: 0 | Status: DISCONTINUED | OUTPATIENT
Start: 2021-06-27 | End: 2021-07-01

## 2021-06-27 RX ADMIN — APIXABAN 5 MILLIGRAM(S): 2.5 TABLET, FILM COATED ORAL at 18:01

## 2021-06-27 RX ADMIN — LEVETIRACETAM 250 MILLIGRAM(S): 250 TABLET, FILM COATED ORAL at 18:01

## 2021-06-27 RX ADMIN — SODIUM CHLORIDE 1 GRAM(S): 9 INJECTION INTRAMUSCULAR; INTRAVENOUS; SUBCUTANEOUS at 06:32

## 2021-06-27 RX ADMIN — APIXABAN 5 MILLIGRAM(S): 2.5 TABLET, FILM COATED ORAL at 06:33

## 2021-06-27 RX ADMIN — Medication 50 GRAM(S): at 12:56

## 2021-06-27 RX ADMIN — Medication 40 MILLIGRAM(S): at 18:01

## 2021-06-27 RX ADMIN — TAMSULOSIN HYDROCHLORIDE 0.4 MILLIGRAM(S): 0.4 CAPSULE ORAL at 21:22

## 2021-06-27 RX ADMIN — Medication 75 MICROGRAM(S): at 06:32

## 2021-06-27 RX ADMIN — SODIUM CHLORIDE 1 GRAM(S): 9 INJECTION INTRAMUSCULAR; INTRAVENOUS; SUBCUTANEOUS at 18:01

## 2021-06-27 RX ADMIN — CHLORHEXIDINE GLUCONATE 1 APPLICATION(S): 213 SOLUTION TOPICAL at 06:33

## 2021-06-27 RX ADMIN — Medication 20 MILLIGRAM(S): at 06:34

## 2021-06-27 RX ADMIN — PANTOPRAZOLE SODIUM 40 MILLIGRAM(S): 20 TABLET, DELAYED RELEASE ORAL at 06:32

## 2021-06-27 RX ADMIN — LISINOPRIL 5 MILLIGRAM(S): 2.5 TABLET ORAL at 06:32

## 2021-06-27 RX ADMIN — ATORVASTATIN CALCIUM 80 MILLIGRAM(S): 80 TABLET, FILM COATED ORAL at 21:22

## 2021-06-27 RX ADMIN — LEVETIRACETAM 250 MILLIGRAM(S): 250 TABLET, FILM COATED ORAL at 06:33

## 2021-06-27 RX ADMIN — Medication 81 MILLIGRAM(S): at 12:30

## 2021-06-27 NOTE — PROGRESS NOTE ADULT - SUBJECTIVE AND OBJECTIVE BOX
OVERNIGHT EVENTS: events noted, renal reviewed, afebrile    Vital Signs Last 24 Hrs  T(C): 36.8 (2021 00:15), Max: 37.2 (2021 16:00)  T(F): 98.2 (2021 00:15), Max: 98.9 (2021 16:00)  HR: 50 (2021 00:15) (45 - 56)  BP: 110/59 (2021 00:15) (110/59 - 174/80)  BP(mean): 80 (2021 00:15) (80 - 115)  RR: 18 (2021 00:15) (16 - 18)  SpO2: 98% (2021 00:15) (95% - 99%)    PHYSICAL EXAMINATION:    GENERAL: ill looking    HEENT: Head is normocephalic and atraumatic.    NECK: Supple.    LUNGS: dec bs both bases    HEART: Regular rate and rhythm without murmur.    ABDOMEN: Soft, nontender, and nondistended.      EXTREMITIES: Without any cyanosis, clubbing, rash, lesions or edema.    NEUROLOGIC: Grossly intact.    SKIN: No ulceration or induration present.      LABS:                        10.0   8.82  )-----------( 218      ( 2021 05:38 )             29.0         121<L>  |  87<L>  |  14  ----------------------------<  98  4.2   |  24  |  1.0    Ca    8.3<L>      2021 17:00  Mg     1.6         TPro  5.7<L>  /  Alb  3.4<L>  /  TBili  0.5  /  DBili  x   /  AST  26  /  ALT  14  /  AlkPhos  63  -      Urinalysis Basic - ( 2021 20:28 )    Color: Light Yellow / Appearance: Clear / S.010 / pH: x  Gluc: x / Ketone: Negative  / Bili: Negative / Urobili: <2 mg/dL   Blood: x / Protein: Negative / Nitrite: Negative   Leuk Esterase: Negative / RBC: x / WBC x   Sq Epi: x / Non Sq Epi: x / Bacteria: x      ABG - ( 2021 10:43 )  pH, Arterial: 7.46  pH, Blood: x     /  pCO2: 35    /  pO2: 82    / HCO3: 25    / Base Excess: 1.5   /  SaO2: 97                      Serum Pro-Brain Natriuretic Peptide: 4015 pg/mL (21 @ 13:24)            21 @ 07:01  -  21 @ 07:00  --------------------------------------------------------  IN: 450 mL / OUT: 1250 mL / NET: -800 mL    21 @ 07:01  -  21 @ 06:26  --------------------------------------------------------  IN: 330 mL / OUT: 775 mL / NET: -445 mL        MICROBIOLOGY:      MEDICATIONS  (STANDING):  apixaban 5 milliGRAM(s) Oral every 12 hours  aspirin enteric coated 81 milliGRAM(s) Oral daily  atorvastatin 80 milliGRAM(s) Oral at bedtime  chlorhexidine 4% Liquid 1 Application(s) Topical <User Schedule>  furosemide   Injectable 20 milliGRAM(s) IV Push two times a day  levETIRAcetam 250 milliGRAM(s) Oral two times a day  levothyroxine 75 MICROGram(s) Oral daily  lisinopril 5 milliGRAM(s) Oral daily  pantoprazole    Tablet 40 milliGRAM(s) Oral before breakfast  sodium chloride 1 Gram(s) Oral two times a day  tamsulosin 0.4 milliGRAM(s) Oral at bedtime    MEDICATIONS  (PRN):      RADIOLOGY & ADDITIONAL STUDIES:

## 2021-06-27 NOTE — PROGRESS NOTE ADULT - ASSESSMENT
Hypervolemic hyponatremia  CHF  Afib  HTN  Hypothyroidism  H/O ETOH     Plan:    Continue Lasix   Strict fluid restriction  Optimize nutrition  Daily BMP

## 2021-06-27 NOTE — PROGRESS NOTE ADULT - SUBJECTIVE AND OBJECTIVE BOX
[place] [facility] FOLLOW UP NOTE  --------------------------------------------------------------------------------  24 hour events/subjective: Patient examined. Appears comfortable.    PAST HISTORY  --------------------------------------------------------------------------------  No significant changes to PMH, PSH, FHx, SHx, unless otherwise noted    ALLERGIES & MEDICATIONS  --------------------------------------------------------------------------------  Allergies    No Known Allergies    Standing Inpatient Medications  apixaban 5 milliGRAM(s) Oral every 12 hours  aspirin enteric coated 81 milliGRAM(s) Oral daily  atorvastatin 80 milliGRAM(s) Oral at bedtime  chlorhexidine 4% Liquid 1 Application(s) Topical <User Schedule>  furosemide   Injectable 40 milliGRAM(s) IV Push two times a day  levETIRAcetam 250 milliGRAM(s) Oral two times a day  levothyroxine 75 MICROGram(s) Oral daily  lisinopril 5 milliGRAM(s) Oral daily  pantoprazole    Tablet 40 milliGRAM(s) Oral before breakfast  sodium chloride 1 Gram(s) Oral two times a day  tamsulosin 0.4 milliGRAM(s) Oral at bedtime    VITALS/PHYSICAL EXAM  --------------------------------------------------------------------------------  T(C): 35.6 (06-27-21 @ 12:00), Max: 37.2 (06-26-21 @ 16:00)  HR: 50 (06-27-21 @ 12:00) (45 - 65)  BP: 119/65 (06-27-21 @ 12:00) (110/59 - 174/80)  RR: 18 (06-27-21 @ 12:00) (18 - 18)  SpO2: 99% (06-27-21 @ 12:00) (96% - 99%)    06-26-21 @ 07:01  -  06-27-21 @ 07:00  --------------------------------------------------------  IN: 330 mL / OUT: 775 mL / NET: -445 mL    06-27-21 @ 07:01  -  06-27-21 @ 14:13  --------------------------------------------------------  IN: 240 mL / OUT: 0 mL / NET: 240 mL    Physical Exam:  	Gen: NAD  	Pulm: CTA B/L  	CV: RRR, S1S2  	Abd: +BS, soft, nontender/nondistended  	: No suprapubic tenderness  	LE: Warm, no edema    LABS/STUDIES  --------------------------------------------------------------------------------              10.3   8.43  >-----------<  249      [06-27-21 @ 07:52]              30.3     124  |  87  |  13  ----------------------------<  114      [06-27-21 @ 07:52]  3.5   |  27  |  1.1        Ca     8.3     [06-27-21 @ 07:52]      Mg     1.5     [06-27-21 @ 07:52]    TPro  5.5  /  Alb  3.4  /  TBili  0.6  /  DBili  x   /  AST  17  /  ALT  13  /  AlkPhos  66  [06-27-21 @ 07:52]    Creatinine Trend:  SCr 1.1 [06-27 @ 07:52]  SCr 1.0 [06-26 @ 17:00]  SCr 1.2 [06-26 @ 05:38]  SCr 1.1 [06-25 @ 16:00]  SCr 1.1 [06-25 @ 11:34]    Urinalysis - [06-26-21 @ 20:28]      Color Light Yellow / Appearance Clear / SG 1.010 / pH 6.0      Gluc Negative / Ketone Negative  / Bili Negative / Urobili <2 mg/dL       Blood Negative / Protein Negative / Leuk Est Negative / Nitrite Negative      RBC  / WBC  / Hyaline  / Gran  / Sq Epi  / Non Sq Epi  / Bacteria     Urine Creatinine 49      [06-26-21 @ 10:30]  Urine Protein 8      [06-26-21 @ 10:30]  Urine Sodium 69.0      [06-26-21 @ 07:52]  Urine Potassium 29      [06-26-21 @ 07:52]  Urine Osmolality 320      [06-26-21 @ 07:52]    HbA1c 5.7      [09-10-18 @ 16:28]  TSH 3.87      [06-26-21 @ 05:38]  Lipid: chol 161, TG 82, HDL 65, LDL 81      [08-15-20 @ 08:30]

## 2021-06-27 NOTE — PROGRESS NOTE ADULT - ASSESSMENT
IMPRESSION:  SOB 2/2 Pulmonary edema/Fluid overload  Hyponatremia  H/O COPD  H/O Seizure disorder  Afib on eliquis    PLAN:    CNS: Avoid CNS depressants.  c/w AEDs    HEENT: Oral care    PULMONARY:  HOB @ 45 degrees. Aspiration Precautions Keep SAO2 88 TO 84%, repeat CXR    CARDIOVASCULAR: Avoid volume overload . Keep I<O. Lasix 40 IV q24. EP f/up, hold BP meds    GI: GI prophylaxis.  Feeding as tolerated     RENAL:  Follow up lytes.  Correct as needed. Urine lytes. Nephrology fup    INFECTIOUS DISEASE: Follow up cultures. No ABX for now    HEMATOLOGICAL:  DVT prophylaxis c/w eliquis    ENDOCRINE:  Follow up FS.  Insulin protocol if needed.    MUSCULOSKELETAL: OOB to chair .     Thorne: No  Lines: Peripheral IV  Code status: Full code  Disposition: SDU

## 2021-06-28 LAB
ALBUMIN SERPL ELPH-MCNC: 3 G/DL — LOW (ref 3.5–5.2)
ALBUMIN SERPL ELPH-MCNC: 3.2 G/DL — LOW (ref 3.5–5.2)
ALP SERPL-CCNC: 61 U/L — SIGNIFICANT CHANGE UP (ref 30–115)
ALP SERPL-CCNC: 66 U/L — SIGNIFICANT CHANGE UP (ref 30–115)
ALT FLD-CCNC: 11 U/L — SIGNIFICANT CHANGE UP (ref 0–41)
ALT FLD-CCNC: 11 U/L — SIGNIFICANT CHANGE UP (ref 0–41)
ANION GAP SERPL CALC-SCNC: 10 MMOL/L — SIGNIFICANT CHANGE UP (ref 7–14)
ANION GAP SERPL CALC-SCNC: 9 MMOL/L — SIGNIFICANT CHANGE UP (ref 7–14)
AST SERPL-CCNC: 13 U/L — SIGNIFICANT CHANGE UP (ref 0–41)
AST SERPL-CCNC: 13 U/L — SIGNIFICANT CHANGE UP (ref 0–41)
BASOPHILS # BLD AUTO: 0.05 K/UL — SIGNIFICANT CHANGE UP (ref 0–0.2)
BASOPHILS NFR BLD AUTO: 0.6 % — SIGNIFICANT CHANGE UP (ref 0–1)
BILIRUB SERPL-MCNC: 0.5 MG/DL — SIGNIFICANT CHANGE UP (ref 0.2–1.2)
BILIRUB SERPL-MCNC: 0.5 MG/DL — SIGNIFICANT CHANGE UP (ref 0.2–1.2)
BUN SERPL-MCNC: 13 MG/DL — SIGNIFICANT CHANGE UP (ref 10–20)
BUN SERPL-MCNC: 14 MG/DL — SIGNIFICANT CHANGE UP (ref 10–20)
CALCIUM SERPL-MCNC: 8.1 MG/DL — LOW (ref 8.5–10.1)
CALCIUM SERPL-MCNC: 8.3 MG/DL — LOW (ref 8.5–10.1)
CHLORIDE SERPL-SCNC: 88 MMOL/L — LOW (ref 98–110)
CHLORIDE SERPL-SCNC: 91 MMOL/L — LOW (ref 98–110)
CO2 SERPL-SCNC: 25 MMOL/L — SIGNIFICANT CHANGE UP (ref 17–32)
CO2 SERPL-SCNC: 27 MMOL/L — SIGNIFICANT CHANGE UP (ref 17–32)
CREAT SERPL-MCNC: 1.1 MG/DL — SIGNIFICANT CHANGE UP (ref 0.7–1.5)
CREAT SERPL-MCNC: 1.1 MG/DL — SIGNIFICANT CHANGE UP (ref 0.7–1.5)
EOSINOPHIL # BLD AUTO: 0.1 K/UL — SIGNIFICANT CHANGE UP (ref 0–0.7)
EOSINOPHIL NFR BLD AUTO: 1.2 % — SIGNIFICANT CHANGE UP (ref 0–8)
GLUCOSE SERPL-MCNC: 87 MG/DL — SIGNIFICANT CHANGE UP (ref 70–99)
GLUCOSE SERPL-MCNC: 95 MG/DL — SIGNIFICANT CHANGE UP (ref 70–99)
HCT VFR BLD CALC: 28.7 % — LOW (ref 42–52)
HGB BLD-MCNC: 9.8 G/DL — LOW (ref 14–18)
IMM GRANULOCYTES NFR BLD AUTO: 0.2 % — SIGNIFICANT CHANGE UP (ref 0.1–0.3)
LYMPHOCYTES # BLD AUTO: 1.41 K/UL — SIGNIFICANT CHANGE UP (ref 1.2–3.4)
LYMPHOCYTES # BLD AUTO: 17.5 % — LOW (ref 20.5–51.1)
MAGNESIUM SERPL-MCNC: 1.5 MG/DL — LOW (ref 1.8–2.4)
MCHC RBC-ENTMCNC: 30.2 PG — SIGNIFICANT CHANGE UP (ref 27–31)
MCHC RBC-ENTMCNC: 34.1 G/DL — SIGNIFICANT CHANGE UP (ref 32–37)
MCV RBC AUTO: 88.3 FL — SIGNIFICANT CHANGE UP (ref 80–94)
MONOCYTES # BLD AUTO: 1.04 K/UL — HIGH (ref 0.1–0.6)
MONOCYTES NFR BLD AUTO: 12.9 % — HIGH (ref 1.7–9.3)
NEUTROPHILS # BLD AUTO: 5.45 K/UL — SIGNIFICANT CHANGE UP (ref 1.4–6.5)
NEUTROPHILS NFR BLD AUTO: 67.6 % — SIGNIFICANT CHANGE UP (ref 42.2–75.2)
NRBC # BLD: 0 /100 WBCS — SIGNIFICANT CHANGE UP (ref 0–0)
PLATELET # BLD AUTO: 216 K/UL — SIGNIFICANT CHANGE UP (ref 130–400)
POTASSIUM SERPL-MCNC: 3.5 MMOL/L — SIGNIFICANT CHANGE UP (ref 3.5–5)
POTASSIUM SERPL-MCNC: 3.6 MMOL/L — SIGNIFICANT CHANGE UP (ref 3.5–5)
POTASSIUM SERPL-SCNC: 3.5 MMOL/L — SIGNIFICANT CHANGE UP (ref 3.5–5)
POTASSIUM SERPL-SCNC: 3.6 MMOL/L — SIGNIFICANT CHANGE UP (ref 3.5–5)
PROT SERPL-MCNC: 5 G/DL — LOW (ref 6–8)
PROT SERPL-MCNC: 5.1 G/DL — LOW (ref 6–8)
RBC # BLD: 3.25 M/UL — LOW (ref 4.7–6.1)
RBC # FLD: 12.2 % — SIGNIFICANT CHANGE UP (ref 11.5–14.5)
SODIUM SERPL-SCNC: 124 MMOL/L — LOW (ref 135–146)
SODIUM SERPL-SCNC: 126 MMOL/L — LOW (ref 135–146)
T4 AB SER-ACNC: 11 UG/DL — SIGNIFICANT CHANGE UP (ref 4.6–12)
T4 FREE SERPL-MCNC: 1.7 NG/DL — SIGNIFICANT CHANGE UP (ref 0.9–1.8)
TSH SERPL-MCNC: 4.17 UIU/ML — SIGNIFICANT CHANGE UP (ref 0.27–4.2)
WBC # BLD: 8.07 K/UL — SIGNIFICANT CHANGE UP (ref 4.8–10.8)
WBC # FLD AUTO: 8.07 K/UL — SIGNIFICANT CHANGE UP (ref 4.8–10.8)

## 2021-06-28 PROCEDURE — 71045 X-RAY EXAM CHEST 1 VIEW: CPT | Mod: 26

## 2021-06-28 PROCEDURE — 33208 INSRT HEART PM ATRIAL & VENT: CPT

## 2021-06-28 PROCEDURE — 99233 SBSQ HOSP IP/OBS HIGH 50: CPT | Mod: 57

## 2021-06-28 PROCEDURE — 99232 SBSQ HOSP IP/OBS MODERATE 35: CPT

## 2021-06-28 RX ORDER — VANCOMYCIN HCL 1 G
1000 VIAL (EA) INTRAVENOUS ONCE
Refills: 0 | Status: COMPLETED | OUTPATIENT
Start: 2021-06-28 | End: 2021-06-28

## 2021-06-28 RX ORDER — VANCOMYCIN HCL 1 G
1000 VIAL (EA) INTRAVENOUS EVERY 12 HOURS
Refills: 0 | Status: COMPLETED | OUTPATIENT
Start: 2021-06-28 | End: 2021-06-29

## 2021-06-28 RX ORDER — MAGNESIUM SULFATE 500 MG/ML
2 VIAL (ML) INJECTION ONCE
Refills: 0 | Status: COMPLETED | OUTPATIENT
Start: 2021-06-28 | End: 2021-06-28

## 2021-06-28 RX ADMIN — Medication 250 MILLIGRAM(S): at 13:20

## 2021-06-28 RX ADMIN — ATORVASTATIN CALCIUM 80 MILLIGRAM(S): 80 TABLET, FILM COATED ORAL at 22:03

## 2021-06-28 RX ADMIN — SODIUM CHLORIDE 1 GRAM(S): 9 INJECTION INTRAMUSCULAR; INTRAVENOUS; SUBCUTANEOUS at 05:11

## 2021-06-28 RX ADMIN — APIXABAN 5 MILLIGRAM(S): 2.5 TABLET, FILM COATED ORAL at 05:13

## 2021-06-28 RX ADMIN — APIXABAN 5 MILLIGRAM(S): 2.5 TABLET, FILM COATED ORAL at 17:40

## 2021-06-28 RX ADMIN — Medication 40 MILLIGRAM(S): at 05:12

## 2021-06-28 RX ADMIN — Medication 40 MILLIGRAM(S): at 17:40

## 2021-06-28 RX ADMIN — PANTOPRAZOLE SODIUM 40 MILLIGRAM(S): 20 TABLET, DELAYED RELEASE ORAL at 09:33

## 2021-06-28 RX ADMIN — Medication 81 MILLIGRAM(S): at 14:21

## 2021-06-28 RX ADMIN — TAMSULOSIN HYDROCHLORIDE 0.4 MILLIGRAM(S): 0.4 CAPSULE ORAL at 22:05

## 2021-06-28 RX ADMIN — CHLORHEXIDINE GLUCONATE 1 APPLICATION(S): 213 SOLUTION TOPICAL at 05:12

## 2021-06-28 RX ADMIN — LEVETIRACETAM 250 MILLIGRAM(S): 250 TABLET, FILM COATED ORAL at 17:41

## 2021-06-28 RX ADMIN — LISINOPRIL 5 MILLIGRAM(S): 2.5 TABLET ORAL at 05:11

## 2021-06-28 RX ADMIN — Medication 250 MILLIGRAM(S): at 15:50

## 2021-06-28 RX ADMIN — SODIUM CHLORIDE 1 GRAM(S): 9 INJECTION INTRAMUSCULAR; INTRAVENOUS; SUBCUTANEOUS at 17:42

## 2021-06-28 RX ADMIN — Medication 250 MILLIGRAM(S): at 19:47

## 2021-06-28 RX ADMIN — LEVETIRACETAM 250 MILLIGRAM(S): 250 TABLET, FILM COATED ORAL at 05:11

## 2021-06-28 RX ADMIN — Medication 75 MICROGRAM(S): at 05:11

## 2021-06-28 NOTE — PROGRESS NOTE ADULT - SUBJECTIVE AND OBJECTIVE BOX
INTERVAL HPI/OVERNIGHT EVENTS: Patient remains bradycardic in 40s, asymptomatic    MEDICATIONS  (STANDING):  apixaban 5 milliGRAM(s) Oral every 12 hours  aspirin enteric coated 81 milliGRAM(s) Oral daily  atorvastatin 80 milliGRAM(s) Oral at bedtime  chlorhexidine 4% Liquid 1 Application(s) Topical <User Schedule>  furosemide   Injectable 40 milliGRAM(s) IV Push two times a day  levETIRAcetam 250 milliGRAM(s) Oral two times a day  levothyroxine 75 MICROGram(s) Oral daily  lisinopril 5 milliGRAM(s) Oral daily  magnesium sulfate  IVPB 2 Gram(s) IV Intermittent once  pantoprazole    Tablet 40 milliGRAM(s) Oral before breakfast  sodium chloride 1 Gram(s) Oral two times a day  tamsulosin 0.4 milliGRAM(s) Oral at bedtime  vancomycin  IVPB 1000 milliGRAM(s) IV Intermittent once  vancomycin  IVPB 1000 milliGRAM(s) IV Intermittent once  vancomycin  IVPB 1000 milliGRAM(s) IV Intermittent every 12 hours    MEDICATIONS  (PRN):      Allergies  No Known Allergies    Intolerances        REVIEW OF SYSTEMS: No CP, palpitations, dizziness or SOB    Vital Signs Last 24 Hrs  T(C): 36.7 (2021 07:39), Max: 37.2 (2021 20:16)  T(F): 98 (2021 07:39), Max: 98.9 (2021 20:16)  HR: 49 (2021 07:39) (48 - 50)  BP: 159/69 (2021 07:39) (124/58 - 159/69)  BP(mean): 99 (2021 07:39) (84 - 99)  RR: 18 (2021 07:39) (18 - 18)  SpO2: 97% (2021 02:00) (97% - 98%)    21 @ 07:01  -  21 @ 07:00  --------------------------------------------------------  IN: 550 mL / OUT: 1875 mL / NET: -1325 mL        Physical Exam  GENERAL: In no apparent distress, well nourished, and hydrated.  HEAD:  Atraumatic, Normocephalic  EYES: EOMI, PERRLA, conjunctiva and sclera clear  ENMT: No tonsillar erythema, exudates, or enlargements; ist mucous membranes, Good dentition, No lesions  NECK: Supple   HEART: Irregular rate and rhythm; No murmurs, rubs, or gallops.  PULMONARY: Clear to auscultation and perfusion.  No rales, wheezing, or rhonchi bilaterally.  ABDOMEN: Soft, Nontender, Nondistended; Bowel sounds present  EXTREMITIES:  2+ Peripheral Pulses, No clubbing, cyanosis, or edema  NEUROLOGICAL: Grossly nonfocal    LABS:                        9.8    8.07  )-----------( 216      ( 2021 06:49 )             28.7         124<L>  |  88<L>  |  13  ----------------------------<  95  3.6   |  27  |  1.1    Ca    8.3<L>      2021 11:56  Mg     1.5         TPro  5.1<L>  /  Alb  3.2<L>  /  TBili  0.5  /  DBili  x   /  AST  13  /  ALT  11  /  AlkPhos  66        Urinalysis Basic - ( 2021 20:28 )    Color: Light Yellow / Appearance: Clear / S.010 / pH: x  Gluc: x / Ketone: Negative  / Bili: Negative / Urobili: <2 mg/dL   Blood: x / Protein: Negative / Nitrite: Negative   Leuk Esterase: Negative / RBC: x / WBC x   Sq Epi: x / Non Sq Epi: x / Bacteria: x        RADIOLOGY & ADDITIONAL TESTS:

## 2021-06-28 NOTE — PROGRESS NOTE ADULT - ASSESSMENT
IMPRESSION:  SOB 2/2 Pulmonary edema/Fluid overload improved   Hyponatremia improving   H/O COPD  H/O Seizure disorder  Afib on Eliquis    PLAN:    CNS: Avoid CNS depressants.  c/w AEDs    HEENT: Oral care    PULMONARY:  HOB @ 45 degrees. Aspiration Precautions.  Repeat CXR eviewed.      CARDIOVASCULAR: Avoid volume overload . Keep I<O. Lasix 40 IV q24. EP f/up, hold BP meds    GI: GI prophylaxis.  Feeding as tolerated     RENAL:  Follow up lytes.  Correct as needed.    INFECTIOUS DISEASE: Follow up cultures. No ABX for now    HEMATOLOGICAL:  DVT prophylaxis c/w Eliquis    ENDOCRINE:  Follow up FS.      MUSCULOSKELETAL: OOB to chair .     Thorne: No  Lines: Peripheral IV  Code status: Full code  Disposition: tele

## 2021-06-28 NOTE — PRE-ANESTHESIA EVALUATION ADULT - NSANTHOSAYNRD_GEN_A_CORE
No. BANDAR screening performed.  STOP BANG Legend: 0-2 = LOW Risk; 3-4 = INTERMEDIATE Risk; 5-8 = HIGH Risk

## 2021-06-28 NOTE — PROGRESS NOTE ADULT - ATTENDING COMMENTS
- PPM today  - Sodium is improving. Now 126 from 119. Considering patient has bradycardia with symptoms and improvement in sodium level significantly, we will proceed with PPM  - DC ricardo  - Change lasix to PO.  - Strict I & O, Daily weights  - Rehab  - Anticipate DC in am if stable. - PPM today  - Sodium is improving. Now 126 from 119. Considering patient has bradycardia with symptoms and improvement in sodium level significantly, we will proceed with PPM. The benefit outweighs risks associated with hyponatremia.  - DC ricardo  - Change lasix to PO.  - Strict I & O, Daily weights  - Rehab  - Anticipate DC in am if stable.

## 2021-06-28 NOTE — CHART NOTE - NSCHARTNOTEFT_GEN_A_CORE
ELECTROPHYSIOLOGY PROCEDURE:  Dual Chamber Pacemaker Implantation  IMPRESSION:  Successful dual chamber pacemaker implant (SJM, Non-dependent).  PLAN:   - Overnight telemetry monitoring  - CXR and Interrogation in am  - DC ricardo  - OOB to chair  - Rehab c/s  - Change IV lasix to PO lasix  - Anticipate DC from EP standpoint in am if stable.    : Kaylah Powell M.D.  INDICATION FOR PROCEDURE: Atrial Fibrillation with slow ventricular response    CONSENT:   The risks, benefits, indications and alternatives to the procedure were explained in detail to the patient and available family members prior to the procedure. The patient and available family members expressed a good understanding of the procedure and risks. All questions asked were answered and consent was obtained. A representative from the device company was present to provide clinical support.    SEDATION:   The patient was transported to the Electrophysiology Laboratory in a non-sedated state and was placed supine on the fluoroscopy table. Sedation was provided by anesthesia team. The patient underwent continuous blood pressure, heart rate and O2 saturation monitoring throughout the procedure with supplemental oxygen utilized as needed.    DETAILS OF PROCEDURE:    Informed consent was obtained, and the patient was brought to the EP lab in a fasting state. The patient  was prepped and draped in the usual strict sterile fashion.   After the antibiotic was completely infused, 50 cc lidocaine was infiltrated into the area just medial to the left deltopectoral groove. Using a #10 scalpel, an incision was made. The incision was extended to the prepectoral fascia using blunt dissection. Then left extra-thoracic axillary vein was accessed twice under fluoroscopy and venogram. 2 guidewires were placed into the vein. Then the sheaths were placed over the guidewire.     Then the ventricular lead was advanced into the RV. The RV lead was fixated to the right ventricular mid septum. Appropriate sensing and thresholds were obtained. No diaphragmatic pacing occurred at 10 V and 1.5 ms.    Then the atrial lead was advanced into the RA. The RA lead was fixated to the right atrial appendage. Appropriate sensing was obtained. Threshold could not be tested as he was in atrial fibrillation. No diaphragmatic pacing occurred at 10 V and 1.5 ms.    The sheaths were removed. The leads were then sutured to the pectoralis muscle using Ethibond. Lead measurements were then rechecked.    Then the prepectoral pocket was fashioned. The leads were attached to the generator. The system was placed in the pocket and connected to the leads. The generator was sutured to the pocket. The generator and leads system were visualized under fluoroscopy. Appropriate redundancy/slack in the leads were noted. The pins of the leads were beyond the set screws.    Hemostasis was reverified. The pocket was then closed in two layers. Dermabond and a sterile dressing were placed.   The patient was transferred to the pre-post room in stable condition.    EBL: 10 cc    IMPRESSION:  Successful dual chamber pacemaker implant (SJM, Non-dependent).

## 2021-06-28 NOTE — PROGRESS NOTE ADULT - SUBJECTIVE AND OBJECTIVE BOX
[place] [facility] FOLLOW UP NOTE  --------------------------------------------------------------------------------  pt seen and examined  critically ill  resp status improved  on iv lasix  BP's acceptable  Na+ increasing    PAST HISTORY  --------------------------------------------------------------------------------  No significant changes to PMH, PSH, FHx, SHx, unless otherwise noted    ALLERGIES & MEDICATIONS  --------------------------------------------------------------------------------  Allergies    No Known Allergies    Physical Exam:  	Gen: NAD  	Pulm: CTA B/L  	CV: RRR, S1S2  	Abd: +BS, soft, nontender/nondistended  	: No suprapubic tenderness  	LE: Warm, no edema    Hospital Medications:   MEDICATIONS  (STANDING):  apixaban 5 milliGRAM(s) Oral every 12 hours  aspirin enteric coated 81 milliGRAM(s) Oral daily  atorvastatin 80 milliGRAM(s) Oral at bedtime  chlorhexidine 4% Liquid 1 Application(s) Topical <User Schedule>  furosemide   Injectable 40 milliGRAM(s) IV Push two times a day  levETIRAcetam 250 milliGRAM(s) Oral two times a day  levothyroxine 75 MICROGram(s) Oral daily  lisinopril 5 milliGRAM(s) Oral daily  magnesium sulfate  IVPB 2 Gram(s) IV Intermittent once  pantoprazole    Tablet 40 milliGRAM(s) Oral before breakfast  sodium chloride 1 Gram(s) Oral two times a day  tamsulosin 0.4 milliGRAM(s) Oral at bedtime        VITALS:  T(F): 98 (21 @ 07:39), Max: 98.9 (21 @ 20:16)  HR: 49 (21 @ 07:39)  BP: 159/69 (21 @ 07:39)  RR: 18 (21 @ 07:39)  SpO2: 97% (21 @ 02:00)  Wt(kg): --     @ 07:01  -   @ 07:00  --------------------------------------------------------  IN: 330 mL / OUT: 775 mL / NET: -445 mL     @ 07:01  -   @ 07:00  --------------------------------------------------------  IN: 550 mL / OUT: 1875 mL / NET: -1325 mL          LABS:      124<L>  |  88<L>  |  13  ----------------------------<  95  3.6   |  27  |  1.1    Ca    8.3<L>      2021 11:56  Mg     1.5         TPro  5.1<L>  /  Alb  3.2<L>  /  TBili  0.5  /  DBili      /  AST  13  /  ALT  11  /  AlkPhos  66                            9.8    8.07  )-----------( 216      ( 2021 06:49 )             28.7       Urine Studies:  Urinalysis Basic - ( 2021 20:28 )    Color: Light Yellow / Appearance: Clear / S.010 / pH:   Gluc:  / Ketone: Negative  / Bili: Negative / Urobili: <2 mg/dL   Blood:  / Protein: Negative / Nitrite: Negative   Leuk Esterase: Negative / RBC:  / WBC    Sq Epi:  / Non Sq Epi:  / Bacteria:       Protein/Creatinine Ratio Calculation: 0.2 Ratio ( @ 10:30)  Creatinine, Random Urine: 49 mg/dL ( @ 10:30)  Potassium, Random Urine: 29 mmol/L ( @ 07:52)  Osmolality, Random Urine: 320 mos/kg ( @ 07:52)  Sodium, Random Urine: 69.0 mmoL/L ( @ 07:52)  Calcium, Random Urine: 9 mg/dL ( @ 07:52)      RADIOLOGY & ADDITIONAL STUDIES:

## 2021-06-28 NOTE — CHART NOTE - NSCHARTNOTEFT_GEN_A_CORE
Mr Nielson is an 89 year old man with the past medical history of AFib, HLD, HTN who presented to the ED for Shortness of breath on . Patient was doing relatively well until 4 says prior to admission when he started experiencing shortness of breath on exertion which he did not previously have and was able to walk his dogs without difficulty. The dyspnea got progressively worse and he noticed some wheezing with exertion. He mentioned that this dyspnea was associated with cough that is mostly dry but sometimes bring up clear sputum. Patient denied having any chest pain, orthopnea or PNDs. Palpitations or any other symptoms. Patient was found to be Bradycardic in the 40's. He was seen and evaluated by EP and cardioversion was discussed but ultimately he was not a candidate due to esophageal strictures. On  patient was brought in for Pacemaker placement.     Transfer from: [facility]  Transfer to:  (  ) Medicine    ( X ) Telemetry    (  ) RCU    (  ) Palliative    (  ) Stroke Unit    (  ) _______________  Accepting physican:    MEDICATIONS:  STANDING MEDICATIONS  apixaban 5 milliGRAM(s) Oral every 12 hours  aspirin enteric coated 81 milliGRAM(s) Oral daily  atorvastatin 80 milliGRAM(s) Oral at bedtime  chlorhexidine 4% Liquid 1 Application(s) Topical <User Schedule>  furosemide   Injectable 40 milliGRAM(s) IV Push two times a day  levETIRAcetam 250 milliGRAM(s) Oral two times a day  levothyroxine 75 MICROGram(s) Oral daily  lisinopril 5 milliGRAM(s) Oral daily  magnesium sulfate  IVPB 2 Gram(s) IV Intermittent once  pantoprazole    Tablet 40 milliGRAM(s) Oral before breakfast  sodium chloride 1 Gram(s) Oral two times a day  tamsulosin 0.4 milliGRAM(s) Oral at bedtime  vancomycin  IVPB 1000 milliGRAM(s) IV Intermittent once  vancomycin  IVPB 1000 milliGRAM(s) IV Intermittent once  vancomycin  IVPB 1000 milliGRAM(s) IV Intermittent every 12 hours    PRN MEDICATIONS      VITAL SIGNS: Last 24 Hours  T(C): 36.7 (2021 07:39), Max: 37.2 (2021 20:16)  T(F): 98 (2021 07:39), Max: 98.9 (2021 20:16)  HR: 49 (2021 07:39) (48 - 50)  BP: 159/69 (2021 07:39) (124/58 - 159/69)  BP(mean): 99 (2021 07:39) (84 - 99)  RR: 18 (2021 07:39) (18 - 18)  SpO2: 97% (2021 02:00) (97% - 98%)    LABS:                        9.8    8.07  )-----------( 216      ( 2021 06:49 )             28.7     06-28    124<L>  |  88<L>  |  13  ----------------------------<  95  3.6   |  27  |  1.1    Ca    8.3<L>      2021 11:56  Mg     1.5         TPro  5.1<L>  /  Alb  3.2<L>  /  TBili  0.5  /  DBili  x   /  AST  13  /  ALT  11  /  AlkPhos  66  -      Urinalysis Basic - ( 2021 20:28 )    Color: Light Yellow / Appearance: Clear / S.010 / pH: x  Gluc: x / Ketone: Negative  / Bili: Negative / Urobili: <2 mg/dL   Blood: x / Protein: Negative / Nitrite: Negative   Leuk Esterase: Negative / RBC: x / WBC x   Sq Epi: x / Non Sq Epi: x / Bacteria: x              RADIOLOGY:    CCU COURSE:          ASSESSMENT & PLAN:         For Follow-Up:

## 2021-06-28 NOTE — CHART NOTE - NSCHARTNOTEFT_GEN_A_CORE
PACU ANESTHESIA ADMISSION NOTE      Procedure: insertion pacemaker  Post op diagnosis: sick sinus syndrome     ____  Intubated  TV:______       Rate: ______      FiO2: ______    __x__  Patent Airway    __x__  Full return of protective reflexes    __x__  Full recovery from anesthesia / back to baseline status    Vitals:  T(C): 36.8 (06-28-21 @ 15:46), Max: 37.2 (06-27-21 @ 20:16)  HR: 60 (06-28-21 @ 15:46) (48 - 91)  BP: 118/71 (06-28-21 @ 15:46) (118/71 - 159/69)  RR: 20 (06-28-21 @ 15:46) (18 - 20)  SpO2: 97% (06-28-21 @ 02:00) (97% - 98%)    Mental Status:  __x__ Awake   _____ Alert   _____ Drowsy   _____ Sedated    Nausea/Vomiting:  __x__ NO  ______Yes,   See Post - Op Orders          Pain Scale (0-10):  __0___    Treatment: __x__ None    ____ See Post - Op/PCA Orders    Post - Operative Fluids:   ____ Oral   __x__ See Post - Op Orders    Plan: Discharge:   ____Home       ___x__Floor     _____Critical Care    _____  Other:_________________    Comments: uneventful anesthesia course no complications. VItals stable. Pt transferred to PACU

## 2021-06-28 NOTE — PROGRESS NOTE ADULT - ASSESSMENT
hyponatremia   - improving   - possible SIADH, but hypervolemic on admission, now improving  CHF  Afib  HTN  Hypothyroidism  H/O ETOH     Plan:    cont lasix 40mg iv q12h  cont NaCl tabs  fluid restriction  encourage salty snacks  f/u bmp  O2 via NC   mg repletion  cont ACE-I

## 2021-06-28 NOTE — PROGRESS NOTE ADULT - ASSESSMENT
Cards; Dr Mcmullen    Assessment: 89y Male with h/p HTN, HLD, hypothyroid, AVR, CKD, PAF on Eliquis, SB at baseline, not on any AVN blockers , admitted with SOB and edema, found to be in acute on chronic HF exacerbation  Noted to be bradycardic to 40s bpm, AF with slow VR.    Impression:  acute on chronic HF exacerbation  AF with slow VR  bradycardia  hyponatremia, chronic     Plan:  - Keep NPO for DC PPM today  - Cont tele monitoring  - Hold AVN blocking agents  - Hyponatremia noted, improving  - CXR in AM  - Will follow

## 2021-06-28 NOTE — PROGRESS NOTE ADULT - SUBJECTIVE AND OBJECTIVE BOX
Patient is a 89y old  Male who presents with a chief complaint of Shortness of breath (2021 14:13)        Over Night Events:  Resting in bed.  On 2 liters O2.  Off pressors.  No events         ROS:     All ROS are negative except HPI         PHYSICAL EXAM    ICU Vital Signs Last 24 Hrs  T(C): 36.7 (2021 07:39), Max: 37.2 (2021 20:16)  T(F): 98 (2021 07:39), Max: 98.9 (2021 20:16)  HR: 49 (2021 07:39) (48 - 50)  BP: 159/69 (2021 07:39) (119/65 - 159/69)  BP(mean): 99 (2021 07:39) (84 - 99)  ABP: --  ABP(mean): --  RR: 18 (2021 07:39) (18 - 18)  SpO2: 97% (2021 02:00) (97% - 99%)      CONSTITUTIONAL:  Well nourished.  NAD    ENT:   Airway patent,   Mouth with normal mucosa.   No thrush    EYES:   Pupils equal,   Round and reactive to light.    CARDIAC:   Normal rate,   Regular rhythm.    No edema      Vascular:  Normal systolic impulse  No Carotid bruits    RESPIRATORY:   No wheezing  Bilateral BS  Normal chest expansion  Not tachypneic,  No use of accessory muscles    GASTROINTESTINAL:  Abdomen soft,   Non-tender,   No guarding,   + BS    MUSCULOSKELETAL:   Range of motion is not limited,  No clubbing, cyanosis    NEUROLOGICAL:   Alert and oriented   No motor  deficits.    SKIN:   Skin normal color for race,   Warm and dry and intact.   No evidence of rash.    PSYCHIATRIC:   Normal mood and affect.   No apparent risk to self or others.    HEMATOLOGICAL:  No cervical  lymphadenopathy.  no inguinal lymphadenopathy      21 @ 07:01  -  21 @ 07:00  --------------------------------------------------------  IN:    IV PiggyBack: 100 mL    Oral Fluid: 450 mL  Total IN: 550 mL    OUT:    Voided (mL): 1875 mL  Total OUT: 1875 mL    Total NET: -1325 mL          LABS:                            9.8    8.07  )-----------( 216      ( 2021 06:49 )             28.7                                                   124<L>  |  87<L>  |  13  ----------------------------<  114<H>  3.5   |  27  |  1.1    Ca    8.3<L>      2021 07:52  Mg     1.5         TPro  5.5<L>  /  Alb  3.4<L>  /  TBili  0.6  /  DBili  x   /  AST  17  /  ALT  13  /  AlkPhos  66                                               Urinalysis Basic - ( 2021 20:28 )    Color: Light Yellow / Appearance: Clear / S.010 / pH: x  Gluc: x / Ketone: Negative  / Bili: Negative / Urobili: <2 mg/dL   Blood: x / Protein: Negative / Nitrite: Negative   Leuk Esterase: Negative / RBC: x / WBC x   Sq Epi: x / Non Sq Epi: x / Bacteria: x                                                  LIVER FUNCTIONS - ( 2021 07:52 )  Alb: 3.4 g/dL / Pro: 5.5 g/dL / ALK PHOS: 66 U/L / ALT: 13 U/L / AST: 17 U/L / GGT: x                                                                                                                                       MEDICATIONS  (STANDING):  apixaban 5 milliGRAM(s) Oral every 12 hours  aspirin enteric coated 81 milliGRAM(s) Oral daily  atorvastatin 80 milliGRAM(s) Oral at bedtime  chlorhexidine 4% Liquid 1 Application(s) Topical <User Schedule>  furosemide   Injectable 40 milliGRAM(s) IV Push two times a day  levETIRAcetam 250 milliGRAM(s) Oral two times a day  levothyroxine 75 MICROGram(s) Oral daily  lisinopril 5 milliGRAM(s) Oral daily  pantoprazole    Tablet 40 milliGRAM(s) Oral before breakfast  sodium chloride 1 Gram(s) Oral two times a day  tamsulosin 0.4 milliGRAM(s) Oral at bedtime    MEDICATIONS  (PRN):      New X-rays reviewed:                                                                                  ECHO    CXR interpreted by me:  Pulmonary edema

## 2021-06-29 LAB
ALBUMIN SERPL ELPH-MCNC: 3.2 G/DL — LOW (ref 3.5–5.2)
ALP SERPL-CCNC: 65 U/L — SIGNIFICANT CHANGE UP (ref 30–115)
ALT FLD-CCNC: 10 U/L — SIGNIFICANT CHANGE UP (ref 0–41)
ANION GAP SERPL CALC-SCNC: 10 MMOL/L — SIGNIFICANT CHANGE UP (ref 7–14)
AST SERPL-CCNC: 16 U/L — SIGNIFICANT CHANGE UP (ref 0–41)
BASOPHILS # BLD AUTO: 0.06 K/UL — SIGNIFICANT CHANGE UP (ref 0–0.2)
BASOPHILS NFR BLD AUTO: 0.8 % — SIGNIFICANT CHANGE UP (ref 0–1)
BILIRUB SERPL-MCNC: 0.6 MG/DL — SIGNIFICANT CHANGE UP (ref 0.2–1.2)
BUN SERPL-MCNC: 12 MG/DL — SIGNIFICANT CHANGE UP (ref 10–20)
CALCIUM SERPL-MCNC: 8.6 MG/DL — SIGNIFICANT CHANGE UP (ref 8.5–10.1)
CHLORIDE SERPL-SCNC: 91 MMOL/L — LOW (ref 98–110)
CO2 SERPL-SCNC: 24 MMOL/L — SIGNIFICANT CHANGE UP (ref 17–32)
CREAT SERPL-MCNC: 1 MG/DL — SIGNIFICANT CHANGE UP (ref 0.7–1.5)
EOSINOPHIL # BLD AUTO: 0.07 K/UL — SIGNIFICANT CHANGE UP (ref 0–0.7)
EOSINOPHIL NFR BLD AUTO: 0.9 % — SIGNIFICANT CHANGE UP (ref 0–8)
GLUCOSE SERPL-MCNC: 83 MG/DL — SIGNIFICANT CHANGE UP (ref 70–99)
HCT VFR BLD CALC: 29 % — LOW (ref 42–52)
HGB BLD-MCNC: 10 G/DL — LOW (ref 14–18)
IMM GRANULOCYTES NFR BLD AUTO: 0.3 % — SIGNIFICANT CHANGE UP (ref 0.1–0.3)
LYMPHOCYTES # BLD AUTO: 1.27 K/UL — SIGNIFICANT CHANGE UP (ref 1.2–3.4)
LYMPHOCYTES # BLD AUTO: 17.1 % — LOW (ref 20.5–51.1)
MAGNESIUM SERPL-MCNC: 1.6 MG/DL — LOW (ref 1.8–2.4)
MCHC RBC-ENTMCNC: 30.6 PG — SIGNIFICANT CHANGE UP (ref 27–31)
MCHC RBC-ENTMCNC: 34.5 G/DL — SIGNIFICANT CHANGE UP (ref 32–37)
MCV RBC AUTO: 88.7 FL — SIGNIFICANT CHANGE UP (ref 80–94)
MONOCYTES # BLD AUTO: 0.89 K/UL — HIGH (ref 0.1–0.6)
MONOCYTES NFR BLD AUTO: 12 % — HIGH (ref 1.7–9.3)
NEUTROPHILS # BLD AUTO: 5.11 K/UL — SIGNIFICANT CHANGE UP (ref 1.4–6.5)
NEUTROPHILS NFR BLD AUTO: 68.9 % — SIGNIFICANT CHANGE UP (ref 42.2–75.2)
NRBC # BLD: 0 /100 WBCS — SIGNIFICANT CHANGE UP (ref 0–0)
PLATELET # BLD AUTO: 241 K/UL — SIGNIFICANT CHANGE UP (ref 130–400)
POTASSIUM SERPL-MCNC: 3.7 MMOL/L — SIGNIFICANT CHANGE UP (ref 3.5–5)
POTASSIUM SERPL-SCNC: 3.7 MMOL/L — SIGNIFICANT CHANGE UP (ref 3.5–5)
PROT SERPL-MCNC: 5 G/DL — LOW (ref 6–8)
RBC # BLD: 3.27 M/UL — LOW (ref 4.7–6.1)
RBC # FLD: 12.2 % — SIGNIFICANT CHANGE UP (ref 11.5–14.5)
SODIUM SERPL-SCNC: 125 MMOL/L — LOW (ref 135–146)
WBC # BLD: 7.42 K/UL — SIGNIFICANT CHANGE UP (ref 4.8–10.8)
WBC # FLD AUTO: 7.42 K/UL — SIGNIFICANT CHANGE UP (ref 4.8–10.8)

## 2021-06-29 PROCEDURE — 71045 X-RAY EXAM CHEST 1 VIEW: CPT | Mod: 26

## 2021-06-29 PROCEDURE — 99232 SBSQ HOSP IP/OBS MODERATE 35: CPT

## 2021-06-29 PROCEDURE — 93280 PM DEVICE PROGR EVAL DUAL: CPT | Mod: 26

## 2021-06-29 PROCEDURE — 99233 SBSQ HOSP IP/OBS HIGH 50: CPT | Mod: 24

## 2021-06-29 RX ORDER — SODIUM CHLORIDE 9 MG/ML
2 INJECTION INTRAMUSCULAR; INTRAVENOUS; SUBCUTANEOUS THREE TIMES A DAY
Refills: 0 | Status: DISCONTINUED | OUTPATIENT
Start: 2021-06-29 | End: 2021-06-30

## 2021-06-29 RX ORDER — MAGNESIUM SULFATE 500 MG/ML
2 VIAL (ML) INJECTION ONCE
Refills: 0 | Status: COMPLETED | OUTPATIENT
Start: 2021-06-29 | End: 2021-06-29

## 2021-06-29 RX ADMIN — LISINOPRIL 5 MILLIGRAM(S): 2.5 TABLET ORAL at 05:46

## 2021-06-29 RX ADMIN — SODIUM CHLORIDE 1 GRAM(S): 9 INJECTION INTRAMUSCULAR; INTRAVENOUS; SUBCUTANEOUS at 05:46

## 2021-06-29 RX ADMIN — LEVETIRACETAM 250 MILLIGRAM(S): 250 TABLET, FILM COATED ORAL at 06:00

## 2021-06-29 RX ADMIN — Medication 40 MILLIGRAM(S): at 05:46

## 2021-06-29 RX ADMIN — Medication 40 MILLIGRAM(S): at 17:59

## 2021-06-29 RX ADMIN — SODIUM CHLORIDE 2 GRAM(S): 9 INJECTION INTRAMUSCULAR; INTRAVENOUS; SUBCUTANEOUS at 14:19

## 2021-06-29 RX ADMIN — CHLORHEXIDINE GLUCONATE 1 APPLICATION(S): 213 SOLUTION TOPICAL at 05:45

## 2021-06-29 RX ADMIN — Medication 81 MILLIGRAM(S): at 11:32

## 2021-06-29 RX ADMIN — APIXABAN 5 MILLIGRAM(S): 2.5 TABLET, FILM COATED ORAL at 18:00

## 2021-06-29 RX ADMIN — ATORVASTATIN CALCIUM 80 MILLIGRAM(S): 80 TABLET, FILM COATED ORAL at 21:22

## 2021-06-29 RX ADMIN — SODIUM CHLORIDE 2 GRAM(S): 9 INJECTION INTRAMUSCULAR; INTRAVENOUS; SUBCUTANEOUS at 21:22

## 2021-06-29 RX ADMIN — TAMSULOSIN HYDROCHLORIDE 0.4 MILLIGRAM(S): 0.4 CAPSULE ORAL at 21:22

## 2021-06-29 RX ADMIN — PANTOPRAZOLE SODIUM 40 MILLIGRAM(S): 20 TABLET, DELAYED RELEASE ORAL at 05:47

## 2021-06-29 RX ADMIN — Medication 50 GRAM(S): at 10:09

## 2021-06-29 RX ADMIN — LEVETIRACETAM 250 MILLIGRAM(S): 250 TABLET, FILM COATED ORAL at 18:00

## 2021-06-29 RX ADMIN — Medication 75 MICROGRAM(S): at 05:46

## 2021-06-29 RX ADMIN — APIXABAN 5 MILLIGRAM(S): 2.5 TABLET, FILM COATED ORAL at 05:46

## 2021-06-29 RX ADMIN — Medication 250 MILLIGRAM(S): at 05:45

## 2021-06-29 NOTE — PROGRESS NOTE ADULT - ASSESSMENT
Mr Nielson is an 89 year old man with the past medical history of AFib, HLD, HTN who presented to the ED for Shortness of breath on June 24th. Patient was doing relatively well until 4 says prior to admission when he started experiencing shortness of breath on exertion which he did not previously have and was able to walk his dogs without difficulty. The dyspnea got progressively worse and he noticed some wheezing with exertion. He mentioned that this dyspnea was associated with cough that is mostly dry but sometimes bring up clear sputum. Patient denied having any chest pain, orthopnea or PNDs. Palpitations or any other symptoms. Patient was found to be Bradycardic in the 40's. He was seen and evaluated by EP and cardioversion was discussed but medical management was prefered. June 28th patient was brought in for Pacemaker placement which went well without incidence. Patient remains hyponatremic at 125 despite 1gNaCl twice daily. Nephrology suggested increasing NaCl to 2g three times daily.    PROBLEM LIST    #SOB, resolved likely secondary to demand ishemia vs throboembolic event  -given patient's chest pain on exertion and his history of coronary artery disease demand ishecmia is likely  -as patient is on apixiban thromboembolic event is less likely  - as symptoms resolved post pace maker placement, it is likely that this was the cause    #Bradycardia s/p pacemaker placement, paced at 60 resolved   -EKG demonstrated sinus bradycardia and chronotropic incompetance suggestive of sick sinus syndrome/sinus node dysfunction  -monitor pacemaker  -discussed s/s of bradycardia with family and patient and to report to MD if any symptoms return     #Hyponatremia, stable, 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 Patient was doing relatively well until 4 says prior to admission when he started experiencing shortness of breath on exertion which he did not previously have and was able to walk his dogs without difficulty. The dyspnea got progressively worse and he noticed some wheezing with exertion. He mentioned that this dyspnea was associated with cough that is mostly dry but sometimes bring up clear sputum. Patient denied having any chest pain, orthopnea or PNDs. Palpitations or any other symptoms. Patient was found to be Bradycardic in the 40's. He was seen and evaluated by EP and cardioversion was discussed but medical management was prefered. June 28th patient was brought in for Pacemaker placement which went well without incidence. Patient remains hyponatremic at 125 despite 1gNaCl twice daily. Nephrology suggested increasing NaCl to 2g three times daily.    PROBLEM LIST    #SOB, resolved likely secondary to demand ishemia vs throboembolic event  -given patient's chest pain on exertion and his history of coronary artery disease demand ishecmia is likely  -as patient is on apixiban thromboembolic event is less likely  - as symptoms resolved post pace maker placement, it is likely that this was the cause    #Bradycardia s/p pacemaker placement, paced at 60 resolved   -EKG demonstrated sinus bradycardia and chronotropic incompetance suggestive of sick sinus syndrome/sinus node dysfunction  -monitor pacemaker  -discussed s/s of bradycardia with family and patient and to report to MD if any symptoms return     #Hyponatremia, stable, 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

## 2021-06-29 NOTE — PROGRESS NOTE ADULT - SUBJECTIVE AND OBJECTIVE BOX
[facility] FOLLOW UP NOTE  --------------------------------------------------------------------------------  pt seen and examined  s/p ppm  less sob  na+ slightly lower today    PAST HISTORY  --------------------------------------------------------------------------------  No significant changes to PMH, PSH, FHx, SHx, unless otherwise noted    ALLERGIES & MEDICATIONS  --------------------------------------------------------------------------------  Allergies    No Known Allergies    Physical Exam:  	Gen: NAD  	Pulm: CTA B/L  	CV: RRR, S1S2  	Abd: +BS, soft, nontender/nondistended  	: No suprapubic tenderness  	LE: Warm, no edema    Hospital Medications:   MEDICATIONS  (STANDING):  apixaban 5 milliGRAM(s) Oral every 12 hours  aspirin enteric coated 81 milliGRAM(s) Oral daily  atorvastatin 80 milliGRAM(s) Oral at bedtime  chlorhexidine 4% Liquid 1 Application(s) Topical <User Schedule>  furosemide   Injectable 40 milliGRAM(s) IV Push two times a day  levETIRAcetam 250 milliGRAM(s) Oral two times a day  levothyroxine 75 MICROGram(s) Oral daily  lisinopril 5 milliGRAM(s) Oral daily  magnesium sulfate  IVPB 2 Gram(s) IV Intermittent once  pantoprazole    Tablet 40 milliGRAM(s) Oral before breakfast  sodium chloride 2 Gram(s) Oral three times a day  tamsulosin 0.4 milliGRAM(s) Oral at bedtime      advance care planning and advance care directives reviewed         VITALS:  T(F): 97.6 (21 @ 16:00), Max: 98.4 (21 @ 20:00)  HR: 61 (21 @ 16:00)  BP: 149/68 (21 @ 16:00)  RR: 20 (21 @ 16:00)  SpO2: 97% (21 @ 16:00)  Wt(kg): --     @ 07:01  -   @ 07:00  --------------------------------------------------------  IN: 550 mL / OUT: 1875 mL / NET: -1325 mL     @ 07:01  -   @ 07:00  --------------------------------------------------------  IN: 480 mL / OUT: 1250 mL / NET: -770 mL     @ 07:01  -   @ 17:57  --------------------------------------------------------  IN: 0 mL / OUT: 925 mL / NET: -925 mL          LABS:      125<L>  |  91<L>  |  12  ----------------------------<  83  3.7   |  24  |  1.0    Ca    8.6      2021 05:34  Mg     1.6         TPro  5.0<L>  /  Alb  3.2<L>  /  TBili  0.6  /  DBili      /  AST  16  /  ALT  10  /  AlkPhos  65                            10.0   7.42  )-----------( 241      ( 2021 05:34 )             29.0       Urine Studies:  Urinalysis Basic - ( 2021 20:28 )    Color: Light Yellow / Appearance: Clear / S.010 / pH:   Gluc:  / Ketone: Negative  / Bili: Negative / Urobili: <2 mg/dL   Blood:  / Protein: Negative / Nitrite: Negative   Leuk Esterase: Negative / RBC:  / WBC    Sq Epi:  / Non Sq Epi:  / Bacteria:       Protein/Creatinine Ratio Calculation: 0.2 Ratio ( @ 10:30)  Creatinine, Random Urine: 49 mg/dL ( @ 10:30)  Potassium, Random Urine: 29 mmol/L ( @ 07:52)  Osmolality, Random Urine: 320 mos/kg ( @ 07:52)  Sodium, Random Urine: 69.0 mmoL/L ( @ 07:52)  Calcium, Random Urine: 9 mg/dL ( @ 07:52)      RADIOLOGY & ADDITIONAL STUDIES:

## 2021-06-29 NOTE — PROGRESS NOTE ADULT - ATTENDING COMMENTS
CXR and interrogation is acceptable  Continue Eliquis  Switch to PO lasix if ok with Nephrology  Rehab c/s  Ok to DC from EP standpoint of view.

## 2021-06-29 NOTE — PHARMACOTHERAPY INTERVENTION NOTE - COMMENTS
AMA HARDY 89yMale    ACE/ARB/ARNI: None outpatient (Lisinopril 5 mg inpatient)  Beta-blocker: None  Diuretic: Furosemide 20 mg PO QD (40 mg PO BID inpatient)    Pharmacy: [facility]    Home Medications:  -Albuterol HFA 2 puffs q4h PRN  -Amlodipine 10 mg PO QD  -Atorvastatin 80 mg PO QD  -Eliquis 5 mg PO BID (Last picked up March, 90 day supply, last copay $30)  -Folic acid 1 mg PO QD  -Hydralazine 25 mg PO BID  -Keppra 250 mg PO BID  -Levothyroxine 75 mcg PO QD  -Magnesium 400 mg PO TID  -Tamsulosin 0.4 mg PO QD    Discrepancies:  1. As per pharmacy unclear if patient takes aspirin, multivitamins, ferrous sulfate, thiamine or Vitamin D      Recommendations:  1. Patient off pressors, planning for D/C, will  once discharge note initiated  2. Monitor Na (currently hyponatremic)  3. Monitor BP (last BP was 164/77)    Contact pharmacy resident with questions [phone]

## 2021-06-29 NOTE — PROGRESS NOTE ADULT - SUBJECTIVE AND OBJECTIVE BOX
Patient is a 89y old  Male who presents with a chief complaint of Shortness of breath (28 Jun 2021 14:31)        Over Night Events:  SP PPM.  On RA.  Off pressors         ROS:     All ROS are negative except HPI         PHYSICAL EXAM    ICU Vital Signs Last 24 Hrs  T(C): 35.7 (29 Jun 2021 07:30), Max: 36.9 (28 Jun 2021 20:00)  T(F): 96.3 (29 Jun 2021 07:30), Max: 98.4 (28 Jun 2021 20:00)  HR: 60 (29 Jun 2021 07:30) (60 - 91)  BP: 164/77 (29 Jun 2021 07:30) (118/71 - 164/77)  BP(mean): 111 (29 Jun 2021 07:30) (89 - 111)  ABP: --  ABP(mean): --  RR: 18 (29 Jun 2021 07:30) (18 - 20)  SpO2: 99% (29 Jun 2021 07:30) (97% - 99%)      CONSTITUTIONAL:  Well nourished.  NAD    ENT:   Airway patent,   Mouth with normal mucosa.   No thrush    EYES:   Pupils equal,   Round and reactive to light.    CARDIAC:   Normal rate,   Irregular rhythm.    No edema      Vascular:  Normal systolic impulse  No Carotid bruits    RESPIRATORY:   No wheezing  Bilateral BS  Normal chest expansion  Not tachypneic,  No use of accessory muscles    GASTROINTESTINAL:  Abdomen soft,   Non-tender,   No guarding,   + BS    MUSCULOSKELETAL:   Range of motion is not limited,  No clubbing, cyanosis    NEUROLOGICAL:   Alert and oriented   No motor  deficits.    SKIN:   Skin normal color for race,   Warm and dry and intact.   No evidence of rash.    PSYCHIATRIC:   Normal mood and affect.   No apparent risk to self or others.    HEMATOLOGICAL:  No cervical  lymphadenopathy.  no inguinal lymphadenopathy      06-28-21 @ 07:01  -  06-29-21 @ 07:00  --------------------------------------------------------  IN:    IV PiggyBack: 250 mL    Oral Fluid: 230 mL  Total IN: 480 mL    OUT:    Voided (mL): 1250 mL  Total OUT: 1250 mL    Total NET: -770 mL          LABS:                            10.0   7.42  )-----------( 241      ( 29 Jun 2021 05:34 )             29.0                                               06-29    125<L>  |  91<L>  |  12  ----------------------------<  83  3.7   |  24  |  1.0    Ca    8.6      29 Jun 2021 05:34  Mg     1.6     06-29    TPro  5.0<L>  /  Alb  3.2<L>  /  TBili  0.6  /  DBili  x   /  AST  16  /  ALT  10  /  AlkPhos  65  06-29                                                                                           LIVER FUNCTIONS - ( 29 Jun 2021 05:34 )  Alb: 3.2 g/dL / Pro: 5.0 g/dL / ALK PHOS: 65 U/L / ALT: 10 U/L / AST: 16 U/L / GGT: x                                                                                                                                       MEDICATIONS  (STANDING):  apixaban 5 milliGRAM(s) Oral every 12 hours  aspirin enteric coated 81 milliGRAM(s) Oral daily  atorvastatin 80 milliGRAM(s) Oral at bedtime  chlorhexidine 4% Liquid 1 Application(s) Topical <User Schedule>  furosemide   Injectable 40 milliGRAM(s) IV Push two times a day  levETIRAcetam 250 milliGRAM(s) Oral two times a day  levothyroxine 75 MICROGram(s) Oral daily  lisinopril 5 milliGRAM(s) Oral daily  magnesium sulfate  IVPB 2 Gram(s) IV Intermittent once  pantoprazole    Tablet 40 milliGRAM(s) Oral before breakfast  sodium chloride 1 Gram(s) Oral two times a day  tamsulosin 0.4 milliGRAM(s) Oral at bedtime    MEDICATIONS  (PRN):      New X-rays reviewed:                                                                                  ECHO    CXR interpreted by me:  Enlarged heart.

## 2021-06-29 NOTE — PROGRESS NOTE ADULT - ASSESSMENT
hyponatremia   - improving   - possible SIADH, but hypervolemic on admission, now improving  CHF  Afib with slow VR / s/p PPM  HTN  Hypothyroidism  H/O ETOH     Plan:    cont lasix 40mg iv q12h  inc nacl 2g po tid  fluid restriction  encourage salty snacks  f/u bmp  O2 via NC   mg repletion  cont ACE-I  d/w EP   d/w resident  full code

## 2021-06-29 NOTE — PHYSICAL THERAPY INITIAL EVALUATION ADULT - PERTINENT HX OF CURRENT PROBLEM, REHAB EVAL
89y Male with h/p HTN, HLD, hypothyroid, AVR, CKD, PAF on Eliquis, SB at baseline, not on any AVN blockers , admitted with SOB and edema, found to be in acute on chronic HF exacerbation

## 2021-06-29 NOTE — PHYSICAL THERAPY INITIAL EVALUATION ADULT - GENERAL OBSERVATIONS, REHAB EVAL
[id number] Patient encountered seated in bed side chair + Iv lock, + tele , NAD receptive to PT . Wife at bed side

## 2021-06-29 NOTE — PROGRESS NOTE ADULT - SUBJECTIVE AND OBJECTIVE BOX
INTERVAL HPI/OVERNIGHT EVENTS: No acute events overnight    MEDICATIONS  (STANDING):  apixaban 5 milliGRAM(s) Oral every 12 hours  aspirin enteric coated 81 milliGRAM(s) Oral daily  atorvastatin 80 milliGRAM(s) Oral at bedtime  chlorhexidine 4% Liquid 1 Application(s) Topical <User Schedule>  furosemide   Injectable 40 milliGRAM(s) IV Push two times a day  levETIRAcetam 250 milliGRAM(s) Oral two times a day  levothyroxine 75 MICROGram(s) Oral daily  lisinopril 5 milliGRAM(s) Oral daily  magnesium sulfate  IVPB 2 Gram(s) IV Intermittent once  magnesium sulfate  IVPB 2 Gram(s) IV Intermittent once  pantoprazole    Tablet 40 milliGRAM(s) Oral before breakfast  sodium chloride 1 Gram(s) Oral two times a day  tamsulosin 0.4 milliGRAM(s) Oral at bedtime    MEDICATIONS  (PRN):      Allergies  No Known Allergies    Intolerances        REVIEW OF SYSTEMS: No CP, palpitations, dizziness or SOB    Vital Signs Last 24 Hrs  T(C): 35.7 (29 Jun 2021 07:30), Max: 36.9 (28 Jun 2021 20:00)  T(F): 96.3 (29 Jun 2021 07:30), Max: 98.4 (28 Jun 2021 20:00)  HR: 60 (29 Jun 2021 07:30) (60 - 91)  BP: 164/77 (29 Jun 2021 07:30) (118/71 - 164/77)  BP(mean): 111 (29 Jun 2021 07:30) (89 - 111)  RR: 18 (29 Jun 2021 07:30) (18 - 20)  SpO2: 99% (29 Jun 2021 07:30) (97% - 99%)    06-28-21 @ 07:01  -  06-29-21 @ 07:00  --------------------------------------------------------  IN: 480 mL / OUT: 1250 mL / NET: -770 mL        Physical Exam  GENERAL: In no apparent distress, well nourished, and hydrated.  EYES: EOMI, PERRLA, conjunctiva and sclera clear  ENMT: No tonsillar erythema, exudates, or enlargements; ist mucous membranes, Good dentition, No lesions  NECK: Supple   HEART: Regular rate and rhythm; No murmurs, rubs, or gallops.  PULMONARY: Clear to auscultation and perfusion.  No rales, wheezing, or rhonchi bilaterally.  ABDOMEN: Soft, Nontender, Nondistended; Bowel sounds present  EXTREMITIES:  2+ Peripheral Pulses, No clubbing, cyanosis, or edema  SKIN: Dressing over L chest wall, no hematoma  NEUROLOGICAL: Grossly nonfocal    LABS:                        10.0   7.42  )-----------( 241      ( 29 Jun 2021 05:34 )             29.0     06-29    125<L>  |  91<L>  |  12  ----------------------------<  83  3.7   |  24  |  1.0    Ca    8.6      29 Jun 2021 05:34  Mg     1.6     06-29    TPro  5.0<L>  /  Alb  3.2<L>  /  TBili  0.6  /  DBili  x   /  AST  16  /  ALT  10  /  AlkPhos  65  06-29          RADIOLOGY & ADDITIONAL TESTS:  < from: Xray Chest 1 View- PORTABLE-Routine (Xray Chest 1 View- PORTABLE-Routine in AM.) (06.28.21 @ 06:18) >  EXAM:  XR CHEST PORTABLE ROUTINE 1V            PROCEDURE DATE:  06/28/2021            INTERPRETATION:  Clinical History / Reason for exam: CHF.    Comparison : Chest radiograph 6/27/2021.    Technique/Positioning: Frontal chest radiograph.    Findings:    Support devices: Stable left chest wall loop recorder.    Cardiac/mediastinum/hilum: Stable cardiomegaly.    Lung parenchyma/Pleura: Slightly decreased bilateral opacities/effusions. No pneumothorax.    Skeleton/soft tissues: Stable.    Impression:    Slightly decreased bilateral opacities/effusions.            HEAVEN MELO MD; Resident Radiologist  This document has been electronically signed.  ESTEFANIA ALEX MD; Attending Radiologist  This document has been electronically signed. Jun 28 202110:16AM    < end of copied text >

## 2021-06-29 NOTE — PROGRESS NOTE ADULT - ASSESSMENT
Cards; Dr Mcmullen    Assessment: 89y Male with h/p HTN, HLD, hypothyroid, AVR, CKD, PAF on Eliquis, SB at baseline, not on any AVN blockers , admitted with SOB and edema, found to be in acute on chronic HF exacerbation  Noted to be bradycardic to 40s bpm, AF with slow VR. S/P DC PPM ([facility]) POD#1    Impression:  acute on chronic HF exacerbation  AF with slow VR sp DC PPM  bradycardia  hyponatremia, chronic     Plan:  - CXR completed  - Interrogation completed, numbers within appropriate range  - Can resume BB as needed  - Do not lift left arm above shoulder height x 6 weeks  - Do not get dressing wet until removed in the office in 1 week  - Follow up with Dr Powell in 1 week  [address] [facility] [address]  [place], [place] [id number] [phone]

## 2021-06-29 NOTE — PROGRESS NOTE ADULT - SUBJECTIVE AND OBJECTIVE BOX
AMA HARDY 89y Male  MRN#: [id number]   Hospital Day: 5d    SUBJECTIVE  Patient is a 89y old Male who presents with a chief complaint of Shortness of breath (29 Jun 2021 08:54)  Currently admitted to medicine with the primary diagnosis of CHF exacerbation      INTERVAL HPI AND OVERNIGHT EVENTS:  Patient was examined and seen at bedside. This morning he is resting comfortably in bed and reports no issues or overnight events.    REVIEW OF SYMPTOMS:  CONSTITUTIONAL: No weakness, fevers or chills; No headaches  EYES: No visual changes, eye pain, or discharge  ENT: No vertigo; No ear pain or change in hearing; No sore throat or difficulty swallowing  NECK: No pain or stiffness  RESPIRATORY: No cough, wheezing, or hemoptysis; No shortness of breath  CARDIOVASCULAR: No chest pain or palpitations  GASTROINTESTINAL: No abdominal or epigastric pain; No nausea, vomiting, or hematemesis; No diarrhea or constipation; No melena or hematochezia  GENITOURINARY: No dysuria, frequency or hematuria  MUSCULOSKELETAL: No joint pain, no muscle pain, no weakness  NEUROLOGICAL: No numbness or weakness  SKIN: No itching or rashes    OBJECTIVE  PAST MEDICAL & SURGICAL HISTORY  Coronary artery disease    Atrial fibrillation    Hyperlipidemia    Hypertension    Hypothyroidism    H/O aortic valve replacement  porcine    History of loop recorder  2017    Status post appendectomy      ALLERGIES:  No Known Allergies    MEDICATIONS:  STANDING MEDICATIONS  apixaban 5 milliGRAM(s) Oral every 12 hours  aspirin enteric coated 81 milliGRAM(s) Oral daily  atorvastatin 80 milliGRAM(s) Oral at bedtime  chlorhexidine 4% Liquid 1 Application(s) Topical <User Schedule>  furosemide   Injectable 40 milliGRAM(s) IV Push two times a day  levETIRAcetam 250 milliGRAM(s) Oral two times a day  levothyroxine 75 MICROGram(s) Oral daily  lisinopril 5 milliGRAM(s) Oral daily  magnesium sulfate  IVPB 2 Gram(s) IV Intermittent once  pantoprazole    Tablet 40 milliGRAM(s) Oral before breakfast  sodium chloride 2 Gram(s) Oral three times a day  tamsulosin 0.4 milliGRAM(s) Oral at bedtime    PRN MEDICATIONS      VITAL SIGNS: Last 24 Hours  T(C): 36.4 (29 Jun 2021 16:00), Max: 36.9 (28 Jun 2021 20:00)  T(F): 97.6 (29 Jun 2021 16:00), Max: 98.4 (28 Jun 2021 20:00)  HR: 61 (29 Jun 2021 16:00) (60 - 61)  BP: 149/68 (29 Jun 2021 16:00) (143/67 - 164/77)  BP(mean): 97 (29 Jun 2021 16:00) (97 - 111)  RR: 20 (29 Jun 2021 16:00) (18 - 20)  SpO2: 97% (29 Jun 2021 16:00) (97% - 99%)    LABS:                        10.0   7.42  )-----------( 241      ( 29 Jun 2021 05:34 )             29.0     06-29    125<L>  |  91<L>  |  12  ----------------------------<  83  3.7   |  24  |  1.0    Ca    8.6      29 Jun 2021 05:34  Mg     1.6     06-29    TPro  5.0<L>  /  Alb  3.2<L>  /  TBili  0.6  /  DBili  x   /  AST  16  /  ALT  10  /  AlkPhos  65  06-29                  RADIOLOGY:  CXR:   1. Decreased bilateral opacities.    CT angiography: pending      PHYSICAL EXAM:  CONSTITUTIONAL: No acute distress, well-developed, well-groomed, AAOx3  HEAD: Atraumatic, normocephalic  EYES: EOM intact, PERRLA, conjunctiva and sclera clear  ENT: Supple, no masses, no thyromegaly, no bruits, no JVD; moist mucous membranes  PULMONARY: Clear to auscultation bilaterally; no wheezes, rales, or rhonchi  CARDIOVASCULAR: Regular rate and rhythm; no murmurs, rubs, or gallops  GASTROINTESTINAL: Soft, non-tender, non-distended; bowel sounds present  MUSCULOSKELETAL: 2+ peripheral pulses; no clubbing, no cyanosis, no edema  NEUROLOGY: non-focal  SKIN: PACEMAKER IN PLACE, CLEAN DRY DRESSING WITH NO ERYTHEMA

## 2021-06-30 LAB
ALBUMIN SERPL ELPH-MCNC: 3.5 G/DL — SIGNIFICANT CHANGE UP (ref 3.5–5.2)
ALP SERPL-CCNC: 73 U/L — SIGNIFICANT CHANGE UP (ref 30–115)
ALT FLD-CCNC: 10 U/L — SIGNIFICANT CHANGE UP (ref 0–41)
ANION GAP SERPL CALC-SCNC: 11 MMOL/L — SIGNIFICANT CHANGE UP (ref 7–14)
AST SERPL-CCNC: 14 U/L — SIGNIFICANT CHANGE UP (ref 0–41)
BILIRUB SERPL-MCNC: 0.5 MG/DL — SIGNIFICANT CHANGE UP (ref 0.2–1.2)
BUN SERPL-MCNC: 13 MG/DL — SIGNIFICANT CHANGE UP (ref 10–20)
CALCIUM SERPL-MCNC: 8.7 MG/DL — SIGNIFICANT CHANGE UP (ref 8.5–10.1)
CHLORIDE SERPL-SCNC: 95 MMOL/L — LOW (ref 98–110)
CO2 SERPL-SCNC: 26 MMOL/L — SIGNIFICANT CHANGE UP (ref 17–32)
CREAT SERPL-MCNC: 1.2 MG/DL — SIGNIFICANT CHANGE UP (ref 0.7–1.5)
GLUCOSE SERPL-MCNC: 106 MG/DL — HIGH (ref 70–99)
HCT VFR BLD CALC: 30.7 % — LOW (ref 42–52)
HGB BLD-MCNC: 10.3 G/DL — LOW (ref 14–18)
MAGNESIUM SERPL-MCNC: 1.9 MG/DL — SIGNIFICANT CHANGE UP (ref 1.8–2.4)
MCHC RBC-ENTMCNC: 30 PG — SIGNIFICANT CHANGE UP (ref 27–31)
MCHC RBC-ENTMCNC: 33.6 G/DL — SIGNIFICANT CHANGE UP (ref 32–37)
MCV RBC AUTO: 89.5 FL — SIGNIFICANT CHANGE UP (ref 80–94)
NRBC # BLD: 0 /100 WBCS — SIGNIFICANT CHANGE UP (ref 0–0)
PLATELET # BLD AUTO: 257 K/UL — SIGNIFICANT CHANGE UP (ref 130–400)
POTASSIUM SERPL-MCNC: 3.7 MMOL/L — SIGNIFICANT CHANGE UP (ref 3.5–5)
POTASSIUM SERPL-SCNC: 3.7 MMOL/L — SIGNIFICANT CHANGE UP (ref 3.5–5)
PROT SERPL-MCNC: 5.5 G/DL — LOW (ref 6–8)
RBC # BLD: 3.43 M/UL — LOW (ref 4.7–6.1)
RBC # FLD: 12.5 % — SIGNIFICANT CHANGE UP (ref 11.5–14.5)
SODIUM SERPL-SCNC: 132 MMOL/L — LOW (ref 135–146)
WBC # BLD: 6.6 K/UL — SIGNIFICANT CHANGE UP (ref 4.8–10.8)
WBC # FLD AUTO: 6.6 K/UL — SIGNIFICANT CHANGE UP (ref 4.8–10.8)

## 2021-06-30 PROCEDURE — 71045 X-RAY EXAM CHEST 1 VIEW: CPT | Mod: 26

## 2021-06-30 PROCEDURE — 99232 SBSQ HOSP IP/OBS MODERATE 35: CPT

## 2021-06-30 RX ADMIN — Medication 40 MILLIGRAM(S): at 06:09

## 2021-06-30 RX ADMIN — LEVETIRACETAM 250 MILLIGRAM(S): 250 TABLET, FILM COATED ORAL at 17:19

## 2021-06-30 RX ADMIN — ATORVASTATIN CALCIUM 80 MILLIGRAM(S): 80 TABLET, FILM COATED ORAL at 22:09

## 2021-06-30 RX ADMIN — APIXABAN 5 MILLIGRAM(S): 2.5 TABLET, FILM COATED ORAL at 06:08

## 2021-06-30 RX ADMIN — APIXABAN 5 MILLIGRAM(S): 2.5 TABLET, FILM COATED ORAL at 17:19

## 2021-06-30 RX ADMIN — TAMSULOSIN HYDROCHLORIDE 0.4 MILLIGRAM(S): 0.4 CAPSULE ORAL at 22:09

## 2021-06-30 RX ADMIN — LISINOPRIL 5 MILLIGRAM(S): 2.5 TABLET ORAL at 06:08

## 2021-06-30 RX ADMIN — Medication 81 MILLIGRAM(S): at 11:00

## 2021-06-30 RX ADMIN — SODIUM CHLORIDE 2 GRAM(S): 9 INJECTION INTRAMUSCULAR; INTRAVENOUS; SUBCUTANEOUS at 06:08

## 2021-06-30 RX ADMIN — PANTOPRAZOLE SODIUM 40 MILLIGRAM(S): 20 TABLET, DELAYED RELEASE ORAL at 06:10

## 2021-06-30 RX ADMIN — Medication 40 MILLIGRAM(S): at 17:19

## 2021-06-30 RX ADMIN — Medication 75 MICROGRAM(S): at 06:08

## 2021-06-30 RX ADMIN — LEVETIRACETAM 250 MILLIGRAM(S): 250 TABLET, FILM COATED ORAL at 06:08

## 2021-06-30 NOTE — CHART NOTE - NSCHARTNOTEFT_GEN_A_CORE
Mr Nielson is an 89 year old man with the past medical history of AFib, HLD, HTN who presented to the ED for Shortness of breath on June 24th. Patient was doing relatively well until 4 says prior to admission when he started experiencing shortness of breath on exertion which he did not previously have and was able to walk his dogs without difficulty. The dyspnea got progressively worse and he noticed some wheezing with exertion. He mentioned that this dyspnea was associated with cough that is mostly dry but sometimes bring up clear sputum. Patient denied having any chest pain, orthopnea or PNDs. Palpitations or any other symptoms. Patient was found to be Bradycardic in the 40's. He was seen and evaluated by EP and cardioversion was discussed but ultimately he was not a candidate. On June 28th patient was brought in for Pacemaker placement. Procedure went well and he has not had any complications since. Physical therapy has been working with him and they found yesterday that he lists backwards and as such is a FALL RISK.  Patient has hypervolemic HYPONATREMIA (now 132 from 125>124>126>124>121). He has a history of hyponatremia in the past that had induced seizures though he did not on this admission.      Radiology Impression: Basilar opacifications, worsening on CXR June 30    Follow up: sodium, potassium and magnesium. CXR    Transfer to:  ( X ) Medicine    (  ) Telemetry    (  ) RCU    (  ) Palliative    (  ) Stroke Unit    (  ) _______________  Accepting physican:    MEDICATIONS:  STANDING MEDICATIONS  apixaban 5 milliGRAM(s) Oral every 12 hours  aspirin enteric coated 81 milliGRAM(s) Oral daily  atorvastatin 80 milliGRAM(s) Oral at bedtime  chlorhexidine 4% Liquid 1 Application(s) Topical <User Schedule>  furosemide   Injectable 40 milliGRAM(s) IV Push two times a day  levETIRAcetam 250 milliGRAM(s) Oral two times a day  levothyroxine 75 MICROGram(s) Oral daily  lisinopril 5 milliGRAM(s) Oral daily  magnesium sulfate  IVPB 2 Gram(s) IV Intermittent once  pantoprazole    Tablet 40 milliGRAM(s) Oral before breakfast  tamsulosin 0.4 milliGRAM(s) Oral at bedtime    PRN MEDICATIONS      VITAL SIGNS: Last 24 Hours  T(C): 36.5 (30 Jun 2021 08:53), Max: 36.8 (29 Jun 2021 20:02)  T(F): 97.7 (30 Jun 2021 08:53), Max: 98.2 (29 Jun 2021 20:02)  HR: 60 (30 Jun 2021 08:53) (60 - 61)  BP: 136/72 (30 Jun 2021 08:53) (136/72 - 163/72)  BP(mean): 99 (30 Jun 2021 08:53) (97 - 99)  RR: 18 (30 Jun 2021 08:53) (18 - 20)  SpO2: 98% (30 Jun 2021 06:06) (97% - 98%)    LABS:                        10.3   6.60  )-----------( 257      ( 30 Jun 2021 07:00 )             30.7     06-30    132<L>  |  95<L>  |  13  ----------------------------<  106<H>  3.7   |  26  |  1.2    Ca    8.7      30 Jun 2021 07:00  Mg     1.9     06-30    TPro  5.5<L>  /  Alb  3.5  /  TBili  0.5  /  DBili  x   /  AST  14  /  ALT  10  /  AlkPhos  73  06-30                            ASSESSMENT & PLAN:       PROBLEM LIST    #SOB, resolved likely secondary to demand ishemia vs throboembolic event  -given patient's chest pain on exertion and his history of coronary artery disease demand ishecmia is likely  -as patient is on apixiban thromboembolic event is less likely  - as symptoms resolved post pace maker placement, it is likely that this was the cause    #Bradycardia s/p pacemaker placement, paced at 60 resolved   -EKG demonstrated sinus bradycardia and chronotropic incompetance suggestive of sick sinus syndrome/sinus node dysfunction  -monitor pacemaker  -discussed s/s of bradycardia with family and patient and to report to MD if any symptoms return     #Hyponatremia, stable, secondary to SIADH vs low dietary solute intake  - despite receiving 2gNaCl daily sodium was has not improved over the last 3 days which is more suggestive of SIADH. There is also an opacification on CXR but afebrile without leukocytosis  -continue with furosamide 40mg bid    #Coronary artery disease, Hyperlipidemia, STABLE  continue with:  -apixiban 5mg  -lisinopril 5mg  -atorvestatin 80mg      #Atrial fibrillation, stable  patient is bradycardic, stable now on pacemaker      For Follow-Up:

## 2021-06-30 NOTE — PROGRESS NOTE ADULT - ASSESSMENT
IMPRESSION:  SOB 2/2 Pulmonary edema/Fluid overload improved   Hyponatremia Improving   Sick sinus sp PPM   H/O COPD  H/O Seizure disorder  Afib on Eliquis    PLAN:    CNS: Avoid CNS depressants.  c/w AEDs    HEENT: Oral care    PULMONARY:  HOB @ 45 degrees. Aspiration Precautions.     CARDIOVASCULAR: Avoid volume overload.  Continue Present management.     GI: GI prophylaxis.  Feeding as tolerated     RENAL:  Follow up lytes.  Correct as needed.    INFECTIOUS DISEASE: Follow up cultures. No ABX for now    HEMATOLOGICAL:  DVT prophylaxis c/w Eliquis    ENDOCRINE:  Follow up FS.      MUSCULOSKELETAL: OOB to chair.  PT OT     Thorne: No  Lines: Peripheral IV  Code status: Full code  Disposition: Floor   DC planning

## 2021-06-30 NOTE — PROGRESS NOTE ADULT - ASSESSMENT
hyponatremia   - resolving  CHF  Afib with slow VR / s/p PPM  HTN  Hypothyroidism  hx of ETOH abuse    Plan:    cont lasix 40mg iv q12h  cont nacl 2g po tid  fluid restriction  encourage salty snacks  f/u bmp  cont ACE-I  replete K+ and Mg++ PRN

## 2021-06-30 NOTE — PROGRESS NOTE ADULT - SUBJECTIVE AND OBJECTIVE BOX
[place] [facility] FOLLOW UP NOTE  --------------------------------------------------------------------------------    s/p ppm  less sob  na+ better  still on iv lasix  tolerating nacl tabs    PAST HISTORY  --------------------------------------------------------------------------------  No significant changes to PMH, PSH, FHx, SHx, unless otherwise noted    ALLERGIES & MEDICATIONS  --------------------------------------------------------------------------------  Allergies    No Known Allergies    Physical Exam:  	Gen: NAD  	Pulm: CTA B/L  	CV: RRR, S1S2  	Abd: +BS, soft, nontender/nondistended  	: No suprapubic tenderness  	LE: Warm, no edema      Hospital Medications:   MEDICATIONS  (STANDING):  apixaban 5 milliGRAM(s) Oral every 12 hours  aspirin enteric coated 81 milliGRAM(s) Oral daily  atorvastatin 80 milliGRAM(s) Oral at bedtime  chlorhexidine 4% Liquid 1 Application(s) Topical <User Schedule>  furosemide   Injectable 40 milliGRAM(s) IV Push two times a day  levETIRAcetam 250 milliGRAM(s) Oral two times a day  levothyroxine 75 MICROGram(s) Oral daily  lisinopril 5 milliGRAM(s) Oral daily  magnesium sulfate  IVPB 2 Gram(s) IV Intermittent once  pantoprazole    Tablet 40 milliGRAM(s) Oral before breakfast  tamsulosin 0.4 milliGRAM(s) Oral at bedtime        VITALS:  T(F): 98.9 (21 @ 14:39), Max: 98.9 (21 @ 14:39)  HR: 60 (21 @ 14:39)  BP: 141/65 (21 @ 14:39)  RR: 18 (21 @ 14:39)  SpO2: 98% (21 @ 06:06)  Wt(kg): --     @ 07:01  -   @ 07:00  --------------------------------------------------------  IN: 480 mL / OUT: 1250 mL / NET: -770 mL     @ 07:01  -   @ 07:00  --------------------------------------------------------  IN: 0 mL / OUT: 1925 mL / NET: -1925 mL     @ 07:01  -   @ 16:21  --------------------------------------------------------  IN: 330 mL / OUT: 600 mL / NET: -270 mL          LABS:      132<L>  |  95<L>  |  13  ----------------------------<  106<H>  3.7   |  26  |  1.2    Ca    8.7      2021 07:00  Mg     1.9         TPro  5.5<L>  /  Alb  3.5  /  TBili  0.5  /  DBili      /  AST  14  /  ALT  10  /  AlkPhos  73                            10.3   6.60  )-----------( 257      ( 2021 07:00 )             30.7       Urine Studies:  Urinalysis Basic - ( 2021 20:28 )    Color: Light Yellow / Appearance: Clear / S.010 / pH:   Gluc:  / Ketone: Negative  / Bili: Negative / Urobili: <2 mg/dL   Blood:  / Protein: Negative / Nitrite: Negative   Leuk Esterase: Negative / RBC:  / WBC    Sq Epi:  / Non Sq Epi:  / Bacteria:       Protein/Creatinine Ratio Calculation: 0.2 Ratio ( @ 10:30)  Creatinine, Random Urine: 49 mg/dL ( @ 10:30)  Potassium, Random Urine: 29 mmol/L ( @ 07:52)  Osmolality, Random Urine: 320 mos/kg ( @ 07:52)  Sodium, Random Urine: 69.0 mmoL/L ( @ 07:52)  Calcium, Random Urine: 9 mg/dL ( @ 07:52)      RADIOLOGY & ADDITIONAL STUDIES:

## 2021-06-30 NOTE — PROGRESS NOTE ADULT - SUBJECTIVE AND OBJECTIVE BOX
Patient is a 89y old  Male who presents with a chief complaint of Shortness of breath (29 Jun 2021 17:56)        Over Night Events:  No events overnight.  No CP         ROS:     All ROS are negative except HPI         PHYSICAL EXAM    ICU Vital Signs Last 24 Hrs  T(C): 36.4 (30 Jun 2021 06:06), Max: 36.8 (29 Jun 2021 20:02)  T(F): 97.5 (30 Jun 2021 06:06), Max: 98.2 (29 Jun 2021 20:02)  HR: 60 (30 Jun 2021 06:06) (60 - 61)  BP: 142/63 (30 Jun 2021 06:06) (142/63 - 163/72)  BP(mean): 97 (29 Jun 2021 16:00) (97 - 97)  ABP: --  ABP(mean): --  RR: 18 (30 Jun 2021 06:06) (18 - 20)  SpO2: 98% (30 Jun 2021 06:06) (97% - 98%)      CONSTITUTIONAL:  Well nourished.  NAD    ENT:   Airway patent,   Mouth with normal mucosa.   No thrush    EYES:   Pupils equal,   Round and reactive to light.    CARDIAC:   Normal rate,   Regular rhythm.    No edema      Vascular:  Normal systolic impulse  No Carotid bruits    RESPIRATORY:   No wheezing  Bilateral BS  Normal chest expansion  Not tachypneic,  No use of accessory muscles    GASTROINTESTINAL:  Abdomen soft,   Non-tender,   No guarding,   + BS    MUSCULOSKELETAL:   Range of motion is not limited,  No clubbing, cyanosis    NEUROLOGICAL:   Alert and oriented   No motor  deficits.    SKIN:   Skin normal color for race,   Warm and dry and intact.   No evidence of rash.    PSYCHIATRIC:   Normal mood and affect.   No apparent risk to self or others.    HEMATOLOGICAL:  No cervical  lymphadenopathy.  no inguinal lymphadenopathy      06-29-21 @ 07:01  -  06-30-21 @ 07:00  --------------------------------------------------------  IN:  Total IN: 0 mL    OUT:    Voided (mL): 1925 mL  Total OUT: 1925 mL    Total NET: -1925 mL      06-30-21 @ 07:01  -  06-30-21 @ 08:29  --------------------------------------------------------  IN:  Total IN: 0 mL    OUT:    Voided (mL): 600 mL  Total OUT: 600 mL    Total NET: -600 mL          LABS:                            10.3   6.60  )-----------( 257      ( 30 Jun 2021 07:00 )             30.7                                               06-29    125<L>  |  91<L>  |  12  ----------------------------<  83  3.7   |  24  |  1.0    Ca    8.6      29 Jun 2021 05:34  Mg     1.6     06-29    TPro  5.0<L>  /  Alb  3.2<L>  /  TBili  0.6  /  DBili  x   /  AST  16  /  ALT  10  /  AlkPhos  65  06-29                                                                                           LIVER FUNCTIONS - ( 29 Jun 2021 05:34 )  Alb: 3.2 g/dL / Pro: 5.0 g/dL / ALK PHOS: 65 U/L / ALT: 10 U/L / AST: 16 U/L / GGT: x                                                                                                                                       MEDICATIONS  (STANDING):  apixaban 5 milliGRAM(s) Oral every 12 hours  aspirin enteric coated 81 milliGRAM(s) Oral daily  atorvastatin 80 milliGRAM(s) Oral at bedtime  chlorhexidine 4% Liquid 1 Application(s) Topical <User Schedule>  furosemide   Injectable 40 milliGRAM(s) IV Push two times a day  levETIRAcetam 250 milliGRAM(s) Oral two times a day  levothyroxine 75 MICROGram(s) Oral daily  lisinopril 5 milliGRAM(s) Oral daily  magnesium sulfate  IVPB 2 Gram(s) IV Intermittent once  pantoprazole    Tablet 40 milliGRAM(s) Oral before breakfast  sodium chloride 2 Gram(s) Oral three times a day  tamsulosin 0.4 milliGRAM(s) Oral at bedtime    MEDICATIONS  (PRN):      New X-rays reviewed:                                                                                  ECHO    CXR interpreted by me:  Slight vascular congestion

## 2021-07-01 LAB
ALBUMIN SERPL ELPH-MCNC: 3.5 G/DL — SIGNIFICANT CHANGE UP (ref 3.5–5.2)
ALP SERPL-CCNC: 78 U/L — SIGNIFICANT CHANGE UP (ref 30–115)
ALT FLD-CCNC: 11 U/L — SIGNIFICANT CHANGE UP (ref 0–41)
ANION GAP SERPL CALC-SCNC: 8 MMOL/L — SIGNIFICANT CHANGE UP (ref 7–14)
AST SERPL-CCNC: 14 U/L — SIGNIFICANT CHANGE UP (ref 0–41)
BILIRUB SERPL-MCNC: 0.5 MG/DL — SIGNIFICANT CHANGE UP (ref 0.2–1.2)
BUN SERPL-MCNC: 13 MG/DL — SIGNIFICANT CHANGE UP (ref 10–20)
CALCIUM SERPL-MCNC: 9 MG/DL — SIGNIFICANT CHANGE UP (ref 8.5–10.1)
CHLORIDE SERPL-SCNC: 93 MMOL/L — LOW (ref 98–110)
CO2 SERPL-SCNC: 30 MMOL/L — SIGNIFICANT CHANGE UP (ref 17–32)
CREAT SERPL-MCNC: 1.2 MG/DL — SIGNIFICANT CHANGE UP (ref 0.7–1.5)
GLUCOSE SERPL-MCNC: 110 MG/DL — HIGH (ref 70–99)
HCT VFR BLD CALC: 31.1 % — LOW (ref 42–52)
HGB BLD-MCNC: 10.5 G/DL — LOW (ref 14–18)
MCHC RBC-ENTMCNC: 30.9 PG — SIGNIFICANT CHANGE UP (ref 27–31)
MCHC RBC-ENTMCNC: 33.8 G/DL — SIGNIFICANT CHANGE UP (ref 32–37)
MCV RBC AUTO: 91.5 FL — SIGNIFICANT CHANGE UP (ref 80–94)
NRBC # BLD: 0 /100 WBCS — SIGNIFICANT CHANGE UP (ref 0–0)
PLATELET # BLD AUTO: 276 K/UL — SIGNIFICANT CHANGE UP (ref 130–400)
POTASSIUM SERPL-MCNC: 3.8 MMOL/L — SIGNIFICANT CHANGE UP (ref 3.5–5)
POTASSIUM SERPL-SCNC: 3.8 MMOL/L — SIGNIFICANT CHANGE UP (ref 3.5–5)
PROT SERPL-MCNC: 5.8 G/DL — LOW (ref 6–8)
RBC # BLD: 3.4 M/UL — LOW (ref 4.7–6.1)
RBC # FLD: 12.6 % — SIGNIFICANT CHANGE UP (ref 11.5–14.5)
SODIUM SERPL-SCNC: 131 MMOL/L — LOW (ref 135–146)
WBC # BLD: 8 K/UL — SIGNIFICANT CHANGE UP (ref 4.8–10.8)
WBC # FLD AUTO: 8 K/UL — SIGNIFICANT CHANGE UP (ref 4.8–10.8)

## 2021-07-01 PROCEDURE — 71045 X-RAY EXAM CHEST 1 VIEW: CPT | Mod: 26

## 2021-07-01 PROCEDURE — 99233 SBSQ HOSP IP/OBS HIGH 50: CPT

## 2021-07-01 RX ORDER — FUROSEMIDE 40 MG
40 TABLET ORAL
Refills: 0 | Status: DISCONTINUED | OUTPATIENT
Start: 2021-07-01 | End: 2021-07-05

## 2021-07-01 RX ADMIN — APIXABAN 5 MILLIGRAM(S): 2.5 TABLET, FILM COATED ORAL at 06:22

## 2021-07-01 RX ADMIN — ATORVASTATIN CALCIUM 80 MILLIGRAM(S): 80 TABLET, FILM COATED ORAL at 21:52

## 2021-07-01 RX ADMIN — CHLORHEXIDINE GLUCONATE 1 APPLICATION(S): 213 SOLUTION TOPICAL at 06:22

## 2021-07-01 RX ADMIN — PANTOPRAZOLE SODIUM 40 MILLIGRAM(S): 20 TABLET, DELAYED RELEASE ORAL at 06:22

## 2021-07-01 RX ADMIN — Medication 81 MILLIGRAM(S): at 11:58

## 2021-07-01 RX ADMIN — APIXABAN 5 MILLIGRAM(S): 2.5 TABLET, FILM COATED ORAL at 17:45

## 2021-07-01 RX ADMIN — Medication 40 MILLIGRAM(S): at 17:45

## 2021-07-01 RX ADMIN — LEVETIRACETAM 250 MILLIGRAM(S): 250 TABLET, FILM COATED ORAL at 17:45

## 2021-07-01 RX ADMIN — TAMSULOSIN HYDROCHLORIDE 0.4 MILLIGRAM(S): 0.4 CAPSULE ORAL at 21:52

## 2021-07-01 RX ADMIN — LISINOPRIL 5 MILLIGRAM(S): 2.5 TABLET ORAL at 06:22

## 2021-07-01 RX ADMIN — Medication 75 MICROGRAM(S): at 06:22

## 2021-07-01 RX ADMIN — LEVETIRACETAM 250 MILLIGRAM(S): 250 TABLET, FILM COATED ORAL at 06:22

## 2021-07-01 RX ADMIN — Medication 40 MILLIGRAM(S): at 06:20

## 2021-07-01 NOTE — PROGRESS NOTE ADULT - ASSESSMENT
Mr Nielson is an 89 year old man with the past medical history of AFib, HLD, HTN who presented to the ED for Shortness of breath on June 24th. Patient was doing relatively well until 4 says prior to admission when he started experiencing shortness of breath on exertion which he did not previously have and was able to walk his dogs without difficulty. The dyspnea got progressively worse and he noticed some wheezing with exertion. He mentioned that this dyspnea was associated with cough that is mostly dry but sometimes bring up clear sputum. Patient denied having any chest pain, orthopnea or PNDs. Palpitations or any other symptoms. Patient was found to be Bradycardic in the 40's. He was seen and evaluated by EP and cardioversion was discussed but ultimately he was not a candidate. On June 28th patient was brought in for Pacemaker placement. Procedure went well and he has not had any complications since.   Patient has hypervolemic HYPONATREMIA (now 132 from 125>124>126>124>121). He has a history of hyponatremia in the past that had induced seizures though he did not on this admission.      #SOB, resolved likely secondary to CHF exacerbation vs demand ischemia vs thromboembolic event  - given patient's chest pain on exertion and his history of coronary artery disease demand ishecmia is likely  - as patient is on apixiban thromboembolic event is less likely  - as symptoms resolved post pace maker placement, it is likely that this was the cause    #Afib with slow VR  #Bradycardia s/p pacemaker placement  -EKG demonstrated sinus bradycardia and chronotropic incompetence suggestive of sick sinus syndrome/sinus node dysfunction  -monitor pacemaker  -discussed s/s of bradycardia with family and patient and to report to MD if any symptoms return     #Hyponatremia, stable, secondary to SIADH vs low dietary solute intake vs volume overload  - Nephrology following  - currently on NaCl tabs 2g TID PO  - switch to Lasix 40mg PO BID    #Coronary artery disease, Hyperlipidemia, STABLE  continue with:  -apixiban 5mg  -lisinopril 5mg  -atorvastatin 80mg      #Atrial fibrillation, stable  patient is bradycardic, stable now on pacemaker    DVT PPx: Eliquis  Gi PPx; PPI  Full COde  Dispo: d/c planning Mr Nielson is an 89 year old man with the past medical history of AFib, HLD, HTN who presented to the ED for Shortness of breath on June 24th. Patient was doing relatively well until 4 says prior to admission when he started experiencing shortness of breath on exertion which he did not previously have and was able to walk his dogs without difficulty. The dyspnea got progressively worse and he noticed some wheezing with exertion. He mentioned that this dyspnea was associated with cough that is mostly dry but sometimes bring up clear sputum. Patient denied having any chest pain, orthopnea or PNDs. Palpitations or any other symptoms. Patient was found to be Bradycardic in the 40's. He was seen and evaluated by EP and cardioversion was discussed but ultimately he was not a candidate. On June 28th patient was brought in for Pacemaker placement. Procedure went well and he has not had any complications since.   Patient has hypervolemic HYPONATREMIA (now 132 from 125>124>126>124>121). He has a history of hyponatremia in the past that had induced seizures though he did not on this admission.      #SOB, resolved likely secondary to CHF exacerbation and/or bradycardia  - as symptoms resolved post pacemaker placement, it is likely that this was the cause    #Afib with slow VR  #Bradycardia s/p pacemaker placement  -EKG demonstrated sinus bradycardia and chronotropic incompetence suggestive of sick sinus syndrome/sinus node dysfunction  -s/p pacemaker interrogation  - outpt f/u with EP    #Hyponatremia, improved and now stable  - secondary to volume overload in context of SIADH & low dietary solute intake  - Nephrology following  - currently on NaCl tabs 2g TID PO  - switch to Lasix 40mg PO BID    #Coronary artery disease, Hyperlipidemia, STABLE  continue with:  - ASA  -lisinopril 5mg  -atorvastatin 80mg        DVT PPx: Eliquis  Gi PPx; PPI  Full COde  Dispo: d/c planning

## 2021-07-01 NOTE — PROGRESS NOTE ADULT - ATTENDING COMMENTS
Pt seen this morning. He was lying flat in bed and felt well.  diuresing well on IV lasix  denied SOB, chest pain, N/V, abdominal pain    PE as documented above  VS reviewed    labs today and imaging report reviewed  renal f/u appreciated    Afib with slow vent rate - s/p pacemaker placement  outpt EP f/u  on apixaban    Acute HFpEF with hypervolemic hyponatremia  continue lasix - agree with changing to PO  daily wts, I's and O's  salt tabs per renal    rehab consult in progress to evaluate pt for inpt rehab  otherwise STR at SNF being planned  continue PT    I discussed the case with the resident and I reviewed her note  Agree with the physical exam, assessment and plan with additions and corrections as above.     PROGRESS NOTE HANDOFF    Pending: rehab consult, discharge planning    pt aware of plan of care    Disposition: inpt rehab vs STR

## 2021-07-01 NOTE — PROGRESS NOTE ADULT - SUBJECTIVE AND OBJECTIVE BOX
HPI  Patient is a 89y old Male who presents with a chief complaint of Shortness of breath (01 Jul 2021 09:21)    Currently admitted to medicine with the primary diagnosis of CHF exacerbation       Today is hospital day 7d.     INTERVAL HPI / OVERNIGHT EVENTS:  Patient was examined and seen at bedside. This morning he is resting comfortably in bed and reports no new issues or overnight events. He is eager to go home.     ROS: Otherwise unremarkable     PAST MEDICAL & SURGICAL HISTORY  Coronary artery disease    Atrial fibrillation    Hyperlipidemia    Hypertension    Hypothyroidism    H/O aortic valve replacement  porcine    History of loop recorder  2017    Status post appendectomy      ALLERGIES  No Known Allergies    MEDICATIONS  STANDING MEDICATIONS  apixaban 5 milliGRAM(s) Oral every 12 hours  aspirin enteric coated 81 milliGRAM(s) Oral daily  atorvastatin 80 milliGRAM(s) Oral at bedtime  chlorhexidine 4% Liquid 1 Application(s) Topical <User Schedule>  furosemide    Tablet 40 milliGRAM(s) Oral two times a day  levETIRAcetam 250 milliGRAM(s) Oral two times a day  levothyroxine 75 MICROGram(s) Oral daily  lisinopril 5 milliGRAM(s) Oral daily  magnesium sulfate  IVPB 2 Gram(s) IV Intermittent once  pantoprazole    Tablet 40 milliGRAM(s) Oral before breakfast  tamsulosin 0.4 milliGRAM(s) Oral at bedtime    PRN MEDICATIONS    VITALS:  T(F): 97.5  HR: 60  BP: 140/60  RR: 18  SpO2: --    PHYSICAL EXAM  GEN: NAD, Resting comfortably in bed  PULM: Clear to auscultation bilaterally, No wheezes  CVS: Regular rate and rhythm, S1-S2, no murmurs  ABD: Soft, non-tender, non-distended, no guarding  EXT: No edema  NEURO: A&Ox3, no focal deficits    LABS                        10.5   8.00  )-----------( 276      ( 01 Jul 2021 06:59 )             31.1     07-01    131<L>  |  93<L>  |  13  ----------------------------<  110<H>  3.8   |  30  |  1.2    Ca    9.0      01 Jul 2021 06:59  Mg     1.9     06-30    TPro  5.8<L>  /  Alb  3.5  /  TBili  0.5  /  DBili  x   /  AST  14  /  ALT  11  /  AlkPhos  78  07-01                  RADIOLOGY    EXAM:  XR CHEST PORTABLE ROUTINE 1V            PROCEDURE DATE:  06/30/2021            INTERPRETATION:  Clinical History / Reason for exam: Cardiac dysrhythmia    Comparison : Chest radiograph prior day.    Technique/Positioning: Frontal portable.    Findings:    Support devices: Dual-chamber left-sided pacemaker. Telemetry leads are seen. Loop recorder overlies the thorax.    Cardiac/mediastinum/hilum: Patient is status post median sternotomy. The aorta is atherosclerotic.    Lung parenchyma/Pleura: Basilar opacifications, worsening    Skeleton/soft tissues: Unchanged    Impression:    Basilar opacifications, worsening. Support devices as described.   HPI  Patient is a 89y old Male who presents with a chief complaint of Shortness of breath (01 Jul 2021 09:21)    Currently admitted to medicine with the primary diagnosis of CHF exacerbation       Today is hospital day 7d.     INTERVAL HPI / OVERNIGHT EVENTS:  Patient was examined and seen at bedside. This morning he is resting comfortably in bed and reports no new issues or overnight events. He is eager to go home.     ROS: Otherwise unremarkable     PAST MEDICAL & SURGICAL HISTORY  Coronary artery disease    Atrial fibrillation    Hyperlipidemia    Hypertension    Hypothyroidism    H/O aortic valve replacement  porcine    History of loop recorder  2017    Status post appendectomy      ALLERGIES  No Known Allergies    MEDICATIONS  STANDING MEDICATIONS  apixaban 5 milliGRAM(s) Oral every 12 hours  aspirin enteric coated 81 milliGRAM(s) Oral daily  atorvastatin 80 milliGRAM(s) Oral at bedtime  chlorhexidine 4% Liquid 1 Application(s) Topical <User Schedule>  furosemide    Tablet 40 milliGRAM(s) Oral two times a day  levETIRAcetam 250 milliGRAM(s) Oral two times a day  levothyroxine 75 MICROGram(s) Oral daily  lisinopril 5 milliGRAM(s) Oral daily  magnesium sulfate  IVPB 2 Gram(s) IV Intermittent once  pantoprazole    Tablet 40 milliGRAM(s) Oral before breakfast  tamsulosin 0.4 milliGRAM(s) Oral at bedtime    PRN MEDICATIONS    VITALS:  T(F): 97.5  HR: 60  BP: 140/60  RR: 18  SpO2: --    PHYSICAL EXAM  GEN: NAD, Resting comfortably in bed  PULM: Clear to auscultation bilaterally, No wheezes  + pacemaker with dressing  CVS: Regular rate and rhythm, S1-S2, no murmurs  ABD: Soft, non-tender, non-distended, no guarding  EXT: No edema  NEURO: A&Ox3, no focal deficits    LABS                        10.5   8.00  )-----------( 276      ( 01 Jul 2021 06:59 )             31.1     07-01    131<L>  |  93<L>  |  13  ----------------------------<  110<H>  3.8   |  30  |  1.2    Ca    9.0      01 Jul 2021 06:59  Mg     1.9     06-30    TPro  5.8<L>  /  Alb  3.5  /  TBili  0.5  /  DBili  x   /  AST  14  /  ALT  11  /  AlkPhos  78  07-01                  RADIOLOGY    EXAM:  XR CHEST PORTABLE ROUTINE 1V            PROCEDURE DATE:  06/30/2021            INTERPRETATION:  Clinical History / Reason for exam: Cardiac dysrhythmia    Comparison : Chest radiograph prior day.    Technique/Positioning: Frontal portable.    Findings:    Support devices: Dual-chamber left-sided pacemaker. Telemetry leads are seen. Loop recorder overlies the thorax.    Cardiac/mediastinum/hilum: Patient is status post median sternotomy. The aorta is atherosclerotic.    Lung parenchyma/Pleura: Basilar opacifications, worsening    Skeleton/soft tissues: Unchanged    Impression:    Basilar opacifications, worsening. Support devices as described.

## 2021-07-01 NOTE — PROGRESS NOTE ADULT - ASSESSMENT
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

## 2021-07-01 NOTE — PROGRESS NOTE ADULT - SUBJECTIVE AND OBJECTIVE BOX
[place] [facility] FOLLOW UP NOTE  --------------------------------------------------------------------------------    pt seen and examined  transferred to floors  off O2  no sob  comfortable  Na+ improving  s/p ppm  still on iv lasix  tolerating nacl tabs    PAST HISTORY  --------------------------------------------------------------------------------  No significant changes to PMH, PSH, FHx, SHx, unless otherwise noted    ALLERGIES & MEDICATIONS  --------------------------------------------------------------------------------  Allergies    No Known Allergies    Physical Exam:  	Gen: NAD  	Pulm: CTA B/L  	CV: RRR, S1S2  	Abd: +BS, soft, nontender/nondistended  	: No suprapubic tenderness  	LE: Warm, no edema      Hospital Medications:   MEDICATIONS  (STANDING):  apixaban 5 milliGRAM(s) Oral every 12 hours  aspirin enteric coated 81 milliGRAM(s) Oral daily  atorvastatin 80 milliGRAM(s) Oral at bedtime  chlorhexidine 4% Liquid 1 Application(s) Topical <User Schedule>  furosemide   Injectable 40 milliGRAM(s) IV Push two times a day  levETIRAcetam 250 milliGRAM(s) Oral two times a day  levothyroxine 75 MICROGram(s) Oral daily  lisinopril 5 milliGRAM(s) Oral daily  magnesium sulfate  IVPB 2 Gram(s) IV Intermittent once  pantoprazole    Tablet 40 milliGRAM(s) Oral before breakfast  tamsulosin 0.4 milliGRAM(s) Oral at bedtime        VITALS:  T(F): 97.5 (21 @ 06:25), Max: 98.9 (21 @ 14:39)  HR: 60 (21 @ 06:25)  BP: 140/60 (21 @ 06:25)  RR: 18 (21 @ 06:25)  SpO2: --  Wt(kg): --     @ 07:01  -   @ 07:00  --------------------------------------------------------  IN: 0 mL / OUT: 1925 mL / NET: -1925 mL     @ 07:01  -   @ 07:00  --------------------------------------------------------  IN: 530 mL / OUT: 1000 mL / NET: -470 mL     @ 07:01  -   @ 09:22  --------------------------------------------------------  IN: 240 mL / OUT: 300 mL / NET: -60 mL          LABS:      131<L>  |  93<L>  |  13  ----------------------------<  110<H>  3.8   |  30  |  1.2    Ca    9.0      2021 06:59  Mg     1.9         TPro  5.8<L>  /  Alb  3.5  /  TBili  0.5  /  DBili      /  AST  14  /  ALT  11  /  AlkPhos  78                            10.5   8.00  )-----------( 276      ( 2021 06:59 )             31.1       Urine Studies:  Urinalysis Basic - ( 2021 20:28 )    Color: Light Yellow / Appearance: Clear / S.010 / pH:   Gluc:  / Ketone: Negative  / Bili: Negative / Urobili: <2 mg/dL   Blood:  / Protein: Negative / Nitrite: Negative   Leuk Esterase: Negative / RBC:  / WBC    Sq Epi:  / Non Sq Epi:  / Bacteria:       Protein/Creatinine Ratio Calculation: 0.2 Ratio ( @ 10:30)  Creatinine, Random Urine: 49 mg/dL ( @ 10:30)  Potassium, Random Urine: 29 mmol/L ( @ 07:52)  Osmolality, Random Urine: 320 mos/kg ( @ 07:52)  Sodium, Random Urine: 69.0 mmoL/L ( @ 07:52)  Calcium, Random Urine: 9 mg/dL ( @ 07:52)      RADIOLOGY & ADDITIONAL STUDIES:

## 2021-07-02 LAB
ALBUMIN SERPL ELPH-MCNC: 3.5 G/DL — SIGNIFICANT CHANGE UP (ref 3.5–5.2)
ALP SERPL-CCNC: 70 U/L — SIGNIFICANT CHANGE UP (ref 30–115)
ALT FLD-CCNC: 10 U/L — SIGNIFICANT CHANGE UP (ref 0–41)
ANION GAP SERPL CALC-SCNC: 9 MMOL/L — SIGNIFICANT CHANGE UP (ref 7–14)
AST SERPL-CCNC: 14 U/L — SIGNIFICANT CHANGE UP (ref 0–41)
BASOPHILS # BLD AUTO: 0.1 K/UL — SIGNIFICANT CHANGE UP (ref 0–0.2)
BASOPHILS NFR BLD AUTO: 1.5 % — HIGH (ref 0–1)
BILIRUB SERPL-MCNC: 0.6 MG/DL — SIGNIFICANT CHANGE UP (ref 0.2–1.2)
BUN SERPL-MCNC: 16 MG/DL — SIGNIFICANT CHANGE UP (ref 10–20)
CALCIUM SERPL-MCNC: 8.9 MG/DL — SIGNIFICANT CHANGE UP (ref 8.5–10.1)
CHLORIDE SERPL-SCNC: 93 MMOL/L — LOW (ref 98–110)
CO2 SERPL-SCNC: 28 MMOL/L — SIGNIFICANT CHANGE UP (ref 17–32)
CREAT SERPL-MCNC: 1.3 MG/DL — SIGNIFICANT CHANGE UP (ref 0.7–1.5)
EOSINOPHIL # BLD AUTO: 0.11 K/UL — SIGNIFICANT CHANGE UP (ref 0–0.7)
EOSINOPHIL NFR BLD AUTO: 1.6 % — SIGNIFICANT CHANGE UP (ref 0–8)
GLUCOSE SERPL-MCNC: 97 MG/DL — SIGNIFICANT CHANGE UP (ref 70–99)
HCT VFR BLD CALC: 29.5 % — LOW (ref 42–52)
HGB BLD-MCNC: 9.8 G/DL — LOW (ref 14–18)
IMM GRANULOCYTES NFR BLD AUTO: 0.3 % — SIGNIFICANT CHANGE UP (ref 0.1–0.3)
LYMPHOCYTES # BLD AUTO: 1.48 K/UL — SIGNIFICANT CHANGE UP (ref 1.2–3.4)
LYMPHOCYTES # BLD AUTO: 21.8 % — SIGNIFICANT CHANGE UP (ref 20.5–51.1)
MAGNESIUM SERPL-MCNC: 1.6 MG/DL — LOW (ref 1.8–2.4)
MCHC RBC-ENTMCNC: 30.5 PG — SIGNIFICANT CHANGE UP (ref 27–31)
MCHC RBC-ENTMCNC: 33.2 G/DL — SIGNIFICANT CHANGE UP (ref 32–37)
MCV RBC AUTO: 91.9 FL — SIGNIFICANT CHANGE UP (ref 80–94)
MONOCYTES # BLD AUTO: 0.83 K/UL — HIGH (ref 0.1–0.6)
MONOCYTES NFR BLD AUTO: 12.2 % — HIGH (ref 1.7–9.3)
NEUTROPHILS # BLD AUTO: 4.25 K/UL — SIGNIFICANT CHANGE UP (ref 1.4–6.5)
NEUTROPHILS NFR BLD AUTO: 62.6 % — SIGNIFICANT CHANGE UP (ref 42.2–75.2)
NRBC # BLD: 0 /100 WBCS — SIGNIFICANT CHANGE UP (ref 0–0)
PLATELET # BLD AUTO: 265 K/UL — SIGNIFICANT CHANGE UP (ref 130–400)
POTASSIUM SERPL-MCNC: 3.7 MMOL/L — SIGNIFICANT CHANGE UP (ref 3.5–5)
POTASSIUM SERPL-SCNC: 3.7 MMOL/L — SIGNIFICANT CHANGE UP (ref 3.5–5)
PROT SERPL-MCNC: 5.3 G/DL — LOW (ref 6–8)
RBC # BLD: 3.21 M/UL — LOW (ref 4.7–6.1)
RBC # FLD: 12.5 % — SIGNIFICANT CHANGE UP (ref 11.5–14.5)
SARS-COV-2 RNA SPEC QL NAA+PROBE: SIGNIFICANT CHANGE UP
SODIUM SERPL-SCNC: 130 MMOL/L — LOW (ref 135–146)
WBC # BLD: 6.79 K/UL — SIGNIFICANT CHANGE UP (ref 4.8–10.8)
WBC # FLD AUTO: 6.79 K/UL — SIGNIFICANT CHANGE UP (ref 4.8–10.8)

## 2021-07-02 PROCEDURE — 99232 SBSQ HOSP IP/OBS MODERATE 35: CPT

## 2021-07-02 RX ORDER — POLYETHYLENE GLYCOL 3350 17 G/17G
17 POWDER, FOR SOLUTION ORAL ONCE
Refills: 0 | Status: COMPLETED | OUTPATIENT
Start: 2021-07-02 | End: 2021-07-02

## 2021-07-02 RX ORDER — MAGNESIUM SULFATE 500 MG/ML
2 VIAL (ML) INJECTION ONCE
Refills: 0 | Status: COMPLETED | OUTPATIENT
Start: 2021-07-02 | End: 2021-07-02

## 2021-07-02 RX ORDER — SODIUM CHLORIDE 9 MG/ML
1 INJECTION INTRAMUSCULAR; INTRAVENOUS; SUBCUTANEOUS
Refills: 0 | Status: DISCONTINUED | OUTPATIENT
Start: 2021-07-02 | End: 2021-07-05

## 2021-07-02 RX ADMIN — LEVETIRACETAM 250 MILLIGRAM(S): 250 TABLET, FILM COATED ORAL at 05:29

## 2021-07-02 RX ADMIN — Medication 50 GRAM(S): at 10:52

## 2021-07-02 RX ADMIN — Medication 40 MILLIGRAM(S): at 17:41

## 2021-07-02 RX ADMIN — SODIUM CHLORIDE 1 GRAM(S): 9 INJECTION INTRAMUSCULAR; INTRAVENOUS; SUBCUTANEOUS at 17:41

## 2021-07-02 RX ADMIN — LEVETIRACETAM 250 MILLIGRAM(S): 250 TABLET, FILM COATED ORAL at 17:41

## 2021-07-02 RX ADMIN — TAMSULOSIN HYDROCHLORIDE 0.4 MILLIGRAM(S): 0.4 CAPSULE ORAL at 21:27

## 2021-07-02 RX ADMIN — CHLORHEXIDINE GLUCONATE 1 APPLICATION(S): 213 SOLUTION TOPICAL at 05:30

## 2021-07-02 RX ADMIN — SODIUM CHLORIDE 1 GRAM(S): 9 INJECTION INTRAMUSCULAR; INTRAVENOUS; SUBCUTANEOUS at 10:51

## 2021-07-02 RX ADMIN — PANTOPRAZOLE SODIUM 40 MILLIGRAM(S): 20 TABLET, DELAYED RELEASE ORAL at 08:33

## 2021-07-02 RX ADMIN — ATORVASTATIN CALCIUM 80 MILLIGRAM(S): 80 TABLET, FILM COATED ORAL at 21:27

## 2021-07-02 RX ADMIN — LISINOPRIL 5 MILLIGRAM(S): 2.5 TABLET ORAL at 05:29

## 2021-07-02 RX ADMIN — Medication 75 MICROGRAM(S): at 05:30

## 2021-07-02 RX ADMIN — Medication 81 MILLIGRAM(S): at 11:39

## 2021-07-02 RX ADMIN — APIXABAN 5 MILLIGRAM(S): 2.5 TABLET, FILM COATED ORAL at 05:30

## 2021-07-02 RX ADMIN — Medication 40 MILLIGRAM(S): at 05:32

## 2021-07-02 RX ADMIN — APIXABAN 5 MILLIGRAM(S): 2.5 TABLET, FILM COATED ORAL at 17:40

## 2021-07-02 RX ADMIN — POLYETHYLENE GLYCOL 3350 17 GRAM(S): 17 POWDER, FOR SOLUTION ORAL at 12:56

## 2021-07-02 NOTE — PROGRESS NOTE ADULT - ASSESSMENT
Mr Nielson is an 89 year old man with the past medical history of AFib, HLD, HTN who presented to the ED for Shortness of breath on June 24th. Patient was doing relatively well until 4 says prior to admission when he started experiencing shortness of breath on exertion which he did not previously have and was able to walk his dogs without difficulty. The dyspnea got progressively worse and he noticed some wheezing with exertion. He mentioned that this dyspnea was associated with cough that is mostly dry but sometimes bring up clear sputum. Patient denied having any chest pain, orthopnea or PNDs. Palpitations or any other symptoms. Patient was found to be Bradycardic in the 40's. He was seen and evaluated by EP and cardioversion was discussed but ultimately he was not a candidate. On June 28th patient was brought in for Pacemaker placement. Procedure went well and he has not had any complications since.   Patient has hypervolemic HYPONATREMIA (now 132 from 125>124>126>124>121). He has a history of hyponatremia in the past that had induced seizures though he did not on this admission.      #SOB, resolved likely secondary to CHF exacerbation and/or bradycardia  - as symptoms resolved post pacemaker placement, it is likely that this was the cause    #Afib with slow VR  #Bradycardia s/p pacemaker placement  -EKG demonstrated sinus bradycardia and chronotropic incompetence suggestive of sick sinus syndrome/sinus node dysfunction  -s/p pacemaker interrogation  - outpt f/u with EP    #Hyponatremia, improved and now stable  - secondary to volume overload in context of SIADH & low dietary solute intake  - Nephrology following  - currently on NaCl tabs 2g TID PO  - switch to Lasix 40mg PO BID    #Coronary artery disease, Hyperlipidemia, STABLE  continue with:  - ASA  -lisinopril 5mg  -atorvastatin 80mg        DVT PPx: Eliquis  Gi PPx; PPI  Full COde  Dispo: d/c planning   Mr Nielson is an 89 year old man with the past medical history of AFib, HLD, HTN who presented to the ED for Shortness of breath on June 24th. Patient was doing relatively well until 4 says prior to admission when he started experiencing shortness of breath on exertion which he did not previously have and was able to walk his dogs without difficulty. The dyspnea got progressively worse and he noticed some wheezing with exertion. He mentioned that this dyspnea was associated with cough that is mostly dry but sometimes bring up clear sputum. Patient denied having any chest pain, orthopnea or PNDs. Palpitations or any other symptoms. Patient was found to be Bradycardic in the 40's. He was seen and evaluated by EP and cardioversion was discussed but ultimately he was not a candidate. On June 28th patient was brought in for Pacemaker placement. Procedure went well and he has not had any complications since.   Patient has hypervolemic HYPONATREMIA (now 132 from 125>124>126>124>121). He has a history of hyponatremia in the past that had induced seizures though he did not on this admission.      #SOB, resolved likely secondary to CHF exacerbation and/or bradycardia  - improved with Diuretics    #Afib with slow VR  #Bradycardia s/p pacemaker placement  -EKG demonstrated sinus bradycardia and chronotropic incompetence suggestive of sick sinus syndrome/sinus node dysfunction  -s/p pacemaker interrogation  - outpt f/u with EP    #Hyponatremia, improved and now stable  - secondary to volume overload in context of SIADH & low dietary solute intake  - Nephrology following  - currently on NaCl tabs 2g TID PO  - continue with Lasix 40mg PO BID    #Coronary artery disease, Hyperlipidemia, STABLE  continue with:  - ASA  -lisinopril 5mg  -atorvastatin 80mg        DVT PPx: Eliquis  Gi PPx; PPI  Full COde  Dispo: d/c planning

## 2021-07-02 NOTE — PROGRESS NOTE ADULT - ASSESSMENT
89 y/o man with PMH of AFib on apixaban, hyperlipidemia, HTN, seizure on keppra, hyponatremia (treated with salt tabs in the past), hypothyroid and s/p porcine aortic valve replacement presented to the ED for Shortness of breath on June 24th. He was found to be reema to the 40's and Na level was 114. He was admitted to ICU and then transferred to the vent unit and is downgraded to medical floor.    1. Afib with slow vent rate - s/p pacemaker placement 6/28  s/p interrogation  outpt EP f/u  on apixaban    2. Acute HFpEF   was on lasix 40mg IV q12hr and changed to PO lasix on 7/1  daily wts, fluid restriction  CXR 7/1 improved and pt is clinically improved    3. Hyponatremia   due to combination of hypervolemic over SIADH and low solute intake  was improving and then Na level is dropping  salt tabs restarted - 1g bid  continue lasix   fluid restriction  renal f/u appreciated  monitor for volume overload    4. CAD on medical therapy    5. h/o seizure on keppra    6. Hypothyroid on synthroid    7. HTN on lisinopril    8. Hypomagnesemia - repleted IV    9. Deconditioning - continue PT  rehab consult appreciated: STR vs home with services  case discussed with family today who wants home with services when medically stable        PROGRESS NOTE HANDOFF    Pending: BMP daily to monitor Na level, tolerance of salt tabs, continue PT    Family discussion: with wife and daughter (RN) at bedside today    Disposition: home with services

## 2021-07-02 NOTE — PROGRESS NOTE ADULT - SUBJECTIVE AND OBJECTIVE BOX
AMA HARDY  89y Male    INTERVAL HPI/OVERNIGHT EVENTS:    Pt seen during PT session today.  He feels well - denies SOB, chest pain, other complaints.  Case discussed in detail with Dr. Molina and the pt's wife and daughter.  All questions answered.    T(F): 97.4 (07-02-21 @ 05:07), Max: 98.9 (07-01-21 @ 21:15)  HR: 66 (07-02-21 @ 05:07) (60 - 66)  BP: 142/64 (07-02-21 @ 05:07) (123/63 - 142/64)  RR: 20 (07-02-21 @ 05:07) (18 - 20)  SpO2: --  I&O's Summary    01 Jul 2021 07:01  -  02 Jul 2021 07:00  --------------------------------------------------------  IN: 967 mL / OUT: 1350 mL / NET: -383 mL      CAPILLARY BLOOD GLUCOSE            PHYSICAL EXAM:  GENERAL: NAD  HEAD:  Normocephalic  EYES:  conjunctiva and sclera clear  ENMT: Moist mucous membranes  NECK: Supple  NERVOUS SYSTEM:  Alert, awake, Good concentration  CHEST/LUNG: decreased BS at the bases but Clear to percussion bilaterally; No rales  HEART: Regular rate and rhythm  left chest wall with pacemaker dressing  ABDOMEN: Soft, Nontender, Nondistended  EXTREMITIES:   No edema    Consultant(s) Notes Reviewed:  [x ] YES  [ ] NO  Care Discussed with Consultants/Other Providers [ x] YES  [ ] NO    MEDICATIONS  (STANDING):  apixaban 5 milliGRAM(s) Oral every 12 hours  aspirin enteric coated 81 milliGRAM(s) Oral daily  atorvastatin 80 milliGRAM(s) Oral at bedtime  chlorhexidine 4% Liquid 1 Application(s) Topical <User Schedule>  furosemide    Tablet 40 milliGRAM(s) Oral two times a day  levETIRAcetam 250 milliGRAM(s) Oral two times a day  levothyroxine 75 MICROGram(s) Oral daily  lisinopril 5 milliGRAM(s) Oral daily  magnesium sulfate  IVPB 2 Gram(s) IV Intermittent once  pantoprazole    Tablet 40 milliGRAM(s) Oral before breakfast  polyethylene glycol 3350 17 Gram(s) Oral once  sodium chloride 1 Gram(s) Oral two times a day  tamsulosin 0.4 milliGRAM(s) Oral at bedtime    MEDICATIONS  (PRN):      LABS:                        9.8    6.79  )-----------( 265      ( 02 Jul 2021 06:38 )             29.5     07-02    130<L>  |  93<L>  |  16  ----------------------------<  97  3.7   |  28  |  1.3    Ca    8.9      02 Jul 2021 06:38  Mg     1.6     07-02    TPro  5.3<L>  /  Alb  3.5  /  TBili  0.6  /  DBili  x   /  AST  14  /  ALT  10  /  AlkPhos  70  07-02          RADIOLOGY & ADDITIONAL TESTS:    Imaging or report Personally Reviewed:  [ ] YES  [ ] NO    < from: TTE Echo Complete w/o Contrast w/ Doppler (06.25.21 @ 06:22) >  Summary:   1. Normal global left ventricular systolic function.   2. Moderately enlarged left atrium.   3. Normal right atrial size.   4. Mild to moderate mitral valve regurgitation.   5. Moderate mitral annular calcification.   6. Moderate thickening of the anterior and posterior mitral valve leaflets.   7.Mild-moderate tricuspid regurgitation.   8. Bioprosthesis in the aortic position.   9. Peak gradient across AV is 4 wit a foy of 2 and calculated DAREK of 2.1.    < end of copied text >      Case discussed with resident    Care discussed with pt and family

## 2021-07-02 NOTE — PROGRESS NOTE ADULT - SUBJECTIVE AND OBJECTIVE BOX
[place] NEPHROLOGY FOLLOW UP NOTE  --------------------------------------------------------------------------------    pt seen and examined  on RA  sitting in chair  no sob  na lower  off nacl tabs  on po lasix    PAST HISTORY  --------------------------------------------------------------------------------  No significant changes to PMH, PSH, FHx, SHx, unless otherwise noted    ALLERGIES & MEDICATIONS  --------------------------------------------------------------------------------  Allergies    No Known Allergies    Physical Exam:  	Gen: NAD  	Pulm: CTA B/L  	CV: RRR, S1S2  	Abd: +BS, soft, nontender/nondistended  	: No suprapubic tenderness  	LE: Warm, no edema    Hospital Medications:   MEDICATIONS  (STANDING):  apixaban 5 milliGRAM(s) Oral every 12 hours  aspirin enteric coated 81 milliGRAM(s) Oral daily  atorvastatin 80 milliGRAM(s) Oral at bedtime  chlorhexidine 4% Liquid 1 Application(s) Topical <User Schedule>  furosemide    Tablet 40 milliGRAM(s) Oral two times a day  levETIRAcetam 250 milliGRAM(s) Oral two times a day  levothyroxine 75 MICROGram(s) Oral daily  lisinopril 5 milliGRAM(s) Oral daily  magnesium sulfate  IVPB 2 Gram(s) IV Intermittent once  pantoprazole    Tablet 40 milliGRAM(s) Oral before breakfast  sodium chloride 1 Gram(s) Oral two times a day  tamsulosin 0.4 milliGRAM(s) Oral at bedtime        VITALS:  T(F): 97.5 (21 @ 12:52), Max: 98.9 (21 @ 21:15)  HR: 66 (21 @ 12:52)  BP: 94/53 (21 @ 12:52)  RR: 20 (21 @ 12:52)  SpO2: --  Wt(kg): --     @ 07:01  -   @ 07:00  --------------------------------------------------------  IN: 530 mL / OUT: 1000 mL / NET: -470 mL     @ 07:01  -   @ 07:00  --------------------------------------------------------  IN: 967 mL / OUT: 1350 mL / NET: -383 mL          LABS:      130<L>  |  93<L>  |  16  ----------------------------<  97  3.7   |  28  |  1.3    Ca    8.9      2021 06:38  Mg     1.6         TPro  5.3<L>  /  Alb  3.5  /  TBili  0.6  /  DBili      /  AST  14  /  ALT  10  /  AlkPhos  70                            9.8    6.79  )-----------( 265      ( 2021 06:38 )             29.5       Urine Studies:  Urinalysis Basic - ( 2021 20:28 )    Color: Light Yellow / Appearance: Clear / S.010 / pH:   Gluc:  / Ketone: Negative  / Bili: Negative / Urobili: <2 mg/dL   Blood:  / Protein: Negative / Nitrite: Negative   Leuk Esterase: Negative / RBC:  / WBC    Sq Epi:  / Non Sq Epi:  / Bacteria:       Protein/Creatinine Ratio Calculation: 0.2 Ratio ( @ 10:30)  Creatinine, Random Urine: 49 mg/dL ( @ 10:30)  Potassium, Random Urine: 29 mmol/L ( @ 07:52)  Osmolality, Random Urine: 320 mos/kg ( @ 07:52)  Sodium, Random Urine: 69.0 mmoL/L ( @ 07:52)  Calcium, Random Urine: 9 mg/dL ( @ 07:52)      RADIOLOGY & ADDITIONAL STUDIES:

## 2021-07-02 NOTE — PROGRESS NOTE ADULT - SUBJECTIVE AND OBJECTIVE BOX
HPI  Patient is a 89y old Male who presents with a chief complaint of Shortness of breath (02 Jul 2021 12:35)    Currently admitted to medicine with the primary diagnosis of CHF exacerbation       Today is hospital day 8d.     INTERVAL HPI / OVERNIGHT EVENTS:  Patient was examined and seen at bedside. This morning he is resting comfortably in bed and reports no new issues or overnight events.     ROS: Otherwise unremarkable     PAST MEDICAL & SURGICAL HISTORY  Coronary artery disease    Atrial fibrillation    Hyperlipidemia    Hypertension    Hypothyroidism    H/O aortic valve replacement  porcine    History of loop recorder  2017    Status post appendectomy      ALLERGIES  No Known Allergies    MEDICATIONS  STANDING MEDICATIONS  apixaban 5 milliGRAM(s) Oral every 12 hours  aspirin enteric coated 81 milliGRAM(s) Oral daily  atorvastatin 80 milliGRAM(s) Oral at bedtime  chlorhexidine 4% Liquid 1 Application(s) Topical <User Schedule>  furosemide    Tablet 40 milliGRAM(s) Oral two times a day  levETIRAcetam 250 milliGRAM(s) Oral two times a day  levothyroxine 75 MICROGram(s) Oral daily  lisinopril 5 milliGRAM(s) Oral daily  magnesium sulfate  IVPB 2 Gram(s) IV Intermittent once  pantoprazole    Tablet 40 milliGRAM(s) Oral before breakfast  sodium chloride 1 Gram(s) Oral two times a day  tamsulosin 0.4 milliGRAM(s) Oral at bedtime    PRN MEDICATIONS    VITALS:  T(F): 97.5  HR: 66  BP: 94/53  RR: 20  SpO2: --    PHYSICAL EXAM  GEN: NAD, Resting comfortably in bed  PULM: Clear to auscultation bilaterally, No wheezes  CVS: Regular rate and rhythm, S1-S2, no murmurs  ABD: Soft, non-tender, non-distended, no guarding  EXT: No edema  NEURO: A&Ox3, no focal deficits    LABS                        9.8    6.79  )-----------( 265      ( 02 Jul 2021 06:38 )             29.5     07-02    130<L>  |  93<L>  |  16  ----------------------------<  97  3.7   |  28  |  1.3    Ca    8.9      02 Jul 2021 06:38  Mg     1.6     07-02    TPro  5.3<L>  /  Alb  3.5  /  TBili  0.6  /  DBili  x   /  AST  14  /  ALT  10  /  AlkPhos  70  07-02                  RADIOLOGY     HPI  Patient is a 89y old Male who presents with a chief complaint of Shortness of breath (02 Jul 2021 12:35)    Currently admitted to medicine with the primary diagnosis of CHF exacerbation       Today is hospital day 8d.     INTERVAL HPI / OVERNIGHT EVENTS:  Patient was examined and seen at bedside. This morning he is resting comfortably in bed and reports no new issues or overnight events.     ROS: Otherwise unremarkable     PAST MEDICAL & SURGICAL HISTORY  Coronary artery disease    Atrial fibrillation    Hyperlipidemia    Hypertension    Hypothyroidism    H/O aortic valve replacement  porcine    History of loop recorder  2017    Status post appendectomy      ALLERGIES  No Known Allergies    MEDICATIONS  STANDING MEDICATIONS  apixaban 5 milliGRAM(s) Oral every 12 hours  aspirin enteric coated 81 milliGRAM(s) Oral daily  atorvastatin 80 milliGRAM(s) Oral at bedtime  chlorhexidine 4% Liquid 1 Application(s) Topical <User Schedule>  furosemide    Tablet 40 milliGRAM(s) Oral two times a day  levETIRAcetam 250 milliGRAM(s) Oral two times a day  levothyroxine 75 MICROGram(s) Oral daily  lisinopril 5 milliGRAM(s) Oral daily  magnesium sulfate  IVPB 2 Gram(s) IV Intermittent once  pantoprazole    Tablet 40 milliGRAM(s) Oral before breakfast  sodium chloride 1 Gram(s) Oral two times a day  tamsulosin 0.4 milliGRAM(s) Oral at bedtime    PRN MEDICATIONS    VITALS:  T(F): 97.5  HR: 66  BP: 94/53  RR: 20  SpO2: --    PHYSICAL EXAM  GEN: NAD, Resting comfortably in bed  PULM: Clear to auscultation bilaterally, No wheezes  CVS: Regular rate and rhythm, S1-S2, no murmurs  ABD: Soft, non-tender, non-distended, no guarding  EXT: No edema  NEURO: A&Ox3, no focal deficits    LABS                        9.8    6.79  )-----------( 265      ( 02 Jul 2021 06:38 )             29.5     07-02    130<L>  |  93<L>  |  16  ----------------------------<  97  3.7   |  28  |  1.3    Ca    8.9      02 Jul 2021 06:38  Mg     1.6     07-02    TPro  5.3<L>  /  Alb  3.5  /  TBili  0.6  /  DBili  x   /  AST  14  /  ALT  10  /  AlkPhos  70  07-02                  RADIOLOGY    EXAM:  XR CHEST PORTABLE ROUTINE 1V            PROCEDURE DATE:  07/01/2021            INTERPRETATION:  Clinical History/Reason for Exam:  opacification progressing    Comparison: XR CHEST 6/30/2021.      Findings:    Technique: Frontal view the chest.    Support devices:  Loop recorder, dual chamber pacemaker device, satisfactory position    Cardiac/mediastinum/hilum: Stable cardiac silhouette. Poststernotomy    Lung parenchyma/ Pleura: Stable bilateral symmetric opacities. No pleural effusionsor pneumothorax.      Skeleton/soft tissues: Stable      Impression:    Stable bilateral symmetric opacities. No pleural effusions or pneumothorax.

## 2021-07-02 NOTE — CONSULT NOTE ADULT - ASSESSMENT
IMPRESSION: Rehab of gait dysfunction / s/p PPM    PRECAUTIONS: [   ] Cardiac  [   ] Respiratory  [   ] Seizures [   ] Contact Isolation  [   ] Droplet Isolation  [   ] Other    Weight Bearing Status:     RECOMMENDATION:    Out of Bed to Chair     DVT/Decubiti Prophylaxis    REHAB PLAN:     [  x  ] Bedside P/T 3-5 times a week   [    ]   Bedside O/T  2-3 times a week             [    ] Speech Therapy               [    ]  No Rehab Therapy Indicated   Conditioning/ROM                                    ADL  Bed Mobility                                               Conditioning/ROM  Transfers                                                     Bed Mobility  Sitting /Standing Balance                         Transfers                                        Gait Training                                               Sitting/Standing Balance  Stair Training [   ]Applicable                    Home equipment Eval                                                                        Splinting  [   ] Only      GOALS:   ADL   [    ]   Independent                    Transfers  [  x  ] Independent                          Ambulation  [  x  ] Independent     [   x  ] With device                            [    ]  CG                                                         [    ]  CG                                                                  [    ] CG                            [    ] Min A                                                   [    ] Min A                                                              [    ] Min  A          DISCHARGE PLAN:   [    ]  Good candidate for Intensive Rehabilitation/Hospital based                                             Will tolerate 3hrs Intensive Rehab Daily                                       [  x   ]  Short Term Rehab in Skilled Nursing Facility                              vs         [  x   ]  Home with Outpatient or VN services                                         [     ]  Possible Candidate for Intensive Hospital based Rehab

## 2021-07-02 NOTE — CONSULT NOTE ADULT - SUBJECTIVE AND OBJECTIVE BOX
[facility] INITIAL CONSULT NOTE  --------------------------------------------------------------------------------  HPI:  This is an 89 year old M patient with past medical history of who presented to the ED for Shortness of breath. Patient was doing relatively well until 4 days prior to admission when he started experiencing shortness of breath on exertion. According to the patient he used to walk the dogs and work in the garden with no problems until the Monday prior to admission when he started noticing that he is slowing down and get more short of breath and tired with exertion. The dyspnea got progressively worse and he noticed some wheezing with exertion. He mentioned that this dyspnea was associated with cough that is mostly dry but sometimes bring up clear sputum. Patient's sodium on admission was 116 and has been 120-121 for the past 24 hours. Renal consulted for further management.    PAST HISTORY  --------------------------------------------------------------------------------  PAST MEDICAL & SURGICAL HISTORY:  Coronary artery disease  Atrial fibrillation  Hyperlipidemia  Hypertension  Hypothyroidism  H/O aortic valve replacement porcine  History of loop recorder 2017  Status post appendectomy    FAMILY HISTORY:  FH: hypertension    SOCIAL HISTORY:  No current ETOH, tobacco, or illicit drug use. H/O ETOH abuse.    ALLERGIES & MEDICATIONS  --------------------------------------------------------------------------------  Allergies    No Known Allergies    Standing Inpatient Medications  apixaban 5 milliGRAM(s) Oral every 12 hours  aspirin enteric coated 81 milliGRAM(s) Oral daily  atorvastatin 80 milliGRAM(s) Oral at bedtime  chlorhexidine 4% Liquid 1 Application(s) Topical <User Schedule>  furosemide   Injectable 40 milliGRAM(s) IV Push daily  levETIRAcetam 250 milliGRAM(s) Oral two times a day  levothyroxine 75 MICROGram(s) Oral daily  lisinopril 5 milliGRAM(s) Oral daily  magnesium sulfate  IVPB 2 Gram(s) IV Intermittent once  pantoprazole    Tablet 40 milliGRAM(s) Oral before breakfast  sodium chloride 1 Gram(s) Oral two times a day  tamsulosin 0.4 milliGRAM(s) Oral at bedtime    REVIEW OF SYSTEMS  --------------------------------------------------------------------------------  Gen: No fevers/chills  Skin: No rashes  Head/Eyes/Ears/Mouth: No headache; No sinus pain/discomfort, sore throat  Respiratory: No cough, wheezing, hemoptysis  CV: No chest pain  GI: No abdominal pain, diarrhea, constipation, nausea, vomiting, melena, hematochezia  : No increased frequency, dysuria, hematuria, nocturia  All other systems were reviewed and are negative, except as noted.    VITALS/PHYSICAL EXAM  --------------------------------------------------------------------------------  T(C): 36 (06-26-21 @ 07:00), Max: 36.8 (06-25-21 @ 12:00)  HR: 52 (06-26-21 @ 07:00) (44 - 55)  BP: 131/59 (06-26-21 @ 07:00) (116/69 - 162/71)  RR: 16 (06-26-21 @ 07:00) (13 - 31)  SpO2: 95% (06-26-21 @ 07:00) (95% - 100%)  Height (cm): 182.9 (06-24-21 @ 12:28)  Weight (kg): 85.2 (06-25-21 @ 06:00)  BMI (kg/m2): 25.5 (06-25-21 @ 06:00)  BSA (m2): 2.07 (06-25-21 @ 06:00)    06-25-21 @ 07:01  -  06-26-21 @ 07:00  --------------------------------------------------------  IN: 450 mL / OUT: 1250 mL / NET: -800 mL    Physical Exam:  	Gen: NAD  	Pulm: CTA B/L  	CV: irreg irreg  	Back: No CVA tenderness; no sacral edema  	Abd: +BS, soft, nontender/nondistended  	: No suprapubic tenderness  	LE: Warm,  edema  	Neuro: AAO x3    LABS/STUDIES  --------------------------------------------------------------------------------              10.0   8.82  >-----------<  218      [06-26-21 @ 05:38]              29.0     121  |  89  |  15  ----------------------------<  93      [06-26-21 @ 05:38]  4.3   |  25  |  1.2        Ca     8.7     [06-26-21 @ 05:38]      Mg     1.6     [06-26-21 @ 05:38]    TPro  5.3  /  Alb  3.2  /  TBili  0.6  /  DBili  x   /  AST  15  /  ALT  11  /  AlkPhos  62  [06-26-21 @ 05:38]    Troponin <0.01      [06-24-21 @ 13:24]  Serum Osmolality 250      [06-25-21 @ 11:34]    Creatinine Trend:  SCr 1.2 [06-26 @ 05:38]  SCr 1.1 [06-25 @ 16:00]  SCr 1.1 [06-25 @ 11:34]  SCr 1.0 [06-25 @ 05:30]  SCr 1.1 [06-25 @ 00:43]    Urinalysis - [08-14-20 @ 23:30]      Color Light Yellow / Appearance Clear / SG 1.013 / pH 6.0      Gluc Negative / Ketone Negative  / Bili Negative / Urobili <2 mg/dL       Blood Negative / Protein Negative / Leuk Est Negative / Nitrite Negative      RBC  / WBC  / Hyaline  / Gran  / Sq Epi  / Non Sq Epi  / Bacteria     HbA1c 5.7      [09-10-18 @ 16:28]  Lipid: chol 161, TG 82, HDL 65, LDL 81      [08-15-20 @ 08:30]
HPI:  This is an 89 year old M patient with past medical history of who presented to the ED for Shortness of breath. Patient was doing relatively well until 4 says prior to admission when he started experiencing shortness of breath on exertion. According to the patient he used to walk the dogs and work in the garden with no problems until the Monday prior to admission when he started noticing that he is slowing down and get more short of breath and tired with exertion. The dyspnea got progressively worse and he noticed some wheezing with exertion. He mentioned that this dyspnea was associated with cough that is mostly dry but sometimes bring up clear sputum. Patient denied having any chest pain, orthopnea or PNDs. Palpitations or any other symptoms.     In the ED Vital Signs Last 24 Hrs  T(C): 36.4 (24 Jun 2021 19:28), Max: 36.6 (24 Jun 2021 16:19)  T(F): 97.6 (24 Jun 2021 19:28), Max: 97.9 (24 Jun 2021 16:19)  HR: 60 (24 Jun 2021 21:00) (60 - 65)  BP: 167/73 (24 Jun 2021 21:00) (159/72 - 190/84)  BP(mean): 119 (24 Jun 2021 21:00) (119 - 119)  RR: 18 (24 Jun 2021 19:28) (18 - 20)  SpO2: 99% (24 Jun 2021 21:00) (94% - 99%) (24 Jun 2021 21:43)  s/p PPM implant on 6/28 for SSS      PAST MEDICAL & SURGICAL HISTORY:  Coronary artery disease    Atrial fibrillation    Hyperlipidemia    Hypertension    Hypothyroidism    H/O aortic valve replacement  porcine    History of loop recorder  2017    Status post appendectomy        Hospital Course:    TODAY'S SUBJECTIVE & REVIEW OF SYMPTOMS:     Constitutional WNL   Cardio WNL   Resp WNL   GI WNL  Heme WNL  Endo WNL  Skin WNL  MSK Weakness  Neuro WNL  Cognitive WNL  Psych WNL      MEDICATIONS  (STANDING):  apixaban 5 milliGRAM(s) Oral every 12 hours  aspirin enteric coated 81 milliGRAM(s) Oral daily  atorvastatin 80 milliGRAM(s) Oral at bedtime  chlorhexidine 4% Liquid 1 Application(s) Topical <User Schedule>  furosemide    Tablet 40 milliGRAM(s) Oral two times a day  levETIRAcetam 250 milliGRAM(s) Oral two times a day  levothyroxine 75 MICROGram(s) Oral daily  lisinopril 5 milliGRAM(s) Oral daily  magnesium sulfate  IVPB 2 Gram(s) IV Intermittent once  magnesium sulfate  IVPB 2 Gram(s) IV Intermittent once  pantoprazole    Tablet 40 milliGRAM(s) Oral before breakfast  sodium chloride 1 Gram(s) Oral two times a day  tamsulosin 0.4 milliGRAM(s) Oral at bedtime    MEDICATIONS  (PRN):      FAMILY HISTORY:  FH: hypertension        Allergies    No Known Allergies    Intolerances        SOCIAL HISTORY:    [  ] Etoh  [  ] Smoking  [  ] Substance abuse     Home Environment:  [   ] Home Alone  [ x  ] Lives with Family  [   ] Home Health Aid    Dwelling:  [   ] Apartment  [  x ] Private House  [   ] Adult Home  [   ] Skilled Nursing Facility      [   ] Short Term  [   ] Long Term  [ x  ] Stairs       Elevator [   ]    FUNCTIONAL STATUS PTA: (Check all that apply)  Ambulation: [  x  ]Independent    [   ] Dependent     [   ] Non-Ambulatory  Assistive Device: [   ] SA Cane  [   ]  Q Cane  [  x ] Walker  [   ]  Wheelchair  ADL : [x   ] Independent  [    ]  Dependent       Vital Signs Last 24 Hrs  T(C): 36.3 (02 Jul 2021 05:07), Max: 37.2 (01 Jul 2021 21:15)  T(F): 97.4 (02 Jul 2021 05:07), Max: 98.9 (01 Jul 2021 21:15)  HR: 66 (02 Jul 2021 05:07) (60 - 66)  BP: 142/64 (02 Jul 2021 05:07) (123/63 - 142/64)  BP(mean): --  RR: 20 (02 Jul 2021 05:07) (18 - 20)  SpO2: --      PHYSICAL EXAM: Awake & Alert  GENERAL: NAD  HEAD:  Normocephalic  CHEST/LUNG: Clear   HEART: S1S2+  ABDOMEN: Soft, Nontender  EXTREMITIES:  no calf tenderness    NERVOUS SYSTEM:  Cranial Nerves 2-12 intact [   ] Abnormal  [   ]  ROM: WFL all extremities [ x  ]  Abnormal [   ]  Motor Strength: WFL all extremities  [   ]  Abnormal [ x  ]4/5 all ext  Sensation: intact to light touch [  x ] Abnormal [   ]    FUNCTIONAL STATUS:  Bed Mobility: Independent [   ]  Supervision [   ]  Needs Assistance [x   ]  N/A [   ]  Transfers: Independent [   ]  Supervision [   ]  Needs Assistance [ x  ]  N/A [   ]   Ambulation: Independent [   ]  Supervision [   ]  Needs Assistance [   ]  N/A [   ]  ADL: Independent [   ] Requires Assistance [   ] N/A [   ]      LABS:                        9.8    6.79  )-----------( 265      ( 02 Jul 2021 06:38 )             29.5     07-02    130<L>  |  93<L>  |  16  ----------------------------<  97  3.7   |  28  |  1.3    Ca    8.9      02 Jul 2021 06:38  Mg     1.6     07-02    TPro  5.3<L>  /  Alb  3.5  /  TBili  0.6  /  DBili  x   /  AST  14  /  ALT  10  /  AlkPhos  70  07-02          RADIOLOGY & ADDITIONAL STUDIES:  
Patient is a 89y old  Male who presents with a chief complaint of Shortness of breath (25 Jun 2021 10:37)        HPI:  This is an 89 year old M patient with past medical history of who presented to the ED for Shortness of breath. Patient was doing relatively well until 4 says prior to admission when he started experiencing shortness of breath on exertion. According to the patient he used to walk the dogs and work in the garden with no problems until the Monday prior to admission when he started noticing that he is slowing down and get more short of breath and tired with exertion. The dyspnea got progressively worse and he noticed some wheezing with exertion. He mentioned that this dyspnea was associated with cough that is mostly dry but sometimes bring up clear sputum. Patient denied having any chest pain, orthopnea or PNDs. Palpitations or any other symptoms.     In the ED Vital Signs Last 24 Hrs  T(C): 36.4 (24 Jun 2021 19:28), Max: 36.6 (24 Jun 2021 16:19)  T(F): 97.6 (24 Jun 2021 19:28), Max: 97.9 (24 Jun 2021 16:19)  HR: 60 (24 Jun 2021 21:00) (60 - 65)  BP: 167/73 (24 Jun 2021 21:00) (159/72 - 190/84)  BP(mean): 119 (24 Jun 2021 21:00) (119 - 119)  RR: 18 (24 Jun 2021 19:28) (18 - 20)  SpO2: 99% (24 Jun 2021 21:00) (94% - 99%) (24 Jun 2021 21:43)      Electrophysiology:  89y Male with h/p HTN, HLD, hypothyroid, AVR, CKD, PAF on Eliquis, , admitted with SOB and edema, found to be in acute on chronic HF exacerbation  Noted to be bradycardic to 40s bpm, AF with slow VR.  Asymptomatic, hemodynamically stable     REVIEW OF SYSTEMS    [x ] A ten-point review of systems was otherwise negative except as noted.  [ ] Due to altered mental status/intubation, subjective information were not able to be obtained from the patient. History was obtained, to the extent possible, from review of the chart and collateral sources of information.      PAST MEDICAL & SURGICAL HISTORY:  Coronary artery disease    Atrial fibrillation    Hyperlipidemia    Hypertension    Hypothyroidism    H/O aortic valve replacement  porcine    History of loop recorder  2017    Status post appendectomy        Home Medications:  aspirin 81 mg oral tablet: 1 tab(s) orally once a day (30 Sep 2020 11:05)  atorvastatin 80 mg oral tablet: 1 tab(s) orally once a day (30 Sep 2020 11:05)  Eliquis 5 mg oral tablet: 1 tab(s) orally 2 times a day (30 Sep 2020 11:05)  ferrous sulfate 75 mg (15 mg elemental iron) oral tablet: 1 tab(s) orally once a day (30 Sep 2020 11:05)  folic acid 1 mg oral tablet: 1 tab(s) orally once a day (at bedtime) (30 Sep 2020 11:05)  levothyroxine 75 mcg (0.075 mg) oral tablet: 1 tab(s) orally once a day (30 Sep 2020 11:05)  magnesium oxide 400 mg (241.3 mg elemental magnesium) oral tablet: 1 tab(s) orally once a day (30 Sep 2020 11:05)  NexIUM 20 mg oral delayed release capsule: 1 cap(s) orally once a day (30 Sep 2020 11:05)  Sodium Chloride 1 g oral tablet: 1 tab(s) orally 2 times a day (30 Sep 2020 11:05)  tamsulosin 0.4 mg oral capsule: 1 cap(s) orally once a day (30 Sep 2020 11:05)  thiamine 100 mg oral tablet: 1 tab(s) orally once a day (01 Oct 2020 14:35)  Vitamin D3 1000 intl units (25 mcg) oral tablet: 1 tab(s) orally once a day (30 Sep 2020 11:05)      Allergies:  No Known Allergies      FAMILY HISTORY:  FH: hypertension        SOCIAL HISTORY: denies tobacco / ETOH / illicit drug use     CIGARETTES:  ALCOHOL:        PREVIOUS DIAGNOSTIC TESTING:      ECHO  FINDINGS:  < from: TTE Echo Complete w/o Contrast w/ Doppler (06.25.21 @ 06:22) >  Summary:   1. Normal global left ventricular systolic function.   2. Moderately enlarged left atrium.   3. Normal right atrial size.   4. Mild to moderate mitral valve regurgitation.   5. Moderate mitral annular calcification.   6. Moderate thickening of the anterior and posterior mitral valve leaflets.   7.Mild-moderate tricuspid regurgitation.   8. Bioprosthesis in the aortic position.   9. Peak gradient across AV is 4 wit a foy of 2 and calculated DAREK of 2.1.    < end of copied text >    STRESS  FINDINGS:    CATHETERIZATION  FINDINGS:    ELECTROPHYSIOLOGY STUDY  FINDINGS:    CAROTID ULTRASOUND:  FINDINGS    VENOUS DUPLEX SCAN:  FINDINGS:    CHEST CT PULMONARY ANGIO with IV Contrast:  FINDINGS:      MEDICATIONS  (STANDING):  apixaban 5 milliGRAM(s) Oral every 12 hours  aspirin enteric coated 81 milliGRAM(s) Oral daily  atorvastatin 80 milliGRAM(s) Oral at bedtime  chlorhexidine 4% Liquid 1 Application(s) Topical <User Schedule>  levETIRAcetam 250 milliGRAM(s) Oral two times a day  levothyroxine 75 MICROGram(s) Oral daily  lisinopril 5 milliGRAM(s) Oral daily  pantoprazole    Tablet 40 milliGRAM(s) Oral before breakfast  sodium chloride 1 Gram(s) Oral two times a day  tamsulosin 0.4 milliGRAM(s) Oral at bedtime    MEDICATIONS  (PRN):      Vital Signs Last 24 Hrs  T(C): 36.1 (25 Jun 2021 15:46), Max: 36.8 (25 Jun 2021 12:00)  T(F): 96.9 (25 Jun 2021 15:46), Max: 98.2 (25 Jun 2021 12:00)  HR: 51 (25 Jun 2021 15:46) (44 - 65)  BP: 150/87 (25 Jun 2021 15:46) (126/65 - 190/84)  BP(mean): 113 (25 Jun 2021 15:46) (89 - 158)  RR: 16 (25 Jun 2021 15:46) (13 - 40)  SpO2: 98% (25 Jun 2021 14:00) (94% - 100%)    PHYSICAL EXAM:    GENERAL: In no apparent distress, well nourished, and hydrated.  HEAD:  Atraumatic, Normocephalic  EYES: EOMI, PERRLA, conjunctiva and sclera clear  NECK: Supple and normal thyroid.  No JVD or carotid bruit.  Carotid pulse is 2+ bilaterally.  HEART: Regular rate and rhythm; No murmurs, rubs, or gallops.  PULMONARY: Clear to auscultation and perfusion.  No rales, wheezing, or rhonchi bilaterally.  ABDOMEN: Soft, Nontender, Nondistended; Bowel sounds present  EXTREMITIES:  2+ Peripheral Pulses, No clubbing, cyanosis, or edema  NEUROLOGICAL: Grossly nonfocal    I&O's Detail    24 Jun 2021 07:01  -  25 Jun 2021 07:00  --------------------------------------------------------  IN:  Total IN: 0 mL    OUT:    Voided (mL): 1300 mL  Total OUT: 1300 mL    Total NET: -1300 mL      25 Jun 2021 07:01  -  25 Jun 2021 15:50  --------------------------------------------------------  IN:    IV PiggyBack: 100 mL    Oral Fluid: 350 mL  Total IN: 450 mL    OUT:    Voided (mL): 900 mL  Total OUT: 900 mL    Total NET: -450 mL        Daily     Daily     INTERPRETATION OF TELEMETRY:    ECG:  < from: 12 Lead ECG (06.24.21 @ 14:07) >    Ventricular Rate 53 BPM    Atrial Rate 35 BPM    QRS Duration 76 ms    Q-T Interval 504 ms    QTC Calculation(Bazett) 472 ms    R Axis 0 degrees    T Axis 6 degrees    Diagnosis Line Atrial fibrillation with slow ventricular response  Moderate voltage criteria for LVH, may be normal variant  Abnormal ECG    < end of copied text >        LABS:                        9.4    7.15  )-----------( 202      ( 25 Jun 2021 05:30 )             27.7     06-25    121<L>  |  88<L>  |  14  ----------------------------<  104<H>  4.1   |  23  |  1.1    Ca    8.3<L>      25 Jun 2021 11:34    TPro  5.4<L>  /  Alb  3.5  /  TBili  0.6  /  DBili  x   /  AST  18  /  ALT  12  /  AlkPhos  61  06-25    CARDIAC MARKERS ( 24 Jun 2021 13:24 )  x     / <0.01 ng/mL / x     / x     / x              BNPSerum Pro-Brain Natriuretic Peptide: 4015 pg/mL (06-24 @ 13:24)              RADIOLOGY & ADDITIONAL STUDIES:      
Patient is a 89y old  Male who presents with a chief complaint of Shortness of breath (25 Jun 2021 05:00)      HPI:  This is an 89 year old M patient with past medical history of who presented to the ED for Shortness of breath. Patient was doing relatively well until 4 says prior to admission when he started experiencing shortness of breath on exertion. According to the patient he used to walk the dogs and work in the garden with no problems until the Monday prior to admission when he started noticing that he is slowing down and get more short of breath and tired with exertion. The dyspnea got progressively worse and he noticed some wheezing with exertion. He mentioned that this dyspnea was associated with cough that is mostly dry but sometimes bring up clear sputum. Patient denied having any chest pain, orthopnea or PNDs. Palpitations or any other symptoms.     In the ED Vital Signs Last 24 Hrs  T(C): 36.4 (24 Jun 2021 19:28), Max: 36.6 (24 Jun 2021 16:19)  T(F): 97.6 (24 Jun 2021 19:28), Max: 97.9 (24 Jun 2021 16:19)  HR: 60 (24 Jun 2021 21:00) (60 - 65)  BP: 167/73 (24 Jun 2021 21:00) (159/72 - 190/84)  BP(mean): 119 (24 Jun 2021 21:00) (119 - 119)  RR: 18 (24 Jun 2021 19:28) (18 - 20)  SpO2: 99% (24 Jun 2021 21:00) (94% - 99%) (24 Jun 2021 21:43)    MICU consulted for Hyponatremia and CHF exacerbation    PAST MEDICAL & SURGICAL HISTORY:  Coronary artery disease    Atrial fibrillation    Hyperlipidemia    Hypertension    Hypothyroidism    H/O aortic valve replacement  porcine    History of loop recorder  2017    Status post appendectomy        SOCIAL HX:   Smoking   former smoker                      ETOH  denies                          Other    FAMILY HISTORY:  FH: hypertension    :  No known cardiovascular family history     Review Of Systems:     All ROS are negative except per HPI       Allergies    No Known Allergies    Intolerances          PHYSICAL EXAM    ICU Vital Signs Last 24 Hrs  T(C): 36.4 (25 Jun 2021 08:00), Max: 36.6 (24 Jun 2021 16:19)  T(F): 97.6 (25 Jun 2021 08:00), Max: 97.9 (24 Jun 2021 16:19)  HR: 50 (25 Jun 2021 10:00) (46 - 65)  BP: 138/62 (25 Jun 2021 10:00) (138/62 - 190/84)  BP(mean): 93 (25 Jun 2021 10:00) (93 - 129)  ABP: --  ABP(mean): --  RR: 15 (25 Jun 2021 10:00) (13 - 24)  SpO2: 99% (25 Jun 2021 10:00) (94% - 100%)      CONSTITUTIONAL:  Ill appearing   NAD    ENT:   Airway patent,   Mouth with normal mucosa.   No thrush      CARDIAC:   Bradycardic  Irregular rhythm.    No edema      Vascular:   normal systolic impulse  no bruits    RESPIRATORY:   No wheezing  dec BS b/l . Crackles +right base  Not tachypneic,  No use of accessory muscles    GASTROINTESTINAL:  Abdomen soft,   Non-tender,   No guarding,   + BS      NEUROLOGICAL:   Alert and awake   Non focal    SKIN:   Skin normal color for race,   No evidence of rash.            06-24-21 @ 07:01  -  06-25-21 @ 07:00  --------------------------------------------------------  IN:  Total IN: 0 mL    OUT:    Voided (mL): 1300 mL  Total OUT: 1300 mL    Total NET: -1300 mL      06-25-21 @ 07:01  -  06-25-21 @ 10:37  --------------------------------------------------------  IN:    IV PiggyBack: 100 mL    Oral Fluid: 250 mL  Total IN: 350 mL    OUT:    Voided (mL): 800 mL  Total OUT: 800 mL    Total NET: -450 mL          LABS:                          9.4    7.15  )-----------( 202      ( 25 Jun 2021 05:30 )             27.7                                               06-25    121<L>  |  89<L>  |  13  ----------------------------<  90  3.1<L>   |  22  |  1.0    Ca    8.5      25 Jun 2021 05:30    TPro  5.4<L>  /  Alb  3.5  /  TBili  0.6  /  DBili  x   /  AST  18  /  ALT  12  /  AlkPhos  61  06-25                                                 CARDIAC MARKERS ( 24 Jun 2021 13:24 )  x     / <0.01 ng/mL / x     / x     / x                                                LIVER FUNCTIONS - ( 25 Jun 2021 05:30 )  Alb: 3.5 g/dL / Pro: 5.4 g/dL / ALK PHOS: 61 U/L / ALT: 12 U/L / AST: 18 U/L / GGT: x                                                                                                                                       X-Rays reviewed    b/l infiltrates                                                                                     ECHO < from: TTE Echo Complete w/o Contrast w/ Doppler (06.25.21 @ 06:22) >  . Normal global left ventricular systolic function.   2. Moderately enlarged left atrium.   3. Normal right atrial size.   4. Mild to moderate mitral valve regurgitation.   5. Moderate mitral annular calcification.   6. Moderate thickening of the anterior and posterior mitral valve leaflets.   7.Mild-moderate tricuspid regurgitation.   8. Bioprosthesis in the aortic position.   9. Peak gradient across AV is 4 wit a foy of 2 and calculated DAREK of 2.1.    < end of copied text >        MEDICATIONS  (STANDING):  amLODIPine   Tablet 10 milliGRAM(s) Oral daily  apixaban 5 milliGRAM(s) Oral every 12 hours  aspirin enteric coated 81 milliGRAM(s) Oral daily  atorvastatin 80 milliGRAM(s) Oral at bedtime  chlorhexidine 4% Liquid 1 Application(s) Topical <User Schedule>  furosemide   Injectable 40 milliGRAM(s) IV Push every 12 hours  levETIRAcetam 250 milliGRAM(s) Oral two times a day  levothyroxine 75 MICROGram(s) Oral daily  pantoprazole    Tablet 40 milliGRAM(s) Oral before breakfast  sodium chloride 1 Gram(s) Oral two times a day  tamsulosin 0.4 milliGRAM(s) Oral at bedtime    MEDICATIONS  (PRN):

## 2021-07-02 NOTE — PROGRESS NOTE ADULT - ASSESSMENT
chronic hyponatremia  resolving acute on chronic HFpEF (echo: nl EF)  Afib with slow VR / s/p PPM  HTN  Hypothyroidism  hx of ETOH abuse    Plan:    cont lasix 40mg po bid  resume nacl 1g po bid  fluid restriction  encourage salty snacks  regular salt diet    cont ACE-I  dc planning  outpatient BMP check in 2 weeks  d/w team, daughter and wife

## 2021-07-03 ENCOUNTER — TRANSCRIPTION ENCOUNTER (OUTPATIENT)
Age: 86
End: 2021-07-03

## 2021-07-03 LAB
ALBUMIN SERPL ELPH-MCNC: 3.3 G/DL — LOW (ref 3.5–5.2)
ALP SERPL-CCNC: 70 U/L — SIGNIFICANT CHANGE UP (ref 30–115)
ALT FLD-CCNC: 12 U/L — SIGNIFICANT CHANGE UP (ref 0–41)
ANION GAP SERPL CALC-SCNC: 10 MMOL/L — SIGNIFICANT CHANGE UP (ref 7–14)
AST SERPL-CCNC: 16 U/L — SIGNIFICANT CHANGE UP (ref 0–41)
BASOPHILS # BLD AUTO: 0.08 K/UL — SIGNIFICANT CHANGE UP (ref 0–0.2)
BASOPHILS NFR BLD AUTO: 0.9 % — SIGNIFICANT CHANGE UP (ref 0–1)
BILIRUB SERPL-MCNC: 0.5 MG/DL — SIGNIFICANT CHANGE UP (ref 0.2–1.2)
BUN SERPL-MCNC: 19 MG/DL — SIGNIFICANT CHANGE UP (ref 10–20)
CALCIUM SERPL-MCNC: 8.9 MG/DL — SIGNIFICANT CHANGE UP (ref 8.5–10.1)
CHLORIDE SERPL-SCNC: 98 MMOL/L — SIGNIFICANT CHANGE UP (ref 98–110)
CO2 SERPL-SCNC: 27 MMOL/L — SIGNIFICANT CHANGE UP (ref 17–32)
CREAT SERPL-MCNC: 1.3 MG/DL — SIGNIFICANT CHANGE UP (ref 0.7–1.5)
EOSINOPHIL # BLD AUTO: 0.07 K/UL — SIGNIFICANT CHANGE UP (ref 0–0.7)
EOSINOPHIL NFR BLD AUTO: 0.8 % — SIGNIFICANT CHANGE UP (ref 0–8)
GLUCOSE SERPL-MCNC: 101 MG/DL — HIGH (ref 70–99)
HCT VFR BLD CALC: 31.3 % — LOW (ref 42–52)
HGB BLD-MCNC: 10.3 G/DL — LOW (ref 14–18)
IMM GRANULOCYTES NFR BLD AUTO: 0.3 % — SIGNIFICANT CHANGE UP (ref 0.1–0.3)
LYMPHOCYTES # BLD AUTO: 1.58 K/UL — SIGNIFICANT CHANGE UP (ref 1.2–3.4)
LYMPHOCYTES # BLD AUTO: 17.7 % — LOW (ref 20.5–51.1)
MAGNESIUM SERPL-MCNC: 1.7 MG/DL — LOW (ref 1.8–2.4)
MCHC RBC-ENTMCNC: 30.4 PG — SIGNIFICANT CHANGE UP (ref 27–31)
MCHC RBC-ENTMCNC: 32.9 G/DL — SIGNIFICANT CHANGE UP (ref 32–37)
MCV RBC AUTO: 92.3 FL — SIGNIFICANT CHANGE UP (ref 80–94)
MONOCYTES # BLD AUTO: 0.93 K/UL — HIGH (ref 0.1–0.6)
MONOCYTES NFR BLD AUTO: 10.4 % — HIGH (ref 1.7–9.3)
NEUTROPHILS # BLD AUTO: 6.26 K/UL — SIGNIFICANT CHANGE UP (ref 1.4–6.5)
NEUTROPHILS NFR BLD AUTO: 69.9 % — SIGNIFICANT CHANGE UP (ref 42.2–75.2)
NRBC # BLD: 0 /100 WBCS — SIGNIFICANT CHANGE UP (ref 0–0)
PLATELET # BLD AUTO: 267 K/UL — SIGNIFICANT CHANGE UP (ref 130–400)
POTASSIUM SERPL-MCNC: 3.8 MMOL/L — SIGNIFICANT CHANGE UP (ref 3.5–5)
POTASSIUM SERPL-SCNC: 3.8 MMOL/L — SIGNIFICANT CHANGE UP (ref 3.5–5)
PROT SERPL-MCNC: 5.4 G/DL — LOW (ref 6–8)
RBC # BLD: 3.39 M/UL — LOW (ref 4.7–6.1)
RBC # FLD: 12.5 % — SIGNIFICANT CHANGE UP (ref 11.5–14.5)
SODIUM SERPL-SCNC: 135 MMOL/L — SIGNIFICANT CHANGE UP (ref 135–146)
WBC # BLD: 8.95 K/UL — SIGNIFICANT CHANGE UP (ref 4.8–10.8)
WBC # FLD AUTO: 8.95 K/UL — SIGNIFICANT CHANGE UP (ref 4.8–10.8)

## 2021-07-03 PROCEDURE — 99233 SBSQ HOSP IP/OBS HIGH 50: CPT

## 2021-07-03 RX ORDER — LISINOPRIL 2.5 MG/1
1 TABLET ORAL
Qty: 30 | Refills: 0
Start: 2021-07-03 | End: 2021-08-01

## 2021-07-03 RX ORDER — SODIUM CHLORIDE 9 MG/ML
1 INJECTION INTRAMUSCULAR; INTRAVENOUS; SUBCUTANEOUS
Qty: 14 | Refills: 0
Start: 2021-07-03 | End: 2021-07-09

## 2021-07-03 RX ORDER — SODIUM CHLORIDE 9 MG/ML
1 INJECTION INTRAMUSCULAR; INTRAVENOUS; SUBCUTANEOUS
Qty: 0 | Refills: 0 | DISCHARGE

## 2021-07-03 RX ORDER — MAGNESIUM SULFATE 500 MG/ML
2 VIAL (ML) INJECTION ONCE
Refills: 0 | Status: COMPLETED | OUTPATIENT
Start: 2021-07-03 | End: 2021-07-03

## 2021-07-03 RX ORDER — FUROSEMIDE 40 MG
1 TABLET ORAL
Qty: 60 | Refills: 0
Start: 2021-07-03 | End: 2021-08-01

## 2021-07-03 RX ORDER — LEVETIRACETAM 250 MG/1
1 TABLET, FILM COATED ORAL
Qty: 0 | Refills: 0 | DISCHARGE
Start: 2021-07-03

## 2021-07-03 RX ADMIN — Medication 81 MILLIGRAM(S): at 11:21

## 2021-07-03 RX ADMIN — Medication 40 MILLIGRAM(S): at 05:11

## 2021-07-03 RX ADMIN — PANTOPRAZOLE SODIUM 40 MILLIGRAM(S): 20 TABLET, DELAYED RELEASE ORAL at 06:13

## 2021-07-03 RX ADMIN — Medication 50 GRAM(S): at 11:12

## 2021-07-03 RX ADMIN — LEVETIRACETAM 250 MILLIGRAM(S): 250 TABLET, FILM COATED ORAL at 17:16

## 2021-07-03 RX ADMIN — SODIUM CHLORIDE 1 GRAM(S): 9 INJECTION INTRAMUSCULAR; INTRAVENOUS; SUBCUTANEOUS at 05:11

## 2021-07-03 RX ADMIN — LEVETIRACETAM 250 MILLIGRAM(S): 250 TABLET, FILM COATED ORAL at 05:11

## 2021-07-03 RX ADMIN — LISINOPRIL 5 MILLIGRAM(S): 2.5 TABLET ORAL at 05:11

## 2021-07-03 RX ADMIN — APIXABAN 5 MILLIGRAM(S): 2.5 TABLET, FILM COATED ORAL at 05:11

## 2021-07-03 RX ADMIN — Medication 75 MICROGRAM(S): at 05:11

## 2021-07-03 RX ADMIN — TAMSULOSIN HYDROCHLORIDE 0.4 MILLIGRAM(S): 0.4 CAPSULE ORAL at 22:16

## 2021-07-03 RX ADMIN — ATORVASTATIN CALCIUM 80 MILLIGRAM(S): 80 TABLET, FILM COATED ORAL at 22:16

## 2021-07-03 RX ADMIN — CHLORHEXIDINE GLUCONATE 1 APPLICATION(S): 213 SOLUTION TOPICAL at 05:11

## 2021-07-03 RX ADMIN — APIXABAN 5 MILLIGRAM(S): 2.5 TABLET, FILM COATED ORAL at 17:16

## 2021-07-03 RX ADMIN — SODIUM CHLORIDE 1 GRAM(S): 9 INJECTION INTRAMUSCULAR; INTRAVENOUS; SUBCUTANEOUS at 17:16

## 2021-07-03 RX ADMIN — Medication 40 MILLIGRAM(S): at 17:16

## 2021-07-03 NOTE — CHART NOTE - NSCHARTNOTEFT_GEN_A_CORE
Pt requires a lightweight wheelchair which is medically necessary to allow patient to participate in ADLs in the home. Patient is unable to perform activities in standard wheelchair, unable to self-propel, and has caregiver at home to assist with propelling wheelchair.

## 2021-07-03 NOTE — PROGRESS NOTE ADULT - SUBJECTIVE AND OBJECTIVE BOX
SUBJECTIVE:    Patient is a 89y old Male who presents with a chief complaint of Shortness of breath (02 Jul 2021 13:58)    Currently admitted to medicine with the primary diagnosis of CHF exacerbation       Today is hospital day 9d. This morning he is resting comfortably in bed and reports no new issues or overnight events.     PAST MEDICAL & SURGICAL HISTORY  Coronary artery disease    Atrial fibrillation    Hyperlipidemia    Hypertension    Hypothyroidism    H/O aortic valve replacement  porcine    History of loop recorder  2017    Status post appendectomy      ALLERGIES:  No Known Allergies    MEDICATIONS:  STANDING MEDICATIONS  apixaban 5 milliGRAM(s) Oral every 12 hours  aspirin enteric coated 81 milliGRAM(s) Oral daily  atorvastatin 80 milliGRAM(s) Oral at bedtime  chlorhexidine 4% Liquid 1 Application(s) Topical <User Schedule>  furosemide    Tablet 40 milliGRAM(s) Oral two times a day  levETIRAcetam 250 milliGRAM(s) Oral two times a day  levothyroxine 75 MICROGram(s) Oral daily  lisinopril 5 milliGRAM(s) Oral daily  pantoprazole    Tablet 40 milliGRAM(s) Oral before breakfast  sodium chloride 1 Gram(s) Oral two times a day  tamsulosin 0.4 milliGRAM(s) Oral at bedtime    PRN MEDICATIONS    VITALS:   T(F): 97.7  HR: 60  BP: 126/60  RR: 18  SpO2: 95%    LABS:                        10.3   8.95  )-----------( 267      ( 03 Jul 2021 06:03 )             31.3     07-03    135  |  98  |  19  ----------------------------<  101<H>  3.8   |  27  |  1.3    Ca    8.9      03 Jul 2021 06:03  Mg     1.7     07-03    TPro  5.4<L>  /  Alb  3.3<L>  /  TBili  0.5  /  DBili  x   /  AST  16  /  ALT  12  /  AlkPhos  70  07-03                  RADIOLOGY:    PHYSICAL EXAM:  GEN: No acute distress  LUNGS: Clear to auscultation bilaterally   HEART: Regular  ABD: Soft, non-tender, non-distended.  EXT: No edema  NEURO: AAOX3

## 2021-07-03 NOTE — DISCHARGE NOTE PROVIDER - NSDCCPCAREPLAN_GEN_ALL_CORE_FT
PRINCIPAL DISCHARGE DIAGNOSIS  Diagnosis: CHF exacerbation  Assessment and Plan of Treatment: You came in with shortness of breath. You were diagnosed with heart failure exacerbation. You were treated with intravenous lasix to get rid of excess fluid. Please follow u with you primary care doctor and take your medication as prescribed.      SECONDARY DISCHARGE DIAGNOSES  Diagnosis: Atrial fibrillation with slow ventricular response  Assessment and Plan of Treatment: Your heart rate was slow when you came in. You were evaluated by electrophysiology team and they placed a pacemaker. Please follow up with electrophysiology doctor outpatient. Please take your blood thinner as prescribed to prevent any strokes.    Diagnosis: Hyponatremia  Assessment and Plan of Treatment: Your sodium levels was low. It was likely caused by excess fluid in your body. You were evaluated by the nephrologist and he recommended starting you on salt tablets and your hyponatremia resolved. Please continue taking salt tablets at home and follow up with your primary care doctor outpatient within 1 week to get your sodium level checked.     PRINCIPAL DISCHARGE DIAGNOSIS  Diagnosis: CHF exacerbation  Assessment and Plan of Treatment: You came in with shortness of breath. You were diagnosed with heart failure exacerbation. You were treated with intravenous lasix to get rid of excess fluid. Please follow up with you primary care doctor and take your medication as prescribed.  - Lasix 40mg twice a day, follow up with PCP in 1 week      SECONDARY DISCHARGE DIAGNOSES  Diagnosis: Atrial fibrillation with slow ventricular response  Assessment and Plan of Treatment: Your heart rate was slow when you came in. You were evaluated by electrophysiology team and they placed a pacemaker. Please follow up with electrophysiology doctor outpatient. Please take your blood thinner as prescribed to prevent any strokes.    Diagnosis: Hyponatremia  Assessment and Plan of Treatment: Your sodium levels was low. It was likely caused by excess fluid in your body. You were evaluated by the nephrologist and he recommended starting you on salt tablets and your hyponatremia resolved. Please continue taking salt tablets at home and follow up with your primary care doctor outpatient within 1 week to get your sodium level checked.

## 2021-07-03 NOTE — PROGRESS NOTE ADULT - ASSESSMENT
Mr Nielson is an 89 year old man with the past medical history of AFib, HLD, HTN who presented to the ED for Shortness of breath on June 24th. Patient was doing relatively well until 4 says prior to admission when he started experiencing shortness of breath on exertion which he did not previously have and was able to walk his dogs without difficulty. The dyspnea got progressively worse and he noticed some wheezing with exertion. He mentioned that this dyspnea was associated with cough that is mostly dry but sometimes bring up clear sputum. Patient denied having any chest pain, orthopnea or PNDs. Palpitations or any other symptoms. Patient was found to be Bradycardic in the 40's. He was seen and evaluated by EP and cardioversion was discussed but ultimately he was not a candidate. On June 28th patient was brought in for Pacemaker placement. Procedure went well and he has not had any complications since.   Patient had hypervolemic HYPONATREMIA. He has a history of hyponatremia in the past that had induced seizures though he did not on this admission.      # SOB, resolved likely secondary to CHF exacerbation and/or bradycardia  - improved with Diuretics  - c/w lasix 40 mg IV BID    #Afib with slow VR  #Bradycardia  - s/p pacemaker placement on 6/28  - EKG demonstrated sinus bradycardia and chronotropic incompetence suggestive of sick sinus syndrome/sinus node dysfunction  - s/p pacemaker interrogation  - outpt f/u with EP    # Hyponatremia,   - Resolved  - secondary to volume overload in context of SIADH & low dietary solute intake  - Nephrology following  - currently on NaCl tabs 2g TID PO  - continue with Lasix 40mg PO BID    # Coronary artery disease, Hyperlipidemia, STABLE  continue with:  - ASA  -lisinopril 5mg  -atorvastatin 80mg        DVT PPx: Eliquis  Gi PPx; PPI  Full COde  Dispo: medically cleared for discharge today

## 2021-07-03 NOTE — DISCHARGE NOTE PROVIDER - NSDCFUSCHEDAPPT_GEN_ALL_CORE_FT
AMA HARDY ; 07/27/2021 ; NPP Cardio [id number] AMA Torrez ; 09/21/2021 ; NPP Neuro [address]

## 2021-07-03 NOTE — DISCHARGE NOTE PROVIDER - HOSPITAL COURSE
88 yo M PMHx AFib, HLD, HTN who presented to the ED for Shortness of breath on June 24th. Admitted for CHF exacerbation. Patient was found to be Bradycardic in the 40's, pacemaker placed on 6/28.  Patient had hypervolemic hyponatremia,  resolved with salt tabs. He has a history of hyponatremia in the past that had induced seizures though he did not on this admission.         89 yo M PMHx AFib, HLD, HTN who presented to the ED for Shortness of breath on June 24th. Admitted for CHF exacerbation. Patient was found to be Bradycardic in the 40's, pacemaker placed on 6/28.  Patient had hypervolemic hyponatremia,  resolved with diuresis and salt tabs. He has a history of hyponatremia in the past that had induced seizures though he did not on this admission.      GENERAL: NAD  HEAD:  Normocephalic  EYES:  conjunctiva and sclera clear  ENMT: Moist mucous membranes  NECK: Supple  NERVOUS SYSTEM:  Alert, awake, Good concentration  CHEST/LUNG: clear to auscultation bilaterally   HEART: Regular rate and rhythm  left chest wall with pacemaker dressing, w/o erythema or pain  ABDOMEN: Soft, Nontender, Nondistended  EXTREMITIES:   No edema      I have spent > 30m preparing this discharge and have counselled patient and family on discharge instructions  Olivia Lee, DO

## 2021-07-03 NOTE — DISCHARGE NOTE PROVIDER - CARE PROVIDER_API CALL
Scooby Molina  INTERNAL MEDICINE  [address] [place] [address]  Phone: [phone]  Fax: [phone]  Follow Up Time: 1 week

## 2021-07-03 NOTE — DISCHARGE NOTE PROVIDER - NSDCMRMEDTOKEN_GEN_ALL_CORE_FT
amLODIPine 10 mg oral tablet: 1 tab(s) orally once a day  aspirin 81 mg oral tablet: 1 tab(s) orally once a day  atorvastatin 80 mg oral tablet: 1 tab(s) orally once a day  Eliquis 5 mg oral tablet: 1 tab(s) orally 2 times a day  ferrous sulfate 75 mg (15 mg elemental iron) oral tablet: 1 tab(s) orally once a day  folic acid 1 mg oral tablet: 1 tab(s) orally once a day (at bedtime)  hydrALAZINE 25 mg oral tablet: 1 tab(s) orally every 12 hours  levETIRAcetam 250 mg oral tablet: 1 tab(s) orally 2 times a day  levothyroxine 75 mcg (0.075 mg) oral tablet: 1 tab(s) orally once a day  magnesium oxide 400 mg (241.3 mg elemental magnesium) oral tablet: 1 tab(s) orally once a day  Multiple Vitamins oral tablet: 1 tab(s) orally once a day  NexIUM 20 mg oral delayed release capsule: 1 cap(s) orally once a day  Sodium Chloride 1 g oral tablet: 1 tab(s) orally 2 times a day  tamsulosin 0.4 mg oral capsule: 1 cap(s) orally once a day  thiamine 100 mg oral tablet: 1 tab(s) orally once a day  Vitamin D3 1000 intl units (25 mcg) oral tablet: 1 tab(s) orally once a day   aspirin 81 mg oral tablet: 1 tab(s) orally once a day  atorvastatin 80 mg oral tablet: 1 tab(s) orally once a day  Eliquis 5 mg oral tablet: 1 tab(s) orally 2 times a day  ferrous sulfate 75 mg (15 mg elemental iron) oral tablet: 1 tab(s) orally once a day  folic acid 1 mg oral tablet: 1 tab(s) orally once a day (at bedtime)  furosemide 40 mg oral tablet: 1 tab(s) orally 2 times a day  levETIRAcetam 250 mg oral tablet: 1 tab(s) orally 2 times a day  levothyroxine 75 mcg (0.075 mg) oral tablet: 1 tab(s) orally once a day  lisinopril 5 mg oral tablet: 1 tab(s) orally once a day  magnesium oxide 400 mg (241.3 mg elemental magnesium) oral tablet: 1 tab(s) orally once a day  Multiple Vitamins oral tablet: 1 tab(s) orally once a day  NexIUM 20 mg oral delayed release capsule: 1 cap(s) orally once a day  Sodium Chloride 1 g oral tablet: 1 tab(s) orally 2 times a day  tamsulosin 0.4 mg oral capsule: 1 cap(s) orally once a day  thiamine 100 mg oral tablet: 1 tab(s) orally once a day  Vitamin D3 1000 intl units (25 mcg) oral tablet: 1 tab(s) orally once a day

## 2021-07-03 NOTE — PROGRESS NOTE ADULT - ATTENDING COMMENTS
89 yo M PMHx AFib, HLD, HTN who presented to the ED for Shortness of breath on June 24th. Admitted for CHF exacerbation. Patient was found to be Bradycardic in the 40's, pacemaker placed on 6/28. Patient had hypervolemic hyponatremia,  resolved with diuresis and salt tabs. He has a history of hyponatremia in the past that had induced seizures though he did not on this admission.  At this time, patient is medically stable for discharge, pending CM for home services.     Olivia Lee, DO

## 2021-07-04 ENCOUNTER — TRANSCRIPTION ENCOUNTER (OUTPATIENT)
Age: 86
End: 2021-07-04

## 2021-07-04 LAB
ANION GAP SERPL CALC-SCNC: 9 MMOL/L — SIGNIFICANT CHANGE UP (ref 7–14)
BUN SERPL-MCNC: 18 MG/DL — SIGNIFICANT CHANGE UP (ref 10–20)
CALCIUM SERPL-MCNC: 9 MG/DL — SIGNIFICANT CHANGE UP (ref 8.5–10.1)
CHLORIDE SERPL-SCNC: 94 MMOL/L — LOW (ref 98–110)
CO2 SERPL-SCNC: 29 MMOL/L — SIGNIFICANT CHANGE UP (ref 17–32)
CREAT SERPL-MCNC: 1.3 MG/DL — SIGNIFICANT CHANGE UP (ref 0.7–1.5)
GLUCOSE SERPL-MCNC: 95 MG/DL — SIGNIFICANT CHANGE UP (ref 70–99)
POTASSIUM SERPL-MCNC: 4.3 MMOL/L — SIGNIFICANT CHANGE UP (ref 3.5–5)
POTASSIUM SERPL-SCNC: 4.3 MMOL/L — SIGNIFICANT CHANGE UP (ref 3.5–5)
SODIUM SERPL-SCNC: 132 MMOL/L — LOW (ref 135–146)

## 2021-07-04 PROCEDURE — 99239 HOSP IP/OBS DSCHRG MGMT >30: CPT

## 2021-07-04 RX ADMIN — PANTOPRAZOLE SODIUM 40 MILLIGRAM(S): 20 TABLET, DELAYED RELEASE ORAL at 05:49

## 2021-07-04 RX ADMIN — TAMSULOSIN HYDROCHLORIDE 0.4 MILLIGRAM(S): 0.4 CAPSULE ORAL at 21:44

## 2021-07-04 RX ADMIN — ATORVASTATIN CALCIUM 80 MILLIGRAM(S): 80 TABLET, FILM COATED ORAL at 21:44

## 2021-07-04 RX ADMIN — LEVETIRACETAM 250 MILLIGRAM(S): 250 TABLET, FILM COATED ORAL at 18:53

## 2021-07-04 RX ADMIN — APIXABAN 5 MILLIGRAM(S): 2.5 TABLET, FILM COATED ORAL at 18:53

## 2021-07-04 RX ADMIN — Medication 40 MILLIGRAM(S): at 05:50

## 2021-07-04 RX ADMIN — LISINOPRIL 5 MILLIGRAM(S): 2.5 TABLET ORAL at 05:50

## 2021-07-04 RX ADMIN — APIXABAN 5 MILLIGRAM(S): 2.5 TABLET, FILM COATED ORAL at 05:50

## 2021-07-04 RX ADMIN — Medication 40 MILLIGRAM(S): at 18:53

## 2021-07-04 RX ADMIN — Medication 81 MILLIGRAM(S): at 12:01

## 2021-07-04 RX ADMIN — LEVETIRACETAM 250 MILLIGRAM(S): 250 TABLET, FILM COATED ORAL at 05:49

## 2021-07-04 RX ADMIN — Medication 75 MICROGRAM(S): at 05:50

## 2021-07-04 RX ADMIN — SODIUM CHLORIDE 1 GRAM(S): 9 INJECTION INTRAMUSCULAR; INTRAVENOUS; SUBCUTANEOUS at 18:53

## 2021-07-04 RX ADMIN — SODIUM CHLORIDE 1 GRAM(S): 9 INJECTION INTRAMUSCULAR; INTRAVENOUS; SUBCUTANEOUS at 05:50

## 2021-07-04 NOTE — DISCHARGE NOTE NURSING/CASE MANAGEMENT/SOCIAL WORK - NSDCVIVACCINE_GEN_ALL_CORE_FT
influenza, injectable, quadrivalent, preservative free; 02-Oct-2020 11:31; Jacque Duarte (RN); i[employer]; [id number] (Exp. Date: 30-Jun-2021); IntraMuscular; Deltoid Left.; 0.5 milliLiter(s); VIS (VIS Published: 15-Aug-2019, VIS Presented: 02-Oct-2020);

## 2021-07-04 NOTE — DISCHARGE NOTE NURSING/CASE MANAGEMENT/SOCIAL WORK - PATIENT PORTAL LINK FT
You can access the FollowMyHealth Patient Portal offered by [facility] by registering at the following website: [email or web address]. By joining [facility]’s FollowMyHealth portal, you will also be able to view your health information using other applications (apps) compatible with our system.

## 2021-07-05 VITALS
SYSTOLIC BLOOD PRESSURE: 131 MMHG | RESPIRATION RATE: 18 BRPM | TEMPERATURE: 97 F | DIASTOLIC BLOOD PRESSURE: 64 MMHG | HEART RATE: 60 BPM

## 2021-07-05 PROCEDURE — 71045 X-RAY EXAM CHEST 1 VIEW: CPT | Mod: 26

## 2021-07-05 PROCEDURE — 99232 SBSQ HOSP IP/OBS MODERATE 35: CPT

## 2021-07-05 PROCEDURE — 93010 ELECTROCARDIOGRAM REPORT: CPT

## 2021-07-05 RX ADMIN — Medication 40 MILLIGRAM(S): at 06:11

## 2021-07-05 RX ADMIN — Medication 81 MILLIGRAM(S): at 12:21

## 2021-07-05 RX ADMIN — LEVETIRACETAM 250 MILLIGRAM(S): 250 TABLET, FILM COATED ORAL at 06:11

## 2021-07-05 RX ADMIN — Medication 75 MICROGRAM(S): at 06:11

## 2021-07-05 RX ADMIN — APIXABAN 5 MILLIGRAM(S): 2.5 TABLET, FILM COATED ORAL at 06:11

## 2021-07-05 RX ADMIN — CHLORHEXIDINE GLUCONATE 1 APPLICATION(S): 213 SOLUTION TOPICAL at 06:11

## 2021-07-05 RX ADMIN — SODIUM CHLORIDE 1 GRAM(S): 9 INJECTION INTRAMUSCULAR; INTRAVENOUS; SUBCUTANEOUS at 06:11

## 2021-07-05 RX ADMIN — LISINOPRIL 5 MILLIGRAM(S): 2.5 TABLET ORAL at 06:11

## 2021-07-05 NOTE — CHART NOTE - NSCHARTNOTEFT_GEN_A_CORE
Patient was set for Discharge today. Called by nurse that patient is SOB. Vitals WNL with o2 100% on RA. When examining the patient seemed anxious and wanted to sit up. Up moving in the bed his o2 sats dropped to 85%. PE was WNL no wheezing, crackles.     Plan  - Holding off discharge as patient may need home o2.  - Recommend ambulatory o2 readings prior to discharge.   - check CXR and ECG.   - Spoke with family at bedside and daughter on the phone and they are agreeable to the plan.

## 2021-07-05 NOTE — PROGRESS NOTE ADULT - ATTENDING COMMENTS
88 yo M PMHx AFib, HLD, HTN who presented to the ED for Shortness of breath on June 24th. Admitted for CHF exacerbation. Patient was found to be Bradycardic in the 40's, pacemaker placed on 6/28. Patient had hypervolemic hyponatremia,  resolved with diuresis and salt tabs. He has a history of hyponatremia in the past that had induced seizures though he did not on this admission. Plan for discharge on 7/4 but patient with questionable episode of hypoxia with exertion. Patient currently saturating 100% on RA, 95% with sitting/standing with nursing staff. CXR unchanged. No need for home oxygen.  At this time, patient is medically stable for discharge.    Olivia Lee DO .

## 2021-07-05 NOTE — PROGRESS NOTE ADULT - NSICDXPILOT_GEN_ALL_CORE
[place]

## 2021-07-05 NOTE — CHART NOTE - NSCHARTNOTEFT_GEN_A_CORE
Letter of Medical Necessity:     The patient has a mobility limitation secondary to his congestive heart failure that significantly impairs the patients ability to participate in one or more MRADLs such as toileting, eating, dressing, and bathing in customary locations in the home. The patients home provides adequate access between rooms for the use of the manual standard wheelchair. The wheelchair will significantly improve the patients' ability to participate in MRADLs and will be used on a regular basis in the home. The patients mobility limitation cannot be resolved by the use of a walker or cane. The patient has sufficient upper extremity function to safely propel the manual wheelchair in the home during a typical day. The patient has agreed to use the manual wheelchair that is provider in the home.       Olivia Lee DO   Attending Hospitalist

## 2021-07-05 NOTE — CHART NOTE - NSCHARTNOTESELECT_GEN_ALL_CORE
Transfer Note
Transfer Note
EP Procedure (PPM Implant)
Event Note
Event Note
Letter of Necessity
Transfer Note
Transfer Note

## 2021-07-05 NOTE — PROGRESS NOTE ADULT - SUBJECTIVE AND OBJECTIVE BOX
AAM HARDY 89y Male  MRN#: [id number]   Hospital Day: 11d    SUBJECTIVE  Patient is a 89y old Male who presents with a chief complaint of Shortness of breath (03 Jul 2021 11:29)  Currently admitted to medicine with the primary diagnosis of CHF exacerbation    INTERVAL HPI AND OVERNIGHT EVENTS:  Patient was examined and seen at bedside. This morning he is resting comfortably in bed and reports no issues or overnight events.    OBJECTIVE  PAST MEDICAL & SURGICAL HISTORY  Coronary artery disease  Atrial fibrillation  Hyperlipidemia  Hypertension  Hypothyroidism  H/O aortic valve replacement  porcine  History of loop recorder  2017  Status post appendectomy    ALLERGIES:  No Known Allergies    MEDICATIONS:  STANDING MEDICATIONS  apixaban 5 milliGRAM(s) Oral every 12 hours  aspirin enteric coated 81 milliGRAM(s) Oral daily  atorvastatin 80 milliGRAM(s) Oral at bedtime  chlorhexidine 4% Liquid 1 Application(s) Topical <User Schedule>  furosemide    Tablet 40 milliGRAM(s) Oral two times a day  levETIRAcetam 250 milliGRAM(s) Oral two times a day  levothyroxine 75 MICROGram(s) Oral daily  lisinopril 5 milliGRAM(s) Oral daily  pantoprazole    Tablet 40 milliGRAM(s) Oral before breakfast  sodium chloride 1 Gram(s) Oral two times a day  tamsulosin 0.4 milliGRAM(s) Oral at bedtime    PRN MEDICATIONS      VITAL SIGNS: Last 24 Hours  T(C): 36 (05 Jul 2021 06:36), Max: 36.3 (04 Jul 2021 13:50)  T(F): 96.8 (05 Jul 2021 06:36), Max: 97.4 (04 Jul 2021 13:50)  HR: 68 (05 Jul 2021 06:36) (60 - 75)  BP: 172/86 (05 Jul 2021 06:36) (137/74 - 172/86)  BP(mean): --  RR: 18 (05 Jul 2021 06:36) (18 - 18)  SpO2: 100% (05 Jul 2021 02:35) (100% - 100%)    LABS:    07-04    132<L>  |  94<L>  |  18  ----------------------------<  95  4.3   |  29  |  1.3    Ca    9.0      04 Jul 2021 06:33      PHYSICAL EXAM:  CONSTITUTIONAL: No acute distress, well-developed, well-groomed, AAOx3  HEAD: Atraumatic, normocephalic  PULMONARY: Clear to auscultation bilaterally  CARDIOVASCULAR: Regular rate and rhythm  GASTROINTESTINAL: Soft, non-tender, non-distended  MUSCULOSKELETAL: no edema  NEUROLOGY: non-focal  SKIN: No rashes or lesions    ASSESSMENT & PLAN  Mr Hardy is an 89 year old man with the past medical history of AFib, HLD, HTN who presented to the ED for Shortness of breath on June 24th. Patient was doing relatively well until 4 says prior to admission when he started experiencing shortness of breath on exertion which he did not previously have and was able to walk his dogs without difficulty. The dyspnea got progressively worse and he noticed some wheezing with exertion. He mentioned that this dyspnea was associated with cough that is mostly dry but sometimes bring up clear sputum. Patient denied having any chest pain, orthopnea or PNDs. Palpitations or any other symptoms. Patient was found to be Bradycardic in the 40's. He was seen and evaluated by EP and cardioversion was discussed but ultimately he was not a candidate. On June 28th patient was brought in for Pacemaker placement. Procedure went well and he has not had any complications since. Patient had hypervolemic HYPONATREMIA. He has a history of hyponatremia in the past that had induced seizures though he did not on this admission.      # SOB, resolved likely secondary to CHF exacerbation and/or bradycardia  - improved with Diuretics  - c/w lasix 40 mg IV BID    #Afib with slow VR  #Bradycardia  - s/p pacemaker placement on 6/28  - EKG demonstrated sinus bradycardia and chronotropic incompetence suggestive of sick sinus syndrome/sinus node dysfunction  - s/p pacemaker interrogation  - outpt f/u with EP    # Hyponatremia  - Na 132   - secondary to volume overload in context of SIADH & low dietary solute intake  - Nephrology following  - currently on NaCl tabs 2g TID PO  - continue with Lasix 40mg PO BID    # Coronary artery disease, Hyperlipidemia, STABLE  continue with:  - ASA  -lisinopril 5mg  -atorvastatin 80mg    DVT PPx: Eliquis  Gi PPx; PPI  Full Code  Dispo: medically cleared for discharge today AMA HARDY 89y Male  MRN#: [id number]   Hospital Day: 11d    SUBJECTIVE  Patient is a 89y old Male who presents with a chief complaint of Shortness of breath (03 Jul 2021 11:29)  Currently admitted to medicine with the primary diagnosis of CHF exacerbation    INTERVAL HPI AND OVERNIGHT EVENTS:  Patient was examined and seen at bedside. This morning he is resting comfortably in bed and reports no issues or overnight events.    OBJECTIVE  PAST MEDICAL & SURGICAL HISTORY  Coronary artery disease  Atrial fibrillation  Hyperlipidemia  Hypertension  Hypothyroidism  H/O aortic valve replacement  porcine  History of loop recorder  2017  Status post appendectomy    ALLERGIES:  No Known Allergies    MEDICATIONS:  STANDING MEDICATIONS  apixaban 5 milliGRAM(s) Oral every 12 hours  aspirin enteric coated 81 milliGRAM(s) Oral daily  atorvastatin 80 milliGRAM(s) Oral at bedtime  chlorhexidine 4% Liquid 1 Application(s) Topical <User Schedule>  furosemide    Tablet 40 milliGRAM(s) Oral two times a day  levETIRAcetam 250 milliGRAM(s) Oral two times a day  levothyroxine 75 MICROGram(s) Oral daily  lisinopril 5 milliGRAM(s) Oral daily  pantoprazole    Tablet 40 milliGRAM(s) Oral before breakfast  sodium chloride 1 Gram(s) Oral two times a day  tamsulosin 0.4 milliGRAM(s) Oral at bedtime    PRN MEDICATIONS      VITAL SIGNS: Last 24 Hours  T(C): 36 (05 Jul 2021 06:36), Max: 36.3 (04 Jul 2021 13:50)  T(F): 96.8 (05 Jul 2021 06:36), Max: 97.4 (04 Jul 2021 13:50)  HR: 68 (05 Jul 2021 06:36) (60 - 75)  BP: 172/86 (05 Jul 2021 06:36) (137/74 - 172/86)  BP(mean): --  RR: 18 (05 Jul 2021 06:36) (18 - 18)  SpO2: 100% (05 Jul 2021 02:35) (100% - 100%)    LABS:    07-04    132<L>  |  94<L>  |  18  ----------------------------<  95  4.3   |  29  |  1.3    Ca    9.0      04 Jul 2021 06:33      PHYSICAL EXAM:  CONSTITUTIONAL: No acute distress, well-developed, well-groomed, AAOx3  HEAD: Atraumatic, normocephalic  PULMONARY: Clear to auscultation bilaterally  CARDIOVASCULAR: Regular rate and rhythm  GASTROINTESTINAL: Soft, non-tender, non-distended  MUSCULOSKELETAL: no edema  NEUROLOGY: non-focal  SKIN: No rashes or lesions    ASSESSMENT & PLAN  Mr Hardy is an 89 year old man with the past medical history of AFib, HLD, HTN who presented to the ED for Shortness of breath on June 24th. Patient was doing relatively well until 4 says prior to admission when he started experiencing shortness of breath on exertion which he did not previously have and was able to walk his dogs without difficulty. The dyspnea got progressively worse and he noticed some wheezing with exertion. He mentioned that this dyspnea was associated with cough that is mostly dry but sometimes bring up clear sputum. Patient denied having any chest pain, orthopnea or PNDs. Palpitations or any other symptoms. Patient was found to be Bradycardic in the 40's. He was seen and evaluated by EP and cardioversion was discussed but ultimately he was not a candidate. On June 28th patient was brought in for Pacemaker placement. Procedure went well and he has not had any complications since. Patient had hypervolemic HYPONATREMIA. He has a history of hyponatremia in the past that had induced seizures though he did not on this admission.      # SOB, resolved likely secondary to CHF exacerbation and/or bradycardia  - improved with Diuretics  - c/w lasix 40 mg IV BID    #Afib with slow VR  #Bradycardia  - s/p pacemaker placement on 6/28  - EKG demonstrated sinus bradycardia and chronotropic incompetence suggestive of sick sinus syndrome/sinus node dysfunction  - s/p pacemaker interrogation  - outpt f/u with EP    # Hyponatremia  - Na 132   - secondary to volume overload in context of SIADH & low dietary solute intake  - Nephrology following  - currently on NaCl tabs 2g TID PO  - continue with Lasix 40mg PO BID    # Coronary artery disease, Hyperlipidemia, STABLE  continue with:  - ASA  -lisinopril 5mg  -atorvastatin 80mg    DVT PPx: Eliquis  Gi PPx; PPI  Full Code  Dispo: medically cleared for discharge today AMA HARDY 89y Male  MRN#: [id number]   Hospital Day: 11d    SUBJECTIVE  Patient is a 89y old Male who presents with a chief complaint of Shortness of breath (03 Jul 2021 11:29)  Currently admitted to medicine with the primary diagnosis of CHF exacerbation    INTERVAL HPI AND OVERNIGHT EVENTS:  Patient was examined and seen at bedside. This morning he is resting comfortably in bed and reports no issues or overnight events.    OBJECTIVE  PAST MEDICAL & SURGICAL HISTORY  Coronary artery disease  Atrial fibrillation  Hyperlipidemia  Hypertension  Hypothyroidism  H/O aortic valve replacement  porcine  History of loop recorder  2017  Status post appendectomy    ALLERGIES:  No Known Allergies    MEDICATIONS:  STANDING MEDICATIONS  apixaban 5 milliGRAM(s) Oral every 12 hours  aspirin enteric coated 81 milliGRAM(s) Oral daily  atorvastatin 80 milliGRAM(s) Oral at bedtime  chlorhexidine 4% Liquid 1 Application(s) Topical <User Schedule>  furosemide    Tablet 40 milliGRAM(s) Oral two times a day  levETIRAcetam 250 milliGRAM(s) Oral two times a day  levothyroxine 75 MICROGram(s) Oral daily  lisinopril 5 milliGRAM(s) Oral daily  pantoprazole    Tablet 40 milliGRAM(s) Oral before breakfast  sodium chloride 1 Gram(s) Oral two times a day  tamsulosin 0.4 milliGRAM(s) Oral at bedtime    PRN MEDICATIONS      VITAL SIGNS: Last 24 Hours  T(C): 36 (05 Jul 2021 06:36), Max: 36.3 (04 Jul 2021 13:50)  T(F): 96.8 (05 Jul 2021 06:36), Max: 97.4 (04 Jul 2021 13:50)  HR: 68 (05 Jul 2021 06:36) (60 - 75)  BP: 172/86 (05 Jul 2021 06:36) (137/74 - 172/86)  BP(mean): --  RR: 18 (05 Jul 2021 06:36) (18 - 18)  SpO2: 100% (05 Jul 2021 02:35) (100% - 100%)    LABS:    07-04    132<L>  |  94<L>  |  18  ----------------------------<  95  4.3   |  29  |  1.3    Ca    9.0      04 Jul 2021 06:33      PHYSICAL EXAM:  CONSTITUTIONAL: No acute distress, well-developed, well-groomed, AAOx3  HEAD: Atraumatic, normocephalic  PULMONARY: Clear to auscultation bilaterally  CARDIOVASCULAR: Regular rate and rhythm  GASTROINTESTINAL: Soft, non-tender, non-distended  MUSCULOSKELETAL: no edema  NEUROLOGY: non-focal  SKIN: No rashes or lesions    ASSESSMENT & PLAN  Mr Hardy is an 89 year old man with the past medical history of AFib, HLD, HTN who presented to the ED for Shortness of breath on June 24th. Patient was doing relatively well until 4 says prior to admission when he started experiencing shortness of breath on exertion which he did not previously have and was able to walk his dogs without difficulty. The dyspnea got progressively worse and he noticed some wheezing with exertion. He mentioned that this dyspnea was associated with cough that is mostly dry but sometimes bring up clear sputum. Patient denied having any chest pain, orthopnea or PNDs. Palpitations or any other symptoms. Patient was found to be Bradycardic in the 40's. He was seen and evaluated by EP and cardioversion was discussed but ultimately he was not a candidate. On June 28th patient was brought in for Pacemaker placement. Procedure went well and he has not had any complications since. Patient had hypervolemic HYPONATREMIA. He has a history of hyponatremia in the past that had induced seizures though he did not on this admission.      # SOB, resolved likely secondary to CHF exacerbation and/or bradycardia  - improved with Diuretics  - c/w lasix 40 mg BID    #Afib with slow VR  #Bradycardia  - s/p pacemaker placement on 6/28  - EKG demonstrated sinus bradycardia and chronotropic incompetence suggestive of sick sinus syndrome/sinus node dysfunction  - s/p pacemaker interrogation  - outpt f/u with EP    # Hyponatremia  - Na 132   - secondary to volume overload in context of SIADH & low dietary solute intake  - Nephrology following  - currently on NaCl tabs 1g BID PO  - continue with Lasix 40mg PO BID    # Coronary artery disease, Hyperlipidemia, STABLE  continue with:  - ASA  -lisinopril 5mg  -atorvastatin 80mg    DVT PPx: Eliquis  Gi PPx; PPI  Full Code  Dispo: medically cleared for discharge today

## 2021-07-05 NOTE — PROGRESS NOTE ADULT - PROVIDER SPECIALTY LIST ADULT
Critical Care
Electrophysiology
Nephrology
Nephrology
Pulmonology
Pulmonology
Internal Medicine
Internal Medicine
Critical Care
Hospitalist
Internal Medicine
Internal Medicine
Nephrology
Critical Care
Nephrology
Nephrology
Critical Care
Electrophysiology
Internal Medicine
Nephrology

## 2021-07-06 ENCOUNTER — RX RENEWAL (OUTPATIENT)
Age: 86
End: 2021-07-06

## 2021-07-07 ENCOUNTER — APPOINTMENT (OUTPATIENT)
Dept: CARE COORDINATION | Facility: HOME HEALTH | Age: 86
End: 2021-07-07
Payer: MEDICARE

## 2021-07-07 DIAGNOSIS — I25.10 ATHEROSCLEROTIC HEART DISEASE OF NATIVE CORONARY ARTERY W/OUT ANGINA PECTORIS: ICD-10-CM

## 2021-07-07 PROCEDURE — 99348 HOME/RES VST EST LOW MDM 30: CPT | Mod: 95

## 2021-07-08 PROBLEM — I25.10 CAD (CORONARY ARTERY DISEASE): Status: ACTIVE | Noted: 2017-04-13

## 2021-07-08 NOTE — COUNSELING
[de-identified] : Pt was informed about CN’s role/ [facility] program and overview of transitional care reviewed with patient. In addition, yellow contact card was provided. Pt educated on topics of importance such as compliance with all provider visits, prescribed medication regimen, and low salt diet. Pt encouraged calling CN with any issues, concerns or questions, also educated to notify CN if experiencing CP, SOB , cough, increased mucus/phlegm production, abdominal discomfort/swelling, difficulty sleeping or lying flat, fever, chills, fatigue, weight gain of 2-3lbs in 24 hours or 5lbs in one week,  dizziness, lightheadedness, n/v/d/c, swelling to extremities and/or any signs of CHF exacerbation as reviewed. Reassurance provided. Will continue to monitor\par \par

## 2021-07-08 NOTE — HISTORY OF PRESENT ILLNESS
[Home] : at home, [unfilled] , at the time of the visit. [Other Location: e.g. Home (Enter Location, City,State)___] : at [unfilled] [Spouse] : spouse [Verbal consent obtained from patient] : the patient, [unfilled] [FreeTextEntry1] : follow up CHF [de-identified] : Patient is an 89 y/o M enrolled in the [facility] program s/p a hospitlzation 6/24 to 7/5 for CHF excaerbation  he has a  PMH of  AFib ( on Eliquis), HLD,   Hospital course included pacemaker placement 6/28 secondary to  Bradycardua in the 40's, and  hypervolemic hyponatremia, resolved with diuresis and salt tabs. He improved clinically and was dc home with [facility] .Patient observed in NAD denies c/p, sob, cough, wt gain (165) palpiations and LE swelling. Follow up with PCP tomorrow and cardiology next week.

## 2021-07-08 NOTE — PLAN
[FreeTextEntry1] : CHF- daily weights, diet discussed , yellow zones reviewed , c/w diuretics and cardiology follow up \par HTN- c/w lisinopril \par CAD- c/w ASA , statin\par afib - c/w Eliquis , bradycardia hx and PPM , EP follow up \par PCP/ Cardio/ EP follow up

## 2021-08-09 ENCOUNTER — APPOINTMENT (OUTPATIENT)
Dept: CARDIOLOGY | Facility: CLINIC | Age: 86
End: 2021-08-09
Payer: MEDICARE

## 2021-08-09 VITALS
WEIGHT: 170 LBS | OXYGEN SATURATION: 97 % | RESPIRATION RATE: 16 BRPM | SYSTOLIC BLOOD PRESSURE: 129 MMHG | HEIGHT: 72 IN | HEART RATE: 79 BPM | TEMPERATURE: 97.8 F | DIASTOLIC BLOOD PRESSURE: 75 MMHG | BODY MASS INDEX: 23.03 KG/M2

## 2021-08-09 PROCEDURE — 99024 POSTOP FOLLOW-UP VISIT: CPT

## 2021-08-09 PROCEDURE — 93280 PM DEVICE PROGR EVAL DUAL: CPT

## 2021-08-09 RX ORDER — IRON/IRON ASP GLY/FA/MV-MIN 38 125-25-1MG
TABLET ORAL
Refills: 0 | Status: DISCONTINUED | COMMUNITY
End: 2021-08-09

## 2021-08-09 RX ORDER — NORMAL SALT TABLETS 1 G/G
1 TABLET ORAL
Refills: 0 | Status: DISCONTINUED | COMMUNITY
End: 2021-08-09

## 2021-08-09 RX ORDER — THIAMINE HCL 50 MG
TABLET ORAL
Refills: 0 | Status: DISCONTINUED | COMMUNITY
End: 2021-08-09

## 2021-08-10 NOTE — PROCEDURE
[No] : not [Atrial Fibrillation] : atrial fibrillation [Pacemaker] : pacemaker [Voltage: ___ volts] : Voltage was [unfilled] volts [Magnet Rate: ___ Ppm] : magnet rate was [unfilled] Ppm [Longevity: ___ months] : The estimated remaining battery life is [unfilled] months [Lead Imp:  ___ohms] : lead impedance was [unfilled] ohms [Sensing Amplitude ___mv] : sensing amplitude was [unfilled] mv [___V @] : [unfilled] V [___ ms] : [unfilled] ms [Auto Capture "On"] : auto capture was switched "On" [Programmed for Longevity] : output reprogrammed for improved battery longevity [Apace-Vpace ___ %] : Apace-Vpace [unfilled]% [de-identified] : [employer] [de-identified] : 2914 [de-identified] : [id number] [de-identified] : 6/28/21 [de-identified] : DDIR [de-identified] : 63 [de-identified] : Normal device function.\par Ongoing AF for last 41 days.\par Patient instructed to contact [employer] for home monitor connection.

## 2021-08-10 NOTE — HISTORY OF PRESENT ILLNESS
[de-identified] : \par \par 88 yo M PMHx  AFib, HLD, HTN, CAD, MI, TIA in 2017  who presented to the ED for Shortness of breath on\par June 24th. Admitted for CHF exacerbation on 6/24/201. Patient was found to be Bradycardic\par in the 40's, pacemaker placed on 6/28/2021\par (off note )Patient has EOS ILR still in situ. Patient opted to leave it alone\par \par Presents for wound check and device interrogation.\par Denies any sob, WALLACE, PND, orthopnea, no palpitations, no dizziness,lightheadedness, denies chest pains\par ECG ( 8/9/2021)69 bpm atrial fib. QRS 78ms, QTC 426ms\par ECHO ( 6/25/2021) normal EF mild to moderate MR and TR. Bioprosthetic aortic valve

## 2021-08-10 NOTE — PHYSICAL EXAM
[General Appearance - Well Developed] : well developed [Normal Appearance] : normal appearance [General Appearance - Well Nourished] : well nourished [General Appearance - In No Acute Distress] : no acute distress [Edema] : no peripheral edema present [] : no respiratory distress [Respiration, Rhythm And Depth] : normal respiratory rhythm and effort [Auscultation Breath Sounds / Voice Sounds] : lungs were clear to auscultation bilaterally [Left Infraclavicular] : left infraclavicular area [Clean] : clean [Dry] : dry [Well-Healed] : well-healed [Abdomen Soft] : soft [Nail Clubbing] : no clubbing of the fingernails [FreeTextEntry1] : irregular [Erythema] : not erythematous

## 2021-08-10 NOTE — DISCUSSION/SUMMARY
[FreeTextEntry1] : Mr. Roly Nielson is an 89  year-old man with hypertension, hyperlipidemia, hypothyroidism, coronary artery disease, history of myocardial infarction, transient ischemic attack (TIA) in March 2017, and paroxysmal atrial fibrillation. After TIA in March 2017 he underwent the placement of an implantable loop recorder on 3/22/2017. His CHADSVASC score is 6. He was placed on Eliquis 5 mg q12h.  \par Recent hospital admission on 6/24 due to chf exacerbation, he was found to be on symptomatic bradycardia, dual chamber PPM was implanted on 6/28 by Dr. Powell  but loop that was EOS remained in place as per patient's preference.\par \par I interrogated and reprogrammed his device as described in procedure. His wound is healed properly, with no signs of inflammation, infection or bleeding. I discussed with patient plan of care in great details. I discussed remote monitoring with him, and need to call office if he does manual transmission. I answered all his questions to his satisfaction. \par  \par \par Please do not hesitate to contact us at [phone] if you have any further questions regarding this patient care.

## 2021-09-09 ENCOUNTER — LABORATORY RESULT (OUTPATIENT)
Age: 86
End: 2021-09-09

## 2021-09-20 ENCOUNTER — APPOINTMENT (OUTPATIENT)
Dept: CARDIOLOGY | Facility: CLINIC | Age: 86
End: 2021-09-20
Payer: MEDICARE

## 2021-09-20 ENCOUNTER — APPOINTMENT (OUTPATIENT)
Dept: CARDIOLOGY | Facility: CLINIC | Age: 86
End: 2021-09-20

## 2021-09-20 VITALS
HEART RATE: 71 BPM | SYSTOLIC BLOOD PRESSURE: 128 MMHG | WEIGHT: 175 LBS | HEIGHT: 72 IN | TEMPERATURE: 97.9 F | RESPIRATION RATE: 16 BRPM | BODY MASS INDEX: 23.7 KG/M2 | OXYGEN SATURATION: 95 % | DIASTOLIC BLOOD PRESSURE: 68 MMHG

## 2021-09-20 VITALS — HEART RATE: 71 BPM | DIASTOLIC BLOOD PRESSURE: 68 MMHG | SYSTOLIC BLOOD PRESSURE: 128 MMHG

## 2021-09-20 PROCEDURE — 99213 OFFICE O/P EST LOW 20 MIN: CPT

## 2021-09-20 PROCEDURE — 93000 ELECTROCARDIOGRAM COMPLETE: CPT | Mod: 59

## 2021-09-20 PROCEDURE — 93280 PM DEVICE PROGR EVAL DUAL: CPT

## 2021-09-20 RX ORDER — CHOLECALCIFEROL (VITAMIN D3) 25 MCG
TABLET ORAL
Refills: 0 | Status: DISCONTINUED | COMMUNITY
End: 2021-09-20

## 2021-09-20 NOTE — HISTORY OF PRESENT ILLNESS
[de-identified] : \par \par 88 yo M PMHx  AFib, HLD, HTN, CAD, MI, TIA in 2017, Symptomatic bradycardia, s/p Dual Chamber PPM implant 6/28/2021\par Off note, patient has EOS ILR still in situ. Patient opted to leave it alone\par \par Patient presents for urgent visit today, after increased RV output received via remote monitoring. \par \par Accompanied by wife. \par Denies any sob, WALLACE, PND, orthopnea, no palpitations, no dizziness,lightheadedness, denies chest pains\par \par Cardiac tests: \par ECG (9/20/2021): Afib at 64 bpm, normal QRS, QTc 438 ms \par ECG (8/9/2021)69 bpm atrial fib. QRS 78ms, QTC 426ms\par ECHO ( 6/25/2021) normal EF mild to moderate MR and TR. Bioprosthetic aortic valve

## 2021-09-20 NOTE — DISCUSSION/SUMMARY
[Pacemaker Function Normal] : normal pacemaker function [Patient] : the patient [FreeTextEntry1] : Mr. Roly Nielson is an 89  year-old man with hypertension, hyperlipidemia, hypothyroidism, coronary artery disease, history of myocardial infarction, transient ischemic attack (TIA) in March 2017, and paroxysmal atrial fibrillation. After TIA in March 2017 he underwent the placement of an implantable loop recorder on 3/22/2017. His CHADSVASC score is 6. He was placed on Eliquis 5 mg q12h.  \par Recent hospital admission on 6/24/2021  due to chf exacerbation, he was found to have Symptomatic bradycardia, dual chamber PPM was implanted on 6/28 by Dr. Powell but loop that was at EOS remained in place as per patient's preference.\par \par I interrogated and reprogrammed his device as described in procedure. See device section for details. \par His wound is healed properly, with no signs of inflammation, infection or bleeding.\par He is enrolled in remote monitoring services and transmitting appropriately.    \par \par Please do not hesitate to contact us at [phone] if you have any further questions regarding this patient care.

## 2021-09-20 NOTE — PROCEDURE
[No] : not [Atrial Fibrillation] : atrial fibrillation [Pacemaker] : pacemaker [Magnet Rate: ___ Ppm] : magnet rate was [unfilled] Ppm [___ ms] : [unfilled] ms [Apace-Vpace ___ %] : Apace-Vpace [unfilled]% [See Scanned Paceart Report] : See scanned paceart report [See Device Printout] : See device printout [Voltage: ___ volts] : Voltage was [unfilled] volts [Longevity: ___ months] : The estimated remaining battery life is [unfilled] months [Lead Imp:  ___ohms] : lead impedance was [unfilled] ohms [Sensing Amplitude ___mv] : sensing amplitude was [unfilled] mv [___V @] : [unfilled] V [Outputs/Safety Margin] : output changed to allow for adequate safety margin [Counters Reset] : the counters were reset [de-identified] : [employer] [de-identified] : 8171 [de-identified] : [id number] [de-identified] : 6/28/21 [de-identified] : DDIR [de-identified] : 63 [de-identified] : Normal device function. \par Persistent Atrial fibrillation/rate controlled \par RV auto capture management turned to off. RV output set at 2.5 V @0.4 ms

## 2021-09-20 NOTE — PHYSICAL EXAM
[General Appearance - Well Developed] : well developed [General Appearance - Well Nourished] : well nourished [Heart Sounds] : normal S1 and S2 [Respiration, Rhythm And Depth] : normal respiratory rhythm and effort [Left Infraclavicular] : left infraclavicular area [Clean] : clean [Dry] : dry [Well-Healed] : well-healed [Bowel Sounds] : normal bowel sounds [Abdomen Soft] : soft [Nail Clubbing] : no clubbing of the fingernails [Cyanosis, Localized] : no localized cyanosis

## 2021-09-21 ENCOUNTER — APPOINTMENT (OUTPATIENT)
Dept: NEUROLOGY | Facility: CLINIC | Age: 86
End: 2021-09-21
Payer: MEDICARE

## 2021-09-21 VITALS
HEIGHT: 72 IN | OXYGEN SATURATION: 97 % | BODY MASS INDEX: 23.7 KG/M2 | DIASTOLIC BLOOD PRESSURE: 70 MMHG | WEIGHT: 175 LBS | HEART RATE: 69 BPM | TEMPERATURE: 97.9 F | SYSTOLIC BLOOD PRESSURE: 132 MMHG

## 2021-09-21 PROCEDURE — 99213 OFFICE O/P EST LOW 20 MIN: CPT

## 2021-09-21 RX ORDER — FUROSEMIDE 40 MG/1
40 TABLET ORAL
Refills: 0 | Status: DISCONTINUED | COMMUNITY
End: 2021-09-21

## 2021-09-24 NOTE — HISTORY OF PRESENT ILLNESS
[FreeTextEntry1] : Roly is here with his wife for the F/U.\par He is doing well in regard to his  seizure and overall at his baseline. His mood is stable and so is his memory and sleep pattern. He says his memory is not as sharp as before but is stable\par The only concern  is his gait and instability. He did have few falls in the past, the falls  happened in the yard while playing with the dog .\par Now he is more careful . He uses the cane. not feeling weak  but feels unsteady when stands up.\par No particular complain about his joints. No back pain\par He had received his vaccine\par Is having his regular F/U  with his PMD\par He says he is slightly frustrated with the politics in the government

## 2021-09-24 NOTE — PHYSICAL EXAM
[FreeTextEntry1] : A/A/Ox3\par good mood\par Follows 4 step commands \par normal language\par rarely has difficulty with word finding\par No drift\par no dysmetria\par has difficulty with standing up without using the hands\par wide based stance and gait\par + romberg

## 2021-09-24 NOTE — ASSESSMENT
[FreeTextEntry1] : Partial epilepsy\par A-Fib\par HTN\par neuropathy\par gait D/O\par carotid stenosis\par \par plan\par Continue current level\par discussed the gait and risks for fall

## 2021-10-06 ENCOUNTER — NON-APPOINTMENT (OUTPATIENT)
Age: 86
End: 2021-10-06

## 2021-10-06 ENCOUNTER — APPOINTMENT (OUTPATIENT)
Dept: CARDIOLOGY | Facility: CLINIC | Age: 86
End: 2021-10-06
Payer: MEDICARE

## 2021-10-06 PROCEDURE — 93296 REM INTERROG EVL PM/IDS: CPT

## 2021-10-06 PROCEDURE — 93294 REM INTERROG EVL PM/LDLS PM: CPT

## 2021-11-08 ENCOUNTER — APPOINTMENT (OUTPATIENT)
Dept: CARDIOLOGY | Facility: CLINIC | Age: 86
End: 2021-11-08
Payer: MEDICARE

## 2021-11-08 VITALS
TEMPERATURE: 96.9 F | SYSTOLIC BLOOD PRESSURE: 138 MMHG | HEART RATE: 76 BPM | DIASTOLIC BLOOD PRESSURE: 73 MMHG | WEIGHT: 176.13 LBS | BODY MASS INDEX: 23.89 KG/M2

## 2021-11-08 PROCEDURE — 99213 OFFICE O/P EST LOW 20 MIN: CPT

## 2021-11-08 PROCEDURE — 93280 PM DEVICE PROGR EVAL DUAL: CPT

## 2021-11-08 RX ORDER — ALBUTEROL SULFATE 90 UG/1
108 (90 BASE) INHALANT RESPIRATORY (INHALATION)
Qty: 7 | Refills: 0 | Status: DISCONTINUED | COMMUNITY
Start: 2021-06-23

## 2021-11-08 RX ORDER — MUPIROCIN 20 MG/G
2 OINTMENT TOPICAL
Qty: 22 | Refills: 0 | Status: DISCONTINUED | COMMUNITY
Start: 2021-10-05

## 2021-11-08 NOTE — HISTORY OF PRESENT ILLNESS
[de-identified] : \par \par 88 yo M PMHx  AFib, HLD, HTN, CAD, MI, TIA in 2017, Symptomatic bradycardia, s/p Dual Chamber PPM implant 6/28/2021\par Of note, patient has EOS ILR still in situ. Patient opted to leave it alone\par \par Patient presents for routine device interrogation. \par Accompanied by wife. \par Denies any sob, WALLACE, PND, orthopnea, no palpitations, no dizziness,lightheadedness, denies chest pains\par \par Cardiac tests:\par ECG (11/8/2021): Afib at 76 bpm \par ECG (9/20/2021): Afib at 64 bpm, normal QRS, QTc 438 ms \par ECG (8/9/2021)69 bpm atrial fib. QRS 78ms, QTC 426ms\par ECHO ( 6/25/2021) normal EF mild to moderate MR and TR. Bioprosthetic aortic valve

## 2021-11-08 NOTE — PROCEDURE
[No] : not [Atrial Fibrillation] : atrial fibrillation [See Scanned Paceart Report] : See scanned paceart report [See Device Printout] : See device printout [Pacemaker] : pacemaker [Voltage: ___ volts] : Voltage was [unfilled] volts [Magnet Rate: ___ Ppm] : magnet rate was [unfilled] Ppm [Longevity: ___ months] : The estimated remaining battery life is [unfilled] months [Lead Imp:  ___ohms] : lead impedance was [unfilled] ohms [Sensing Amplitude ___mv] : sensing amplitude was [unfilled] mv [___V @] : [unfilled] V [___ ms] : [unfilled] ms [Outputs/Safety Margin] : output changed to allow for adequate safety margin [Counters Reset] : the counters were reset [Apace-Vpace ___ %] : Apace-Vpace [unfilled]% [de-identified] : [employer] [de-identified] : [id number] [de-identified] : [id number] [de-identified] : 6/28/21 [de-identified] : DDIR [de-identified] : 60 [de-identified] : 7.9-8.7 years [de-identified] : Normal device function. \par Persistent Atrial fibrillation/rate controlled \par RV auto capture management remains off. RV output set at 2.5 V @0.4 ms

## 2021-11-08 NOTE — DISCUSSION/SUMMARY
[Pacemaker Function Normal] : normal pacemaker function [Patient] : the patient [FreeTextEntry1] : Mr. Roly Nielson is an 89  year-old man with hypertension, hyperlipidemia, hypothyroidism, coronary artery disease, history of myocardial infarction, transient ischemic attack (TIA) in March 2017, and paroxysmal atrial fibrillation. After TIA in March 2017 he underwent the placement of an implantable loop recorder on 3/22/2017. His CHADSVASC score is 6. He was placed on Eliquis 5 mg q12h. He reports no s/s bleeding. Recent labs reviewed (10/21/21): H/H 9.8/29.9, creat 1.6. He has h/o anemia, used to be on iron pills but discontinued by PCP.\par \par I interrogated and reprogrammed his device as described in procedure. See device section for details. \par His wound is healed properly, with no signs of inflammation, infection or bleeding.\par He is enrolled in remote monitoring services and transmitting appropriately.    \par His BP is well controlled.\par \par He will return to the office in 9 months. Please do not hesitate to contact us at [phone] if you have any further questions regarding this patient care.

## 2021-11-08 NOTE — PHYSICAL EXAM
[General Appearance - Well Developed] : well developed [General Appearance - Well Nourished] : well nourished [Heart Sounds] : normal S1 and S2 [Respiration, Rhythm And Depth] : normal respiratory rhythm and effort [Left Infraclavicular] : left infraclavicular area [Clean] : clean [Dry] : dry [Well-Healed] : well-healed [Abdomen Soft] : soft [Nail Clubbing] : no clubbing of the fingernails [Cyanosis, Localized] : no localized cyanosis

## 2022-01-05 ENCOUNTER — NON-APPOINTMENT (OUTPATIENT)
Age: 87
End: 2022-01-05

## 2022-01-05 ENCOUNTER — APPOINTMENT (OUTPATIENT)
Dept: CARDIOLOGY | Facility: CLINIC | Age: 87
End: 2022-01-05
Payer: MEDICARE

## 2022-01-05 PROCEDURE — 93294 REM INTERROG EVL PM/LDLS PM: CPT

## 2022-01-05 PROCEDURE — 93296 REM INTERROG EVL PM/IDS: CPT

## 2022-03-21 ENCOUNTER — APPOINTMENT (OUTPATIENT)
Dept: CARDIOLOGY | Facility: CLINIC | Age: 87
End: 2022-03-21
Payer: MEDICARE

## 2022-03-21 VITALS
DIASTOLIC BLOOD PRESSURE: 86 MMHG | TEMPERATURE: 97.1 F | HEART RATE: 67 BPM | BODY MASS INDEX: 23.57 KG/M2 | HEIGHT: 72 IN | WEIGHT: 174 LBS | SYSTOLIC BLOOD PRESSURE: 129 MMHG

## 2022-03-21 DIAGNOSIS — Z79.01 LONG TERM (CURRENT) USE OF ANTICOAGULANTS: ICD-10-CM

## 2022-03-21 DIAGNOSIS — I50.9 HEART FAILURE, UNSPECIFIED: ICD-10-CM

## 2022-03-21 PROCEDURE — 93280 PM DEVICE PROGR EVAL DUAL: CPT

## 2022-03-21 PROCEDURE — 99214 OFFICE O/P EST MOD 30 MIN: CPT

## 2022-03-21 PROCEDURE — 93000 ELECTROCARDIOGRAM COMPLETE: CPT | Mod: 59

## 2022-03-21 RX ORDER — APIXABAN 5 MG/1
5 TABLET, FILM COATED ORAL
Qty: 180 | Refills: 1 | Status: DISCONTINUED | COMMUNITY
Start: 2017-03-24 | End: 2022-03-21

## 2022-03-21 NOTE — HISTORY OF PRESENT ILLNESS
[de-identified] : Cardio: Dr. Romo\par Nephro: Dr. 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 Patient opted to leave it alone\par \par Patient presents for routine device interrogation. \par Accompanied by wife. \par Denies any sob, WALLACE, PND, orthopnea, no palpitations, no dizziness,lightheadedness, denies chest pains

## 2022-03-21 NOTE — DISCUSSION/SUMMARY
[Pacemaker Function Normal] : normal pacemaker function [Patient] : the patient [FreeTextEntry1] : Mr. Roly Nielson is an 89  year-old man with hypertension, hyperlipidemia, hypothyroidism, coronary artery disease, history of myocardial infarction, transient ischemic attack (TIA) in March 2017, and paroxysmal atrial fibrillation. After TIA in March 2017 he underwent the placement of an implantable loop recorder on 3/22/2017. His CHADSVASC score is 6. He was placed on Eliquis 5 mg q12h. He reports no s/s bleeding. Recent labs reviewed (3/10/22): H/H 10.9/33.7, creat 1.8. He has h/o anemia, used to be on iron pills but discontinued by PCP.\par \par I recommend that patient start Eliquis 2.5mg twice daily from 5mg due to age >80 and creatinine 1.8. Discussed at length with patient and wife - he sees Dr. Rosas for nephro - plan to contact him as he will see patient in 3 weeks and patient has had to adjust Eliquis dose for this in the past.\par \par I interrogated and reprogrammed his device as described in procedure. See device section for details. \par His wound is healed properly, with no signs of inflammation, infection or bleeding.\par He is enrolled in remote monitoring services and transmitting appropriately.    \par His BP is well controlled.\par \par He will return to the office in 6 months. Please do not hesitate to contact us at [phone] if you have any further questions regarding this patient care.

## 2022-03-21 NOTE — CARDIOLOGY SUMMARY
[de-identified] : 3/21/22: AF 67 bpm, PVC, QRS 76ms, QTc 402ms\par ECG (11/8/2021): Afib at 76 bpm \par ECG (9/20/2021): Afib at 64 bpm, normal QRS, QTc 438 ms \par ECG (8/9/2021)69 bpm atrial fib. QRS 78ms, QTC 426ms [de-identified] : ECHO ( 6/25/2021) normal EF mild to moderate MR and TR. Bioprosthetic aortic valve

## 2022-03-21 NOTE — PROCEDURE
[No] : not [Atrial Fibrillation] : atrial fibrillation [Pacemaker] : pacemaker [Voltage: ___ volts] : Voltage was [unfilled] volts [Magnet Rate: ___ Ppm] : magnet rate was [unfilled] Ppm [Longevity: ___ months] : The estimated remaining battery life is [unfilled] months [Lead Imp:  ___ohms] : lead impedance was [unfilled] ohms [Sensing Amplitude ___mv] : sensing amplitude was [unfilled] mv [___ ms] : [unfilled] ms [None] : none [Apace-Vpace ___ %] : Apace-Vpace [unfilled]% [See Device Printout] : See device printout [___V @] : [unfilled] V [de-identified] : Abbott [de-identified] : [id number] [de-identified] : [id number] [de-identified] : 06/28/2021 [de-identified] : DDIR [de-identified] : 60 [de-identified] : AT/AF [place] >99%\par Transmitting on merlin\par No events

## 2022-03-23 ENCOUNTER — APPOINTMENT (OUTPATIENT)
Dept: NEUROLOGY | Facility: CLINIC | Age: 87
End: 2022-03-23
Payer: MEDICARE

## 2022-03-23 VITALS
TEMPERATURE: 97.9 F | HEART RATE: 74 BPM | OXYGEN SATURATION: 97 % | SYSTOLIC BLOOD PRESSURE: 122 MMHG | WEIGHT: 175 LBS | DIASTOLIC BLOOD PRESSURE: 70 MMHG | HEIGHT: 72 IN | BODY MASS INDEX: 23.7 KG/M2

## 2022-03-23 DIAGNOSIS — G40.909 EPILEPSY, UNSPECIFIED, NOT INTRACTABLE, W/OUT STATUS EPILEPTICUS: ICD-10-CM

## 2022-03-23 PROCEDURE — 99214 OFFICE O/P EST MOD 30 MIN: CPT

## 2022-03-23 NOTE — HISTORY OF PRESENT ILLNESS
[FreeTextEntry1] : Roly is here for the F/u accompanied by his wife.\par He is doing well . No seizure or events suspicious for the seizure. His mood and memory are stable. He sleeps a lot/well . According to him he wakes up but stays in bed for a while until he gets up after 12 pm.\par He is also waking up to go the bathroom frequently.\par His appetite is Ok.\par He is vaccinated X 2. . says his daughter who is a nurse recommended two. and not the booster.\par He did have a blood test done through his  PMD.\par The GFR is 36 and the BUN and Creatinine are climbing. He was referred to see nephrologist\par THe HB is also 10.9 \par He denies lightheadedness . NI GI symptoms.\par no weight loss.\par

## 2022-03-23 NOTE — PHYSICAL EXAM
[FreeTextEntry1] : A/A/Ox3\par good mood \par good attention\par interacting well and appropriately\par follows commands\par Normal CN\par no drift\par wide based stance \par +/- Romberg\par stable gaite with the cane\par no dysmetria\par

## 2022-03-23 NOTE — ASSESSMENT
[FreeTextEntry1] : Partial epilepsy\par A=-Fib\par HTN\par gait D/O\par Neuropathy\par \par \par plan\par continue the keppra\par F/u with the nephrology and perhaps GI\par F/U in 6 months

## 2022-06-22 ENCOUNTER — NON-APPOINTMENT (OUTPATIENT)
Age: 87
End: 2022-06-22

## 2022-06-22 ENCOUNTER — APPOINTMENT (OUTPATIENT)
Dept: CARDIOLOGY | Facility: CLINIC | Age: 87
End: 2022-06-22

## 2022-06-22 PROCEDURE — 93296 REM INTERROG EVL PM/IDS: CPT

## 2022-06-22 PROCEDURE — 93294 REM INTERROG EVL PM/LDLS PM: CPT

## 2022-08-08 ENCOUNTER — APPOINTMENT (OUTPATIENT)
Dept: CARDIOLOGY | Facility: CLINIC | Age: 87
End: 2022-08-08

## 2022-09-12 ENCOUNTER — NON-APPOINTMENT (OUTPATIENT)
Age: 87
End: 2022-09-12

## 2022-09-22 ENCOUNTER — RX RENEWAL (OUTPATIENT)
Age: 87
End: 2022-09-22

## 2022-09-26 ENCOUNTER — APPOINTMENT (OUTPATIENT)
Dept: CARDIOLOGY | Facility: CLINIC | Age: 87
End: 2022-09-26

## 2022-09-26 VITALS
HEIGHT: 72 IN | DIASTOLIC BLOOD PRESSURE: 60 MMHG | RESPIRATION RATE: 16 BRPM | TEMPERATURE: 98 F | SYSTOLIC BLOOD PRESSURE: 120 MMHG | WEIGHT: 175 LBS | BODY MASS INDEX: 23.7 KG/M2 | HEART RATE: 68 BPM

## 2022-09-26 DIAGNOSIS — I10 ESSENTIAL (PRIMARY) HYPERTENSION: ICD-10-CM

## 2022-09-26 PROCEDURE — 93000 ELECTROCARDIOGRAM COMPLETE: CPT | Mod: 59

## 2022-09-26 PROCEDURE — 93280 PM DEVICE PROGR EVAL DUAL: CPT

## 2022-09-26 PROCEDURE — 99213 OFFICE O/P EST LOW 20 MIN: CPT

## 2022-09-26 NOTE — CARDIOLOGY SUMMARY
[de-identified] : 3/26/2022: AF 68 bpm, intermittent V-paced\par 3/21/22: AF 67 bpm, PVC, QRS 76ms, QTc 402ms\par ECG (11/8/2021): Afib at 76 bpm \par ECG (9/20/2021): Afib at 64 bpm, normal QRS, QTc 438 ms \par ECG (8/9/2021)69 bpm atrial fib. QRS 78ms, QTC 426ms [de-identified] : ECHO ( 6/25/2021) normal EF mild to moderate MR and TR. Bioprosthetic aortic valve

## 2022-09-26 NOTE — DISCUSSION/SUMMARY
[Pacemaker Function Normal] : normal pacemaker function [Patient] : the patient [FreeTextEntry1] : Mr. Roly Nielson is a 89  year-old man with hypertension, hyperlipidemia, hypothyroidism, coronary artery disease, history of myocardial infarction, transient ischemic attack (TIA) in March 2017, and persistent atrial fibrillation. After TIA in March 2017 he underwent the placement of an implantable loop recorder on 3/22/2017. His CHADSVASC score is 6. He was placed on Eliquis 5 mg q12h. He reports no s/s bleeding. Recent labs reviewed (3/10/22): H/H 10.9/33.7, creat 1.8. He has h/o anemia, used to be on iron pills but discontinued by PCP.\par \par I recommend that patient continue Eliquis 2.5mg twice daily (age >80 and creatinine 1.6 on 9/3/2022). Hgb 10.4 (baseline 10-11 this year)\par \par I interrogated and reprogrammed his device as described in procedure. See device section for details. \par His wound is healed properly, with no signs of inflammation, infection or bleeding.\par He is enrolled in remote monitoring services and transmitting appropriately.    \par His BP is well controlled.\par \par He is having repeat Echo with Dr. Mcmullen next month. I asked that they have a copy of the report faxed to our office. \par \par He will return to the office in 6-9 months. Please do not hesitate to contact us at [phone] if you have any further questions regarding this patient care.

## 2022-09-26 NOTE — CARDIOLOGY SUMMARY
[de-identified] : 3/26/2022: AF 68 bpm, intermittent V-paced\par 3/21/22: AF 67 bpm, PVC, QRS 76ms, QTc 402ms\par ECG (11/8/2021): Afib at 76 bpm \par ECG (9/20/2021): Afib at 64 bpm, normal QRS, QTc 438 ms \par ECG (8/9/2021)69 bpm atrial fib. QRS 78ms, QTC 426ms [de-identified] : ECHO ( 6/25/2021) normal EF mild to moderate MR and TR. Bioprosthetic aortic valve

## 2022-09-26 NOTE — HISTORY OF PRESENT ILLNESS
[de-identified] : Cardio: Dr. Romo\par Nephro: Dr. 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 Patient opted to leave it alone.\par Patient has no complaints. As per wife, patient is "slowing down". He does not have as much energy as he used to. \par \par Patient presents for routine device interrogation. \par Accompanied by wife. \par Denies any sob, WALLACE, PND, orthopnea, no palpitations, no dizziness,lightheadedness, denies chest pains

## 2022-09-26 NOTE — PROCEDURE
[No] : not [Atrial Fibrillation] : atrial fibrillation [See Device Printout] : See device printout [Pacemaker] : pacemaker [Voltage: ___ volts] : Voltage was [unfilled] volts [Magnet Rate: ___ Ppm] : magnet rate was [unfilled] Ppm [Longevity: ___ months] : The estimated remaining battery life is [unfilled] months [Lead Imp:  ___ohms] : lead impedance was [unfilled] ohms [Sensing Amplitude ___mv] : sensing amplitude was [unfilled] mv [___V @] : [unfilled] V [___ ms] : [unfilled] ms [None] : none [Apace-Vpace ___ %] : Apace-Vpace [unfilled]% [de-identified] : Abbott [de-identified] : [id number] [de-identified] : [id number] [de-identified] : 06/28/2021 [de-identified] : DDIR [de-identified] : 60 [de-identified] : 10.7 years [de-identified] : AT/AF [place] >99%\par : 64%\par Transmitting on merlin\par Brief NSVT 7 beats

## 2022-09-26 NOTE — PHYSICAL EXAM
[Heart Sounds] : normal S1 and S2 [General Appearance - Well Developed] : well developed [General Appearance - Well Nourished] : well nourished [Respiration, Rhythm And Depth] : normal respiratory rhythm and effort [Left Infraclavicular] : left infraclavicular area [Clean] : clean [Dry] : dry [Well-Healed] : well-healed [Abdomen Soft] : soft [Nail Clubbing] : no clubbing of the fingernails [Cyanosis, Localized] : no localized cyanosis [Arterial Pulses Normal] : the arterial pulses were normal [FreeTextEntry1] : Irregularly irregular

## 2022-09-26 NOTE — DISCUSSION/SUMMARY
[Pacemaker Function Normal] : normal pacemaker function [Patient] : the patient [FreeTextEntry1] : Mr. Roly Nielson is a 89  year-old man with hypertension, hyperlipidemia, hypothyroidism, coronary artery disease, history of myocardial infarction, transient ischemic attack (TIA) in March 2017, and persistent atrial fibrillation. After TIA in March 2017 he underwent the placement of an implantable loop recorder on 3/22/2017. His CHADSVASC score is 6. He was placed on Eliquis 5 mg q12h. He reports no s/s bleeding. Recent labs reviewed (3/10/22): H/H 10.9/33.7, creat 1.8. He has h/o anemia, used to be on iron pills but discontinued by PCP.\par \par I recommend that patient continue Eliquis 2.5mg twice daily (age >80 and creatinine 1.6 on 9/3/2022). Hgb 10.4 (baseline 10-11 this year)\par \par I interrogated and reprogrammed his device as described in procedure. See device section for details. \par His wound is healed properly, with no signs of inflammation, infection or bleeding.\par He is enrolled in remote monitoring services and transmitting appropriately.    \par His BP is well controlled.\par \par He is having repeat Echo with Dr. Mcmullen next month. I asked that they have a copy of the report faxed to our office. \par \par He will return to the office in 6-9 months. Please do not hesitate to contact us at [phone] if you have any further questions regarding this patient care.

## 2022-09-26 NOTE — HISTORY OF PRESENT ILLNESS
[de-identified] : Cardio: Dr. Romo\par Nephro: Dr. 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 Patient opted to leave it alone.\par Patient has no complaints. As per wife, patient is "slowing down". He does not have as much energy as he used to. \par \par Patient presents for routine device interrogation. \par Accompanied by wife. \par Denies any sob, WALLACE, PND, orthopnea, no palpitations, no dizziness,lightheadedness, denies chest pains

## 2022-09-26 NOTE — PROCEDURE
[No] : not [Atrial Fibrillation] : atrial fibrillation [See Device Printout] : See device printout [Pacemaker] : pacemaker [Voltage: ___ volts] : Voltage was [unfilled] volts [Magnet Rate: ___ Ppm] : magnet rate was [unfilled] Ppm [Longevity: ___ months] : The estimated remaining battery life is [unfilled] months [Lead Imp:  ___ohms] : lead impedance was [unfilled] ohms [Sensing Amplitude ___mv] : sensing amplitude was [unfilled] mv [___V @] : [unfilled] V [___ ms] : [unfilled] ms [None] : none [Apace-Vpace ___ %] : Apace-Vpace [unfilled]% [de-identified] : Abbott [de-identified] : [id number] [de-identified] : [id number] [de-identified] : 06/28/2021 [de-identified] : DDIR [de-identified] : 60 [de-identified] : 10.7 years [de-identified] : AT/AF [place] >99%\par : 64%\par Transmitting on merlin\par Brief NSVT 7 beats

## 2022-10-31 NOTE — ED ADULT NURSE NOTE - CHIEF COMPLAINT
"CLINICAL NUTRITION SERVICES - REASSESSMENT NOTE      Recommendations Ordered by Registered Dietitian (RD):     Will make pt Room Service with Assist - she will be seen daily for meal ordering  Will cancel the Glucerna supplement - encouraged her to have  bring in her preferred brand  Daily multivitamin for healing      Malnutrition:   % Weight Loss:  None noted  % Intake:  <75% for > 7 days (moderate malnutrition)  Subcutaneous Fat Loss:  None observed  Muscle Loss:  Temporal region - mild depletion and Clavicle bone region  - mild/moderate depletion  Fluid Retention:  None noted    Malnutrition Diagnosis: Moderate malnutrition  In Context of:  Acute illness or injury       EVALUATION OF PROGRESS TOWARD GOALS   Diet:    Moderate CHO  Chocolate Glucerna at lunch - HS  Not Appropriate for Room Service (standard trays ordered)      Intake/Tolerance:    Chart reviewed  Visited with pt this morning  She was upset that she received a standard breakfast tray - \"I didn't order this - I wanted Cornflakes!\"  Tells me that she dislikes the Glucerna - several unopened bottles in her room  States she drinks a different supplement at home  Likes yogurt    Per flowsheets, meal intake has averaged ~50%      NEW FINDINGS:     10/25: WOCN   Sacrum: Unstageable CAPI  s/s fall - evolving with eschar lip over wound bed   Rt buttock: linear line of superficial wound unknown Etiology. No local s/s infection- improving    10/26: Sacral decubitus ulcer necrotic eschar was debrided at bedside     Wt appears stable:    10/26/22 1939 76 kg (167 lb 8.8 oz)   10/19/22 0430 76 kg (167 lb 8.8 oz)   10/18/22 1700 76.2 kg (167 lb 15.9 oz)       10/19/22 : 76 kg (167 lb 8.8 oz)  09/12/22 : 71.9 kg (158 lb 9.6 oz)  09/09/22 : 70.2 kg (154 lb 11.2 oz)  08/31/22 : 69.9 kg (154 lb)  08/02/22 : 69.6 kg (153 lb 8 oz)  07/26/22 : 70.8 kg (156 lb)  07/19/22 : 69.9 kg (154 lb)  07/18/22 : 72.1 kg (159 lb)  06/21/22 : 72.6 kg (160 lb)  06/07/22 : 71.5 " kg (157 lb 9.6 oz)      Previous Goals (10/25):   Patient to consume >75% of nutritionally adequate meals BID with supplements over the next 5-7 days.  Evaluation: Not met    Previous Nutrition Diagnosis (10/25):   Inadequate oral intake related to poor appetite and altered sense of taste secondary to COVID as evidenced by 50% intakes documented this admit, need for ONS  Evaluation: No change, modified below      MALNUTRITION  % Weight Loss:  None noted  % Intake:  <75% for > 7 days (moderate malnutrition)  Subcutaneous Fat Loss:  None observed  Muscle Loss:  Temporal region - mild depletion and Clavicle bone region  - mild/moderate depletion  Fluid Retention:  None noted    Malnutrition Diagnosis: Moderate malnutrition  In Context of:  Acute illness or injury    CURRENT NUTRITION DIAGNOSIS  Inadequate oral intake related to fair appetite and dislike of nutrition supplement as evidenced by pt eating ~50% meals on average and not taking Glucerna supplement    INTERVENTIONS  Recommendations / Nutrition Prescription  Moderate CHO diet    Will make pt Room Service with Assist - she will be seen daily for meal ordering  Will cancel the Glucerna supplement - encouraged her to have  bring in her preferred brand  Daily multivitamin for healing       Goals  Pt to consume 75% meals consistently      MONITORING AND EVALUATION:  Progress towards goals will be monitored and evaluated per protocol and Practice Guidelines         The patient is a 89y Male complaining of shortness of breath.

## 2022-12-27 ENCOUNTER — NON-APPOINTMENT (OUTPATIENT)
Age: 87
End: 2022-12-27

## 2022-12-27 ENCOUNTER — APPOINTMENT (OUTPATIENT)
Dept: CARDIOLOGY | Facility: CLINIC | Age: 87
End: 2022-12-27
Payer: MEDICARE

## 2022-12-27 PROCEDURE — 93294 REM INTERROG EVL PM/LDLS PM: CPT

## 2022-12-27 PROCEDURE — 93296 REM INTERROG EVL PM/IDS: CPT

## 2023-03-27 ENCOUNTER — APPOINTMENT (OUTPATIENT)
Dept: ELECTROPHYSIOLOGY | Facility: CLINIC | Age: 88
End: 2023-03-27
Payer: MEDICARE

## 2023-03-27 VITALS
HEART RATE: 61 BPM | DIASTOLIC BLOOD PRESSURE: 78 MMHG | BODY MASS INDEX: 23.19 KG/M2 | SYSTOLIC BLOOD PRESSURE: 122 MMHG | WEIGHT: 171 LBS

## 2023-03-27 PROCEDURE — 93000 ELECTROCARDIOGRAM COMPLETE: CPT | Mod: 59

## 2023-03-27 PROCEDURE — 99213 OFFICE O/P EST LOW 20 MIN: CPT

## 2023-03-27 PROCEDURE — 93280 PM DEVICE PROGR EVAL DUAL: CPT

## 2023-03-27 NOTE — CARDIOLOGY SUMMARY
[de-identified] : 3/27/2023: AF 61 bpm, intermittent V-paced\par 3/26/2022: AF 68 bpm, intermittent V-paced\par 3/21/22: AF 67 bpm, PVC, QRS 76ms, QTc 402ms\par ECG (11/8/2021): Afib at 76 bpm \par ECG (9/20/2021): Afib at 64 bpm, normal QRS, QTc 438 ms \par ECG (8/9/2021)69 bpm atrial fib. QRS 78ms, QTC 426ms [de-identified] : ECHO ( 6/25/2021) normal EF mild to moderate MR and TR. Bioprosthetic aortic valve

## 2023-03-27 NOTE — PROCEDURE
[No] : not [Atrial Fibrillation] : atrial fibrillation [See Device Printout] : See device printout [Pacemaker] : pacemaker [Voltage: ___ volts] : Voltage was [unfilled] volts [Magnet Rate: ___ Ppm] : magnet rate was [unfilled] Ppm [Longevity: ___ months] : The estimated remaining battery life is [unfilled] months [Lead Imp:  ___ohms] : lead impedance was [unfilled] ohms [Sensing Amplitude ___mv] : sensing amplitude was [unfilled] mv [___V @] : [unfilled] V [___ ms] : [unfilled] ms [None] : none [Apace-Vpace ___ %] : Apace-Vpace [unfilled]% [de-identified] : Abbott [de-identified] : [id number] [de-identified] : [id number] [de-identified] : 06/28/2021 [de-identified] : DDIR [de-identified] : 60 [de-identified] : 8.6 years [de-identified] : AT/AF [place] >99%\par : 68%\par Transmitting on merlin\par 2 Brief NSVT episodes, longest 10 beats ~2-3 seconds max  V 202 bpm

## 2023-03-27 NOTE — DISCUSSION/SUMMARY
[Pacemaker Function Normal] : normal pacemaker function [Patient] : the patient [FreeTextEntry1] : Mr. Roly Nielson is a 89 year-old man with hypertension, hyperlipidemia, hypothyroidism, coronary artery disease, history of myocardial infarction, transient ischemic attack (TIA) in March 2017, and persistent atrial fibrillation. After TIA in March 2017 he underwent the placement of an implantable loop recorder on 3/22/2017. His CHADSVASC score is 6. He was placed on Eliquis 5 mg q12h. He reports no s/s bleeding.\par \par I recommend that patient continue Eliquis 2.5mg twice daily. Will order blood work at [facility].\par \par I interrogated and reprogrammed his device as described in procedure. See device section for details. \par His wound is healed properly, with no signs of inflammation, infection or bleeding.\par He is enrolled in remote monitoring services and transmitting appropriately.    \par His BP is well controlled.\par \par He will return to the office in 6-9 months. Please do not hesitate to contact us at [phone] if you have any further questions regarding this patient care.

## 2023-03-27 NOTE — PHYSICAL EXAM
[General Appearance - Well Developed] : well developed [General Appearance - Well Nourished] : well nourished [Arterial Pulses Normal] : the arterial pulses were normal [Respiration, Rhythm And Depth] : normal respiratory rhythm and effort [Left Infraclavicular] : left infraclavicular area [Clean] : clean [Dry] : dry [Well-Healed] : well-healed [Abdomen Soft] : soft [Nail Clubbing] : no clubbing of the fingernails [Cyanosis, Localized] : no localized cyanosis [FreeTextEntry1] : Irregularly irregular

## 2023-03-27 NOTE — HISTORY OF PRESENT ILLNESS
[de-identified] : Cardio: Dr. Romo\par Nephro: Dr. 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 Patient opted to leave it alone.\par Patient has no complaints. As per wife, patient is "slowing down". He does not have as much energy as he used to. \par \par Patient presents for routine device interrogation. \par Accompanied by wife. \par Denies any sob, WALLACE, PND, orthopnea, no palpitations, no dizziness,lightheadedness, denies chest pains

## 2023-06-30 ENCOUNTER — APPOINTMENT (OUTPATIENT)
Dept: CARDIOLOGY | Facility: CLINIC | Age: 88
End: 2023-06-30
Payer: MEDICARE

## 2023-06-30 ENCOUNTER — NON-APPOINTMENT (OUTPATIENT)
Age: 88
End: 2023-06-30

## 2023-06-30 PROCEDURE — 93296 REM INTERROG EVL PM/IDS: CPT

## 2023-06-30 PROCEDURE — 93294 REM INTERROG EVL PM/LDLS PM: CPT

## 2023-09-29 ENCOUNTER — NON-APPOINTMENT (OUTPATIENT)
Age: 88
End: 2023-09-29

## 2023-09-29 ENCOUNTER — APPOINTMENT (OUTPATIENT)
Dept: CARDIOLOGY | Facility: CLINIC | Age: 88
End: 2023-09-29
Payer: MEDICARE

## 2023-09-29 PROCEDURE — 93294 REM INTERROG EVL PM/LDLS PM: CPT

## 2023-09-29 PROCEDURE — 93296 REM INTERROG EVL PM/IDS: CPT

## 2023-10-04 ENCOUNTER — RX RENEWAL (OUTPATIENT)
Age: 88
End: 2023-10-04

## 2023-11-06 ENCOUNTER — APPOINTMENT (OUTPATIENT)
Dept: ELECTROPHYSIOLOGY | Facility: CLINIC | Age: 88
End: 2023-11-06
Payer: MEDICARE

## 2023-11-06 VITALS
SYSTOLIC BLOOD PRESSURE: 133 MMHG | WEIGHT: 173 LBS | BODY MASS INDEX: 23.43 KG/M2 | DIASTOLIC BLOOD PRESSURE: 71 MMHG | TEMPERATURE: 98 F | HEIGHT: 72 IN

## 2023-11-06 PROCEDURE — 93280 PM DEVICE PROGR EVAL DUAL: CPT

## 2023-11-06 PROCEDURE — 99213 OFFICE O/P EST LOW 20 MIN: CPT

## 2023-11-06 RX ORDER — ASPIRIN/ACETAMINOPHEN/CAFFEINE 250-250-65
325 (65 FE) TABLET ORAL DAILY
Refills: 0 | Status: ACTIVE | COMMUNITY

## 2024-01-31 ENCOUNTER — RX RENEWAL (OUTPATIENT)
Age: 89
End: 2024-01-31

## 2024-02-06 ENCOUNTER — NON-APPOINTMENT (OUTPATIENT)
Age: 89
End: 2024-02-06

## 2024-02-06 ENCOUNTER — APPOINTMENT (OUTPATIENT)
Dept: CARDIOLOGY | Facility: CLINIC | Age: 89
End: 2024-02-06
Payer: MEDICARE

## 2024-02-06 PROCEDURE — 93296 REM INTERROG EVL PM/IDS: CPT

## 2024-02-06 PROCEDURE — 93294 REM INTERROG EVL PM/LDLS PM: CPT

## 2024-05-06 ENCOUNTER — NON-APPOINTMENT (OUTPATIENT)
Age: 89
End: 2024-05-06

## 2024-05-07 ENCOUNTER — APPOINTMENT (OUTPATIENT)
Dept: CARDIOLOGY | Facility: CLINIC | Age: 89
End: 2024-05-07
Payer: MEDICARE

## 2024-05-07 PROCEDURE — 93294 REM INTERROG EVL PM/LDLS PM: CPT

## 2024-05-07 PROCEDURE — 93296 REM INTERROG EVL PM/IDS: CPT

## 2024-06-17 ENCOUNTER — APPOINTMENT (OUTPATIENT)
Dept: ELECTROPHYSIOLOGY | Facility: CLINIC | Age: 89
End: 2024-06-17
Payer: MEDICARE

## 2024-06-17 VITALS
BODY MASS INDEX: 21.94 KG/M2 | TEMPERATURE: 97.1 F | HEIGHT: 72 IN | HEART RATE: 71 BPM | WEIGHT: 162 LBS | SYSTOLIC BLOOD PRESSURE: 128 MMHG | DIASTOLIC BLOOD PRESSURE: 70 MMHG

## 2024-06-17 DIAGNOSIS — I48.91 UNSPECIFIED ATRIAL FIBRILLATION: ICD-10-CM

## 2024-06-17 DIAGNOSIS — R00.1 BRADYCARDIA, UNSPECIFIED: ICD-10-CM

## 2024-06-17 DIAGNOSIS — Z45.018 ENCOUNTER FOR ADJUSTMENT AND MANAGEMENT OF OTHER PART OF CARDIAC PACEMAKER: ICD-10-CM

## 2024-06-17 DIAGNOSIS — I10 ESSENTIAL (PRIMARY) HYPERTENSION: ICD-10-CM

## 2024-06-17 PROCEDURE — 93280 PM DEVICE PROGR EVAL DUAL: CPT

## 2024-06-17 PROCEDURE — 99214 OFFICE O/P EST MOD 30 MIN: CPT

## 2024-06-17 PROCEDURE — 93000 ELECTROCARDIOGRAM COMPLETE: CPT | Mod: 59

## 2024-06-17 RX ORDER — APIXABAN 2.5 MG/1
2.5 TABLET, FILM COATED ORAL
Qty: 180 | Refills: 0 | Status: ACTIVE | COMMUNITY
Start: 2022-03-21

## 2024-06-17 RX ORDER — LEVETIRACETAM 250 MG/1
250 TABLET, FILM COATED ORAL TWICE DAILY
Refills: 0 | Status: ACTIVE | COMMUNITY

## 2024-06-17 RX ORDER — FOLIC ACID 1 MG/1
1 TABLET ORAL DAILY
Refills: 0 | Status: ACTIVE | COMMUNITY

## 2024-06-17 RX ORDER — ATORVASTATIN CALCIUM 80 MG/1
80 TABLET, FILM COATED ORAL DAILY
Refills: 0 | Status: ACTIVE | COMMUNITY

## 2024-06-17 RX ORDER — LEVOTHYROXINE SODIUM 0.07 MG/1
75 TABLET ORAL DAILY
Refills: 0 | Status: ACTIVE | COMMUNITY

## 2024-06-17 RX ORDER — LISINOPRIL 5 MG/1
5 TABLET ORAL DAILY
Refills: 0 | Status: ACTIVE | COMMUNITY

## 2024-06-17 RX ORDER — ASPIRIN 81 MG
81 TABLET, DELAYED RELEASE (ENTERIC COATED) ORAL DAILY
Refills: 0 | Status: ACTIVE | COMMUNITY

## 2024-06-17 RX ORDER — MAGNESIUM OXIDE 241.3 MG/1000MG
400 TABLET ORAL TWICE DAILY
Refills: 0 | Status: ACTIVE | COMMUNITY
Start: 2020-06-12

## 2024-06-17 RX ORDER — FUROSEMIDE 20 MG/1
20 TABLET ORAL TWICE DAILY
Refills: 0 | Status: ACTIVE | COMMUNITY

## 2024-06-17 RX ORDER — TAMSULOSIN HYDROCHLORIDE 0.4 MG/1
0.4 CAPSULE ORAL DAILY
Refills: 0 | Status: ACTIVE | COMMUNITY

## 2024-06-17 RX ORDER — ESOMEPRAZOLE MAGNESIUM 20 MG/1
20 CAPSULE, DELAYED RELEASE ORAL DAILY
Refills: 0 | Status: ACTIVE | COMMUNITY

## 2024-06-17 NOTE — HISTORY OF PRESENT ILLNESS
[de-identified] : Cardio: Dr. Romo Nephro: Dr. Rosas  89 year old male with AFib, Hyperlipidemia, HTN, CAD, MI, TIA in 2017, Symptomatic bradycardia, s/p Dual Chamber PPM implant 6/28/2021, presents today for routine device interrogation.  Of note, patient has EOS ILR still in situ. Patient opted to leave it alone.  The patient is feeling well and has no cardiac complaints. Denies CP, palpitations, SOB, dizziness, WALLACE, PND, syncope.  Of note, patient will be moving to [place], [place] next month.

## 2024-06-17 NOTE — PHYSICAL EXAM
[Normal Appearance] : normal appearance [General Appearance - In No Acute Distress] : no acute distress [Heart Sounds] : normal S1 and S2 [Edema] : no peripheral edema present [] : no respiratory distress [Respiration, Rhythm And Depth] : normal respiratory rhythm and effort [Exaggerated Use Of Accessory Muscles For Inspiration] : no accessory muscle use [Left Infraclavicular] : left infraclavicular area [Clean] : clean [Dry] : dry [Well-Healed] : well-healed [Nail Clubbing] : no clubbing of the fingernails [Cyanosis, Localized] : no localized cyanosis [FreeTextEntry1] : Irregularly irregular

## 2024-06-17 NOTE — CARDIOLOGY SUMMARY
[de-identified] : 6/17/2024: AF, 71 bpm, intermittent V-paced 3/27/2023: AF 61 bpm, intermittent V-paced 3/26/2022: AF 68 bpm, intermittent V-paced 3/21/22: AF 67 bpm, PVC, QRS 76ms, QTc 402ms ECG (11/8/2021): Afib at 76 bpm  ECG (9/20/2021): Afib at 64 bpm, normal QRS, QTc 438 ms  ECG (8/9/2021)69 bpm atrial fib. QRS 78ms, QTC 426ms [de-identified] : ECHO ( 6/25/2021) normal EF mild to moderate MR and TR. Bioprosthetic aortic valve

## 2024-06-17 NOTE — DISCUSSION/SUMMARY
[FreeTextEntry1] : Mr. Roly Nielson is a 89 year-old man with Hypertension, Hyperlipidemia, Hypothyroidism, CAD with a history of myocardial infarction, transient ischemic attack (TIA) in March 2017, and persistent atrial fibrillation. After TIA in March 2017 he underwent the placement of an implantable loop recorder on 3/22/2017. His CHADSVASC score is 6. He was placed on Eliquis 5 mg q12h. He reports no s/s bleeding.  I recommend that patient continue Eliquis 2.5mg twice daily.  Labs from 5/23/2024 reviewed: Hgb 11.8 (baseline) and Cr. 1.8.  I interrogated his device as described in the procedure. See device section for details.  4 brief NSVT episodes of NSVT. The patient denies palpitations. Will consider a beta blocker if more episodes occur.  Noise reversion episodes are due to course AF - not true noise. Device reprogrammed to VVI.   He is enrolled in remote monitoring services and transmitting appropriately.     His BP is well controlled.  Follow-up via remote as patient is moving. He was advised that when he finds a new provider for his device, to let our office know so we can transfer remote care.

## 2024-06-17 NOTE — PROCEDURE
[No] : not [Atrial Fibrillation] : atrial fibrillation [See Device Printout] : See device printout [Pacemaker] : pacemaker [Voltage: ___ volts] : Voltage was [unfilled] volts [Magnet Rate: ___ Ppm] : magnet rate was [unfilled] Ppm [Longevity: ___ months] : The estimated remaining battery life is [unfilled] months [Threshold Testing Performed] : Threshold testing was performed [Lead Imp:  ___ohms] : lead impedance was [unfilled] ohms [Sensing Amplitude ___mv] : sensing amplitude was [unfilled] mv [___V @] : [unfilled] V [___ ms] : [unfilled] ms [Programmed for Longevity] : output reprogrammed for improved battery longevity [de-identified] : [employer] [de-identified] : 2347 [de-identified] : [id number] [de-identified] : DDIR [de-identified] : 06/28/2021 [de-identified] : 60 [de-identified] : 7.0 years [de-identified] : Changed to VVI mode [de-identified] : Normal device function. AT/AF [place] >99% : 71%, AP<1% Transmitting on Merlin 4 brief NSVT Noise reversion episodes are due to AF, not true noise

## 2024-12-20 NOTE — SWALLOW BEDSIDE ASSESSMENT ADULT - POSITIONING
Explained policies and procedures of an echocardiogram/doppler study.  
upright (90 degrees)
upright (90 degrees)

## 2025-02-05 NOTE — PRE-ANESTHESIA EVALUATION ADULT - BSA (M2)
2.07 Bean is a 40 year old who is being evaluated via a billable video visit.    How would you like to obtain your AVS? MyChart  If the video visit is dropped, the invitation should be resent by: Text to cell phone: [phone]  Will anyone else be joining your video visit? No    Assessment & Plan   Alcohol dependence, continuous (H)  Unfortunately, it sounds like Bean has relapsed as he has been trying to heal from his hospitalization 2 months ago related to alcohol dependence.  I discussed both inpatient and outpatient options with the patient and his wife today.  After discussion, it sounds like the patient will present to an emergency room today for proper assessment and to discuss another course of detox.  I will hold off on completing this return to work form as I do not feel like the patient is currently able to return to work. I did assist him in scheduling an appointment with our excellent Addiction Medicine clinic next week as I do think the would benefit from plugging into that clinic.  - Adult Mental Health  Referral; Future    Signed Electronically by:  Sanjay Del Castillo MD, MPH  [facility]  Internal Medicine    The longitudinal plan of care for the diagnosis(es)/condition(s) as documented were addressed during this visit. Due to the added complexity in care, I will continue to support Bean in the subsequent management and with ongoing continuity of care.    Subjective   Bean is a 40 year old accompanied by his wife to discuss: return to work.  My last visit with Bean was roughly 1 month ago.  At that time, he reported his alcohol dependence was in early remission.  He was attending AA meetings with regularity and had not drank any alcohol.  I did complete his short-term disability at that time.  In between then and now, he did request an extension and that paperwork was filled out as well.  Today's visit was supposed to be related to completing return  to work paperwork.  Bean did drop off that paperwork earlier today and by all accounts was ready to return to work without restriction.  At the beginning of the visit, he asked me to speak to his wife who is also present in the parked car that they were doing the visit from and the rest of the visit was largely done between patient's wife and myself.  His wife reports that Bean has been continuing to drink throughout most of the last few weeks, and has struggled maintaining his sobriety.  She does not feel like he is able to go back to work, but more pressing than that is concerned about his current health getting his heavy drinking.  She is wondering about next steps to take both today and in the near term future.      Objective     Vitals: No vitals were obtained today due to virtual visit.    Physical Exam   GENERAL: alert and no distress  EYES: Eyes grossly normal to inspection.  No discharge or erythema, or obvious scleral/conjunctival abnormalities.  RESP: No audible wheeze, cough, or visible cyanosis.    SKIN: Visible skin clear. No significant rash, abnormal pigmentation or lesions.  NEURO: Cranial nerves grossly intact.  Mentation and speech appropriate for age.  PSYCH: Appropriate affect, tone, and pace of words    Video-Visit Details  Type of service: Video Visit   Originating Location (pt. Location): Home  Distant Location (provider location):  On-site  Platform used for Video Visit: [employer]

## 2025-04-29 NOTE — PATIENT PROFILE ADULT. - NS PRO AD NO ADVANCE DIRECTIVE
Post-op Phone Call Follow-up  Dr Trinidad Stephenson is 4 days s/p .     I called the pt today to see how they were doing post-operatively.  The pt's pain is 0 and is not taking anything for pain  Pt's incisions are doing well. Denies erythema, swelling or drainage.   Pt is tolerating eating without N/V, and is  having bowel movements.   I reviewed the post-operative instructions, addressed any concerns and answered the patient's questions.     I confirmed the patient's post-op appointment on 4/29/2025        HA RESENDIZ LPN   
No

## 2025-05-28 NOTE — ED ADULT TRIAGE NOTE - HEART RATE (BEATS/MIN)
Oncology Social Work  OSW will follow to make a referral to [facility] for medication management for anxiety and depression and will follow up on a living will and Advanced Care Planning needs for the patient.  SEAN Valdez   78